# Patient Record
Sex: FEMALE | Race: WHITE | Employment: UNEMPLOYED | URBAN - METROPOLITAN AREA
[De-identification: names, ages, dates, MRNs, and addresses within clinical notes are randomized per-mention and may not be internally consistent; named-entity substitution may affect disease eponyms.]

---

## 2017-04-03 ENCOUNTER — OFFICE VISIT (OUTPATIENT)
Dept: SURGERY | Age: 37
End: 2017-04-03

## 2017-04-03 VITALS
HEIGHT: 63 IN | DIASTOLIC BLOOD PRESSURE: 85 MMHG | WEIGHT: 170 LBS | BODY MASS INDEX: 30.12 KG/M2 | SYSTOLIC BLOOD PRESSURE: 134 MMHG | HEART RATE: 85 BPM

## 2017-04-03 DIAGNOSIS — B35.1 ONYCHOMYCOSIS: Primary | ICD-10-CM

## 2017-04-03 LAB
ANION GAP SERPL CALCULATED.3IONS-SCNC: 13 MEQ/L (ref 7–13)
BUN BLDV-MCNC: 8 MG/DL (ref 6–20)
CALCIUM SERPL-MCNC: 9 MG/DL (ref 8.6–10.2)
CHLORIDE BLD-SCNC: 102 MEQ/L (ref 98–107)
CO2: 21 MEQ/L (ref 22–29)
CREAT SERPL-MCNC: 0.48 MG/DL (ref 0.5–0.9)
GFR AFRICAN AMERICAN: >60
GFR NON-AFRICAN AMERICAN: >60
GLUCOSE BLD-MCNC: 221 MG/DL (ref 74–109)
GLUCOSE BLD-MCNC: 300 MG/DL
HBA1C MFR BLD: 7.1 %
POTASSIUM SERPL-SCNC: 4.1 MEQ/L (ref 3.5–5.1)
SODIUM BLD-SCNC: 136 MEQ/L (ref 132–144)

## 2017-04-03 PROCEDURE — 82962 GLUCOSE BLOOD TEST: CPT | Performed by: INTERNAL MEDICINE

## 2017-04-03 PROCEDURE — 83036 HEMOGLOBIN GLYCOSYLATED A1C: CPT | Performed by: INTERNAL MEDICINE

## 2017-04-03 PROCEDURE — 99213 OFFICE O/P EST LOW 20 MIN: CPT | Performed by: INTERNAL MEDICINE

## 2017-04-03 ASSESSMENT — ENCOUNTER SYMPTOMS: VISUAL CHANGE: 0

## 2017-08-22 ENCOUNTER — OFFICE VISIT (OUTPATIENT)
Dept: SURGERY | Age: 37
End: 2017-08-22

## 2017-08-22 VITALS
SYSTOLIC BLOOD PRESSURE: 126 MMHG | WEIGHT: 158 LBS | DIASTOLIC BLOOD PRESSURE: 88 MMHG | BODY MASS INDEX: 28 KG/M2 | HEART RATE: 92 BPM | HEIGHT: 63 IN

## 2017-08-22 LAB
ANION GAP SERPL CALCULATED.3IONS-SCNC: 15 MEQ/L (ref 7–13)
BUN BLDV-MCNC: 6 MG/DL (ref 6–20)
CALCIUM SERPL-MCNC: 9.2 MG/DL (ref 8.6–10.2)
CHLORIDE BLD-SCNC: 99 MEQ/L (ref 98–107)
CO2: 24 MEQ/L (ref 22–29)
CREAT SERPL-MCNC: 0.4 MG/DL (ref 0.5–0.9)
CREATININE URINE: 82.5 MG/DL
GFR AFRICAN AMERICAN: >60
GFR NON-AFRICAN AMERICAN: >60
GLUCOSE BLD-MCNC: 134 MG/DL (ref 74–109)
GLUCOSE BLD-MCNC: 146 MG/DL
HBA1C MFR BLD: 6.6 %
MICROALBUMIN UR-MCNC: <1.2 MG/DL
MICROALBUMIN/CREAT UR-RTO: NORMAL MG/G (ref 0–30)
POTASSIUM SERPL-SCNC: 3.9 MEQ/L (ref 3.5–5.1)
SODIUM BLD-SCNC: 138 MEQ/L (ref 132–144)

## 2017-08-22 PROCEDURE — 83036 HEMOGLOBIN GLYCOSYLATED A1C: CPT | Performed by: INTERNAL MEDICINE

## 2017-08-22 PROCEDURE — 82962 GLUCOSE BLOOD TEST: CPT | Performed by: INTERNAL MEDICINE

## 2017-08-22 PROCEDURE — 99213 OFFICE O/P EST LOW 20 MIN: CPT | Performed by: INTERNAL MEDICINE

## 2017-09-05 ENCOUNTER — HOSPITAL ENCOUNTER (EMERGENCY)
Age: 37
Discharge: HOME | End: 2017-09-06
Payer: COMMERCIAL

## 2017-09-05 DIAGNOSIS — E86.0 DEHYDRATION: ICD-10-CM

## 2017-09-05 DIAGNOSIS — R00.0 TACHYCARDIA: Primary | ICD-10-CM

## 2017-09-05 LAB
ALBUMIN SERPL-MCNC: 3.4 G/DL (ref 3.9–4.9)
ALP BLD-CCNC: 42 U/L (ref 40–130)
ALT SERPL-CCNC: 9 U/L (ref 0–33)
AMPHETAMINE SCREEN, URINE: ABNORMAL
ANION GAP SERPL CALCULATED.3IONS-SCNC: 16 MEQ/L (ref 7–13)
AST SERPL-CCNC: 10 U/L (ref 0–35)
BARBITURATE SCREEN URINE: ABNORMAL
BASOPHILS ABSOLUTE: 0 K/UL (ref 0–0.2)
BASOPHILS RELATIVE PERCENT: 0.3 %
BENZODIAZEPINE SCREEN, URINE: ABNORMAL
BILIRUB SERPL-MCNC: 0.1 MG/DL (ref 0–1.2)
BILIRUBIN URINE: NEGATIVE
BLOOD, URINE: NEGATIVE
BUN BLDV-MCNC: 8 MG/DL (ref 6–20)
CALCIUM SERPL-MCNC: 8.9 MG/DL (ref 8.6–10.2)
CANNABINOID SCREEN URINE: POSITIVE
CHLORIDE BLD-SCNC: 102 MEQ/L (ref 98–107)
CLARITY: CLEAR
CO2: 19 MEQ/L (ref 22–29)
COCAINE METABOLITE SCREEN URINE: ABNORMAL
COLOR: YELLOW
CREAT SERPL-MCNC: 0.44 MG/DL (ref 0.5–0.9)
EOSINOPHILS ABSOLUTE: 0.2 K/UL (ref 0–0.7)
EOSINOPHILS RELATIVE PERCENT: 1.7 %
ETHANOL PERCENT: NORMAL G/DL
ETHANOL: <10 MG/DL (ref 0–0.08)
GFR AFRICAN AMERICAN: >60
GFR NON-AFRICAN AMERICAN: >60
GLOBULIN: 2.4 G/DL (ref 2.3–3.5)
GLUCOSE BLD-MCNC: 219 MG/DL (ref 74–109)
GLUCOSE URINE: >=1000 MG/DL
HCT VFR BLD CALC: 37.1 % (ref 37–47)
HEMOGLOBIN: 13 G/DL (ref 12–16)
KETONES, URINE: NEGATIVE MG/DL
LACTIC ACID: 2.3 MMOL/L (ref 0.5–2.2)
LEUKOCYTE ESTERASE, URINE: NEGATIVE
LYMPHOCYTES ABSOLUTE: 2.1 K/UL (ref 1–4.8)
LYMPHOCYTES RELATIVE PERCENT: 21.4 %
Lab: ABNORMAL
MAGNESIUM: 1.8 MG/DL (ref 1.7–2.3)
MCH RBC QN AUTO: 31.5 PG (ref 27–31.3)
MCHC RBC AUTO-ENTMCNC: 34.9 % (ref 33–37)
MCV RBC AUTO: 90.1 FL (ref 82–100)
MONOCYTES ABSOLUTE: 0.6 K/UL (ref 0.2–0.8)
MONOCYTES RELATIVE PERCENT: 6.1 %
NEUTROPHILS ABSOLUTE: 7 K/UL (ref 1.4–6.5)
NEUTROPHILS RELATIVE PERCENT: 70.5 %
NITRITE, URINE: NEGATIVE
OPIATE SCREEN URINE: ABNORMAL
PDW BLD-RTO: 12.2 % (ref 11.5–14.5)
PH UA: 6 (ref 5–9)
PHENCYCLIDINE SCREEN URINE: ABNORMAL
PLATELET # BLD: 261 K/UL (ref 130–400)
POTASSIUM SERPL-SCNC: 3.9 MEQ/L (ref 3.5–5.1)
PROTEIN UA: NEGATIVE MG/DL
RBC # BLD: 4.12 M/UL (ref 4.2–5.4)
SODIUM BLD-SCNC: 137 MEQ/L (ref 132–144)
SPECIFIC GRAVITY UA: 1.02 (ref 1–1.03)
TOTAL CK: 59 U/L (ref 0–170)
TOTAL PROTEIN: 5.8 G/DL (ref 6.4–8.1)
TRICYCLIC, URINE: ABNORMAL
TROPONIN: <0.01 NG/ML (ref 0–0.01)
URINE REFLEX TO CULTURE: ABNORMAL
UROBILINOGEN, URINE: 0.2 E.U./DL
WBC # BLD: 9.9 K/UL (ref 4.8–10.8)

## 2017-09-05 PROCEDURE — 2580000003 HC RX 258: Performed by: PHYSICIAN ASSISTANT

## 2017-09-05 PROCEDURE — 87040 BLOOD CULTURE FOR BACTERIA: CPT

## 2017-09-05 PROCEDURE — 83735 ASSAY OF MAGNESIUM: CPT

## 2017-09-05 PROCEDURE — 83605 ASSAY OF LACTIC ACID: CPT

## 2017-09-05 PROCEDURE — 84484 ASSAY OF TROPONIN QUANT: CPT

## 2017-09-05 PROCEDURE — 36415 COLL VENOUS BLD VENIPUNCTURE: CPT

## 2017-09-05 PROCEDURE — 93005 ELECTROCARDIOGRAM TRACING: CPT

## 2017-09-05 PROCEDURE — 81003 URINALYSIS AUTO W/O SCOPE: CPT

## 2017-09-05 PROCEDURE — 96361 HYDRATE IV INFUSION ADD-ON: CPT

## 2017-09-05 PROCEDURE — 82550 ASSAY OF CK (CPK): CPT

## 2017-09-05 PROCEDURE — 80307 DRUG TEST PRSMV CHEM ANLYZR: CPT

## 2017-09-05 PROCEDURE — 80053 COMPREHEN METABOLIC PANEL: CPT

## 2017-09-05 PROCEDURE — G0480 DRUG TEST DEF 1-7 CLASSES: HCPCS

## 2017-09-05 PROCEDURE — 99284 EMERGENCY DEPT VISIT MOD MDM: CPT

## 2017-09-05 PROCEDURE — 85025 COMPLETE CBC W/AUTO DIFF WBC: CPT

## 2017-09-05 PROCEDURE — 96360 HYDRATION IV INFUSION INIT: CPT

## 2017-09-05 RX ORDER — 0.9 % SODIUM CHLORIDE 0.9 %
1000 INTRAVENOUS SOLUTION INTRAVENOUS ONCE
Status: COMPLETED | OUTPATIENT
Start: 2017-09-05 | End: 2017-09-06

## 2017-09-05 RX ADMIN — SODIUM CHLORIDE 1000 ML: 9 INJECTION, SOLUTION INTRAVENOUS at 23:04

## 2017-09-05 ASSESSMENT — ENCOUNTER SYMPTOMS
EYE PAIN: 0
COUGH: 0
CHOKING: 0
SHORTNESS OF BREATH: 0
ABDOMINAL PAIN: 0
ALLERGIC/IMMUNOLOGIC NEGATIVE: 1
COLOR CHANGE: 0
APNEA: 0
TROUBLE SWALLOWING: 0

## 2017-09-06 VITALS
HEIGHT: 63 IN | OXYGEN SATURATION: 99 % | BODY MASS INDEX: 29.23 KG/M2 | DIASTOLIC BLOOD PRESSURE: 85 MMHG | RESPIRATION RATE: 16 BRPM | TEMPERATURE: 98.5 F | SYSTOLIC BLOOD PRESSURE: 131 MMHG | HEART RATE: 79 BPM | WEIGHT: 165 LBS

## 2017-09-06 LAB
EKG ATRIAL RATE: 80 BPM
EKG P AXIS: 50 DEGREES
EKG P-R INTERVAL: 140 MS
EKG Q-T INTERVAL: 354 MS
EKG QRS DURATION: 92 MS
EKG QTC CALCULATION (BAZETT): 408 MS
EKG R AXIS: 74 DEGREES
EKG T AXIS: 44 DEGREES
EKG VENTRICULAR RATE: 80 BPM

## 2017-09-06 PROCEDURE — 2580000003 HC RX 258: Performed by: PHYSICIAN ASSISTANT

## 2017-09-06 RX ORDER — 0.9 % SODIUM CHLORIDE 0.9 %
1000 INTRAVENOUS SOLUTION INTRAVENOUS ONCE
Status: COMPLETED | OUTPATIENT
Start: 2017-09-06 | End: 2017-09-06

## 2017-09-06 RX ADMIN — SODIUM CHLORIDE 1000 ML: 9 INJECTION, SOLUTION INTRAVENOUS at 00:38

## 2017-09-11 LAB
BLOOD CULTURE, ROUTINE: NORMAL
CULTURE, BLOOD 2: NORMAL

## 2017-09-22 ENCOUNTER — APPOINTMENT (OUTPATIENT)
Dept: GENERAL RADIOLOGY | Age: 37
End: 2017-09-22
Payer: COMMERCIAL

## 2017-09-22 ENCOUNTER — HOSPITAL ENCOUNTER (EMERGENCY)
Age: 37
Discharge: HOME | End: 2017-09-22
Attending: EMERGENCY MEDICINE
Payer: COMMERCIAL

## 2017-09-22 VITALS
RESPIRATION RATE: 23 BRPM | HEART RATE: 100 BPM | OXYGEN SATURATION: 98 % | HEIGHT: 63 IN | DIASTOLIC BLOOD PRESSURE: 79 MMHG | WEIGHT: 167 LBS | TEMPERATURE: 98.8 F | BODY MASS INDEX: 29.59 KG/M2 | SYSTOLIC BLOOD PRESSURE: 135 MMHG

## 2017-09-22 DIAGNOSIS — I47.1 PAROXYSMAL SUPRAVENTRICULAR TACHYCARDIA (HCC): Primary | ICD-10-CM

## 2017-09-22 LAB
ALBUMIN SERPL-MCNC: 3.8 G/DL (ref 3.9–4.9)
ALP BLD-CCNC: 41 U/L (ref 40–130)
ALT SERPL-CCNC: 11 U/L (ref 0–33)
ANION GAP SERPL CALCULATED.3IONS-SCNC: 16 MEQ/L (ref 7–13)
AST SERPL-CCNC: 12 U/L (ref 0–35)
BASOPHILS ABSOLUTE: 0 K/UL (ref 0–0.2)
BASOPHILS RELATIVE PERCENT: 0.2 %
BILIRUB SERPL-MCNC: 0.3 MG/DL (ref 0–1.2)
BUN BLDV-MCNC: 8 MG/DL (ref 6–20)
CALCIUM SERPL-MCNC: 9.6 MG/DL (ref 8.6–10.2)
CHLORIDE BLD-SCNC: 99 MEQ/L (ref 98–107)
CO2: 19 MEQ/L (ref 22–29)
CREAT SERPL-MCNC: 0.45 MG/DL (ref 0.5–0.9)
EOSINOPHILS ABSOLUTE: 0.3 K/UL (ref 0–0.7)
EOSINOPHILS RELATIVE PERCENT: 2 %
GFR AFRICAN AMERICAN: >60
GFR NON-AFRICAN AMERICAN: >60
GLOBULIN: 2.7 G/DL (ref 2.3–3.5)
GLUCOSE BLD-MCNC: 159 MG/DL (ref 74–109)
GLUCOSE BLD-MCNC: 172 MG/DL (ref 60–115)
HCT VFR BLD CALC: 41.3 % (ref 37–47)
HEMOGLOBIN: 13.9 G/DL (ref 12–16)
LYMPHOCYTES ABSOLUTE: 1.8 K/UL (ref 1–4.8)
LYMPHOCYTES RELATIVE PERCENT: 14.6 %
MAGNESIUM: 2 MG/DL (ref 1.7–2.3)
MCH RBC QN AUTO: 30.8 PG (ref 27–31.3)
MCHC RBC AUTO-ENTMCNC: 33.6 % (ref 33–37)
MCV RBC AUTO: 91.5 FL (ref 82–100)
MONOCYTES ABSOLUTE: 0.8 K/UL (ref 0.2–0.8)
MONOCYTES RELATIVE PERCENT: 6 %
NEUTROPHILS ABSOLUTE: 9.8 K/UL (ref 1.4–6.5)
NEUTROPHILS RELATIVE PERCENT: 77.2 %
PDW BLD-RTO: 12.8 % (ref 11.5–14.5)
PERFORMED ON: ABNORMAL
PLATELET # BLD: 261 K/UL (ref 130–400)
POTASSIUM SERPL-SCNC: 3.9 MEQ/L (ref 3.5–5.1)
RBC # BLD: 4.51 M/UL (ref 4.2–5.4)
SODIUM BLD-SCNC: 134 MEQ/L (ref 132–144)
TOTAL PROTEIN: 6.5 G/DL (ref 6.4–8.1)
TROPONIN: <0.01 NG/ML (ref 0–0.01)
WBC # BLD: 12.7 K/UL (ref 4.8–10.8)

## 2017-09-22 PROCEDURE — 84484 ASSAY OF TROPONIN QUANT: CPT

## 2017-09-22 PROCEDURE — 85025 COMPLETE CBC W/AUTO DIFF WBC: CPT

## 2017-09-22 PROCEDURE — 93005 ELECTROCARDIOGRAM TRACING: CPT

## 2017-09-22 PROCEDURE — 73140 X-RAY EXAM OF FINGER(S): CPT

## 2017-09-22 PROCEDURE — 80053 COMPREHEN METABOLIC PANEL: CPT

## 2017-09-22 PROCEDURE — 99285 EMERGENCY DEPT VISIT HI MDM: CPT

## 2017-09-22 PROCEDURE — 36415 COLL VENOUS BLD VENIPUNCTURE: CPT

## 2017-09-22 PROCEDURE — 83735 ASSAY OF MAGNESIUM: CPT

## 2017-09-22 RX ORDER — ACETAMINOPHEN 500 MG
500 TABLET ORAL EVERY 6 HOURS PRN
Qty: 120 TABLET | Refills: 3 | Status: SHIPPED | OUTPATIENT
Start: 2017-09-22 | End: 2017-10-14 | Stop reason: ALTCHOICE

## 2017-09-22 RX ORDER — 0.9 % SODIUM CHLORIDE 0.9 %
1000 INTRAVENOUS SOLUTION INTRAVENOUS ONCE
Status: DISCONTINUED | OUTPATIENT
Start: 2017-09-22 | End: 2017-09-22 | Stop reason: HOSPADM

## 2017-09-22 ASSESSMENT — ENCOUNTER SYMPTOMS
DIARRHEA: 0
SORE THROAT: 0
COUGH: 0
VOMITING: 0
SHORTNESS OF BREATH: 0
ABDOMINAL PAIN: 0
BACK PAIN: 0
NAUSEA: 0

## 2017-09-26 LAB
EKG ATRIAL RATE: 103 BPM
EKG P AXIS: 32 DEGREES
EKG P-R INTERVAL: 132 MS
EKG Q-T INTERVAL: 330 MS
EKG QRS DURATION: 82 MS
EKG QTC CALCULATION (BAZETT): 432 MS
EKG R AXIS: 58 DEGREES
EKG T AXIS: 36 DEGREES
EKG VENTRICULAR RATE: 103 BPM

## 2017-10-04 ENCOUNTER — OFFICE VISIT (OUTPATIENT)
Dept: SURGERY | Age: 37
End: 2017-10-04

## 2017-10-04 VITALS
BODY MASS INDEX: 30.83 KG/M2 | SYSTOLIC BLOOD PRESSURE: 118 MMHG | WEIGHT: 174 LBS | DIASTOLIC BLOOD PRESSURE: 78 MMHG | HEIGHT: 63 IN | HEART RATE: 80 BPM

## 2017-10-04 LAB — GLUCOSE BLD-MCNC: 98 MG/DL

## 2017-10-04 PROCEDURE — 82962 GLUCOSE BLOOD TEST: CPT | Performed by: INTERNAL MEDICINE

## 2017-10-04 PROCEDURE — 99213 OFFICE O/P EST LOW 20 MIN: CPT | Performed by: INTERNAL MEDICINE

## 2017-10-04 NOTE — MR AVS SNAPSHOT
118/78 (Site: Left Arm, Position: Sitting, Cuff Size: Medium Adult) 80 5' 3\" (1.6 m) 174 lb (78.9 kg) 05/31/2017 30.82 kg/m2    Smoking Status                   Former Smoker           Additional Information about your Body Mass Index (BMI)           Your BMI as listed above is considered obese (30 or more). BMI is an estimate of body fat, calculated from your height and weight. The higher your BMI, the greater your risk of heart disease, high blood pressure, type 2 diabetes, stroke, gallstones, arthritis, sleep apnea, and certain cancers. BMI is not perfect. It may overestimate body fat in athletes and people who are more muscular. Even a small weight loss (between 5 and 10 percent of your current weight) by decreasing your calorie intake and becoming more physically active will help lower your risk of developing or worsening diseases associated with obesity. Learn more at: [employer]             Today's Medication Changes          These changes are accurate as of: 10/4/17  1:55 PM.  If you have any questions, ask your nurse or doctor. CHANGE how you take these medications           insulin lispro 100 UNIT/ML pen   Commonly known as:  HUMALOG KWIKPEN   Instructions:  10-14  units at each meals   Quantity:  5 Pen   Refills:  3   What changed:  additional instructions   Changed by: David Marcum MD       insulin  UNIT/ML injection pen   Commonly known as:  HUMULIN N KWIKPEN   Instructions:  60 units am and 50 units at dinner   Quantity:  30 Pen   Refills:  3   What changed:  additional instructions   Changed by: David Marcum MD            Where to Get Your Medications      Information about where to get these medications is not yet available     !  Ask your nurse or doctor about these medications     insulin lispro 100 UNIT/ML pen    insulin  UNIT/ML injection pen               Your Current Medications Are medical emergencies, dial 911. For questions regarding your [employer] account call [phone]. If you have a clinical question, please call your doctor's office.

## 2017-10-04 NOTE — PROGRESS NOTES
Pt is pregnant, Disp: 200 strip, Rfl: 3    metFORMIN (GLUCOPHAGE) 500 MG tablet, Take 1 tablet by mouth 2 times daily (with meals), Disp: 60 tablet, Rfl: 3    Insulin Pen Needle (BD PEN NEEDLE EMRE U/F) 32G X 4 MM MISC, Use 3 times a day, Disp: 200 each, Rfl: 03    albuterol sulfate  (90 BASE) MCG/ACT inhaler, Use 2 puffs 4 times daily for 7 days then as needed for wheezing. Dispense with Spacer and instruct in use. At patient's preference may use 60 dose MDI. May Sub Pro-Air or Proventil as needed per insurance., Disp: 1 Inhaler, Rfl: 0    Alcohol Swabs PADS, Use qid, Disp: 100 each, Rfl: 06    FREESTYLE LANCETS MISC, 1 each by Does not apply route daily, Disp: 200 each, Rfl: 3      Review of Systems   Cardiovascular: Positive for palpitations. Seeing cardiologist foe tachycardia    Neurological: Positive for dizziness. Psychiatric/Behavioral: Positive for confusion. Vitals:    10/04/17 1331   BP: 118/78   Site: Left Arm   Position: Sitting   Cuff Size: Medium Adult   Pulse: 80   Weight: 174 lb (78.9 kg)   Height: 5' 3\" (1.6 m)       Objective:   Physical Exam   Constitutional: She is oriented to person, place, and time. She appears well-developed and well-nourished. HENT:   Head: Normocephalic and atraumatic. Cardiovascular: Normal rate. Abdominal:   Obese    Musculoskeletal: Normal range of motion. Neurological: She is alert and oriented to person, place, and time. Skin: Skin is warm and dry. Psychiatric: Her mood appears anxious. Assessment:      1.  Uncontrolled diabetes mellitus type 2 without complications, unspecified long term insulin use status (HCC)  POCT Glucose    HM DIABETES FOOT EXAM    HM DIABETES EYE EXAM    Lipid Panel           Plan:        Orders Placed This Encounter   Procedures    Lipid Panel     Standing Status:   Future     Standing Expiration Date:   10/4/2018     Order Specific Question:   Is Patient Fasting?/# of Hours     Answer:   Yes, 12 Hours  POCT Glucose     DIABETES FOOT EXAM     DIABETES EYE EXAM     Orders Placed This Encounter   Medications    insulin NPH (HUMULIN N KWIKPEN) 100 UNIT/ML injection pen     Si units am and 50 units at dinner     Dispense:  30 Pen     Refill:  3    insulin lispro (HUMALOG KWIKPEN) 100 UNIT/ML pen     Sig: 10-14  units at each meals     Dispense:  5 Pen     Refill:  3       continue metformin 500 mg bid

## 2017-10-11 ENCOUNTER — OFFICE VISIT (OUTPATIENT)
Dept: OBGYN | Age: 37
End: 2017-10-11

## 2017-10-11 VITALS
HEIGHT: 63 IN | SYSTOLIC BLOOD PRESSURE: 134 MMHG | DIASTOLIC BLOOD PRESSURE: 82 MMHG | HEART RATE: 100 BPM | WEIGHT: 175 LBS | BODY MASS INDEX: 31.01 KG/M2

## 2017-10-11 DIAGNOSIS — N91.2 AMENORRHEA: Primary | ICD-10-CM

## 2017-10-11 DIAGNOSIS — Z3A.19 19 WEEKS GESTATION OF PREGNANCY: ICD-10-CM

## 2017-10-11 DIAGNOSIS — O09.892 HX OF PRETERM DELIVERY, CURRENTLY PREGNANT, SECOND TRIMESTER: ICD-10-CM

## 2017-10-11 DIAGNOSIS — O09.92 HIGH-RISK PREGNANCY IN SECOND TRIMESTER: ICD-10-CM

## 2017-10-11 DIAGNOSIS — Z34.82 ENCOUNTER FOR SUPERVISION OF OTHER NORMAL PREGNANCY, SECOND TRIMESTER: ICD-10-CM

## 2017-10-11 DIAGNOSIS — O09.32 LATE PRENATAL CARE AFFECTING PREGNANCY IN SECOND TRIMESTER: ICD-10-CM

## 2017-10-11 DIAGNOSIS — O09.292 HX OF PRE-ECLAMPSIA IN PRIOR PREGNANCY, CURRENTLY PREGNANT, SECOND TRIMESTER: ICD-10-CM

## 2017-10-11 DIAGNOSIS — O34.219 HX SUCCESSFUL VBAC (VAGINAL BIRTH AFTER CESAREAN), CURRENTLY PREGNANT: ICD-10-CM

## 2017-10-11 DIAGNOSIS — Z98.891 HX OF CESAREAN SECTION: ICD-10-CM

## 2017-10-11 DIAGNOSIS — N91.2 AMENORRHEA: ICD-10-CM

## 2017-10-11 DIAGNOSIS — O24.319 MODIFIED WHITE CLASS B PREGESTATIONAL DIABETES MELLITUS: ICD-10-CM

## 2017-10-11 DIAGNOSIS — Z86.79 HISTORY OF PSVT (PAROXYSMAL SUPRAVENTRICULAR TACHYCARDIA): ICD-10-CM

## 2017-10-11 DIAGNOSIS — E66.9 OBESITY (BMI 30-39.9): ICD-10-CM

## 2017-10-11 DIAGNOSIS — O09.292 HISTORY OF FETAL ABNORMALITY IN PREVIOUS PREGNANCY, CURRENTLY PREGNANT, SECOND TRIMESTER: ICD-10-CM

## 2017-10-11 PROBLEM — O09.299 HISTORY OF FETAL ABNORMALITY IN PREVIOUS PREGNANCY, CURRENTLY PREGNANT: Status: ACTIVE | Noted: 2017-10-11

## 2017-10-11 PROBLEM — O09.299 HX OF PRE-ECLAMPSIA IN PRIOR PREGNANCY, CURRENTLY PREGNANT: Status: ACTIVE | Noted: 2017-10-11

## 2017-10-11 PROBLEM — O09.899 HX OF PRETERM DELIVERY, CURRENTLY PREGNANT: Status: ACTIVE | Noted: 2017-10-11

## 2017-10-11 LAB
ABO/RH: NORMAL
ANTIBODY SCREEN: NORMAL
BASOPHILS ABSOLUTE: 0 K/UL (ref 0–0.2)
BASOPHILS RELATIVE PERCENT: 0.1 %
CONTROL: YES
EOSINOPHILS ABSOLUTE: 0.2 K/UL (ref 0–0.7)
EOSINOPHILS RELATIVE PERCENT: 2.6 %
GONADOTROPIN, CHORIONIC (HCG) QUANT: NORMAL MIU/ML
HCT VFR BLD CALC: 38.2 % (ref 37–47)
HEMOGLOBIN: 13.2 G/DL (ref 12–16)
HEPATITIS B SURFACE ANTIGEN INTERPRETATION: NORMAL
LYMPHOCYTES ABSOLUTE: 1.6 K/UL (ref 1–4.8)
LYMPHOCYTES RELATIVE PERCENT: 18.7 %
MCH RBC QN AUTO: 30.8 PG (ref 27–31.3)
MCHC RBC AUTO-ENTMCNC: 34.6 % (ref 33–37)
MCV RBC AUTO: 89.1 FL (ref 82–100)
MONOCYTES ABSOLUTE: 0.6 K/UL (ref 0.2–0.8)
MONOCYTES RELATIVE PERCENT: 6.9 %
NEUTROPHILS ABSOLUTE: 6 K/UL (ref 1.4–6.5)
NEUTROPHILS RELATIVE PERCENT: 71.7 %
PDW BLD-RTO: 13.5 % (ref 11.5–14.5)
PLATELET # BLD: 236 K/UL (ref 130–400)
PREGNANCY TEST URINE, POC: NORMAL
RBC # BLD: 4.28 M/UL (ref 4.2–5.4)
RUBELLA ANTIBODY IGG: 24.3 IU/ML
WBC # BLD: 8.3 K/UL (ref 4.8–10.8)

## 2017-10-11 PROCEDURE — 99214 OFFICE O/P EST MOD 30 MIN: CPT | Performed by: OBSTETRICS & GYNECOLOGY

## 2017-10-11 PROCEDURE — 81025 URINE PREGNANCY TEST: CPT | Performed by: OBSTETRICS & GYNECOLOGY

## 2017-10-11 RX ORDER — ASPIRIN 325 MG
150 TABLET ORAL DAILY
Qty: 30 TABLET | Refills: 3 | Status: ON HOLD | OUTPATIENT
Start: 2017-10-11 | End: 2018-02-08 | Stop reason: HOSPADM

## 2017-10-11 NOTE — PROGRESS NOTES
normoactive  External Genitalia: Normal anatomy, no lesions or masses  Pubic Hair Distribution: Normal pattern and distribution  Pelvic Floor: Normal pelvic support, no significant cystocele or rectocele  Perineum: No fissures, lesions or leukoplakia  Urethra: Normal  Vagina: Moist, pink, supple, no lesions, no abnormal discharge  Cervix: Firm, nontender, no lesions  Uterus: firm, mobile, nontender, no masses or irregularities  Adnexa: Normal; No masses or tenderness bilaterally      Assessment:     1. Amenorrhea    2. Encounter for supervision of other normal pregnancy, second trimester    3. 19 weeks gestation of pregnancy    4. High-risk pregnancy in second trimester    5. 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 History of PSVT (paroxysmal supraventricular tachycardia)    7. Hx of pre-eclampsia in prior pregnancy, currently pregnant, second trimester    8. Hx of  section    9. Hx successful  (vaginal birth after ), currently pregnant    10. Hx of  delivery, currently pregnant, second trimester    6. History of fetal abnormality in previous pregnancy, currently pregnant, second trimester    15. Late prenatal care affecting pregnancy in second trimester    15. Obesity (BMI 30-39. 9)        Plan:     1) MFM referral in process for genetic counseling, fetal anatomy scan, history of gestational diabetes  delivery fetal congenital anomalies  2) started on aspirin 150 mg for prevention of preeclampsia  3) history of paroxysmal supraventricular tachycardia according to patient's history, follows by Dr. Mayank Knight. Currently on no medications. 4)history of P PROM due to polyhydramnios, could be candidate for 17 hydroxyprogesterone intramuscular weekly injections to be assessed by maternal-fetal medicine  5) class B gestational diabetes, following with Dr. Israel Newman endocrinology  6) patient for repeat  section #3      Orders Placed This Encounter   Procedures    C. SHEBA HORIZON     Standing Status:   Future     Standing Expiration Date:   10/11/2018    Prenatal Testing for Fetal Aneuploidy     PANORAMA TEST     Standing Status:   Future     Standing Expiration Date:   10/11/2018    CBC Auto Differential     Standing Status:   Future     Standing Expiration Date:   10/11/2018    Glucose tolerance, 1 hour     Standing Status:   Future     Standing Expiration Date:   10/11/2018    POCT urine pregnancy    Type and screen     Standing Status:   Future     Standing Expiration Date:   10/11/2018        Orders Placed This Encounter   Medications    aspirin (JOSEE ASPIRIN) 325 MG tablet     Sig: Take 0.5 tablets by mouth daily     Dispense:  30 tablet     Refill:  3     Plan:   MD Blanca Zavala  10/11/2017  Patient's last menstrual period was 2017. Brief Counseling DM in pregnancy :   Risks  ? The risk of adverse pregnancy outcome is increased in women with diabetes, but appears to be similar for women with type 1 and type 2 diabetes. Women with type 1 diabetes are more likely to have microvascular disease-related complications than women with type 2 diabetes, and they are at higher risk of developing severe hypo- and hyperglycemia. ?Adverse pregnancy outcomes include miscarriage, congenital anomaly, macrosomia, preeclampsia,  birth,  delivery, and  mortality. Short- and long-term morbidity in offspring is also a concern. Maternal medical risks include progression of retinopathy and nephropathy, diabetic ketoacidosis, and complications related to gastroparesis. ? Hyperglycemia in the periconception period and during the course of pregnancy is the single most important determinant of excess risk of adverse fetal outcome in women with pregestational diabetes. Achieving preconception glycemic control as close to normal as possible while avoiding hypoglycemia is of critical importance.        Risks of obesity in pregnancy  Compared with pregnant women with BMI <25 kg/m2, pregnancies among obese women are at increased risk of several adverse outcomes, including increased rates of early pregnancy loss, congenital anomalies, stillbirth, pregnancy-associated hypertension,  and post-term birth, gestational diabetes mellitus (GDM), multifetal gestation, and birth of a large for gestational age infant. Macrosomia may result in shoulder dystocia and its sequelae (brachial plexus injury) or  delivery.  delivery in obese women is associated with increased rates of wound infection and thromboembolism. Obese pregnant women are also at increased risk for maternal disorders, such as sleep-related breathing disorders, carpal tunnel syndrome, postpartum depression, and venous thromboembolism (VTE). Recommendations for total and rate of weight gain during pregnancy by prepregnancy BMI    Total weight gain Rates of weight gain*  second and third trimester   Prepregnancy BMI Range in kg Range in lb Mean (range) in kg/week Mean (range) in lb/week   Underweight (<18.5 kg/m2) 12.5 to 18 28 to 40 0.51 (0.44 to 0.58) 1 (1 to 1.3)   Normal weight (18.5 to 24.9 kg/m2) 11.5 to 16 25 to 35 0.42 (0.35 to 0.50) 1 (0.8 to 1)   Overweight (25.0 to 29.9 kg/m2) 7 to 11.5 15 to 25 0.28 (0.23 to 0.33) 0.6 (0.5 to 0.7)   Obese (?30.0 kg/m2) 5 to 9 11 to 20 0.22 (0.17 to 0.27) 0.5 (0.4 to 0.6)         INITIAL OBSTETRICAL VISIT EVALUATION:  The patient was seen full history and physical was completed/reviewed. Cytology was collected for patients over 24years of age. Cultures were collected. The patient was counseled on office policies and she was counseled on termination of pregnancy in the state of [place]. The patient was counseled on Toxoplasmosis, HIV, Tobacco Abuse, Group Beta Strep Infections, Cystic Fibrosis,  Labor precautions and Sickle Cell disease. The patient was counseled on the risks of tobacco abuse.  Both maternal and fetal. She was instructed to stop smoking if currently using tobacco. Morbidity, mortality, and cessation programs were reviewed. The risks include but are not limited to increased risks of  labor,  delivery, premature rupture of membranes, intrauterine growth restriction, intrauterine fetal demise and abruptio placenta. Secondary smoke risks were also reviewed. Increases in cancer, respiratory problems, and sudden infant death syndrome were reviewed as well. The patient was informed of a 2-4% risk of congenital anomalies in the general population. She was also informed that karyotyping is the only way to evaluate the fetus for genetic problems and genetic lethal anomalies. Chorionic villous sampling, amniocentesis and Maternal Genetic Blood Sampling-(NIPT Testing) were also discussed with morbidity rates in detail. She requested any of the options. Route of delivery and counseling on vaginal, operative vaginal, and  sections were completed with the risks of each to both the patient as well as her baby. The possibility of a blood transfusion was discussed as well. The patient was not opposed to receiving a transfusion if needed. Nuchal translucency and MSAFP single marker testing was reviewed in detail with attention to timing of testing and their windows. For patients beyond the gestational age for Nuchal translucency evaluation Quad testing was recommended. Timing for the Quad test was reviewed. Benefits of the above testing was reviewed. A second trimester amniocentesis was also made available to the patient. Risks, Benefits and non-invasive alternative testing was reviewed.      The literature regarding a questionable link to pitocin augmentation and induction of labor, the assistance of labor contractions and the initiation of contractions to help delivery, have been reviewed with the patient regarding the increased potential of having a  with Attention Deficit Hyperactivity Disorder and or Autism. These two disorders and the ramifications of their impact on a child and the family caring for that child has been reviewed with the patient in detail. She was given the risks, benefits and alternatives of the use of this medication. She has agreed to its use in the delivery of her unborn child if needed at the time of delivery, Yes. The patient was questioned in detail regarding any genetic misnomer history, chromosomal abnormalities, or learning disabilities in  herself, the father of the baby or their families. SHE DENIED ANY HISTORY AS STATED ABOVE: Yes    Upon completion of the visit all questions were answered and the patients follow-up and testing schedule were reviewed. Prenatal vitamins were given. Patient was seen with total face to face time of 45 minutes. More than 50% of this visit was counseling and education regarding The primary encounter diagnosis was Amenorrhea. 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 9) were also pertinent to this visit. and Amenorrhea (LMP approximately 17)   as well as  counseling on preventative health maintenance follow-up. Initial labs drawn. Prenatal vitamins. Problem list reviewed and updated.   Counseled about QUAD/ NIPT  Role of ultrasound in pregnancy discussed  Follow-up in 2 weeks  Finish labs and return in 2 weeks    Ino Sun M.D., ARIELLE G

## 2017-10-12 DIAGNOSIS — N91.2 AMENORRHEA: ICD-10-CM

## 2017-10-12 LAB
BILIRUBIN URINE: NEGATIVE
BLOOD, URINE: NEGATIVE
CLARITY: ABNORMAL
COLOR: YELLOW
GLUCOSE URINE: >=1000 MG/DL
HEMOGLOBIN A-1 QUANTITATION: 95.6 % (ref 95–97.9)
HEMOGLOBIN A2 QUANTITATION: 3.9 % (ref 2–3.5)
HEMOGLOBIN C QUANTITATION: 0 % (ref 0–0)
HEMOGLOBIN E QUANTITATION: 0 % (ref 0–0)
HEMOGLOBIN ELECTROPHORESIS: ABNORMAL
HEMOGLOBIN EVALUATION: ABNORMAL
HEMOGLOBIN F QUANTITATION: 0.5 % (ref 0–2.1)
HEMOGLOBIN OTHER: 0 % (ref 0–0)
HEMOGLOBIN S QUANTITATION: 0 % (ref 0–0)
KETONES, URINE: NEGATIVE MG/DL
LEUKOCYTE ESTERASE, URINE: NEGATIVE
NITRITE, URINE: NEGATIVE
PH UA: 6 (ref 5–9)
PROTEIN UA: NEGATIVE MG/DL
RPR: NORMAL
SICKLE CELL: ABNORMAL
SPECIFIC GRAVITY UA: 1.01 (ref 1–1.03)
UROBILINOGEN, URINE: 0.2 E.U./DL

## 2017-10-13 LAB
HIV-1 AND HIV-2 ANTIBODIES: NEGATIVE
VZV IGG SER QL IA: 495 IV

## 2017-10-14 ENCOUNTER — HOSPITAL ENCOUNTER (EMERGENCY)
Age: 37
Discharge: HOME | End: 2017-10-14
Attending: EMERGENCY MEDICINE
Payer: COMMERCIAL

## 2017-10-14 VITALS
RESPIRATION RATE: 14 BRPM | DIASTOLIC BLOOD PRESSURE: 83 MMHG | HEART RATE: 87 BPM | HEIGHT: 63 IN | BODY MASS INDEX: 31.01 KG/M2 | OXYGEN SATURATION: 100 % | TEMPERATURE: 98 F | WEIGHT: 175 LBS | SYSTOLIC BLOOD PRESSURE: 139 MMHG

## 2017-10-14 DIAGNOSIS — M62.838 TRAPEZIUS MUSCLE SPASM: Primary | ICD-10-CM

## 2017-10-14 LAB
AMPHETAMINE SCREEN, URINE: NEGATIVE NG/ML
BARBITURATE SCREEN URINE: NEGATIVE NG/ML
BENZODIAZEPINE SCREEN, URINE: NEGATIVE NG/ML
CANNABINOID SCREEN URINE: NEGATIVE NG/ML
COCAINE METABOLITE SCREEN URINE: NEGATIVE NG/ML
CREATININE URINE: 34.1 MG/DL (ref 20–400)
Lab: NORMAL
MDMA URINE: NEGATIVE NG/ML
METHADONE SCREEN, URINE: NEGATIVE NG/ML
OPIATE SCREEN URINE: NEGATIVE NG/ML
ORGANISM: ABNORMAL
OXYCODONE SCREEN URINE: NEGATIVE NG/ML
PHENCYCLIDINE SCREEN URINE: NEGATIVE NG/ML
PROPOXYPHENE SCREEN: NEGATIVE NG/ML
URINE CULTURE, ROUTINE: ABNORMAL
URINE CULTURE, ROUTINE: ABNORMAL

## 2017-10-14 PROCEDURE — 99282 EMERGENCY DEPT VISIT SF MDM: CPT

## 2017-10-14 RX ORDER — ACETAMINOPHEN 500 MG
500 TABLET ORAL EVERY 6 HOURS PRN
Qty: 60 TABLET | Refills: 0 | Status: SHIPPED | OUTPATIENT
Start: 2017-10-14 | End: 2017-12-01

## 2017-10-14 RX ORDER — CYCLOBENZAPRINE HCL 10 MG
10 TABLET ORAL 2 TIMES DAILY PRN
Qty: 10 TABLET | Refills: 0 | Status: SHIPPED | OUTPATIENT
Start: 2017-10-14 | End: 2017-10-19

## 2017-10-14 ASSESSMENT — ENCOUNTER SYMPTOMS
VOMITING: 0
SORE THROAT: 0
DIARRHEA: 0
ABDOMINAL PAIN: 0
NAUSEA: 0
COUGH: 0
SHORTNESS OF BREATH: 0
BACK PAIN: 0

## 2017-10-14 ASSESSMENT — PAIN DESCRIPTION - PAIN TYPE: TYPE: ACUTE PAIN

## 2017-10-14 ASSESSMENT — PAIN DESCRIPTION - FREQUENCY: FREQUENCY: CONTINUOUS

## 2017-10-14 ASSESSMENT — PAIN SCALES - GENERAL: PAINLEVEL_OUTOF10: 8

## 2017-10-14 ASSESSMENT — PAIN DESCRIPTION - LOCATION: LOCATION: NECK

## 2017-10-16 ENCOUNTER — HOSPITAL ENCOUNTER (OUTPATIENT)
Dept: ULTRASOUND IMAGING | Age: 37
Discharge: HOME | End: 2017-10-16
Payer: COMMERCIAL

## 2017-10-16 DIAGNOSIS — Z34.82 ENCOUNTER FOR SUPERVISION OF OTHER NORMAL PREGNANCY, SECOND TRIMESTER: ICD-10-CM

## 2017-10-16 DIAGNOSIS — N91.2 AMENORRHEA: ICD-10-CM

## 2017-10-16 LAB — PAP SMEAR: NORMAL

## 2017-10-16 PROCEDURE — 76805 OB US >/= 14 WKS SNGL FETUS: CPT

## 2017-10-18 LAB
EER NON INVASIVE PRENATAL ANEUPLOIDY: NORMAL
FETAL FRACTION: 12.8
FETAL GENDER: NORMAL
GESTATIONAL AGE (DAYS): 0
GESTATIONAL AGE(WEEKS): 19
Lab: NORMAL
MATERNAL WEIGHT: 175
MONOSOMY X: NORMAL
REPORT FETUS GENDER: YES
TRIPLOIDY (VANISHING TWIN): NORMAL
TRISOMY 13 RISK: NORMAL
TRISOMY 18 RISK ASSESSMENT: NORMAL
TRISOMY 21 RISK: NORMAL

## 2017-10-27 RX ORDER — NITROFURANTOIN 25; 75 MG/1; MG/1
100 CAPSULE ORAL 2 TIMES DAILY
Qty: 20 CAPSULE | Refills: 0 | Status: SHIPPED | OUTPATIENT
Start: 2017-10-27 | End: 2017-11-06

## 2017-10-30 ENCOUNTER — INITIAL PRENATAL (OUTPATIENT)
Dept: OBGYN | Age: 37
End: 2017-10-30

## 2017-10-30 VITALS — SYSTOLIC BLOOD PRESSURE: 120 MMHG | DIASTOLIC BLOOD PRESSURE: 62 MMHG | HEART RATE: 100 BPM

## 2017-10-30 DIAGNOSIS — O09.292 HX OF PRE-ECLAMPSIA IN PRIOR PREGNANCY, CURRENTLY PREGNANT, SECOND TRIMESTER: ICD-10-CM

## 2017-10-30 DIAGNOSIS — O09.92 HIGH-RISK PREGNANCY IN SECOND TRIMESTER: ICD-10-CM

## 2017-10-30 DIAGNOSIS — Z86.79 HISTORY OF PSVT (PAROXYSMAL SUPRAVENTRICULAR TACHYCARDIA): ICD-10-CM

## 2017-10-30 DIAGNOSIS — Z34.82 ENCOUNTER FOR SUPERVISION OF OTHER NORMAL PREGNANCY IN SECOND TRIMESTER: Primary | ICD-10-CM

## 2017-10-30 DIAGNOSIS — O09.892 HX OF PRETERM DELIVERY, CURRENTLY PREGNANT, SECOND TRIMESTER: ICD-10-CM

## 2017-10-30 DIAGNOSIS — Z3A.21 21 WEEKS GESTATION OF PREGNANCY: ICD-10-CM

## 2017-10-30 DIAGNOSIS — O24.319 MODIFIED WHITE CLASS B PREGESTATIONAL DIABETES MELLITUS: ICD-10-CM

## 2017-10-30 DIAGNOSIS — O09.292 HISTORY OF FETAL ABNORMALITY IN PREVIOUS PREGNANCY, CURRENTLY PREGNANT, SECOND TRIMESTER: ICD-10-CM

## 2017-10-30 DIAGNOSIS — Z98.891 HX OF CESAREAN SECTION: ICD-10-CM

## 2017-10-30 DIAGNOSIS — O34.219 HX SUCCESSFUL VBAC (VAGINAL BIRTH AFTER CESAREAN), CURRENTLY PREGNANT: ICD-10-CM

## 2017-10-30 PROCEDURE — 99213 OFFICE O/P EST LOW 20 MIN: CPT | Performed by: OBSTETRICS & GYNECOLOGY

## 2017-10-30 NOTE — PROGRESS NOTES
Initial OB visit      Joie Goodell is a 40y.o. year old female [id number] @ 21.5 weeks by L 2017, MACHELLE 3/7/2018 . POB: SAB  X 2            FTLTCS @ 38 wks , female , 9lbs , induction due to Pre-eclampsia, gestational diabetes , arrest of descent .               @ 33 weeks , PPROM with labor due to congenital anomalies, polyhydramnios, GDMA2. Was followed at [facility] with MFM ,              SAB x 2. FTLTCS X 2 @ 36 wks , male , Alessandro Mohamud . After which patient was diagnosed with diabetes in postpartum and is currently on insulin.  . NB diagnosed with cardiomegaly due to diabetes. PGYN: History of genital herpes ? Last outbreak age 13 yo . PMH: IDDM - hUMULIN N 50 in AM and 50 in PM ,             Hx SVTs  ? Follows with DR. Ina Mueller - last seen \" few weeks ago\" - next appointment 10/18    PSH: C/S X 2     MEDS: [facility]. Drug Allergies: neg     SOCHX: quit smoking recently . FH: Mother or Father , DM Yes , HTN Yes, Other No    /62   Pulse 100   LMP 2017   Past Medical History:   Diagnosis Date    Hypercholesterolemia     Irregular heart beat     runs of v tach in the past    Obesity     Pregnancy induced hypertension     Type II or unspecified type diabetes mellitus without mention of complication, not stated as uncontrolled      Past Surgical History:   Procedure Laterality Date     SECTION           Review of Systems  Constitutional: negative  Genitourinary:see above  Integument/breast: negative  Behavioral/Psych: negative  Endocrine: negative    All other systems reviewed and are negative. Physical Exam:  /62   Pulse 100   LMP 2017   Physical Exam   Constitutional: She is well-developed, well-nourished, and in no distress. Cardiovascular: Normal rate and regular rhythm. Pulmonary/Chest: Effort normal and breath sounds normal.   Abdominal: Soft.  Bowel sounds are normal.   Gravid uterus :     HOF: Women with type 1 diabetes are more likely to have microvascular disease-related complications than women with type 2 diabetes, and they are at higher risk of developing severe hypo- and hyperglycemia. ?Adverse pregnancy outcomes include miscarriage, congenital anomaly, macrosomia, preeclampsia,  birth,  delivery, and  mortality. Short- and long-term morbidity in offspring is also a concern. Maternal medical risks include progression of retinopathy and nephropathy, diabetic ketoacidosis, and complications related to gastroparesis. ? Hyperglycemia in the periconception period and during the course of pregnancy is the single most important determinant of excess risk of adverse fetal outcome in women with pregestational diabetes. Achieving preconception glycemic control as close to normal as possible while avoiding hypoglycemia is of critical importance. Risks of obesity in pregnancy  Compared with pregnant women with BMI <25 kg/m2, pregnancies among obese women are at increased risk of several adverse outcomes, including increased rates of early pregnancy loss, congenital anomalies, stillbirth, pregnancy-associated hypertension,  and post-term birth, gestational diabetes mellitus (GDM), multifetal gestation, and birth of a large for gestational age infant. Macrosomia may result in shoulder dystocia and its sequelae (brachial plexus injury) or  delivery.  delivery in obese women is associated with increased rates of wound infection and thromboembolism. Obese pregnant women are also at increased risk for maternal disorders, such as sleep-related breathing disorders, carpal tunnel syndrome, postpartum depression, and venous thromboembolism (VTE).      Recommendations for total and rate of weight gain during pregnancy by prepregnancy BMI    Total weight gain Rates of weight gain*  second and third trimester   Prepregnancy BMI Range in kg Range in lb Mean (range) in kg/week Mean (range) in lb/week   Underweight (<18.5 kg/m2) 12.5 to 18 28 to 40 0.51 (0.44 to 0.58) 1 (1 to 1.3)   Normal weight (18.5 to 24.9 kg/m2) 11.5 to 16 25 to 35 0.42 (0.35 to 0.50) 1 (0.8 to 1)   Overweight (25.0 to 29.9 kg/m2) 7 to 11.5 15 to 25 0.28 (0.23 to 0.33) 0.6 (0.5 to 0.7)   Obese (?30.0 kg/m2) 5 to 9 11 to 20 0.22 (0.17 to 0.27) 0.5 (0.4 to 0.6)      return in 3 weeks    Genna Harper M.D., F.A.C.O. G

## 2017-11-02 ENCOUNTER — HOSPITAL ENCOUNTER (EMERGENCY)
Age: 37
Discharge: HOME | End: 2017-11-02
Payer: COMMERCIAL

## 2017-11-02 ENCOUNTER — TELEPHONE (OUTPATIENT)
Dept: OBGYN | Age: 37
End: 2017-11-02

## 2017-11-02 VITALS
BODY MASS INDEX: 32.43 KG/M2 | HEIGHT: 63 IN | OXYGEN SATURATION: 98 % | DIASTOLIC BLOOD PRESSURE: 77 MMHG | RESPIRATION RATE: 18 BRPM | WEIGHT: 183 LBS | HEART RATE: 94 BPM | SYSTOLIC BLOOD PRESSURE: 147 MMHG | TEMPERATURE: 98.7 F

## 2017-11-02 DIAGNOSIS — B02.9 HERPES ZOSTER WITHOUT COMPLICATION: Primary | ICD-10-CM

## 2017-11-02 PROCEDURE — 99282 EMERGENCY DEPT VISIT SF MDM: CPT

## 2017-11-02 RX ORDER — VALACYCLOVIR HYDROCHLORIDE 1 G/1
1000 TABLET, FILM COATED ORAL 3 TIMES DAILY
Qty: 21 TABLET | Refills: 0 | Status: SHIPPED | OUTPATIENT
Start: 2017-11-02 | End: 2017-11-09

## 2017-11-02 ASSESSMENT — PAIN DESCRIPTION - LOCATION: LOCATION: NECK

## 2017-11-02 ASSESSMENT — ENCOUNTER SYMPTOMS
COUGH: 0
SORE THROAT: 0
PHOTOPHOBIA: 0
BACK PAIN: 0
TROUBLE SWALLOWING: 0
ABDOMINAL PAIN: 0
SHORTNESS OF BREATH: 0
VOMITING: 0

## 2017-11-02 ASSESSMENT — PAIN DESCRIPTION - FREQUENCY: FREQUENCY: CONTINUOUS

## 2017-11-02 ASSESSMENT — PAIN DESCRIPTION - ORIENTATION: ORIENTATION: LEFT

## 2017-11-02 ASSESSMENT — PAIN DESCRIPTION - DESCRIPTORS: DESCRIPTORS: DISCOMFORT

## 2017-11-02 ASSESSMENT — PAIN SCALES - GENERAL: PAINLEVEL_OUTOF10: 5

## 2017-11-02 NOTE — TELEPHONE ENCOUNTER
Patient wanted to let the doctor know about her recent BP readings. 147/77 and 141/84. She wants to know if these are okay?

## 2017-11-02 NOTE — ED PROVIDER NOTES
[address] [facility] ED  eMERGENCY dEPARTMENT eNCOUnter      Pt Name: Timo Garcia  MRN: [id number]  [place] 1980  Date of evaluation: 2017  Provider: Yunior Singer PA-C      HISTORY OF PRESENT ILLNESS    HPI  Timo Garcia is a 40 y.o. female, who presents for evaluation of A blistered, red, painful rash to the left side of the neck times one day. Patient is approximately 23 weeks pregnant. She states that she gets frequent herpes outbreaks in this area for which she takes Valtrex, but she is out of her prescription was unsure if it was safe in pregnancy. She denies cough, congestion, shortness of breath. REVIEW OF SYSTEMS       Review of Systems   Constitutional: Negative for chills and fever. HENT: Negative for ear pain, sore throat and trouble swallowing. Eyes: Negative for photophobia and visual disturbance. Respiratory: Negative for cough and shortness of breath. Cardiovascular: Negative for chest pain, palpitations and leg swelling. Gastrointestinal: Negative for abdominal pain and vomiting. Genitourinary: Negative for difficulty urinating, dysuria, flank pain and hematuria. Musculoskeletal: Negative for back pain. Skin: Positive for rash. Neurological: Negative for syncope, speech difficulty, numbness and headaches. Psychiatric/Behavioral: Negative for agitation, confusion and hallucinations.          PAST MEDICAL HISTORY     Past Medical History:   Diagnosis Date    Hypercholesterolemia     Irregular heart beat     runs of v tach in the past    Obesity     Pregnancy induced hypertension     Type II or unspecified type diabetes mellitus without mention of complication, not stated as uncontrolled          SURGICAL HISTORY       Past Surgical History:   Procedure Laterality Date     SECTION           CURRENT MEDICATIONS       Previous Medications    ACETAMINOPHEN (APAP EXTRA STRENGTH) 500 MG TABLET    Take 1 tablet by mouth every 6 hours as Constitutional: She is oriented to person, place, and time. No distress. HENT:   Head: Normocephalic and atraumatic. Eyes: EOM are normal. Pupils are equal, round, and reactive to light. Neck: Neck supple. Cardiovascular: Normal rate and regular rhythm. Pulmonary/Chest: Breath sounds normal. No respiratory distress. Abdominal: Soft. Bowel sounds are normal. There is no tenderness. Musculoskeletal: Normal range of motion. She exhibits no edema. Neurological: She is alert and oriented to person, place, and time. Skin: Skin is warm and dry. Rash noted. Rash is vesicular (White vesicles on erythematous base to the left side of the neck approximately 1 cm x 2 cm). Nursing note and vitals reviewed. Procedures      MDM:   Vitals:    Vitals:    11/02/17 1249   BP: (!) 147/77   Pulse: 94   Resp: 18   Temp: 98.7 °F (37.1 °C)   SpO2: 98%   Weight: 183 lb (83 kg)   Height: 5' 3\" (1.6 m)       I discussed the patient this is likely herpes outbreak, as she has experienced in the past.  Valtrex is category B in pregnancy, and she is prescribed this. I asked that she follow up with her ObGyn managing pregnancy for further evaluation management. She is agreeable to plan. Standard anticipatory guidance given to patient upon discharge. I have given them a specific time frame in which to follow-up and who to follow-up with. I have also advised them that they should return to the emergency department if they get worse, or not getting better or develop any new or concerning symptoms. Patient demonstrates understanding. FINAL IMPRESSION      1.  Herpes zoster without complication          DISPOSITION/PLAN   DISPOSITION Decision to Discharge    PATIENT REFERRED TO:  your PCP    In 2 days        DISCHARGE MEDICATIONS:  New Prescriptions    VALACYCLOVIR (VALTREX) 1 G TABLET    Take 1 tablet by mouth 3 times daily for 7 days            Hayde Shahid PA-C (electronically signed)  Emergency

## 2017-11-03 ENCOUNTER — OFFICE VISIT (OUTPATIENT)
Dept: SURGERY | Age: 37
End: 2017-11-03

## 2017-11-03 VITALS
BODY MASS INDEX: 32.6 KG/M2 | WEIGHT: 184 LBS | DIASTOLIC BLOOD PRESSURE: 64 MMHG | HEART RATE: 88 BPM | HEIGHT: 63 IN | SYSTOLIC BLOOD PRESSURE: 120 MMHG

## 2017-11-03 LAB — GLUCOSE BLD-MCNC: 153 MG/DL

## 2017-11-03 PROCEDURE — G8427 DOCREV CUR MEDS BY ELIG CLIN: HCPCS | Performed by: INTERNAL MEDICINE

## 2017-11-03 PROCEDURE — 3044F HG A1C LEVEL LT 7.0%: CPT | Performed by: INTERNAL MEDICINE

## 2017-11-03 PROCEDURE — G8417 CALC BMI ABV UP PARAM F/U: HCPCS | Performed by: INTERNAL MEDICINE

## 2017-11-03 PROCEDURE — G8484 FLU IMMUNIZE NO ADMIN: HCPCS | Performed by: INTERNAL MEDICINE

## 2017-11-03 PROCEDURE — 82962 GLUCOSE BLOOD TEST: CPT | Performed by: INTERNAL MEDICINE

## 2017-11-03 PROCEDURE — 99213 OFFICE O/P EST LOW 20 MIN: CPT | Performed by: INTERNAL MEDICINE

## 2017-11-03 PROCEDURE — 1036F TOBACCO NON-USER: CPT | Performed by: INTERNAL MEDICINE

## 2017-11-03 NOTE — PROGRESS NOTES
Subjective:      Patient ID: Sherly Wilson is a 40 y.o. female. Diabetes   She presents for her follow-up diabetic visit. She has type 2 diabetes mellitus. Her disease course has been stable. Hypoglycemia symptoms include confusion, dizziness and sweats. Symptoms are stable. Risk factors for coronary artery disease include sedentary lifestyle and diabetes mellitus. Current diabetic treatment includes oral agent (monotherapy). She is compliant with treatment all of the time. She is currently taking insulin pre-breakfast, pre-lunch, pre-dinner and at bedtime. Insulin injections are given by patient. Her weight is fluctuating minimally. She is following a generally healthy diet. Meal planning includes avoidance of concentrated sweets. She participates in exercise intermittently. Frequency home blood tests: 2-3 /day. Blood glucose monitoring compliance is inadequate. Her home blood glucose trend is fluctuating minimally. Her breakfast blood glucose range is generally  mg/dl. Her highest blood glucose is 180-200 mg/dl. Her overall blood glucose range is 130-140 mg/dl. (  Pt 23 weeks pregnant) She sees a podiatrist (roberto).         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 Seeing cardiologist for tachycardia    Neurological: Positive for dizziness. Psychiatric/Behavioral: Positive for confusion. Vitals:    17 1322   BP: 120/64   Site: Right Arm   Position: Sitting   Cuff Size: Medium Adult   Pulse: 88   Weight: 184 lb (83.5 kg)   Height: 5' 3\" (1.6 m)       Objective:   Physical Exam   Constitutional: She is oriented to person, place, and time. She appears well-developed and well-nourished. HENT:   Head: Normocephalic and atraumatic. Cardiovascular: Normal rate. Abdominal:   Obese    Musculoskeletal: Normal range of motion. Neurological: She is alert and oriented to person, place, and time. Skin: Skin is warm and dry. Psychiatric: Her mood appears anxious. Assessment:      1.  Uncontrolled diabetes mellitus type 2 without complications, unspecified long term insulin use status (HCC)  POCT Glucose           Plan:        continue humulin n and metformin at current dose   Orders Placed This Encounter   Medications    insulin lispro (HUMALOG KWIKPEN) 100 UNIT/ML pen     Si-16  units at each meals     Dispense:  5 Pen     Refill:  3

## 2017-11-20 ENCOUNTER — ROUTINE PRENATAL (OUTPATIENT)
Dept: OBGYN | Age: 37
End: 2017-11-20

## 2017-11-20 VITALS
HEART RATE: 104 BPM | BODY MASS INDEX: 33.48 KG/M2 | WEIGHT: 189 LBS | DIASTOLIC BLOOD PRESSURE: 68 MMHG | SYSTOLIC BLOOD PRESSURE: 108 MMHG

## 2017-11-20 DIAGNOSIS — O09.292 HISTORY OF FETAL ABNORMALITY IN PREVIOUS PREGNANCY, CURRENTLY PREGNANT, SECOND TRIMESTER: ICD-10-CM

## 2017-11-20 DIAGNOSIS — O09.892 HX OF PRETERM DELIVERY, CURRENTLY PREGNANT, SECOND TRIMESTER: ICD-10-CM

## 2017-11-20 DIAGNOSIS — Z98.891 HX OF CESAREAN SECTION: ICD-10-CM

## 2017-11-20 DIAGNOSIS — O24.319 MODIFIED WHITE CLASS B PREGESTATIONAL DIABETES MELLITUS: ICD-10-CM

## 2017-11-20 DIAGNOSIS — Z86.79 HISTORY OF PSVT (PAROXYSMAL SUPRAVENTRICULAR TACHYCARDIA): ICD-10-CM

## 2017-11-20 DIAGNOSIS — O09.292 HX OF PRE-ECLAMPSIA IN PRIOR PREGNANCY, CURRENTLY PREGNANT, SECOND TRIMESTER: ICD-10-CM

## 2017-11-20 DIAGNOSIS — Z3A.24 24 WEEKS GESTATION OF PREGNANCY: ICD-10-CM

## 2017-11-20 DIAGNOSIS — O34.219 HX SUCCESSFUL VBAC (VAGINAL BIRTH AFTER CESAREAN), CURRENTLY PREGNANT: ICD-10-CM

## 2017-11-20 DIAGNOSIS — L98.9 SKIN LESION: ICD-10-CM

## 2017-11-20 DIAGNOSIS — O09.92 HIGH-RISK PREGNANCY IN SECOND TRIMESTER: ICD-10-CM

## 2017-11-20 DIAGNOSIS — Z34.82 ENCOUNTER FOR SUPERVISION OF OTHER NORMAL PREGNANCY IN SECOND TRIMESTER: Primary | ICD-10-CM

## 2017-11-20 PROBLEM — Z34.90 SUPERVISION OF NORMAL PREGNANCY: Status: ACTIVE | Noted: 2017-11-20

## 2017-11-20 LAB
BILIRUBIN, POC: ABNORMAL
BLOOD URINE, POC: ABNORMAL
CLARITY, POC: CLEAR
COLOR, POC: YELLOW
GLUCOSE URINE, POC: ABNORMAL
KETONES, POC: ABNORMAL
LEUKOCYTE EST, POC: ABNORMAL
NITRITE, POC: ABNORMAL
PH, POC: 6
PROTEIN, POC: ABNORMAL
SPECIFIC GRAVITY, POC: 1.02
UROBILINOGEN, POC: ABNORMAL

## 2017-11-20 PROCEDURE — 99213 OFFICE O/P EST LOW 20 MIN: CPT | Performed by: OBSTETRICS & GYNECOLOGY

## 2017-11-20 RX ORDER — VALACYCLOVIR HYDROCHLORIDE 500 MG/1
500 TABLET, FILM COATED ORAL 2 TIMES DAILY
COMMUNITY
End: 2017-12-01

## 2017-11-20 NOTE — PROGRESS NOTES
Subsequent OB visit      Luis Alberto Castellon is a 40y.o. year old female [id number] @ 24.5 weeks by L 2017, MACHELLE 3/7/2018 . POB: SAB  X 2            FTLTCS @ 38 wks , female , 9lbs , induction due to Pre-eclampsia, gestational diabetes , arrest of descent .               @ 33 weeks , PPROM with labor due to congenital anomalies, polyhydramnios, GDMA2. Was followed at [facility] with MFM ,              SAB x 2. FTLTCS X 2 @ 36 wks , male , Rosina Martin . After which patient was diagnosed with diabetes in postpartum and is currently on insulin.  . NB diagnosed with cardiomegaly due to diabetes. PGYN: History of genital herpes ? Last outbreak age 15 yo . PMH: IDDM - hUMULIN N 50 in AM and 50 in PM ,             Hx SVTs  ? Follows with DR. Kendra Ge - last seen \" few weeks ago\" - next appointment 10/18    PSH: C/S X 2     MEDS: [facility]. Drug Allergies: neg     SOCHX: quit smoking recently . FH: Mother or Father , DM Yes , HTN Yes, Other No    /68   Pulse 104   Wt 189 lb (85.7 kg)   LMP 2017   BMI 33.48 kg/m²   Past Medical History:   Diagnosis Date    Hypercholesterolemia     Irregular heart beat     runs of v tach in the past    Obesity     Pregnancy induced hypertension     Type II or unspecified type diabetes mellitus without mention of complication, not stated as uncontrolled      Past Surgical History:   Procedure Laterality Date     SECTION           Review of Systems  Constitutional: negative  Genitourinary:see above  Integument/breast: negative  Behavioral/Psych: negative  Endocrine: negative    All other systems reviewed and are negative. Physical Exam:  /68   Pulse 104   Wt 189 lb (85.7 kg)   LMP 2017   BMI 33.48 kg/m²   Physical Exam   Constitutional: She is well-developed, well-nourished, and in no distress. Cardiovascular: Normal rate and regular rhythm.     Pulmonary/Chest: Effort normal and breath sounds normal.   Abdominal: Soft. Bowel sounds are normal.   Gravid uterus :     HOF: 24 CM . FHT : 156 bpm     Assessment:     Complains of left skin lesion on neck, claims its Herpes? Using valacyclovir , with no resolution. Class B DM - controlled , follows with MFM and endocrinology . 1. Encounter for supervision of other normal pregnancy in second trimester    2. 24 weeks gestation of pregnancy    3. High-risk pregnancy in second trimester    4. 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 History of PSVT (paroxysmal supraventricular tachycardia)    6. Hx of pre-eclampsia in prior pregnancy, currently pregnant, second trimester    7. Hx of  section    8. Hx successful  (vaginal birth after ), currently pregnant    9. Hx of  delivery, currently pregnant, second trimester    10. History of fetal abnormality in previous pregnancy, currently pregnant, second trimester    6. Skin lesion            Orders Placed This Encounter   Procedures    CBC With Auto Differential     Standing Status:   Future     Standing Expiration Date:   2018    Ambulatory referral to Dermatology     Referral Priority:   Routine     Referral Type:   Consult for Advice and Opinion     Referral Reason:   Specialty Services Required     Referred to Provider:   Elmer Baker MD     Requested Specialty:   Family Medicine     Number of Visits Requested:   1    POCT Urinalysis No Micro (Auto)        No orders of the defined types were placed in this encounter. Plan:   Following with [facility]     1) s/p MFM referral in process for genetic counseling, fetal anatomy scan, history of gestational diabetes  delivery fetal congenital anomalies  2) started on aspirin 150 mg for prevention of preeclampsia  3) history of paroxysmal supraventricular tachycardia according to patient's history, follows by Dr. Sophia Schaeffer. Currently on no medications.    4) history of PPROM due to polyhydramnios, no a large for gestational age infant. Macrosomia may result in shoulder dystocia and its sequelae (brachial plexus injury) or  delivery.  delivery in obese women is associated with increased rates of wound infection and thromboembolism. Obese pregnant women are also at increased risk for maternal disorders, such as sleep-related breathing disorders, carpal tunnel syndrome, postpartum depression, and venous thromboembolism (VTE). Recommendations for total and rate of weight gain during pregnancy by prepregnancy BMI    Total weight gain Rates of weight gain*  second and third trimester   Prepregnancy BMI Range in kg Range in lb Mean (range) in kg/week Mean (range) in lb/week   Underweight (<18.5 kg/m2) 12.5 to 18 28 to 40 0.51 (0.44 to 0.58) 1 (1 to 1.3)   Normal weight (18.5 to 24.9 kg/m2) 11.5 to 16 25 to 35 0.42 (0.35 to 0.50) 1 (0.8 to 1)   Overweight (25.0 to 29.9 kg/m2) 7 to 11.5 15 to 25 0.28 (0.23 to 0.33) 0.6 (0.5 to 0.7)   Obese (?30.0 kg/m2) 5 to 9 11 to 20 0.22 (0.17 to 0.27) 0.5 (0.4 to 0.6)      return in 3 weeks    Betzaida Lozoya M.D., F.A.C.O. G

## 2017-11-24 LAB
Lab: NORMAL
REPORT: NORMAL
THIS TEST SENT TO: NORMAL

## 2017-12-01 ENCOUNTER — OFFICE VISIT (OUTPATIENT)
Dept: FAMILY MEDICINE CLINIC | Age: 37
End: 2017-12-01

## 2017-12-01 VITALS
SYSTOLIC BLOOD PRESSURE: 106 MMHG | WEIGHT: 191 LBS | OXYGEN SATURATION: 98 % | HEART RATE: 105 BPM | HEIGHT: 63 IN | DIASTOLIC BLOOD PRESSURE: 68 MMHG | BODY MASS INDEX: 33.84 KG/M2

## 2017-12-01 DIAGNOSIS — B00.9 HERPES SIMPLEX INFECTION OF SKIN: ICD-10-CM

## 2017-12-01 PROCEDURE — G8484 FLU IMMUNIZE NO ADMIN: HCPCS | Performed by: FAMILY MEDICINE

## 2017-12-01 PROCEDURE — 99202 OFFICE O/P NEW SF 15 MIN: CPT | Performed by: FAMILY MEDICINE

## 2017-12-01 PROCEDURE — G8427 DOCREV CUR MEDS BY ELIG CLIN: HCPCS | Performed by: FAMILY MEDICINE

## 2017-12-01 PROCEDURE — G8417 CALC BMI ABV UP PARAM F/U: HCPCS | Performed by: FAMILY MEDICINE

## 2017-12-01 PROCEDURE — 1036F TOBACCO NON-USER: CPT | Performed by: FAMILY MEDICINE

## 2017-12-01 RX ORDER — ACYCLOVIR 50 MG/G
OINTMENT TOPICAL
Qty: 30 G | Refills: 1 | Status: SHIPPED | OUTPATIENT
Start: 2017-12-01 | End: 2017-12-07 | Stop reason: SDUPTHER

## 2017-12-01 RX ORDER — INSULIN HUMAN 100 [IU]/ML
INJECTION, SUSPENSION SUBCUTANEOUS
Qty: 30 ML | Refills: 3 | Status: SHIPPED | OUTPATIENT
Start: 2017-12-01 | End: 2017-12-08 | Stop reason: SDUPTHER

## 2017-12-01 RX ORDER — VALACYCLOVIR HYDROCHLORIDE 500 MG/1
500 TABLET, FILM COATED ORAL DAILY
Qty: 30 TABLET | Refills: 2 | Status: SHIPPED | OUTPATIENT
Start: 2017-12-01 | End: 2018-01-16 | Stop reason: SDUPTHER

## 2017-12-01 ASSESSMENT — ENCOUNTER SYMPTOMS
SHORTNESS OF BREATH: 0
ABDOMINAL PAIN: 0
COUGH: 0

## 2017-12-06 RX ORDER — BLOOD PRESSURE TEST KIT
KIT MISCELLANEOUS
Qty: 150 EACH | Refills: 6 | Status: SHIPPED | OUTPATIENT
Start: 2017-12-06 | End: 2019-10-23 | Stop reason: SDUPTHER

## 2017-12-07 ENCOUNTER — TELEPHONE (OUTPATIENT)
Dept: FAMILY MEDICINE CLINIC | Age: 37
End: 2017-12-07

## 2017-12-07 DIAGNOSIS — B00.9 HERPES SIMPLEX INFECTION OF SKIN: ICD-10-CM

## 2017-12-07 RX ORDER — ACYCLOVIR 50 MG/G
OINTMENT TOPICAL
Qty: 15 G | Refills: 1 | Status: SHIPPED | OUTPATIENT
Start: 2017-12-07 | End: 2017-12-14

## 2017-12-08 ENCOUNTER — OFFICE VISIT (OUTPATIENT)
Dept: SURGERY | Age: 37
End: 2017-12-08

## 2017-12-08 VITALS
DIASTOLIC BLOOD PRESSURE: 76 MMHG | HEART RATE: 88 BPM | BODY MASS INDEX: 34.02 KG/M2 | WEIGHT: 192 LBS | HEIGHT: 63 IN | SYSTOLIC BLOOD PRESSURE: 112 MMHG

## 2017-12-08 LAB
GLUCOSE BLD-MCNC: 205 MG/DL
HBA1C MFR BLD: 6.5 %

## 2017-12-08 PROCEDURE — 1036F TOBACCO NON-USER: CPT | Performed by: INTERNAL MEDICINE

## 2017-12-08 PROCEDURE — 3044F HG A1C LEVEL LT 7.0%: CPT | Performed by: INTERNAL MEDICINE

## 2017-12-08 PROCEDURE — 82962 GLUCOSE BLOOD TEST: CPT | Performed by: INTERNAL MEDICINE

## 2017-12-08 PROCEDURE — G8484 FLU IMMUNIZE NO ADMIN: HCPCS | Performed by: INTERNAL MEDICINE

## 2017-12-08 PROCEDURE — G8417 CALC BMI ABV UP PARAM F/U: HCPCS | Performed by: INTERNAL MEDICINE

## 2017-12-08 PROCEDURE — 83036 HEMOGLOBIN GLYCOSYLATED A1C: CPT | Performed by: INTERNAL MEDICINE

## 2017-12-08 PROCEDURE — G8428 CUR MEDS NOT DOCUMENT: HCPCS | Performed by: INTERNAL MEDICINE

## 2017-12-08 PROCEDURE — 99213 OFFICE O/P EST LOW 20 MIN: CPT | Performed by: INTERNAL MEDICINE

## 2017-12-12 ENCOUNTER — ROUTINE PRENATAL (OUTPATIENT)
Dept: OBGYN | Age: 37
End: 2017-12-12

## 2017-12-12 VITALS
SYSTOLIC BLOOD PRESSURE: 98 MMHG | BODY MASS INDEX: 34.54 KG/M2 | WEIGHT: 195 LBS | HEART RATE: 100 BPM | DIASTOLIC BLOOD PRESSURE: 64 MMHG

## 2017-12-12 DIAGNOSIS — Z34.82 ENCOUNTER FOR SUPERVISION OF OTHER NORMAL PREGNANCY IN SECOND TRIMESTER: Primary | ICD-10-CM

## 2017-12-12 PROCEDURE — 99213 OFFICE O/P EST LOW 20 MIN: CPT | Performed by: OBSTETRICS & GYNECOLOGY

## 2017-12-12 NOTE — PROGRESS NOTES
Subsequent OB visit      Yareli Kaye is a 40y.o. year old female [id number] @ 27.6 weeks by L 2017, MACHELLE 3/7/2018 . POB: SAB  X 2            FTLTCS @ 38 wks , female , 9lbs , induction due to Pre-eclampsia, gestational diabetes , arrest of descent .               @ 33 weeks , PPROM with labor due to congenital anomalies, polyhydramnios, GDMA2. Was followed at [facility] with MFM ,             SAB x 2. FTLTCS X 2 @ 36 wks , male , Gerber Alba . After which patient was diagnosed with diabetes in postpartum and is currently on insulin.  . NB diagnosed with cardiomegaly due to diabetes. PGYN: History of genital herpes ? Last outbreak age 15 yo . PMH: IDDM - hUMULIN N 65 in AM and 65 in PM , Lispro 14-18 follows each meal .  with Dg [facility]. Hx SVTs  ? Follows with DR. Jaylan Bravo - last seen \" few weeks ago\" - next appointment 10/18    PSH: C/S X 2     MEDS: [facility]. Drug Allergies: neg     SOCHX: quit smoking recently . FH: Mother or Father , DM Yes , HTN Yes, Other No    BP 98/64   Pulse 100   Wt 195 lb (88.5 kg)   LMP 2017   BMI 34.54 kg/m²   Past Medical History:   Diagnosis Date    Herpes simplex infection of skin     Hypercholesterolemia     Irregular heart beat     runs of v tach in the past    Obesity     Pregnancy induced hypertension     Type II or unspecified type diabetes mellitus without mention of complication, not stated as uncontrolled      Past Surgical History:   Procedure Laterality Date     SECTION           Review of Systems  Constitutional: negative  Genitourinary:see above  Integument/breast: negative  Behavioral/Psych: negative  Endocrine: negative    All other systems reviewed and are negative. Physical Exam:  BP 98/64   Pulse 100   Wt 195 lb (88.5 kg)   LMP 2017   BMI 34.54 kg/m²   Physical Exam   Constitutional: She is well-developed, well-nourished, and in no distress.    Cardiovascular: they are at higher risk of developing severe hypo- and hyperglycemia. ?Adverse pregnancy outcomes include miscarriage, congenital anomaly, macrosomia, preeclampsia,  birth,  delivery, and  mortality. Short- and long-term morbidity in offspring is also a concern. Maternal medical risks include progression of retinopathy and nephropathy, diabetic ketoacidosis, and complications related to gastroparesis. ? Hyperglycemia in the periconception period and during the course of pregnancy is the single most important determinant of excess risk of adverse fetal outcome in women with pregestational diabetes. Achieving preconception glycemic control as close to normal as possible while avoiding hypoglycemia is of critical importance. Risks of obesity in pregnancy  Compared with pregnant women with BMI <25 kg/m2, pregnancies among obese women are at increased risk of several adverse outcomes, including increased rates of early pregnancy loss, congenital anomalies, stillbirth, pregnancy-associated hypertension,  and post-term birth, gestational diabetes mellitus (GDM), multifetal gestation, and birth of a large for gestational age infant. Macrosomia may result in shoulder dystocia and its sequelae (brachial plexus injury) or  delivery.  delivery in obese women is associated with increased rates of wound infection and thromboembolism. Obese pregnant women are also at increased risk for maternal disorders, such as sleep-related breathing disorders, carpal tunnel syndrome, postpartum depression, and venous thromboembolism (VTE).      Recommendations for total and rate of weight gain during pregnancy by prepregnancy BMI    Total weight gain Rates of weight gain*  second and third trimester   Prepregnancy BMI Range in kg Range in lb Mean (range) in kg/week Mean (range) in lb/week   Underweight (<18.5 kg/m2) 12.5 to 18 28 to 40 0.51 (0.44 to 0.58) 1 (1 to 1.3)   Normal weight (18.5 to 24.9 kg/m2) 11.5 to 16 25 to 35 0.42 (0.35 to 0.50) 1 (0.8 to 1)   Overweight (25.0 to 29.9 kg/m2) 7 to 11.5 15 to 25 0.28 (0.23 to 0.33) 0.6 (0.5 to 0.7)   Obese (?30.0 kg/m2) 5 to 9 11 to 20 0.22 (0.17 to 0.27) 0.5 (0.4 to 0.6)      return in 3 weeks    Ino Sun M.D., ARIELLE G

## 2017-12-13 NOTE — PROGRESS NOTES
Subjective:      Patient ID: Jo Ann Hagan is a 40 y.o. female. Diabetes   She presents for her follow-up diabetic visit. She has type 2 diabetes mellitus. Her disease course has been worsening. Hypoglycemia symptoms include confusion, dizziness and sweats. Risk factors for coronary artery disease include sedentary lifestyle and diabetes mellitus. Current diabetic treatment includes oral agent (monotherapy). She is compliant with treatment all of the time. She is currently taking insulin pre-breakfast, pre-lunch, pre-dinner and at bedtime. Insulin injections are given by patient. Her weight is fluctuating minimally. She is following a generally healthy diet. Meal planning includes avoidance of concentrated sweets. She participates in exercise intermittently. Frequency home blood tests: 2-3 /day. Blood glucose monitoring compliance is inadequate. Her home blood glucose trend is fluctuating minimally. Her breakfast blood glucose range is generally 110-130 mg/dl. Her highest blood glucose is 180-200 mg/dl. Her overall blood glucose range is 130-140 mg/dl. (Lab Results       Component                Value               Date                       LABA1C                   6.5                 2017            Higher fasting and post prandial glucose  Pt 27 weeks pregnant) She sees a podiatrist (roberto).         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 9)    Hx of  delivery, currently pregnant, second trimester    History of fetal abnormality in previous pregnancy, currently pregnant, second trimester    Hx of pre-eclampsia in prior pregnancy, currently pregnant, second trimester    Supervision of normal pregnancy    24 weeks gestation of pregnancy    Herpes simplex infection of skin         Allergies   Allergen Reactions    Tape [Adhesive Tape]        Current Outpatient Prescriptions:     insulin NPH (HUMULIN N KWIKPEN) 100 UNIT/ML injection pen, 65 units twice a day, Disp: 20 pen, Rfl: 3    insulin lispro (HUMALOG KWIKPEN) 100 UNIT/ML pen, 14-18  units at each meals, Disp: 5 pen, Rfl: 3    acyclovir (ZOVIRAX) 5 % ointment, Apply topically 5 times daily to affected area for 7 days, Disp: 15 g, Rfl: 1    Insulin Pen Needle (BD PEN NEEDLE EMRE U/F) 32G X 4 MM MISC, Use 3 times a day, Disp: 200 each, Rfl: 03    Alcohol Swabs PADS, Use qid, Disp: 150 each, Rfl: 06    valACYclovir (VALTREX) 500 MG tablet, Take 1 tablet by mouth daily, Disp: 30 tablet, Rfl: 2    Heating Pads (HEATING PAD DRY HEAT) PADS, 1 packet by Does not apply route 3 times daily, Disp: 120 each, Rfl: 0    aspirin (JOSEE ASPIRIN) 325 MG tablet, Take 0.5 tablets by mouth daily, Disp: 30 tablet, Rfl: 3    insulin NPH (HUMULIN N KWIKPEN) 100 UNIT/ML injection pen, 60 units am and 50 units at dinner, Disp: 30 Pen, Rfl: 3    glucose blood VI test strips (FREESTYLE LITE) strip, Use 6 times a day  Pt is pregnant, Disp: 200 strip, Rfl: 3    metFORMIN (GLUCOPHAGE) 500 MG tablet, Take 1 tablet by mouth 2 times daily (with meals), Disp: 60 tablet, Rfl: 3    FREESTYLE LANCETS MISC, 1 each by Does not apply route daily, Disp: 200 each, Rfl: 3      Review of Systems   Neurological: Positive

## 2017-12-27 ENCOUNTER — HOSPITAL ENCOUNTER (OUTPATIENT)
Age: 37
Discharge: HOME | End: 2017-12-27
Attending: OBSTETRICS & GYNECOLOGY | Admitting: OBSTETRICS & GYNECOLOGY
Payer: COMMERCIAL

## 2017-12-27 VITALS
SYSTOLIC BLOOD PRESSURE: 123 MMHG | RESPIRATION RATE: 18 BRPM | DIASTOLIC BLOOD PRESSURE: 71 MMHG | HEART RATE: 102 BPM | TEMPERATURE: 98.3 F

## 2017-12-27 LAB
ALBUMIN SERPL-MCNC: 3.1 G/DL (ref 3.9–4.9)
ALP BLD-CCNC: 48 U/L (ref 40–130)
ALT SERPL-CCNC: 11 U/L (ref 0–33)
AMPHETAMINE SCREEN, URINE: NORMAL
AMYLASE: 43 U/L (ref 28–100)
ANION GAP SERPL CALCULATED.3IONS-SCNC: 13 MEQ/L (ref 7–13)
AST SERPL-CCNC: 9 U/L (ref 0–35)
BARBITURATE SCREEN URINE: NORMAL
BASOPHILS ABSOLUTE: 0 K/UL (ref 0–0.2)
BASOPHILS RELATIVE PERCENT: 0.2 %
BENZODIAZEPINE SCREEN, URINE: NORMAL
BILIRUB SERPL-MCNC: 0.2 MG/DL (ref 0–1.2)
BILIRUBIN URINE: NEGATIVE
BLOOD, URINE: NEGATIVE
BUN BLDV-MCNC: 7 MG/DL (ref 6–20)
CALCIUM SERPL-MCNC: 8.4 MG/DL (ref 8.6–10.2)
CANNABINOID SCREEN URINE: NORMAL
CHLORIDE BLD-SCNC: 103 MEQ/L (ref 98–107)
CLARITY: CLEAR
CO2: 18 MEQ/L (ref 22–29)
COCAINE METABOLITE SCREEN URINE: NORMAL
COLOR: YELLOW
CREAT SERPL-MCNC: 0.31 MG/DL (ref 0.5–0.9)
EOSINOPHILS ABSOLUTE: 0.3 K/UL (ref 0–0.7)
EOSINOPHILS RELATIVE PERCENT: 2.8 %
FETAL FIBRONECTIN: NEGATIVE
GFR AFRICAN AMERICAN: >60
GFR NON-AFRICAN AMERICAN: >60
GLOBULIN: 2.3 G/DL (ref 2.3–3.5)
GLUCOSE BLD-MCNC: 202 MG/DL (ref 74–109)
GLUCOSE URINE: >=1000 MG/DL
HCT VFR BLD CALC: 37.6 % (ref 37–47)
HEMOGLOBIN: 13.3 G/DL (ref 12–16)
KETONES, URINE: NEGATIVE MG/DL
LEUKOCYTE ESTERASE, URINE: NEGATIVE
LIPASE: 38 U/L (ref 13–60)
LYMPHOCYTES ABSOLUTE: 1.7 K/UL (ref 1–4.8)
LYMPHOCYTES RELATIVE PERCENT: 17.1 %
Lab: NORMAL
MCH RBC QN AUTO: 32 PG (ref 27–31.3)
MCHC RBC AUTO-ENTMCNC: 35.4 % (ref 33–37)
MCV RBC AUTO: 90.2 FL (ref 82–100)
MONOCYTES ABSOLUTE: 0.7 K/UL (ref 0.2–0.8)
MONOCYTES RELATIVE PERCENT: 7.2 %
NEUTROPHILS ABSOLUTE: 7.2 K/UL (ref 1.4–6.5)
NEUTROPHILS RELATIVE PERCENT: 72.7 %
NITRITE, URINE: NEGATIVE
OPIATE SCREEN URINE: NORMAL
PDW BLD-RTO: 14.1 % (ref 11.5–14.5)
PH UA: 6 (ref 5–9)
PHENCYCLIDINE SCREEN URINE: NORMAL
PLATELET # BLD: 215 K/UL (ref 130–400)
POTASSIUM SERPL-SCNC: 3.9 MEQ/L (ref 3.5–5.1)
PROTEIN UA: NEGATIVE MG/DL
RBC # BLD: 4.17 M/UL (ref 4.2–5.4)
SODIUM BLD-SCNC: 134 MEQ/L (ref 132–144)
SPECIFIC GRAVITY UA: 1.04 (ref 1–1.03)
TOTAL PROTEIN: 5.4 G/DL (ref 6.4–8.1)
URIC ACID, SERUM: 1.6 MG/DL (ref 2.4–5.7)
UROBILINOGEN, URINE: 0.2 E.U./DL
WBC # BLD: 9.9 K/UL (ref 4.8–10.8)

## 2017-12-27 PROCEDURE — 99283 EMERGENCY DEPT VISIT LOW MDM: CPT

## 2017-12-27 PROCEDURE — 99283 EMERGENCY DEPT VISIT LOW MDM: CPT | Performed by: OBSTETRICS & GYNECOLOGY

## 2017-12-27 PROCEDURE — 81003 URINALYSIS AUTO W/O SCOPE: CPT

## 2017-12-27 PROCEDURE — 82150 ASSAY OF AMYLASE: CPT

## 2017-12-27 PROCEDURE — 83690 ASSAY OF LIPASE: CPT

## 2017-12-27 PROCEDURE — 85025 COMPLETE CBC W/AUTO DIFF WBC: CPT

## 2017-12-27 PROCEDURE — 36415 COLL VENOUS BLD VENIPUNCTURE: CPT

## 2017-12-27 PROCEDURE — 82731 ASSAY OF FETAL FIBRONECTIN: CPT

## 2017-12-27 PROCEDURE — 6370000000 HC RX 637 (ALT 250 FOR IP): Performed by: OBSTETRICS & GYNECOLOGY

## 2017-12-27 PROCEDURE — 80053 COMPREHEN METABOLIC PANEL: CPT

## 2017-12-27 PROCEDURE — 84550 ASSAY OF BLOOD/URIC ACID: CPT

## 2017-12-27 PROCEDURE — 80307 DRUG TEST PRSMV CHEM ANLYZR: CPT

## 2017-12-27 RX ORDER — ACETAMINOPHEN 325 MG/1
650 TABLET ORAL EVERY 4 HOURS PRN
Status: DISCONTINUED | OUTPATIENT
Start: 2017-12-27 | End: 2017-12-27 | Stop reason: HOSPADM

## 2017-12-27 RX ADMIN — ACETAMINOPHEN 650 MG: 325 TABLET ORAL at 16:07

## 2017-12-27 ASSESSMENT — PAIN SCALES - GENERAL: PAINLEVEL_OUTOF10: 3

## 2017-12-27 NOTE — ED PROVIDER NOTES
Department of Obstetrics and Gynecology  Labor and Delivery   Triage Note      Pt Name: Mahesh See  MRN: [id number] [place] #: [de-identified]  YOB: 1980  Procedure Performed By: Tigist Rangel MD        SUBJECTIVE:  39 y/o [id number] female whose MACHELLE was 3/7/18; now 30 wks gest and presents with lower abdominal pain and HA; type 2 DM ( insulin dependent ) patient with very complicated ob hx/o 2 prior c-sections, PTD at 35 wks with infant with tracheal esophageal fistula and prior PIH; seeing Dr. Abhay Ndiaye for [facility] and also being seen by MFM from [facility] and Dr. Winsome Laws for DM mgmt; last ob sono was fetal echo at [facility] ~ 2 wks ago; no recent sex. ROS-no dysuria, vag bleeding or ROM. OBJECTIVE  She is at 30 weeks gestation   Vitals:  /84   Pulse 110   Temp 98.3 °F (36.8 °C) (Oral)   Resp 18   LMP 2017     CONSTITUTIONAL:  awake, alert, cooperative, no apparent distress, and appears stated age  ABDOMEN:  Uterus gravid to 8 months and supple. GENITAL/URINARY:  No labial lesions. Speculum: normal d/c of pregnancy ( FFN-done ). Cervix:             Dilation:  Closed         Effacement:  Long         Station:  -3 cm         Consistency:  medium         Position:  mid    Fetal Position:  Unknown.     Membranes:  Intact    NST is reactive    Fetal heart rate:         Baseline Heart Rate:  140        Accelerations:  present       Decelerations:  none       Variability:  moderate    Contraction frequency: 0 minutes        ASSESSMENT & PLAN:    IUP at 30 wks gest, ; type-2 DM ( insulin dep ); complicated ob hx ( see HPI ); will check cbc, ua, chem panel; will offer clear liquids and tylenol; FFN is neg and NST is reactive with no sign of PT ctx's; ua is neg except lg glucose and serum chem shows random BS of 200 mg%; spoke with Dr. Abhay Ndiaye re: apparent poor control of DM and he would like pt to get into his office this Friday; her most recnt Hgb A1c was on 17 and this was 6.5; advise increase rest fluids and tylenol; continue close monitor of BS's at home.     Tigist Rangel MD

## 2018-01-02 ENCOUNTER — HOSPITAL ENCOUNTER (OUTPATIENT)
Age: 38
Discharge: HOME | End: 2018-01-02
Attending: OBSTETRICS & GYNECOLOGY | Admitting: OBSTETRICS & GYNECOLOGY
Payer: COMMERCIAL

## 2018-01-02 VITALS
SYSTOLIC BLOOD PRESSURE: 122 MMHG | TEMPERATURE: 97.7 F | RESPIRATION RATE: 18 BRPM | DIASTOLIC BLOOD PRESSURE: 82 MMHG | HEART RATE: 112 BPM

## 2018-01-02 LAB
AMPHETAMINE SCREEN, URINE: NORMAL
BARBITURATE SCREEN URINE: NORMAL
BENZODIAZEPINE SCREEN, URINE: NORMAL
BILIRUBIN URINE: NEGATIVE
BLOOD, URINE: NEGATIVE
CANNABINOID SCREEN URINE: NORMAL
CLARITY: CLEAR
COCAINE METABOLITE SCREEN URINE: NORMAL
COLOR: YELLOW
GLUCOSE BLD-MCNC: 196 MG/DL (ref 60–115)
GLUCOSE URINE: >=1000 MG/DL
KETONES, URINE: 40 MG/DL
LEUKOCYTE ESTERASE, URINE: NEGATIVE
Lab: NORMAL
NITRITE, URINE: NEGATIVE
OPIATE SCREEN URINE: NORMAL
PERFORMED ON: ABNORMAL
PH UA: 6 (ref 5–9)
PHENCYCLIDINE SCREEN URINE: NORMAL
PROTEIN UA: NEGATIVE MG/DL
SPECIFIC GRAVITY UA: 1.04 (ref 1–1.03)
UROBILINOGEN, URINE: 0.2 E.U./DL

## 2018-01-02 PROCEDURE — 80307 DRUG TEST PRSMV CHEM ANLYZR: CPT

## 2018-01-02 PROCEDURE — 59025 FETAL NON-STRESS TEST: CPT

## 2018-01-02 PROCEDURE — 81003 URINALYSIS AUTO W/O SCOPE: CPT

## 2018-01-02 PROCEDURE — 6370000000 HC RX 637 (ALT 250 FOR IP): Performed by: OBSTETRICS & GYNECOLOGY

## 2018-01-02 PROCEDURE — 59025 FETAL NON-STRESS TEST: CPT | Performed by: OBSTETRICS & GYNECOLOGY

## 2018-01-02 RX ORDER — D-METHORPHAN/PE/ACETAMINOPHEN 10-5-325MG
2 CAPSULE ORAL DAILY
COMMUNITY
End: 2021-05-20

## 2018-01-02 RX ORDER — ACETAMINOPHEN 325 MG/1
650 TABLET ORAL EVERY 4 HOURS PRN
Status: DISCONTINUED | OUTPATIENT
Start: 2018-01-02 | End: 2018-01-02 | Stop reason: HOSPADM

## 2018-01-02 RX ADMIN — ACETAMINOPHEN 650 MG: 325 TABLET, FILM COATED ORAL at 17:20

## 2018-01-02 ASSESSMENT — PAIN SCALES - GENERAL: PAINLEVEL_OUTOF10: 6

## 2018-01-02 NOTE — FLOWSHEET NOTE
Pt was seen by Joe LOYAW regarding pt concerns about getting supplies and formula for her child and concerns about insurance lapse and need for son's supplies   LSW gave pt her number to contact if she had further concerns

## 2018-01-02 NOTE — FLOWSHEET NOTE
Dr Patsy Rodríguez notified of current bp reading and that pt has had her tylenol  MD will review poc after speaking with Dr Zina Cosme about pt's blood sugar

## 2018-01-08 ENCOUNTER — ROUTINE PRENATAL (OUTPATIENT)
Dept: OBGYN | Age: 38
End: 2018-01-08

## 2018-01-08 VITALS
WEIGHT: 197 LBS | DIASTOLIC BLOOD PRESSURE: 60 MMHG | SYSTOLIC BLOOD PRESSURE: 102 MMHG | HEART RATE: 108 BPM | BODY MASS INDEX: 34.9 KG/M2

## 2018-01-08 DIAGNOSIS — Z98.891 HX OF CESAREAN SECTION: ICD-10-CM

## 2018-01-08 DIAGNOSIS — O09.892 HX OF PRETERM DELIVERY, CURRENTLY PREGNANT, SECOND TRIMESTER: ICD-10-CM

## 2018-01-08 DIAGNOSIS — Z34.82 ENCOUNTER FOR SUPERVISION OF OTHER NORMAL PREGNANCY IN SECOND TRIMESTER: ICD-10-CM

## 2018-01-08 DIAGNOSIS — O24.319 MODIFIED WHITE CLASS B PREGESTATIONAL DIABETES MELLITUS: ICD-10-CM

## 2018-01-08 DIAGNOSIS — Z34.83 ENCOUNTER FOR SUPERVISION OF OTHER NORMAL PREGNANCY IN THIRD TRIMESTER: Primary | ICD-10-CM

## 2018-01-08 DIAGNOSIS — O09.292 HX OF PRE-ECLAMPSIA IN PRIOR PREGNANCY, CURRENTLY PREGNANT, SECOND TRIMESTER: ICD-10-CM

## 2018-01-08 DIAGNOSIS — O09.292 HISTORY OF FETAL ABNORMALITY IN PREVIOUS PREGNANCY, CURRENTLY PREGNANT, SECOND TRIMESTER: ICD-10-CM

## 2018-01-08 DIAGNOSIS — Z3A.31 31 WEEKS GESTATION OF PREGNANCY: ICD-10-CM

## 2018-01-08 DIAGNOSIS — Z86.79 HISTORY OF PSVT (PAROXYSMAL SUPRAVENTRICULAR TACHYCARDIA): ICD-10-CM

## 2018-01-08 DIAGNOSIS — O34.219 HX SUCCESSFUL VBAC (VAGINAL BIRTH AFTER CESAREAN), CURRENTLY PREGNANT: ICD-10-CM

## 2018-01-08 DIAGNOSIS — Z23 NEED FOR PROPHYLACTIC VACCINATION AND INOCULATION AGAINST INFLUENZA: ICD-10-CM

## 2018-01-08 DIAGNOSIS — O09.92 HIGH-RISK PREGNANCY IN SECOND TRIMESTER: ICD-10-CM

## 2018-01-08 DIAGNOSIS — Z23 NEED FOR TDAP VACCINATION: ICD-10-CM

## 2018-01-08 PROCEDURE — 0502F SUBSEQUENT PRENATAL CARE: CPT | Performed by: OBSTETRICS & GYNECOLOGY

## 2018-01-08 PROCEDURE — 3046F HEMOGLOBIN A1C LEVEL >9.0%: CPT | Performed by: OBSTETRICS & GYNECOLOGY

## 2018-01-08 PROCEDURE — 90472 IMMUNIZATION ADMIN EACH ADD: CPT | Performed by: OBSTETRICS & GYNECOLOGY

## 2018-01-08 PROCEDURE — G8417 CALC BMI ABV UP PARAM F/U: HCPCS | Performed by: OBSTETRICS & GYNECOLOGY

## 2018-01-08 PROCEDURE — G8427 DOCREV CUR MEDS BY ELIG CLIN: HCPCS | Performed by: OBSTETRICS & GYNECOLOGY

## 2018-01-08 PROCEDURE — 90715 TDAP VACCINE 7 YRS/> IM: CPT | Performed by: OBSTETRICS & GYNECOLOGY

## 2018-01-08 PROCEDURE — 90471 IMMUNIZATION ADMIN: CPT | Performed by: OBSTETRICS & GYNECOLOGY

## 2018-01-08 PROCEDURE — 1036F TOBACCO NON-USER: CPT | Performed by: OBSTETRICS & GYNECOLOGY

## 2018-01-08 PROCEDURE — 90686 IIV4 VACC NO PRSV 0.5 ML IM: CPT | Performed by: OBSTETRICS & GYNECOLOGY

## 2018-01-08 PROCEDURE — G8484 FLU IMMUNIZE NO ADMIN: HCPCS | Performed by: OBSTETRICS & GYNECOLOGY

## 2018-01-08 NOTE — PROGRESS NOTES
stillbirth, pregnancy-associated hypertension,  and post-term birth, gestational diabetes mellitus (GDM), multifetal gestation, and birth of a large for gestational age infant. Macrosomia may result in shoulder dystocia and its sequelae (brachial plexus injury) or  delivery.  delivery in obese women is associated with increased rates of wound infection and thromboembolism. Obese pregnant women are also at increased risk for maternal disorders, such as sleep-related breathing disorders, carpal tunnel syndrome, postpartum depression, and venous thromboembolism (VTE). Recommendations for total and rate of weight gain during pregnancy by prepregnancy BMI    Total weight gain Rates of weight gain*  second and third trimester   Prepregnancy BMI Range in kg Range in lb Mean (range) in kg/week Mean (range) in lb/week   Underweight (<18.5 kg/m2) 12.5 to 18 28 to 40 0.51 (0.44 to 0.58) 1 (1 to 1.3)   Normal weight (18.5 to 24.9 kg/m2) 11.5 to 16 25 to 35 0.42 (0.35 to 0.50) 1 (0.8 to 1)   Overweight (25.0 to 29.9 kg/m2) 7 to 11.5 15 to 25 0.28 (0.23 to 0.33) 0.6 (0.5 to 0.7)   Obese (?30.0 kg/m2) 5 to 9 11 to 20 0.22 (0.17 to 0.27) 0.5 (0.4 to 0.6)      Testing Schedule     1) The patient will begin twice weekly fetal non stress testing at 32 weeks as well as weekly biophysical profiles. Fetal kick counts every 8 hours will begin at 28 weeks. 2) Growth ultrasounds will begin at 24 weeks     Indication : high risk history . Return in 2 weeks  TDAP - given   Flu- given     Litzy Schuler M.D., F.A.C.O. G

## 2018-01-10 ENCOUNTER — NURSE ONLY (OUTPATIENT)
Dept: OBGYN | Age: 38
End: 2018-01-10

## 2018-01-10 DIAGNOSIS — O09.92 HIGH-RISK PREGNANCY IN SECOND TRIMESTER: Primary | ICD-10-CM

## 2018-01-10 DIAGNOSIS — E66.9 OBESITY (BMI 30-39.9): ICD-10-CM

## 2018-01-10 DIAGNOSIS — O99.019 ANEMIA DURING PREGNANCY: ICD-10-CM

## 2018-01-10 DIAGNOSIS — O24.319 MODIFIED WHITE CLASS B PREGESTATIONAL DIABETES MELLITUS: ICD-10-CM

## 2018-01-10 PROCEDURE — 99999 PR OFFICE/OUTPT VISIT,PROCEDURE ONLY: CPT | Performed by: OBSTETRICS & GYNECOLOGY

## 2018-01-10 PROCEDURE — 59025 FETAL NON-STRESS TEST: CPT | Performed by: OBSTETRICS & GYNECOLOGY

## 2018-01-10 NOTE — PROGRESS NOTES
Nonstress Test        SUBJECTIVE:  The patient is a 40 y.o. female 32w0d. 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

## 2018-01-11 ENCOUNTER — OFFICE VISIT (OUTPATIENT)
Dept: SURGERY | Age: 38
End: 2018-01-11

## 2018-01-11 VITALS
HEIGHT: 63 IN | SYSTOLIC BLOOD PRESSURE: 118 MMHG | BODY MASS INDEX: 34.91 KG/M2 | DIASTOLIC BLOOD PRESSURE: 82 MMHG | HEART RATE: 112 BPM | WEIGHT: 197 LBS

## 2018-01-11 LAB — GLUCOSE BLD-MCNC: 237 MG/DL

## 2018-01-11 PROCEDURE — 3046F HEMOGLOBIN A1C LEVEL >9.0%: CPT | Performed by: INTERNAL MEDICINE

## 2018-01-11 PROCEDURE — G8484 FLU IMMUNIZE NO ADMIN: HCPCS | Performed by: INTERNAL MEDICINE

## 2018-01-11 PROCEDURE — 99213 OFFICE O/P EST LOW 20 MIN: CPT | Performed by: INTERNAL MEDICINE

## 2018-01-11 PROCEDURE — G8427 DOCREV CUR MEDS BY ELIG CLIN: HCPCS | Performed by: INTERNAL MEDICINE

## 2018-01-11 PROCEDURE — 1036F TOBACCO NON-USER: CPT | Performed by: INTERNAL MEDICINE

## 2018-01-11 PROCEDURE — G8417 CALC BMI ABV UP PARAM F/U: HCPCS | Performed by: INTERNAL MEDICINE

## 2018-01-11 PROCEDURE — 82962 GLUCOSE BLOOD TEST: CPT | Performed by: INTERNAL MEDICINE

## 2018-01-11 NOTE — PROGRESS NOTES
Subjective:      Patient ID: Travis Blair is a 40 y.o. female. Diabetes   She presents for her follow-up diabetic visit. She has type 2 diabetes mellitus. Her disease course has been worsening. Hypoglycemia symptoms include confusion, dizziness and sweats. Risk factors for coronary artery disease include sedentary lifestyle and diabetes mellitus. Current diabetic treatment includes oral agent (monotherapy). She is compliant with treatment all of the time. She is currently taking insulin pre-breakfast, pre-lunch, pre-dinner and at bedtime. Insulin injections are given by patient. Her weight is fluctuating minimally. She is following a generally healthy diet. She participates in exercise intermittently. Frequency home blood tests: 2-3 /day. Blood glucose monitoring compliance is inadequate. Her home blood glucose trend is fluctuating minimally. Her breakfast blood glucose range is generally 130-140 mg/dl. Her highest blood glucose is >200 mg/dl. Her overall blood glucose range is >200 mg/dl. (Pt admitted to ob recently       High stress   Pt 32 weeks pregnant) Eye exam is current (). 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 9)    Hx of  delivery, currently pregnant, second trimester    History of fetal abnormality in previous pregnancy, currently pregnant, second trimester    Hx of pre-eclampsia in prior pregnancy, currently pregnant, second trimester    Supervision of normal pregnancy    24 weeks gestation of pregnancy    Herpes simplex infection of skin    Lower abdominal pain    Type 2 diabetes mellitus affecting pregnancy in third trimester, antepartum    30 weeks gestation of pregnancy    Insulin dependent diabetes mellitus ([facility] [address]    31 weeks gestation of pregnancy         Allergies   Allergen Reactions    Tape [Adhesive Tape]        Current Outpatient Prescriptions:     insulin NPH (HUMULIN N KWIKPEN) 100 UNIT/ML injection pen, 75 units twice a day, Disp: 30 pen, Rfl: 3    insulin lispro (HUMALOG KWIKPEN) 100 UNIT/ML pen, 20-24   units at each meals, Disp: 15 pen, Rfl: 3    Pediatric Multiple Vit-C-FA (FLINSTONES GUMMIES OMEGA-3 DHA) CHEW, Take 2 capsules by mouth daily, Disp: , Rfl:     Insulin Pen Needle (BD PEN NEEDLE EMRE U/F) 32G X 4 MM MISC, Use 3 times a day, Disp: 200 each, Rfl: 03    Alcohol Swabs PADS, Use qid, Disp: 150 each, Rfl: 06    valACYclovir (VALTREX) 500 MG tablet, Take 1 tablet by mouth daily, Disp: 30 tablet, Rfl: 2    Heating Pads (HEATING PAD DRY HEAT) PADS, 1 packet by Does not apply route 3 times daily, Disp: 120 each, Rfl: 0    aspirin (JOSEE ASPIRIN) 325 MG tablet, Take 0.5 tablets by mouth daily, Disp: 30 tablet, Rfl: 3    glucose blood VI test strips (FREESTYLE LITE) strip, Use 6 times a day  Pt is pregnant, Disp: 200 strip, Rfl: 3    FREESTYLE LANCETS MISC, 1 each by Does not apply route daily, Disp: 200 each, Rfl: 3      Review of Systems   Neurological: Positive for dizziness. Psychiatric/Behavioral: Positive for confusion.      Vitals:    01/11/18 1321   BP: 118/82   Site: Left Arm   Position: Sitting   Cuff Size: Medium Adult   Pulse: 112   Weight: 197 lb (89.4 kg)   Height: 5' 3\" (1.6 m)       Objective:   Physical Exam   Constitutional: She is oriented to person, place, and time. She appears well-developed and well-nourished. HENT:   Head: Normocephalic and atraumatic. Cardiovascular: Normal rate. Abdominal:   Obese    Musculoskeletal: Normal range of motion. Neurological: She is alert and oriented to person, place, and time. Skin: Skin is warm and dry. Psychiatric: Her mood appears anxious. Lab Results   Component Value Date     2017    K 3.9 2017     2017    CO2 18 (L) 2017    BUN 7 2017    CREATININE 0.31 (L) 2017    GLUCOSE 237 2018    CALCIUM 8.4 (L) 2017    PROT 5.4 (L) 2017    LABALBU 3.1 (L) 2017    BILITOT 0.2 2017    ALKPHOS 48 2017    AST 9 2017    ALT 11 2017    LABGLOM >60.0 2017    GFRAA >60.0 2017    GLOB 2.3 2017         Assessment:      1. Uncontrolled diabetes mellitus type 2 without complications, unspecified long term insulin use status (HCC)  POCT Glucose   2.  Uncontrolled type 2 diabetes mellitus with complication, with long-term current use of insulin (HCC)  insulin NPH (HUMULIN N KWIKPEN) 100 UNIT/ML injection pen    insulin lispro (HUMALOG KWIKPEN) 100 UNIT/ML pen           Plan:        Increase insulin dose   Orders Placed This Encounter   Medications    insulin NPH (HUMULIN N KWIKPEN) 100 UNIT/ML injection pen     Si units twice a day     Dispense:  30 pen     Refill:  3    insulin lispro (HUMALOG KWIKPEN) 100 UNIT/ML pen     Si-24   units at each meals     Dispense:  15 pen     Refill:  3

## 2018-01-13 ENCOUNTER — HOSPITAL ENCOUNTER (EMERGENCY)
Age: 38
Discharge: HOME | End: 2018-01-13
Attending: EMERGENCY MEDICINE
Payer: COMMERCIAL

## 2018-01-13 VITALS
OXYGEN SATURATION: 99 % | WEIGHT: 197 LBS | RESPIRATION RATE: 16 BRPM | BODY MASS INDEX: 34.91 KG/M2 | DIASTOLIC BLOOD PRESSURE: 82 MMHG | HEIGHT: 63 IN | TEMPERATURE: 97.6 F | SYSTOLIC BLOOD PRESSURE: 121 MMHG | HEART RATE: 110 BPM

## 2018-01-13 DIAGNOSIS — L02.91 ABSCESS: Primary | ICD-10-CM

## 2018-01-13 DIAGNOSIS — T16.1XXA FOREIGN BODY OF RIGHT EAR, INITIAL ENCOUNTER: ICD-10-CM

## 2018-01-13 DIAGNOSIS — T16.2XXA FOREIGN BODY OF LEFT EAR, INITIAL ENCOUNTER: ICD-10-CM

## 2018-01-13 PROCEDURE — 99282 EMERGENCY DEPT VISIT SF MDM: CPT

## 2018-01-13 ASSESSMENT — ENCOUNTER SYMPTOMS
SHORTNESS OF BREATH: 0
FACIAL SWELLING: 0
RHINORRHEA: 0
ABDOMINAL DISTENTION: 0
ABDOMINAL PAIN: 0
VOMITING: 0
COLOR CHANGE: 0
WHEEZING: 0
EYE DISCHARGE: 0
PHOTOPHOBIA: 0

## 2018-01-13 ASSESSMENT — PAIN DESCRIPTION - FREQUENCY: FREQUENCY: CONTINUOUS

## 2018-01-13 ASSESSMENT — PAIN DESCRIPTION - PAIN TYPE: TYPE: ACUTE PAIN

## 2018-01-13 ASSESSMENT — PAIN SCALES - GENERAL: PAINLEVEL_OUTOF10: 8

## 2018-01-13 ASSESSMENT — PAIN DESCRIPTION - DESCRIPTORS: DESCRIPTORS: CONSTANT

## 2018-01-13 ASSESSMENT — PAIN DESCRIPTION - ONSET: ONSET: ON-GOING

## 2018-01-13 ASSESSMENT — PAIN DESCRIPTION - LOCATION: LOCATION: BUTTOCKS

## 2018-01-13 NOTE — ED PROVIDER NOTES
[facility] ED  eMERGENCY dEPARTMENT eNCOUnter      Pt Name: Travis Blair  MRN: [id number]  [place] 1980  Date of evaluation: 2018  Provider: Tori Monge, [address] [place] [address]       Chief Complaint   Patient presents with    Abscess     between rectum and vagina for a couple days. pt is 32 weeks pregant. HISTORY OF PRESENT ILLNESS   (Location/Symptom, Timing/Onset, Context/Setting, Quality, Duration, Modifying Factors, Severity)  Note limiting factors. Travis Blair is a 40 y.o. female who presents to the emergency department With a chief complaint of an abscess. Patient states that since arriving to the emergency department she has noticed some drainage on her panties, it is been hurting her for a couple of days. Patient has a secondary concern that she needs to have her earrings removed prior to  and is having a hard time getting them out. HPI    Nursing Notes were reviewed. REVIEW OF SYSTEMS    (2-9 systems for level 4, 10 or more for level 5)     Review of Systems   Constitutional: Negative for activity change and appetite change. HENT: Positive for ear pain. Negative for congestion, facial swelling and rhinorrhea. Eyes: Negative for photophobia and discharge. Respiratory: Negative for shortness of breath and wheezing. Cardiovascular: Negative for chest pain. Gastrointestinal: Negative for abdominal distention, abdominal pain and vomiting. Endocrine: Negative for polydipsia and polyphagia. Genitourinary: Negative for difficulty urinating, frequency, vaginal bleeding and vaginal discharge. Musculoskeletal: Negative for gait problem. Skin: Positive for wound. Negative for color change. Allergic/Immunologic: Negative for immunocompromised state. Neurological: Negative for dizziness, weakness and light-headedness. Hematological: Negative for adenopathy. Psychiatric/Behavioral: Negative for behavioral problems.        Except children: N/A    Years of education: N/A     Social History Main Topics    Smoking status: Former Smoker     Quit date: 8/15/2017    Smokeless tobacco: Never Used    Alcohol use No    Drug use: No    Sexual activity: Yes     Partners: Male     Other Topics Concern    None     Social History Narrative    None       SCREENINGS    Adonis Coma Scale  Eye Opening: Spontaneous  Best Verbal Response: Oriented  Best Motor Response: Obeys commands  Adonis Coma Scale Score: 15        PHYSICAL EXAM    (up to 7 for level 4, 8 or more for level 5)     ED Triage Vitals [01/13/18 1354]   BP Temp Temp Source Pulse Resp SpO2 Height Weight   121/82 97.6 °F (36.4 °C) Oral 110 16 99 % 5' 3\" (1.6 m) 197 lb (89.4 kg)       Physical Exam   Constitutional: She is oriented to person, place, and time. She appears well-developed and well-nourished. HENT:   Head: Normocephalic and atraumatic. Eyes: Conjunctivae and EOM are normal. Pupils are equal, round, and reactive to light. Patient does have small studs in her ears, the backs of which have corroded onto the stems making them difficult for removal.   Neck: Normal range of motion. Neck supple. Cardiovascular: Normal rate. Pulmonary/Chest: Effort normal.   Abdominal: Soft. Bowel sounds are normal.   Musculoskeletal: Normal range of motion. Neurological: She is alert and oriented to person, place, and time. She has normal reflexes. Skin: Skin is warm and dry. Patient has an abscess noted at the internal aspect of her left buttock approximately 2 inches away from her rectum at 9:00. This is indurated and mildly erythematous and tender to the touch. It is draining a copious amount of purulent discharge spontaneously   Psychiatric: She has a normal mood and affect.        DIAGNOSTIC RESULTS     EKG: All EKG's are interpreted by the Emergency Department Physician who either signs or Co-signs this chart in the absence of a cardiologist.        RADIOLOGY:   Non-plain film images such as CT, Ultrasound and MRI are read by the radiologist. Plain radiographic images are visualized and preliminarily interpreted by the emergency physician with the below findings:        Interpretation per the Radiologist below, if available at the time of this note:    No orders to display         ED BEDSIDE ULTRASOUND:   Performed by ED Physician - none    LABS:  Labs Reviewed - No data to display    All other labs were within normal range or not returned as of this dictation. EMERGENCY DEPARTMENT COURSE and DIFFERENTIAL DIAGNOSIS/MDM:   Vitals:    Vitals:    01/13/18 1354   BP: 121/82   Pulse: 110   Resp: 16   Temp: 97.6 °F (36.4 °C)   TempSrc: Oral   SpO2: 99%   Weight: 197 lb (89.4 kg)   Height: 5' 3\" (1.6 m)       We discussed conservative measures at this point to manage her abscess as she is not wanting any pain medication or antibiotics during pregnancy unless absolutely necessary. She is encouraged to do sitz baths and/or hot compresses and use Tylenol as needed for pain control. She has her earrings removed without event and is discharged home to follow-up with her obstetrician as needed. MDM    CRITICAL CARE TIME   Total Critical Care time was 0 minutes, excluding separately reportable procedures. There was a high probability of clinically significant/life threatening deterioration in the patient's condition which required my urgent intervention. CONSULTS:  None    PROCEDURES:  Unless otherwise noted below, none     Foreign Body  Date/Time: 1/13/2018 2:54 PM  Performed by: Giles Cranker by: Michelle Alicea     Consent:     Consent obtained:  Verbal    Consent given by:  Patient    Risks discussed:  Bleeding, pain and poor cosmetic result    Alternatives discussed:  No treatment  Location:     Location:  Ear    Ear location:  R ear (both)    Depth:   Intradermal    Tendon involvement:  None  Pre-procedure details:     Imaging:  None    Neurovascular status: intact Anesthesia (see MAR for exact dosages): Anesthesia method:  None  Procedure type:     Procedure complexity:  Simple  Procedure details:     Localization method:  Visualized    Dissection of underlying tissues: no      Bloodless field: yes      Removal mechanism:  Hemostat    Foreign bodies recovered:  2    Intact foreign body removal: yes    Post-procedure details:     Neurovascular status: intact      Confirmation:  No additional foreign bodies on visualization    Skin closure:  None    Dressing:  Open (no dressing)    Patient tolerance of procedure: Tolerated well, no immediate complications        FINAL IMPRESSION      1. Abscess    2. Foreign body of right ear, initial encounter    3.  Foreign body of left ear, initial encounter          DISPOSITION/PLAN   DISPOSITION Decision To Discharge 01/13/2018 02:47:27 PM      PATIENT REFERRED TO:  Mehul Fink             DISCHARGE MEDICATIONS:  New Prescriptions    No medications on file          (Please note that portions of this note were completed with a voice recognition program.  Efforts were made to edit the dictations but occasionally words are mis-transcribed.)    Vega Soriano DO (electronically signed)  Attending Emergency Physician          Vega Soriano DO  01/13/18 2625

## 2018-01-14 ENCOUNTER — HOSPITAL ENCOUNTER (OUTPATIENT)
Age: 38
Discharge: HOME | End: 2018-01-14
Attending: OBSTETRICS & GYNECOLOGY | Admitting: OBSTETRICS & GYNECOLOGY
Payer: COMMERCIAL

## 2018-01-14 VITALS
WEIGHT: 197 LBS | RESPIRATION RATE: 18 BRPM | SYSTOLIC BLOOD PRESSURE: 105 MMHG | DIASTOLIC BLOOD PRESSURE: 65 MMHG | HEIGHT: 63 IN | HEART RATE: 110 BPM | BODY MASS INDEX: 34.91 KG/M2 | TEMPERATURE: 97.9 F

## 2018-01-14 PROBLEM — O09.92 HIGH-RISK PREGNANCY IN SECOND TRIMESTER: Status: RESOLVED | Noted: 2017-10-11 | Resolved: 2018-01-14

## 2018-01-14 PROBLEM — O09.892 HX OF PRETERM DELIVERY, CURRENTLY PREGNANT, SECOND TRIMESTER: Status: RESOLVED | Noted: 2017-10-30 | Resolved: 2018-01-14

## 2018-01-14 PROBLEM — Z34.82 ENCOUNTER FOR SUPERVISION OF OTHER NORMAL PREGNANCY, SECOND TRIMESTER: Status: RESOLVED | Noted: 2017-10-11 | Resolved: 2018-01-14

## 2018-01-14 PROBLEM — Z34.90 SUPERVISION OF NORMAL PREGNANCY: Status: RESOLVED | Noted: 2017-11-20 | Resolved: 2018-01-14

## 2018-01-14 PROBLEM — Z3A.31 31 WEEKS GESTATION OF PREGNANCY: Status: RESOLVED | Noted: 2018-01-08 | Resolved: 2018-01-14

## 2018-01-14 PROBLEM — O09.292 HISTORY OF FETAL ABNORMALITY IN PREVIOUS PREGNANCY, CURRENTLY PREGNANT, SECOND TRIMESTER: Status: RESOLVED | Noted: 2017-10-30 | Resolved: 2018-01-14

## 2018-01-14 PROBLEM — N91.2 AMENORRHEA: Status: RESOLVED | Noted: 2017-10-11 | Resolved: 2018-01-14

## 2018-01-14 PROBLEM — O09.292 HX OF PRE-ECLAMPSIA IN PRIOR PREGNANCY, CURRENTLY PREGNANT, SECOND TRIMESTER: Status: RESOLVED | Noted: 2017-10-30 | Resolved: 2018-01-14

## 2018-01-14 PROBLEM — Z3A.24 24 WEEKS GESTATION OF PREGNANCY: Status: RESOLVED | Noted: 2017-11-20 | Resolved: 2018-01-14

## 2018-01-14 PROBLEM — Z3A.19 19 WEEKS GESTATION OF PREGNANCY: Status: RESOLVED | Noted: 2017-10-11 | Resolved: 2018-01-14

## 2018-01-14 LAB
BILIRUBIN URINE: NEGATIVE
BLOOD, URINE: NEGATIVE
CLARITY: CLEAR
COLOR: YELLOW
GLUCOSE BLD-MCNC: 189 MG/DL (ref 60–115)
GLUCOSE URINE: >=1000 MG/DL
KETONES, URINE: NEGATIVE MG/DL
LEUKOCYTE ESTERASE, URINE: NEGATIVE
NITRITE, URINE: NEGATIVE
PERFORMED ON: ABNORMAL
PH UA: 6 (ref 5–9)
PROTEIN UA: NEGATIVE MG/DL
SPECIFIC GRAVITY UA: 1.02 (ref 1–1.03)
UROBILINOGEN, URINE: 0.2 E.U./DL

## 2018-01-14 PROCEDURE — 59025 FETAL NON-STRESS TEST: CPT

## 2018-01-14 PROCEDURE — 99284 EMERGENCY DEPT VISIT MOD MDM: CPT

## 2018-01-14 PROCEDURE — 81003 URINALYSIS AUTO W/O SCOPE: CPT

## 2018-01-14 PROCEDURE — 6370000000 HC RX 637 (ALT 250 FOR IP): Performed by: OBSTETRICS & GYNECOLOGY

## 2018-01-14 PROCEDURE — 96374 THER/PROPH/DIAG INJ IV PUSH: CPT

## 2018-01-14 PROCEDURE — 2580000003 HC RX 258: Performed by: OBSTETRICS & GYNECOLOGY

## 2018-01-14 PROCEDURE — 6360000002 HC RX W HCPCS: Performed by: OBSTETRICS & GYNECOLOGY

## 2018-01-14 RX ORDER — DOCUSATE SODIUM 100 MG/1
100 CAPSULE, LIQUID FILLED ORAL DAILY
Status: DISCONTINUED | OUTPATIENT
Start: 2018-01-14 | End: 2018-01-14 | Stop reason: HOSPADM

## 2018-01-14 RX ORDER — SODIUM CHLORIDE 9 MG/ML
INJECTION, SOLUTION INTRAVENOUS CONTINUOUS
Status: DISCONTINUED | OUTPATIENT
Start: 2018-01-14 | End: 2018-01-14 | Stop reason: HOSPADM

## 2018-01-14 RX ORDER — ACETAMINOPHEN 325 MG/1
650 TABLET ORAL EVERY 4 HOURS PRN
Status: DISCONTINUED | OUTPATIENT
Start: 2018-01-14 | End: 2018-01-14 | Stop reason: HOSPADM

## 2018-01-14 RX ORDER — SODIUM CHLORIDE 0.9 % (FLUSH) 0.9 %
10 SYRINGE (ML) INJECTION EVERY 12 HOURS SCHEDULED
Status: DISCONTINUED | OUTPATIENT
Start: 2018-01-14 | End: 2018-01-14 | Stop reason: HOSPADM

## 2018-01-14 RX ORDER — FAMOTIDINE 20 MG/1
20 TABLET, FILM COATED ORAL DAILY
Qty: 30 TABLET | Refills: 3 | Status: SHIPPED | OUTPATIENT
Start: 2018-01-14 | End: 2020-12-10

## 2018-01-14 RX ORDER — SODIUM CHLORIDE 0.9 % (FLUSH) 0.9 %
10 SYRINGE (ML) INJECTION PRN
Status: DISCONTINUED | OUTPATIENT
Start: 2018-01-14 | End: 2018-01-14 | Stop reason: HOSPADM

## 2018-01-14 RX ORDER — CEPHALEXIN 500 MG/1
500 CAPSULE ORAL 2 TIMES DAILY
Qty: 20 CAPSULE | Refills: 0 | Status: ON HOLD | OUTPATIENT
Start: 2018-01-14 | End: 2018-02-08 | Stop reason: HOSPADM

## 2018-01-14 RX ORDER — ONDANSETRON 2 MG/ML
4 INJECTION INTRAMUSCULAR; INTRAVENOUS EVERY 6 HOURS PRN
Status: DISCONTINUED | OUTPATIENT
Start: 2018-01-14 | End: 2018-01-14 | Stop reason: HOSPADM

## 2018-01-14 RX ORDER — PSEUDOEPHEDRINE HCL 30 MG
100 TABLET ORAL 2 TIMES DAILY
Qty: 60 CAPSULE | Refills: 3 | Status: SHIPPED | OUTPATIENT
Start: 2018-01-14 | End: 2020-12-10

## 2018-01-14 RX ADMIN — WATER 1 G: 1 INJECTION INTRAMUSCULAR; INTRAVENOUS; SUBCUTANEOUS at 19:25

## 2018-01-14 RX ADMIN — DOCUSATE SODIUM 100 MG: 100 CAPSULE, LIQUID FILLED ORAL at 18:18

## 2018-01-14 RX ADMIN — SODIUM CHLORIDE: 9 INJECTION, SOLUTION INTRAVENOUS at 18:13

## 2018-01-14 NOTE — FLOWSHEET NOTE
Dr. Graham Labor in to see patient . Inspected \"boil\". Plan will talk to Dr. Liss Jauregui. And give antibiatics.

## 2018-01-15 NOTE — PROGRESS NOTES
Discharge instructions given, pt states understanding, states she sees dr Chiquis Blevins on Friday. Pt given prescriptions for keflex, colace and pepcid.

## 2018-01-16 ENCOUNTER — OFFICE VISIT (OUTPATIENT)
Dept: FAMILY MEDICINE CLINIC | Age: 38
End: 2018-01-16

## 2018-01-16 VITALS
HEART RATE: 82 BPM | BODY MASS INDEX: 34.91 KG/M2 | SYSTOLIC BLOOD PRESSURE: 102 MMHG | OXYGEN SATURATION: 97 % | HEIGHT: 63 IN | DIASTOLIC BLOOD PRESSURE: 62 MMHG | WEIGHT: 197 LBS

## 2018-01-16 DIAGNOSIS — B00.9 HERPES SIMPLEX INFECTION OF SKIN: Primary | ICD-10-CM

## 2018-01-16 PROCEDURE — G8427 DOCREV CUR MEDS BY ELIG CLIN: HCPCS | Performed by: FAMILY MEDICINE

## 2018-01-16 PROCEDURE — 99214 OFFICE O/P EST MOD 30 MIN: CPT | Performed by: FAMILY MEDICINE

## 2018-01-16 PROCEDURE — G8484 FLU IMMUNIZE NO ADMIN: HCPCS | Performed by: FAMILY MEDICINE

## 2018-01-16 PROCEDURE — 1036F TOBACCO NON-USER: CPT | Performed by: FAMILY MEDICINE

## 2018-01-16 PROCEDURE — G8417 CALC BMI ABV UP PARAM F/U: HCPCS | Performed by: FAMILY MEDICINE

## 2018-01-16 RX ORDER — VALACYCLOVIR HYDROCHLORIDE 500 MG/1
500 TABLET, FILM COATED ORAL DAILY
Qty: 30 TABLET | Refills: 11 | Status: ON HOLD | OUTPATIENT
Start: 2018-01-16 | End: 2018-02-08 | Stop reason: HOSPADM

## 2018-01-16 ASSESSMENT — ENCOUNTER SYMPTOMS
ABDOMINAL PAIN: 0
COUGH: 0
SHORTNESS OF BREATH: 0

## 2018-01-16 NOTE — PROGRESS NOTES
 Number of children: N/A    Years of education: N/A     Occupational History    Not on file.      Social History Main Topics    Smoking status: Former Smoker     Quit date: 8/15/2017    Smokeless tobacco: Never Used    Alcohol use No      Comment: socially before pregnancy    Drug use: No    Sexual activity: Not Currently     Partners: Male     Other Topics Concern    Not on file     Social History Narrative    No narrative on file     Family History   Problem Relation Age of Onset    Arthritis Mother     Diabetes Mother     Arthritis Father     Early Death Father     Heart Attack Father     Heart Disease Father     High Blood Pressure Father     High Cholesterol Father     Stroke Father     Diabetes Paternal Grandfather     Birth Defects Son      Allergies   Allergen Reactions    Tape Joseph Milo Tape] Rash     Current Outpatient Prescriptions   Medication Sig Dispense Refill    valACYclovir (VALTREX) 500 MG tablet Take 1 tablet by mouth daily 30 tablet 11    cephALEXin (KEFLEX) 500 MG capsule Take 1 capsule by mouth 2 times daily 20 capsule 0    docusate sodium (COLACE, DULCOLAX) 100 MG CAPS Take 100 mg by mouth 2 times daily 60 capsule 3    famotidine (PEPCID) 20 MG tablet Take 1 tablet by mouth daily 30 tablet 3    insulin NPH (HUMULIN N KWIKPEN) 100 UNIT/ML injection pen 75 units twice a day 30 pen 3    insulin lispro (HUMALOG KWIKPEN) 100 UNIT/ML pen 20-24   units at each meals 15 pen 3    Pediatric Multiple Vit-C-FA (FLINSTONES GUMMIES OMEGA-3 DHA) CHEW Take 2 capsules by mouth daily      Insulin Pen Needle (BD PEN NEEDLE EMRE U/F) 32G X 4 MM MISC Use 3 times a day 200 each 03    Alcohol Swabs PADS Use qid 150 each 06    Heating Pads (HEATING PAD DRY HEAT) PADS 1 packet by Does not apply route 3 times daily 120 each 0    aspirin (JOSEE ASPIRIN) 325 MG tablet Take 0.5 tablets by mouth daily 30 tablet 3    glucose blood VI test strips (FREESTYLE LITE) strip Use 6 times a day

## 2018-01-24 ENCOUNTER — ROUTINE PRENATAL (OUTPATIENT)
Dept: OBGYN CLINIC | Age: 38
End: 2018-01-24
Payer: COMMERCIAL

## 2018-01-24 VITALS
DIASTOLIC BLOOD PRESSURE: 68 MMHG | BODY MASS INDEX: 34.9 KG/M2 | SYSTOLIC BLOOD PRESSURE: 98 MMHG | WEIGHT: 197 LBS | HEART RATE: 84 BPM

## 2018-01-24 DIAGNOSIS — Z34.83 ENCOUNTER FOR SUPERVISION OF OTHER NORMAL PREGNANCY IN THIRD TRIMESTER: Primary | ICD-10-CM

## 2018-01-24 DIAGNOSIS — O09.92 HIGH-RISK PREGNANCY IN SECOND TRIMESTER: ICD-10-CM

## 2018-01-24 DIAGNOSIS — O34.219 HX SUCCESSFUL VBAC (VAGINAL BIRTH AFTER CESAREAN), CURRENTLY PREGNANT: ICD-10-CM

## 2018-01-24 DIAGNOSIS — O09.292 HX OF PRE-ECLAMPSIA IN PRIOR PREGNANCY, CURRENTLY PREGNANT, SECOND TRIMESTER: ICD-10-CM

## 2018-01-24 DIAGNOSIS — Z3A.34 34 WEEKS GESTATION OF PREGNANCY: ICD-10-CM

## 2018-01-24 DIAGNOSIS — Z98.891 HX OF CESAREAN SECTION: ICD-10-CM

## 2018-01-24 DIAGNOSIS — Z91.199 NONCOMPLIANCE: ICD-10-CM

## 2018-01-24 DIAGNOSIS — O24.319 MODIFIED WHITE CLASS B PREGESTATIONAL DIABETES MELLITUS: ICD-10-CM

## 2018-01-24 DIAGNOSIS — Z86.79 HISTORY OF PSVT (PAROXYSMAL SUPRAVENTRICULAR TACHYCARDIA): ICD-10-CM

## 2018-01-24 PROBLEM — Z34.90 SUPERVISION OF NORMAL PREGNANCY: Status: ACTIVE | Noted: 2018-01-24

## 2018-01-24 PROCEDURE — 99213 OFFICE O/P EST LOW 20 MIN: CPT | Performed by: OBSTETRICS & GYNECOLOGY

## 2018-01-24 NOTE — PROGRESS NOTES
Subsequent OB visit      Anthony Bradley is a 40y.o. year old female [id number] @ 34 weeks by L 2017, MACHELLE 3/7/2018 . POB: SAB  X 2            FTLTCS @ 38 wks , female , 9lbs , induction due to Pre-eclampsia, gestational diabetes , arrest of descent .               @ 33 weeks , PPROM with labor due to congenital anomalies, polyhydramnios, GDMA2. Was followed at [facility] with MFM ,             SAB x 2. FTLTCS X 2 @ 36 wks , male , Josefa Germain . After which patient was diagnosed with diabetes in postpartum and is currently on insulin. 2016 . NB diagnosed with cardiomegaly due to diabetes. PGYN: History of genital herpes ? Last outbreak age 15 yo . PMH: IDDM - hUMULIN N 65 in AM and 65 in PM , Lispro 14-18 follows each meal .  with Dinora Mina. Hx SVTs  ? Follows with DR. Ted Agarwal - last seen \" few weeks ago\" - next appointment 10/18    PSH: C/S X 2     MEDS: [facility]. Drug Allergies: neg     SOCHX: quit smoking recently . FH: Mother or Father , DM Yes , HTN Yes, Other No    BP 98/68   Pulse 84   Wt 197 lb (89.4 kg)   LMP 2017   BMI 34.90 kg/m²   Past Medical History:   Diagnosis Date    Anemia during pregnancy 2018    Herpes simplex infection of skin     on neck    Hypercholesterolemia     Irregular heart beat     runs of v tach in the past    Obesity     Pregnancy induced hypertension     Type II or unspecified type diabetes mellitus without mention of complication, not stated as uncontrolled     type 2     Past Surgical History:   Procedure Laterality Date    ABDOMEN SURGERY       x 2     SECTION           Review of Systems  Constitutional: negative  Genitourinary:see above  Integument/breast: negative  Behavioral/Psych: negative  Endocrine: negative    All other systems reviewed and are negative.        Physical Exam:  BP 98/68   Pulse 84   Wt 197 lb (89.4 kg)   LMP 2017   BMI 34.90 kg/m²   Physical Exam   Constitutional: She is well-developed, well-nourished, and in no distress. Cardiovascular: Normal rate and regular rhythm. Pulmonary/Chest: Effort normal and breath sounds normal.   Abdominal: Soft. Bowel sounds are normal.   Gravid uterus :     HOF: 34 cm . FHT : 156 bpm     Assessment:     Complains of left skin lesion on neck, claims its Herpes? Using valacyclovir , with no resolution. Class B DM - controlled , follows with MFM and endocrinology see updated doses above. Fetal Echo - following with [facility], will follow in 4 weeks. MFM - on 18   82 humulin N AM and PM .   Humulin R cannot remember ? Being managed by MF now for uncontrolled BS . NST received and BPP at [facility] 2018   HBA1C pending with [facility] . 1. Encounter for supervision of other normal pregnancy in third trimester    2. Noncompliance    3. High-risk pregnancy in second trimester    4. 34 weeks gestation of pregnancy    5. History of PSVT (paroxysmal supraventricular tachycardia)    6. Hx of pre-eclampsia in prior pregnancy, currently pregnant, second trimester    7. Hx of  section    8. Hx successful  (vaginal birth after ), currently pregnant    9. 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 Plan:   Following with [facility]     1) s/p MFM referral in process for genetic counseling, fetal anatomy scan, history of gestational diabetes  delivery fetal congenital anomalies  2) started on aspirin 150 mg for prevention of preeclampsia  3) history of paroxysmal supraventricular tachycardia according to patient's history, follows by Dr. Olga Marquez. Currently on no medications.    4) history of PPROM due to polyhydramnios, no need for 17 hydroxyprogesterone intramuscular weekly injections  by maternal-fetal medicine, due to known inducing cause of PPROM, ie the polyhydramnios. 5) class B gestational diabetes, following with Dr. Zach Richmond endocrinology  6) patient for repeat  section #3  7) advised to deliver at tertiary facility due to possible follow up and studies needed to assess  due to previous history of  diagnosis in her previous NB with cardiomegaly due to diabetes. Brief Counseling DM in pregnancy :   Risks  ? The risk of adverse pregnancy outcome is increased in women with diabetes, but appears to be similar for women with type 1 and type 2 diabetes. Women with type 1 diabetes are more likely to have microvascular disease-related complications than women with type 2 diabetes, and they are at higher risk of developing severe hypo- and hyperglycemia. ?Adverse pregnancy outcomes include miscarriage, congenital anomaly, macrosomia, preeclampsia,  birth,  delivery, and  mortality. Short- and long-term morbidity in offspring is also a concern. Maternal medical risks include progression of retinopathy and nephropathy, diabetic ketoacidosis, and complications related to gastroparesis. ? Hyperglycemia in the periconception period and during the course of pregnancy is the single most important determinant of excess risk of adverse fetal outcome in women with pregestational diabetes. Achieving preconception glycemic control as close to normal as possible while avoiding hypoglycemia is of critical importance. Risks of obesity in pregnancy  Compared with pregnant women with BMI <25 kg/m2, pregnancies among obese women are at increased risk of several adverse outcomes, including increased rates of early pregnancy loss, congenital anomalies, stillbirth, pregnancy-associated hypertension,  and post-term birth, gestational diabetes mellitus (GDM), multifetal gestation, and birth of a large for gestational age infant.  Macrosomia may result in shoulder dystocia and its sequelae (brachial plexus injury) or  delivery.  delivery in obese women is associated with increased rates of wound infection and thromboembolism. Obese pregnant women are also at increased risk for maternal disorders, such as sleep-related breathing disorders, carpal tunnel syndrome, postpartum depression, and venous thromboembolism (VTE). Recommendations for total and rate of weight gain during pregnancy by prepregnancy BMI    Total weight gain Rates of weight gain*  second and third trimester   Prepregnancy BMI Range in kg Range in lb Mean (range) in kg/week Mean (range) in lb/week   Underweight (<18.5 kg/m2) 12.5 to 18 28 to 40 0.51 (0.44 to 0.58) 1 (1 to 1.3)   Normal weight (18.5 to 24.9 kg/m2) 11.5 to 16 25 to 35 0.42 (0.35 to 0.50) 1 (0.8 to 1)   Overweight (25.0 to 29.9 kg/m2) 7 to 11.5 15 to 25 0.28 (0.23 to 0.33) 0.6 (0.5 to 0.7)   Obese (?30.0 kg/m2) 5 to 9 11 to 20 0.22 (0.17 to 0.27) 0.5 (0.4 to 0.6)      Testing Schedule     1) The patient will begin twice weekly fetal non stress testing at 32 weeks as well as weekly biophysical profiles. Fetal kick counts every 8 hours will begin at 28 weeks. 2) Growth ultrasounds will begin at 24 weeks     Indication : high risk history . Class B DM uncontrolled. Return in 1 weeks. Instructed to follow with MFM only since she will be going there twice weekly. TDAP - given   Flu- given     Alessandra Bridges M.D., F.A.C.O. G

## 2018-02-02 PROBLEM — O99.019 ANEMIA DURING PREGNANCY: Status: ACTIVE | Noted: 2018-02-02

## 2018-02-02 PROBLEM — O09.92 HIGH-RISK PREGNANCY IN SECOND TRIMESTER: Status: ACTIVE | Noted: 2018-02-02

## 2018-02-08 ENCOUNTER — APPOINTMENT (OUTPATIENT)
Dept: ULTRASOUND IMAGING | Age: 38
End: 2018-02-08
Payer: COMMERCIAL

## 2018-02-08 ENCOUNTER — HOSPITAL ENCOUNTER (OUTPATIENT)
Age: 38
Discharge: HOME | End: 2018-02-08
Attending: OBSTETRICS & GYNECOLOGY | Admitting: OBSTETRICS & GYNECOLOGY
Payer: COMMERCIAL

## 2018-02-08 VITALS
OXYGEN SATURATION: 98 % | BODY MASS INDEX: 35.26 KG/M2 | SYSTOLIC BLOOD PRESSURE: 120 MMHG | HEIGHT: 63 IN | DIASTOLIC BLOOD PRESSURE: 74 MMHG | WEIGHT: 199 LBS | TEMPERATURE: 98.2 F | HEART RATE: 107 BPM | RESPIRATION RATE: 18 BRPM

## 2018-02-08 PROBLEM — Z98.891 PREVIOUS CESAREAN SECTION: Status: ACTIVE | Noted: 2017-10-11

## 2018-02-08 LAB
ALBUMIN SERPL-MCNC: 3.2 G/DL (ref 3.9–4.9)
ALP BLD-CCNC: 83 U/L (ref 40–130)
ALT SERPL-CCNC: 10 U/L (ref 0–33)
AMPHETAMINE SCREEN, URINE: NORMAL
AMYLASE: 58 U/L (ref 28–100)
ANION GAP SERPL CALCULATED.3IONS-SCNC: 14 MEQ/L (ref 7–13)
AST SERPL-CCNC: 11 U/L (ref 0–35)
BACTERIA: ABNORMAL /HPF
BARBITURATE SCREEN URINE: NORMAL
BASOPHILS ABSOLUTE: 0 K/UL (ref 0–0.2)
BASOPHILS RELATIVE PERCENT: 0.2 %
BENZODIAZEPINE SCREEN, URINE: NORMAL
BILIRUB SERPL-MCNC: 0.3 MG/DL (ref 0–1.2)
BILIRUBIN URINE: NEGATIVE
BLOOD, URINE: NEGATIVE
BUN BLDV-MCNC: 6 MG/DL (ref 6–20)
CALCIUM SERPL-MCNC: 8.8 MG/DL (ref 8.6–10.2)
CANNABINOID SCREEN URINE: NORMAL
CHLORIDE BLD-SCNC: 104 MEQ/L (ref 98–107)
CLARITY: ABNORMAL
CO2: 20 MEQ/L (ref 22–29)
COCAINE METABOLITE SCREEN URINE: NORMAL
COLOR: YELLOW
CREAT SERPL-MCNC: 0.4 MG/DL (ref 0.5–0.9)
EOSINOPHILS ABSOLUTE: 0.2 K/UL (ref 0–0.7)
EOSINOPHILS RELATIVE PERCENT: 1.5 %
EPITHELIAL CELLS, UA: ABNORMAL /HPF
GFR AFRICAN AMERICAN: >60
GFR NON-AFRICAN AMERICAN: >60
GLOBULIN: 2.5 G/DL (ref 2.3–3.5)
GLUCOSE BLD-MCNC: 106 MG/DL (ref 74–109)
GLUCOSE URINE: NEGATIVE MG/DL
HCT VFR BLD CALC: 42.4 % (ref 37–47)
HEMOGLOBIN: 14.4 G/DL (ref 12–16)
KETONES, URINE: NEGATIVE MG/DL
LEUKOCYTE ESTERASE, URINE: ABNORMAL
LIPASE: 39 U/L (ref 13–60)
LYMPHOCYTES ABSOLUTE: 2.2 K/UL (ref 1–4.8)
LYMPHOCYTES RELATIVE PERCENT: 17.9 %
Lab: NORMAL
MCH RBC QN AUTO: 30.4 PG (ref 27–31.3)
MCHC RBC AUTO-ENTMCNC: 33.9 % (ref 33–37)
MCV RBC AUTO: 89.8 FL (ref 82–100)
MONOCYTES ABSOLUTE: 1 K/UL (ref 0.2–0.8)
MONOCYTES RELATIVE PERCENT: 7.6 %
NEUTROPHILS ABSOLUTE: 9.1 K/UL (ref 1.4–6.5)
NEUTROPHILS RELATIVE PERCENT: 72.8 %
NITRITE, URINE: NEGATIVE
OPIATE SCREEN URINE: NORMAL
PDW BLD-RTO: 14.1 % (ref 11.5–14.5)
PH UA: 6 (ref 5–9)
PHENCYCLIDINE SCREEN URINE: NORMAL
PLATELET # BLD: 271 K/UL (ref 130–400)
POTASSIUM SERPL-SCNC: 3.9 MEQ/L (ref 3.5–5.1)
PROTEIN UA: NEGATIVE MG/DL
RBC # BLD: 4.73 M/UL (ref 4.2–5.4)
RBC UA: ABNORMAL /HPF (ref 0–2)
SODIUM BLD-SCNC: 138 MEQ/L (ref 132–144)
SPECIFIC GRAVITY UA: 1.01 (ref 1–1.03)
TOTAL PROTEIN: 5.7 G/DL (ref 6.4–8.1)
UROBILINOGEN, URINE: 0.2 E.U./DL
WBC # BLD: 12.6 K/UL (ref 4.8–10.8)
WBC UA: ABNORMAL /HPF (ref 0–5)

## 2018-02-08 PROCEDURE — 80053 COMPREHEN METABOLIC PANEL: CPT

## 2018-02-08 PROCEDURE — 87086 URINE CULTURE/COLONY COUNT: CPT

## 2018-02-08 PROCEDURE — 99283 EMERGENCY DEPT VISIT LOW MDM: CPT

## 2018-02-08 PROCEDURE — 81001 URINALYSIS AUTO W/SCOPE: CPT

## 2018-02-08 PROCEDURE — 36415 COLL VENOUS BLD VENIPUNCTURE: CPT

## 2018-02-08 PROCEDURE — 99282 EMERGENCY DEPT VISIT SF MDM: CPT | Performed by: OBSTETRICS & GYNECOLOGY

## 2018-02-08 PROCEDURE — 76819 FETAL BIOPHYS PROFIL W/O NST: CPT

## 2018-02-08 PROCEDURE — 59025 FETAL NON-STRESS TEST: CPT

## 2018-02-08 PROCEDURE — 85025 COMPLETE CBC W/AUTO DIFF WBC: CPT

## 2018-02-08 PROCEDURE — 83690 ASSAY OF LIPASE: CPT

## 2018-02-08 PROCEDURE — 82150 ASSAY OF AMYLASE: CPT

## 2018-02-08 PROCEDURE — 80307 DRUG TEST PRSMV CHEM ANLYZR: CPT

## 2018-02-08 RX ORDER — POLYETHYLENE GLYCOL 3350 17 G/17G
17 POWDER, FOR SOLUTION ORAL DAILY
COMMUNITY
End: 2020-12-10

## 2018-02-08 RX ORDER — NITROFURANTOIN 25; 75 MG/1; MG/1
100 CAPSULE ORAL 2 TIMES DAILY
Qty: 6 CAPSULE | Refills: 0 | Status: SHIPPED | OUTPATIENT
Start: 2018-02-08 | End: 2018-02-11

## 2018-02-08 NOTE — ED PROVIDER NOTES
OB/ER Note:  SUBJECTIVE:  39 y/o [id number] female whose MACHELLE was 3/7/18; now 36 wks 1 day gest and presents with c/o constipation; high risk IDDM patient whose care was transferred to [facility] by Dr. Richardson Kim; she is s/p  x's 2 and is scheduled for repeat  on 2/15/18 with Dr. Cipriano Rangel at [facility]; she has been seeing Dr. Izzy Gruber ( MFM at [facility] for late [facility] after transfer of care from Dr. Richardson Kim ); she is on miralax as well as colace; describes passing only small fecal balls this AM, but is having flatus; does not give hx/o constipaation when not pregnant. OBJECTIVE:  /74   Pulse 107   Temp 98.2 °F (36.8 °C) (Oral)   Resp 18   Ht 5' 3\" (1.6 m)   Wt 199 lb (90.3 kg)   LMP 2017   SpO2 98%   BMI 35.25 kg/m²   NST-reactive. Uterus gravid to 38 wks gest ( supple )  CX-clsed/ 80%/ 0 station/ vtx ( os far posterior ); RV-no feces in rectal ampulla. ASSESSMENT:  IUP at 36+ wks gest, ; IDDM; prior  x's 2; c/o constipation; PNC elsewhere ( [facility] ); stable. PLAN:  Will check cbc, ua and comp panel; will check BPP; labs are ok and BS is 106; ua shows mod leuks and bacteria, but no nitrites ( also 10-20 Epi ); BPP is 8/8 and CIERA-11 cm; pt was able to tolerate a sandwich wrap and liquids; spoke with Dr. James Nunez ( MFM at [facility] ); will rx with macrobid, 100 mg po bid x's 3 days pending uc result and have pt f/u -M next Tuesday; if symptoms with constipation persist or worsen, advise pt to present to [facility] for further evaluation; continue miralax-colacace and increase fluids; discus dietary issues as well.   Marielena Diane MD

## 2018-02-08 NOTE — FLOWSHEET NOTE
Labs drawn and sent EFM applied pt continues to C/O only being able to pass \"3 small poop balls\". Admits to cramping and\" not sure why. \"

## 2018-02-10 LAB — URINE CULTURE, ROUTINE: NORMAL

## 2018-03-02 ENCOUNTER — TELEPHONE (OUTPATIENT)
Dept: OBGYN CLINIC | Age: 38
End: 2018-03-02

## 2018-03-02 RX ORDER — AMOXICILLIN AND CLAVULANATE POTASSIUM 875; 125 MG/1; MG/1
1 TABLET, FILM COATED ORAL 2 TIMES DAILY
Qty: 20 TABLET | Refills: 0 | Status: SHIPPED | OUTPATIENT
Start: 2018-03-02 | End: 2018-03-12

## 2018-04-18 ENCOUNTER — OFFICE VISIT (OUTPATIENT)
Dept: OBGYN CLINIC | Age: 38
End: 2018-04-18
Payer: COMMERCIAL

## 2018-04-18 VITALS
HEIGHT: 63 IN | BODY MASS INDEX: 31.71 KG/M2 | WEIGHT: 179 LBS | HEART RATE: 100 BPM | DIASTOLIC BLOOD PRESSURE: 64 MMHG | SYSTOLIC BLOOD PRESSURE: 110 MMHG

## 2018-04-18 DIAGNOSIS — R21 RASH: Primary | ICD-10-CM

## 2018-04-18 PROCEDURE — G8417 CALC BMI ABV UP PARAM F/U: HCPCS | Performed by: OBSTETRICS & GYNECOLOGY

## 2018-04-18 PROCEDURE — 99213 OFFICE O/P EST LOW 20 MIN: CPT | Performed by: OBSTETRICS & GYNECOLOGY

## 2018-04-18 PROCEDURE — 1036F TOBACCO NON-USER: CPT | Performed by: OBSTETRICS & GYNECOLOGY

## 2018-04-18 PROCEDURE — G8427 DOCREV CUR MEDS BY ELIG CLIN: HCPCS | Performed by: OBSTETRICS & GYNECOLOGY

## 2018-04-20 PROBLEM — R21 RASH: Status: ACTIVE | Noted: 2018-04-20

## 2018-05-02 ENCOUNTER — TELEPHONE (OUTPATIENT)
Dept: OBGYN CLINIC | Age: 38
End: 2018-05-02

## 2018-05-04 RX ORDER — CLOTRIMAZOLE AND BETAMETHASONE DIPROPIONATE 10; .64 MG/G; MG/G
CREAM TOPICAL
Qty: 1 TUBE | Refills: 1 | Status: SHIPPED | OUTPATIENT
Start: 2018-05-04

## 2018-10-16 ENCOUNTER — TELEPHONE (OUTPATIENT)
Dept: FAMILY MEDICINE CLINIC | Age: 38
End: 2018-10-16

## 2018-11-07 ENCOUNTER — OFFICE VISIT (OUTPATIENT)
Dept: FAMILY MEDICINE CLINIC | Age: 38
End: 2018-11-07
Payer: COMMERCIAL

## 2018-11-07 VITALS
OXYGEN SATURATION: 98 % | SYSTOLIC BLOOD PRESSURE: 112 MMHG | TEMPERATURE: 97.7 F | BODY MASS INDEX: 31.79 KG/M2 | HEIGHT: 63 IN | HEART RATE: 57 BPM | DIASTOLIC BLOOD PRESSURE: 82 MMHG | WEIGHT: 179.4 LBS

## 2018-11-07 DIAGNOSIS — L08.9 SUPERFICIAL INJURY OF LEFT MIDDLE FINGER WITH INFECTION: Primary | ICD-10-CM

## 2018-11-07 DIAGNOSIS — S60.943A SUPERFICIAL INJURY OF LEFT MIDDLE FINGER WITH INFECTION: Primary | ICD-10-CM

## 2018-11-07 PROCEDURE — G8427 DOCREV CUR MEDS BY ELIG CLIN: HCPCS | Performed by: NURSE PRACTITIONER

## 2018-11-07 PROCEDURE — G8417 CALC BMI ABV UP PARAM F/U: HCPCS | Performed by: NURSE PRACTITIONER

## 2018-11-07 PROCEDURE — 1036F TOBACCO NON-USER: CPT | Performed by: NURSE PRACTITIONER

## 2018-11-07 PROCEDURE — 99213 OFFICE O/P EST LOW 20 MIN: CPT | Performed by: NURSE PRACTITIONER

## 2018-11-07 PROCEDURE — G8484 FLU IMMUNIZE NO ADMIN: HCPCS | Performed by: NURSE PRACTITIONER

## 2018-11-07 RX ORDER — CEPHALEXIN 500 MG/1
500 CAPSULE ORAL 2 TIMES DAILY
Qty: 14 CAPSULE | Refills: 0 | Status: SHIPPED | OUTPATIENT
Start: 2018-11-07 | End: 2018-11-14

## 2018-11-07 ASSESSMENT — ENCOUNTER SYMPTOMS
CHEST TIGHTNESS: 0
SHORTNESS OF BREATH: 0
COLOR CHANGE: 0
BACK PAIN: 0

## 2018-11-07 ASSESSMENT — PATIENT HEALTH QUESTIONNAIRE - PHQ9
SUM OF ALL RESPONSES TO PHQ QUESTIONS 1-9: 0
2. FEELING DOWN, DEPRESSED OR HOPELESS: 0
SUM OF ALL RESPONSES TO PHQ9 QUESTIONS 1 & 2: 0
SUM OF ALL RESPONSES TO PHQ QUESTIONS 1-9: 0
1. LITTLE INTEREST OR PLEASURE IN DOING THINGS: 0

## 2018-11-07 NOTE — PROGRESS NOTES
distal pulses. Pulmonary/Chest: Effort normal and breath sounds normal. No respiratory distress. Neurological: She is alert and oriented to person, place, and time. She displays normal reflexes. She exhibits normal muscle tone. Coordination normal.   Skin: Skin is warm and dry. Abrasion noted. No ecchymosis and no rash noted. She is not diaphoretic. No erythema. Dorsal surface of left third finger is erythematous and warm, with peeling skin and several small cracks. Cuticle area is mildly edematous. No drainage noted. Psychiatric: She has a normal mood and affect. Her behavior is normal.   Nursing note and vitals reviewed. POC Testing Today: No results found for this visit on 11/07/18. Assessment & Plan    Diagnosis Orders   1. Superficial injury of left middle finger with infection  cephALEXin (KEFLEX) 500 MG capsule    Crisaborole 2 % OINT       No orders of the defined types were placed in this encounter. Juan Carlos Newberry denies any specific injury to her finger; states that it has been worsening due to frequent hand washing/sanitizing. States she just recently stopped pumping her breast milk, which required pump parts to be washed in hot water several times per day. Also reports having a \"special needs\" son, so she is diligent in hand hygiene to try to prevent him from getting sick. States her blood glucose levels have been higher than usual for the past few weeks, even when she has only had water for the day prior to checking. Sample of Eucrisa provided. Apply to peeling skin of finger and cover with a bandage. Wear kitchen gloves while washing dishes, which Juan Carlos Newberry states she is already doing. States she is also using an alcohol-free hand  now, as well as Gold Squires hand lotion. Antibiotic Instructions: Complete the full course of antibiotics as ordered. Take each dose with a small snack or meal to lessen potential GI upset.   To prevent antibiotic resistance, please take medication as ordered and for the full duration even if you start to feel better. Consider intake of yogurt or probiotic during antibiotic use and for a few days after to help reduce the risk of developing a secondary infection. Separate the yogurt and antibiotic by at least 1 hour. Avoid alcohol while taking antibiotics. Return if symptoms worsen or fail to improve, for follow-up with PCP. Side effects and adverse effects of any medication prescribed today, as well as treatment plan/rationale,follow-up care, and result expectations have been discussed with the patient. Expresses understanding and desires to proceed with treatment plan. Discussed signs and symptoms which require immediate follow-up in ED/call to 911. Understanding verbalized. I have reviewed and updated the electronic medical record.     TRISHA Tinajero NP

## 2019-09-03 ENCOUNTER — OFFICE VISIT (OUTPATIENT)
Dept: FAMILY MEDICINE CLINIC | Age: 39
End: 2019-09-03
Payer: COMMERCIAL

## 2019-09-03 VITALS
SYSTOLIC BLOOD PRESSURE: 136 MMHG | HEART RATE: 106 BPM | OXYGEN SATURATION: 99 % | TEMPERATURE: 98.6 F | RESPIRATION RATE: 16 BRPM | DIASTOLIC BLOOD PRESSURE: 88 MMHG | BODY MASS INDEX: 29.77 KG/M2 | WEIGHT: 168 LBS | HEIGHT: 63 IN

## 2019-09-03 DIAGNOSIS — B00.9 HERPES SIMPLEX INFECTION OF SKIN: Primary | ICD-10-CM

## 2019-09-03 PROCEDURE — 1036F TOBACCO NON-USER: CPT | Performed by: NURSE PRACTITIONER

## 2019-09-03 PROCEDURE — G8417 CALC BMI ABV UP PARAM F/U: HCPCS | Performed by: NURSE PRACTITIONER

## 2019-09-03 PROCEDURE — 99213 OFFICE O/P EST LOW 20 MIN: CPT | Performed by: NURSE PRACTITIONER

## 2019-09-03 PROCEDURE — G8427 DOCREV CUR MEDS BY ELIG CLIN: HCPCS | Performed by: NURSE PRACTITIONER

## 2019-09-03 RX ORDER — ACYCLOVIR 50 MG/G
OINTMENT TOPICAL
Qty: 15 G | Refills: 0 | Status: SHIPPED | OUTPATIENT
Start: 2019-09-03

## 2019-09-03 RX ORDER — ACYCLOVIR 50 MG/G
OINTMENT TOPICAL
COMMUNITY
End: 2019-09-03 | Stop reason: SDUPTHER

## 2019-09-03 RX ORDER — VALACYCLOVIR HYDROCHLORIDE 1 G/1
1000 TABLET, FILM COATED ORAL 2 TIMES DAILY
Qty: 20 TABLET | Refills: 0 | Status: SHIPPED | OUTPATIENT
Start: 2019-09-03 | End: 2019-09-13

## 2019-09-03 ASSESSMENT — ENCOUNTER SYMPTOMS
EYE PAIN: 0
EYE REDNESS: 0
SHORTNESS OF BREATH: 0
SORE THROAT: 0
COUGH: 0
WHEEZING: 0
VOMITING: 0

## 2019-09-03 ASSESSMENT — PATIENT HEALTH QUESTIONNAIRE - PHQ9
2. FEELING DOWN, DEPRESSED OR HOPELESS: 0
SUM OF ALL RESPONSES TO PHQ QUESTIONS 1-9: 0
SUM OF ALL RESPONSES TO PHQ9 QUESTIONS 1 & 2: 0
SUM OF ALL RESPONSES TO PHQ QUESTIONS 1-9: 0
1. LITTLE INTEREST OR PLEASURE IN DOING THINGS: 0

## 2019-09-03 NOTE — PATIENT INSTRUCTIONS
· Return or see PCP if symptoms do not improve in 3-5 days as expected or worsen in any way  · Take medication with food  · Keep blisters covered as they may form    Patient Education   Cold Sores: Care Instructions  Your Care Instructions  Cold sores are clusters of small blisters on the lip and skin around or inside the mouth. Often the first sign of a cold sore is a spot that tingles, burns, or itches. A blister usually forms within 24 hours. The skin around the blisters can be red and inflamed. The blisters can break open, weep a clear fluid, and then scab over after a few days. Cold sores most often heal in 7 to 10 days without a scar. They are sometimes called fever blisters. Cold sores are caused by a virus called the herpes simplex virus. This virus also causes some cases of genital herpes. The virus can spread from a sore in the genital area to the lips. Or it can spread from a cold sore on the lips to the genital area. Cold sores most often go away on their own. But if they are severe, embarrass you, or cause pain, your doctor may prescribe antiviral medicine to relieve pain and help prevent outbreaks. Follow-up care is a key part of your treatment and safety. Be sure to make and go to all appointments, and call your doctor if you are having problems. It's also a good idea to know your test results and keep a list of the medicines you take. How can you care for yourself at home? · Wash your hands often. And try not to touch your cold sores. This will help to avoid spreading the virus to your eyes or genital area, or to other people. This is more likely to happen if this is your first cold sore outbreak. · Place ice or a cool, wet towel on the sores 3 times a day. Do this for 20 minutes each time. It may help to reduce redness and swelling. · If you are just getting a cold sore, try over-the-counter docosanol (Abreva) cream to reduce symptoms.   · If your doctor prescribed antiviral medicine to \"Cold Sores: Care Instructions. \"     If you do not have an account, please click on the \"Sign Up Now\" link. Current as of: September 11, 2018  Content Version: 12.1  © 3989-5734 [employer]. Care instructions adapted under license by [facility], [place] ([facility]). If you have questions about a medical condition or this instruction, always ask your healthcare professional. Coralee Rawlins any warranty or liability for your use of this information.

## 2019-09-16 ASSESSMENT — ENCOUNTER SYMPTOMS
DIARRHEA: 0
NAUSEA: 0
BLOOD IN STOOL: 0
TROUBLE SWALLOWING: 0
ABDOMINAL PAIN: 0

## 2019-10-10 ENCOUNTER — TELEPHONE (OUTPATIENT)
Dept: INTERNAL MEDICINE CLINIC | Age: 39
End: 2019-10-10

## 2019-10-23 ENCOUNTER — OFFICE VISIT (OUTPATIENT)
Dept: ENDOCRINOLOGY | Age: 39
End: 2019-10-23
Payer: COMMERCIAL

## 2019-10-23 VITALS
HEART RATE: 84 BPM | SYSTOLIC BLOOD PRESSURE: 129 MMHG | DIASTOLIC BLOOD PRESSURE: 86 MMHG | HEIGHT: 63 IN | OXYGEN SATURATION: 98 % | RESPIRATION RATE: 16 BRPM | BODY MASS INDEX: 29.16 KG/M2 | WEIGHT: 164.6 LBS

## 2019-10-23 LAB
ANION GAP SERPL CALCULATED.3IONS-SCNC: 15 MEQ/L (ref 9–15)
BUN BLDV-MCNC: 9 MG/DL (ref 6–20)
CALCIUM SERPL-MCNC: 8.7 MG/DL (ref 8.5–9.9)
CHLORIDE BLD-SCNC: 98 MEQ/L (ref 95–107)
CHP ED QC CHECK: NORMAL
CO2: 22 MEQ/L (ref 20–31)
CREAT SERPL-MCNC: 0.53 MG/DL (ref 0.5–0.9)
CREATININE URINE: 46.3 MG/DL
GFR AFRICAN AMERICAN: >60
GFR NON-AFRICAN AMERICAN: >60
GLUCOSE BLD-MCNC: 300 MG/DL
GLUCOSE BLD-MCNC: 333 MG/DL (ref 70–99)
HBA1C MFR BLD: 9.3 %
HBA1C MFR BLD: 9.3 % (ref 4.8–5.9)
MICROALBUMIN UR-MCNC: <1.2 MG/DL
MICROALBUMIN/CREAT UR-RTO: NORMAL MG/G (ref 0–30)
POTASSIUM SERPL-SCNC: 4.3 MEQ/L (ref 3.4–4.9)
SODIUM BLD-SCNC: 135 MEQ/L (ref 135–144)

## 2019-10-23 PROCEDURE — G8417 CALC BMI ABV UP PARAM F/U: HCPCS | Performed by: INTERNAL MEDICINE

## 2019-10-23 PROCEDURE — 99213 OFFICE O/P EST LOW 20 MIN: CPT | Performed by: INTERNAL MEDICINE

## 2019-10-23 PROCEDURE — 2022F DILAT RTA XM EVC RTNOPTHY: CPT | Performed by: INTERNAL MEDICINE

## 2019-10-23 PROCEDURE — G8427 DOCREV CUR MEDS BY ELIG CLIN: HCPCS | Performed by: INTERNAL MEDICINE

## 2019-10-23 PROCEDURE — 82962 GLUCOSE BLOOD TEST: CPT | Performed by: INTERNAL MEDICINE

## 2019-10-23 PROCEDURE — 3046F HEMOGLOBIN A1C LEVEL >9.0%: CPT | Performed by: INTERNAL MEDICINE

## 2019-10-23 PROCEDURE — 83036 HEMOGLOBIN GLYCOSYLATED A1C: CPT | Performed by: INTERNAL MEDICINE

## 2019-10-23 PROCEDURE — G8484 FLU IMMUNIZE NO ADMIN: HCPCS | Performed by: INTERNAL MEDICINE

## 2019-10-23 PROCEDURE — 1036F TOBACCO NON-USER: CPT | Performed by: INTERNAL MEDICINE

## 2019-10-23 RX ORDER — LANCETS 28 GAUGE
1 EACH MISCELLANEOUS DAILY
Qty: 200 EACH | Refills: 3 | Status: SHIPPED | OUTPATIENT
Start: 2019-10-23 | End: 2022-06-08

## 2019-10-23 RX ORDER — BLOOD PRESSURE TEST KIT
KIT MISCELLANEOUS
Qty: 150 EACH | Refills: 6 | Status: SHIPPED | OUTPATIENT
Start: 2019-10-23 | End: 2020-12-11 | Stop reason: SDUPTHER

## 2019-11-20 ENCOUNTER — OFFICE VISIT (OUTPATIENT)
Dept: ENDOCRINOLOGY | Age: 39
End: 2019-11-20
Payer: COMMERCIAL

## 2019-11-20 VITALS
WEIGHT: 165 LBS | HEIGHT: 63 IN | BODY MASS INDEX: 29.23 KG/M2 | SYSTOLIC BLOOD PRESSURE: 141 MMHG | DIASTOLIC BLOOD PRESSURE: 91 MMHG | HEART RATE: 81 BPM

## 2019-11-20 PROCEDURE — 99213 OFFICE O/P EST LOW 20 MIN: CPT | Performed by: INTERNAL MEDICINE

## 2019-11-20 PROCEDURE — 2022F DILAT RTA XM EVC RTNOPTHY: CPT | Performed by: INTERNAL MEDICINE

## 2019-11-20 PROCEDURE — G8484 FLU IMMUNIZE NO ADMIN: HCPCS | Performed by: INTERNAL MEDICINE

## 2019-11-20 PROCEDURE — 95250 CONT GLUC MNTR PHYS/QHP EQP: CPT | Performed by: INTERNAL MEDICINE

## 2019-11-20 PROCEDURE — G8417 CALC BMI ABV UP PARAM F/U: HCPCS | Performed by: INTERNAL MEDICINE

## 2019-11-20 PROCEDURE — 1036F TOBACCO NON-USER: CPT | Performed by: INTERNAL MEDICINE

## 2019-11-20 PROCEDURE — 3046F HEMOGLOBIN A1C LEVEL >9.0%: CPT | Performed by: INTERNAL MEDICINE

## 2019-11-20 PROCEDURE — G8428 CUR MEDS NOT DOCUMENT: HCPCS | Performed by: INTERNAL MEDICINE

## 2019-11-20 RX ORDER — INSULIN LISPRO 100 [IU]/ML
INJECTION, SOLUTION INTRAVENOUS; SUBCUTANEOUS
Qty: 15 PEN | Refills: 3 | Status: SHIPPED | OUTPATIENT
Start: 2019-11-20 | End: 2020-03-20 | Stop reason: SDUPTHER

## 2019-12-04 ENCOUNTER — NURSE ONLY (OUTPATIENT)
Dept: ENDOCRINOLOGY | Age: 39
End: 2019-12-04
Payer: COMMERCIAL

## 2019-12-04 PROCEDURE — 95251 CONT GLUC MNTR ANALYSIS I&R: CPT | Performed by: INTERNAL MEDICINE

## 2019-12-26 ENCOUNTER — OFFICE VISIT (OUTPATIENT)
Dept: OBGYN CLINIC | Age: 39
End: 2019-12-26
Payer: COMMERCIAL

## 2019-12-26 DIAGNOSIS — N63.20 BREAST MASS, LEFT: Primary | ICD-10-CM

## 2019-12-26 PROCEDURE — G8484 FLU IMMUNIZE NO ADMIN: HCPCS | Performed by: OBSTETRICS & GYNECOLOGY

## 2019-12-26 PROCEDURE — G8427 DOCREV CUR MEDS BY ELIG CLIN: HCPCS | Performed by: OBSTETRICS & GYNECOLOGY

## 2019-12-26 PROCEDURE — 1036F TOBACCO NON-USER: CPT | Performed by: OBSTETRICS & GYNECOLOGY

## 2019-12-26 PROCEDURE — G8417 CALC BMI ABV UP PARAM F/U: HCPCS | Performed by: OBSTETRICS & GYNECOLOGY

## 2019-12-26 PROCEDURE — 99213 OFFICE O/P EST LOW 20 MIN: CPT | Performed by: OBSTETRICS & GYNECOLOGY

## 2020-01-02 ENCOUNTER — HOSPITAL ENCOUNTER (OUTPATIENT)
Dept: WOMENS IMAGING | Age: 40
Discharge: HOME | End: 2020-01-04
Payer: COMMERCIAL

## 2020-01-02 ENCOUNTER — HOSPITAL ENCOUNTER (OUTPATIENT)
Dept: ULTRASOUND IMAGING | Age: 40
Discharge: HOME | End: 2020-01-04
Payer: COMMERCIAL

## 2020-01-02 PROCEDURE — G0279 TOMOSYNTHESIS, MAMMO: HCPCS

## 2020-01-02 PROCEDURE — 76642 ULTRASOUND BREAST LIMITED: CPT

## 2020-02-27 RX ORDER — INSULIN GLARGINE 100 [IU]/ML
INJECTION, SOLUTION SUBCUTANEOUS
Qty: 9 ML | Refills: 3 | Status: SHIPPED | OUTPATIENT
Start: 2020-02-27 | End: 2020-03-20 | Stop reason: SDUPTHER

## 2020-03-20 RX ORDER — INSULIN LISPRO 100 [IU]/ML
INJECTION, SOLUTION INTRAVENOUS; SUBCUTANEOUS
Qty: 15 PEN | Refills: 3 | Status: SHIPPED | OUTPATIENT
Start: 2020-03-20 | End: 2020-12-10

## 2020-03-20 RX ORDER — INSULIN GLARGINE 100 [IU]/ML
INJECTION, SOLUTION SUBCUTANEOUS
Qty: 9 ML | Refills: 3 | Status: SHIPPED | OUTPATIENT
Start: 2020-03-20 | End: 2020-12-10

## 2020-05-07 ENCOUNTER — TELEPHONE (OUTPATIENT)
Dept: ENDOCRINOLOGY | Age: 40
End: 2020-05-07

## 2020-05-08 RX ORDER — INSULIN GLARGINE 100 [IU]/ML
INJECTION, SOLUTION SUBCUTANEOUS
Qty: 5 PEN | Refills: 3 | Status: SHIPPED | OUTPATIENT
Start: 2020-05-08 | End: 2020-12-11 | Stop reason: SDUPTHER

## 2020-08-28 ENCOUNTER — HOSPITAL ENCOUNTER (EMERGENCY)
Age: 40
Discharge: HOME | End: 2020-08-28
Payer: COMMERCIAL

## 2020-08-28 VITALS
RESPIRATION RATE: 16 BRPM | DIASTOLIC BLOOD PRESSURE: 90 MMHG | SYSTOLIC BLOOD PRESSURE: 138 MMHG | TEMPERATURE: 98.7 F | OXYGEN SATURATION: 98 % | HEART RATE: 95 BPM | WEIGHT: 167 LBS | BODY MASS INDEX: 29.59 KG/M2 | HEIGHT: 63 IN

## 2020-08-28 PROCEDURE — 6370000000 HC RX 637 (ALT 250 FOR IP): Performed by: PHYSICIAN ASSISTANT

## 2020-08-28 PROCEDURE — 99282 EMERGENCY DEPT VISIT SF MDM: CPT

## 2020-08-28 RX ORDER — CLOTRIMAZOLE AND BETAMETHASONE DIPROPIONATE 10; .64 MG/G; MG/G
CREAM TOPICAL
Qty: 1 TUBE | Refills: 0 | Status: SHIPPED | OUTPATIENT
Start: 2020-08-28 | End: 2020-12-10

## 2020-08-28 RX ORDER — PREDNISONE 20 MG/1
60 TABLET ORAL ONCE
Status: COMPLETED | OUTPATIENT
Start: 2020-08-28 | End: 2020-08-28

## 2020-08-28 RX ORDER — PREDNISONE 20 MG/1
20 TABLET ORAL DAILY
Qty: 5 TABLET | Refills: 0 | Status: SHIPPED | OUTPATIENT
Start: 2020-08-28 | End: 2020-09-02

## 2020-08-28 RX ADMIN — PREDNISONE 60 MG: 20 TABLET ORAL at 15:44

## 2020-08-28 ASSESSMENT — ENCOUNTER SYMPTOMS
BACK PAIN: 0
DIARRHEA: 0
EYE REDNESS: 0
ABDOMINAL PAIN: 0
COUGH: 0
CHEST TIGHTNESS: 0
SINUS PAIN: 0
SHORTNESS OF BREATH: 0
EYE DISCHARGE: 0
RHINORRHEA: 0
NAUSEA: 0
VOMITING: 0
COLOR CHANGE: 0

## 2020-08-28 ASSESSMENT — PAIN DESCRIPTION - ORIENTATION: ORIENTATION: RIGHT;LEFT

## 2020-08-28 ASSESSMENT — PAIN DESCRIPTION - LOCATION: LOCATION: HAND

## 2020-08-28 ASSESSMENT — PAIN DESCRIPTION - DESCRIPTORS: DESCRIPTORS: ACHING

## 2020-08-28 NOTE — ED PROVIDER NOTES
[address] [facility] ED  EMERGENCY DEPARTMENT ENCOUNTER      Pt Name: Bradford Hsieh  MRN: [id number]  [place] 1980  Date of evaluation: 8/28/2020  Provider: Layla Bauman PA-C    CHIEF COMPLAINT       Chief Complaint   Patient presents with    Rash     rash to bilateral hands         HISTORY OF PRESENT ILLNESS   (Location/Symptom, Timing/Onset, Context/Setting, Quality, Duration, Modifying Factors, Severity)  Note limiting factors. Bradford Hsieh is a 45 y.o. female who presents to the emergency department erythematous, painful, scaly, bilateral hand rash that is painful and pruritic. Patient states this started about March when she started wearing gloves every time she left the house or touching her children. Patient states she also washes her hands multiple times a day. Patient states she has been trying to treat this \"rash\" with triple antibiotic ointment but is only gotten worse. Patient states she has tried different soaps without cessation of symptoms. Patient states whenever she tries to apply moisturizing cream is staying so she does not. Patient states that she is multiple small \"cuts\" that she has covered by bandages that happen whenever she bends her fingers. Patient denies fevers. HPI    Nursing Notes were reviewed. REVIEW OF SYSTEMS    (2-9 systems for level 4, 10 or more for level 5)     Review of Systems   Constitutional: Negative for activity change, chills, fatigue and fever. HENT: Negative for congestion, hearing loss, rhinorrhea, sinus pain, sneezing and tinnitus. Eyes: Negative for discharge, redness and visual disturbance. Respiratory: Negative for cough, chest tightness and shortness of breath. Cardiovascular: Negative for chest pain and leg swelling. Gastrointestinal: Negative for abdominal pain, diarrhea, nausea and vomiting. Endocrine: Negative for heat intolerance, polydipsia and polyuria.    Genitourinary: Negative for dysuria, flank pain, hematuria and urgency. Musculoskeletal: Negative for back pain, joint swelling and myalgias. Skin: Positive for rash. Negative for color change and wound. Allergic/Immunologic: Negative for immunocompromised state. Neurological: Negative for tremors, seizures, syncope, light-headedness and headaches. Hematological: Does not bruise/bleed easily. Psychiatric/Behavioral: Negative for agitation and behavioral problems. The patient is not nervous/anxious. Except as noted above the remainder of the review of systems was reviewed and negative. PAST MEDICAL HISTORY     Past Medical History:   Diagnosis Date    Anemia during pregnancy 2018    Herpes simplex infection of skin     on neck    Hypercholesterolemia     Irregular heart beat     runs of v tach in the past    Obesity     Pregnancy induced hypertension     Type II or unspecified type diabetes mellitus without mention of complication, not stated as uncontrolled     type 2         SURGICAL HISTORY       Past Surgical History:   Procedure Laterality Date    ABDOMEN SURGERY       x 2     SECTION           CURRENT MEDICATIONS       Discharge Medication List as of 2020  3:56 PM      CONTINUE these medications which have NOT CHANGED    Details   !! LANTUS SOLOSTAR 100 UNIT/ML injection pen Inject 45 units nightly, Disp-5 pen,R-3,DAWNormal      !! insulin glargine (BASAGLAR KWIKPEN) 100 UNIT/ML injection pen inject 45 units subcutaneously nightly, Disp-9 mL,R-3Normal      insulin lispro, 1 Unit Dial, (HUMALOG KWIKPEN) 100 UNIT/ML SOPN 10 units at each meals, Disp-15 pen,R-3Normal      !!  Insulin Pen Needle (NOVOFINE) 32G X 6 MM MISC Disp-300 each, R-3, NormalQid      FREESTYLE LANCETS MISC DAILY Starting Wed 10/23/2019, Disp-200 each, R-3, Normal      blood glucose test strips (FREESTYLE LITE) strip Disp-200 strip, R-3, NormalUse 6 times a day  Pt is pregnant      Alcohol Swabs PADS Disp-150 each, R-06, NormalUse qid      metFORMIN (GLUCOPHAGE) 1000 MG tablet Take 1 tablet by mouth 2 times daily (with meals), Disp-60 tablet, R-3Normal      !! Insulin Pen Needle (NOVOFINE) 32G X 6 MM MISC Disp-100 each, R-5, NormalOnce a day      acyclovir (ZOVIRAX) 5 % ointment Apply topically 5 times daily Apply topically 5 times daily to affected area for 7 days, Topical, 5 TIMES DAILY Starting Tue 9/3/2019, Disp-15 g, R-0, Normal      Crisaborole 2 % OINT Apply thin layer topically daily. , Disp-1 Tube, R-0Sample      !! clotrimazole-betamethasone (LOTRISONE) 1-0.05 % cream Apply topically 2 times daily. , Disp-1 Tube, R-1, Normal      miconazole (MICONAZOLE ANTIFUNGAL) 2 % cream Apply topically 2 times daily. , Disp-1 Tube, R-1, Normal      polyethylene glycol (GLYCOLAX) powder Take 17 g by mouth dailyHistorical Med      docusate sodium (COLACE, DULCOLAX) 100 MG CAPS Take 100 mg by mouth 2 times daily, Disp-60 capsule, R-3Print      famotidine (PEPCID) 20 MG tablet Take 1 tablet by mouth daily, Disp-30 tablet, R-3Print      Pediatric Multiple Vit-C-FA (FLINSTONES GUMMIES OMEGA-3 DHA) CHEW Take 2 capsules by mouth dailyHistorical Med      !! Insulin Pen Needle (BD PEN NEEDLE EMRE U/F) 32G X 4 MM MISC Disp-200 each, R-03, NormalUse 3 times a day       Heating Pads (HEATING PAD DRY HEAT) PADS 3 TIMES DAILY Starting Sat 10/14/2017, Disp-120 each, R-0, Print       !! - Potential duplicate medications found. Please discuss with provider.           ALLERGIES     Tape Renzo Dry tape]    FAMILY HISTORY       Family History   Problem Relation Age of Onset    Arthritis Mother     Diabetes Mother     Arthritis Father     Early Death Father     Heart Attack Father     Heart Disease Father     High Blood Pressure Father     High Cholesterol Father     Stroke Father     Diabetes Paternal Grandfather     Birth Defects Son           SOCIAL HISTORY       Social History     Socioeconomic History    Marital status: Single     Spouse name: oropharyngeal exudate or posterior oropharyngeal erythema. Eyes:      Conjunctiva/sclera: Conjunctivae normal.      Pupils: Pupils are equal, round, and reactive to light. Neck:      Musculoskeletal: Normal range of motion. No neck rigidity. Cardiovascular:      Rate and Rhythm: Normal rate and regular rhythm. Pulses: Normal pulses. Heart sounds: Normal heart sounds. No murmur. No friction rub. Pulmonary:      Effort: Pulmonary effort is normal. No respiratory distress. Breath sounds: Normal breath sounds. No stridor. Abdominal:      General: Abdomen is flat. Bowel sounds are normal.      Palpations: Abdomen is soft. Genitourinary:     Vagina: No vaginal discharge. Musculoskeletal: Normal range of motion. General: No swelling or tenderness. Hands:       Comments: Scaly, erythematous, nonraised rash bilateral dorsums of hands that extend to the first interphalangeal joint. Also on the palmar aspect of bilateral hands. Multiple small linear abrasions noted to bilateral hands. Dorsum of each hand there is a circular lesion with a red border and skin colored center that patient states has grown in size. Neurological:      General: No focal deficit present. Mental Status: She is alert.    Psychiatric:         Mood and Affect: Mood normal.         Behavior: Behavior normal.         DIAGNOSTIC RESULTS     EKG: All EKG's are interpreted by the Emergency Department Physician who either signs or Co-signs this chart in the absence of a cardiologist.        RADIOLOGY:   Non-plain film images such as CT, Ultrasound and MRI are read by the radiologist. Plain radiographic images are visualized and preliminarily interpreted by the emergency physician with the below findings:        Interpretation per the Radiologist below, if available at the time of this note:    No orders to display         ED BEDSIDE ULTRASOUND:   Performed by ED Physician - none    LABS:  Labs Reviewed - No data to display    All other labs were within normal range or not returned as of this dictation. EMERGENCY DEPARTMENT COURSE and DIFFERENTIAL DIAGNOSIS/MDM:   Vitals:    Vitals:    08/28/20 1520   BP: (!) 138/90   Pulse: 95   Resp: 16   Temp: 98.7 °F (37.1 °C)   TempSrc: Oral   SpO2: 98%   Weight: 167 lb (75.8 kg)   Height: 5' 3\" (1.6 m)       37-year of age female with bilateral hand rash. Patient will be given high-dose steroids here. Patient advised to apply cream 5 times a day or at least every single time after she washes her hands. Patient also advised to stop wearing gloves as often. Patient remained hemodynamically stable throughout the entire ED course. Patient be discharged home with a prescription for steroids and and a Zoll cream.  Patient also advised to follow-up closely with  Dermatology. Patient understand this plan is agreeable as well. MDM  Number of Diagnoses or Management Options  Dermatitis:   Ringworm:             CONSULTS:  None    PROCEDURES:  Unless otherwise noted below, none     Procedures        FINAL IMPRESSION      1. Ringworm    2. Dermatitis          DISPOSITION/PLAN   DISPOSITION Decision To Discharge 08/28/2020 03:46:08 PM      PATIENT REFERRED TO:  [address] [place] [address]  [phone]  Call in 1 day        DISCHARGE MEDICATIONS:  Discharge Medication List as of 8/28/2020  3:56 PM      START taking these medications    Details   !! clotrimazole-betamethasone (LOTRISONE) 1-0.05 % cream Apply topically 2 times daily. , Disp-1 Tube,R-0, Print      predniSONE (DELTASONE) 20 MG tablet Take 1 tablet by mouth daily for 5 days, Disp-5 tablet,R-0Print       !! - Potential duplicate medications found. Please discuss with provider. Controlled Substances Monitoring:     No flowsheet data found.     (Please note that portions of this note were completed with a voice recognition program.  Efforts were made to edit the dictations but occasionally words are mis-transcribed.)    Layla Bauman PA-C (electronically signed)             Layla Bauman PA-C  08/28/20 0211

## 2020-08-28 NOTE — ED TRIAGE NOTES
Pt presents to the ER with complaints of rash to bilateral hands. Red patches seen through patients gloves that she is wearing continuously due to [address] Patient is switching gloves multiple times a day. Patient states that she washes her hands multiple times a day due to being worried about virus. Uses hand  also. Patient has been changing soaps to see if her hands will get better. Patient states that she prefers to keep gloves on because she has moisture in her hands with them on.

## 2020-11-04 ENCOUNTER — OFFICE VISIT (OUTPATIENT)
Dept: FAMILY MEDICINE CLINIC | Age: 40
End: 2020-11-04
Payer: COMMERCIAL

## 2020-11-04 VITALS
DIASTOLIC BLOOD PRESSURE: 74 MMHG | HEIGHT: 63 IN | BODY MASS INDEX: 30.12 KG/M2 | SYSTOLIC BLOOD PRESSURE: 128 MMHG | HEART RATE: 81 BPM | TEMPERATURE: 98.5 F | WEIGHT: 170 LBS | OXYGEN SATURATION: 98 %

## 2020-11-04 PROCEDURE — G8427 DOCREV CUR MEDS BY ELIG CLIN: HCPCS | Performed by: NURSE PRACTITIONER

## 2020-11-04 PROCEDURE — 1036F TOBACCO NON-USER: CPT | Performed by: NURSE PRACTITIONER

## 2020-11-04 PROCEDURE — 99213 OFFICE O/P EST LOW 20 MIN: CPT | Performed by: NURSE PRACTITIONER

## 2020-11-04 PROCEDURE — G8417 CALC BMI ABV UP PARAM F/U: HCPCS | Performed by: NURSE PRACTITIONER

## 2020-11-04 PROCEDURE — G8484 FLU IMMUNIZE NO ADMIN: HCPCS | Performed by: NURSE PRACTITIONER

## 2020-11-04 RX ORDER — CLOBETASOL PROPIONATE 0.5 MG/G
OINTMENT TOPICAL
Qty: 60 G | Refills: 1 | Status: SHIPPED | OUTPATIENT
Start: 2020-11-04

## 2020-11-04 RX ORDER — PREDNISONE 20 MG/1
TABLET ORAL
Qty: 14 TABLET | Refills: 0 | Status: SHIPPED | OUTPATIENT
Start: 2020-11-04 | End: 2020-12-10

## 2020-11-04 SDOH — ECONOMIC STABILITY: TRANSPORTATION INSECURITY
IN THE PAST 12 MONTHS, HAS LACK OF TRANSPORTATION KEPT YOU FROM MEETINGS, WORK, OR FROM GETTING THINGS NEEDED FOR DAILY LIVING?: NO

## 2020-11-04 SDOH — ECONOMIC STABILITY: TRANSPORTATION INSECURITY
IN THE PAST 12 MONTHS, HAS THE LACK OF TRANSPORTATION KEPT YOU FROM MEDICAL APPOINTMENTS OR FROM GETTING MEDICATIONS?: NO

## 2020-11-04 SDOH — ECONOMIC STABILITY: FOOD INSECURITY: WITHIN THE PAST 12 MONTHS, THE FOOD YOU BOUGHT JUST DIDN'T LAST AND YOU DIDN'T HAVE MONEY TO GET MORE.: NEVER TRUE

## 2020-11-04 SDOH — ECONOMIC STABILITY: FOOD INSECURITY: WITHIN THE PAST 12 MONTHS, YOU WORRIED THAT YOUR FOOD WOULD RUN OUT BEFORE YOU GOT MONEY TO BUY MORE.: NEVER TRUE

## 2020-11-04 ASSESSMENT — ENCOUNTER SYMPTOMS
WHEEZING: 0
SHORTNESS OF BREATH: 0
VOMITING: 0
COUGH: 0
NAUSEA: 0
RHINORRHEA: 0
DIARRHEA: 0
SORE THROAT: 0

## 2020-11-04 ASSESSMENT — PATIENT HEALTH QUESTIONNAIRE - PHQ9
SUM OF ALL RESPONSES TO PHQ QUESTIONS 1-9: 0
SUM OF ALL RESPONSES TO PHQ QUESTIONS 1-9: 0
2. FEELING DOWN, DEPRESSED OR HOPELESS: 0
SUM OF ALL RESPONSES TO PHQ QUESTIONS 1-9: 0
SUM OF ALL RESPONSES TO PHQ9 QUESTIONS 1 & 2: 0
1. LITTLE INTEREST OR PLEASURE IN DOING THINGS: 0

## 2020-11-04 NOTE — PROGRESS NOTES
Subjective:      Patient ID: Shannon Snell is a 45 y.o. female who presents today for:  Chief Complaint   Patient presents with    Hand Pain     Patient here with hand pain, peeling, cracking peeling, had started back in  March , has tried differant lotions and soaps not dont seem to be getting better        HPI        Pt here with complaints of dry hands, cracks in them, she cannot open her hands up all the way because they are so dry.   Buying all different lotions  Different hand washes  Cannot put  on her hands because it burns   She has been excessively washing her hands because she is paranoid over the pandemic   Hands have looked this bad  For 5 days  She went to the ER before and it hasn't gotten better  This is the worst its ever been and the worst pain  Cannot straighten her hands   Skins cracked  This has been a problem though for a long time   They dont really itch, they hurt   The lesions on the top itch a little  She wears gloves all the time out in public because she is afraid to touch things      Past Medical History:   Diagnosis Date    Anemia during pregnancy 2018    Herpes simplex infection of skin     on neck    Hypercholesterolemia     Irregular heart beat     runs of v tach in the past    Obesity     Pregnancy induced hypertension     Type II or unspecified type diabetes mellitus without mention of complication, not stated as uncontrolled     type 2     Past Surgical History:   Procedure Laterality Date    ABDOMEN SURGERY       x 2     SECTION       Social History     Socioeconomic History    Marital status: Single     Spouse name: Not on file    Number of children: Not on file    Years of education: Not on file    Highest education level: Not on file   Occupational History    Not on file   Social Needs    Financial resource strain: Not on file    Food insecurity     Worry: Never true     Inability: Never true   [employer] needs Medical: No     Non-medical: No   Tobacco Use    Smoking status: Former Smoker     Last attempt to quit: 8/15/2017     Years since quitting: 3.2    Smokeless tobacco: Never Used   Substance and Sexual Activity    Alcohol use: No     Comment: socially before pregnancy    Drug use: No    Sexual activity: Not Currently     Partners: Male   Lifestyle    Physical activity     Days per week: Not on file     Minutes per session: Not on file    Stress: Not on file   Relationships    Social connections     Talks on phone: Not on file     Gets together: Not on file     Attends Restorationist service: Not on file     Active member of club or organization: Not on file     Attends meetings of clubs or organizations: Not on file     Relationship status: Not on file    Intimate partner violence     Fear of current or ex partner: Not on file     Emotionally abused: Not on file     Physically abused: Not on file     Forced sexual activity: Not on file   Other Topics Concern    Not on file   Social History Narrative    Not on file     Family History   Problem Relation Age of Onset    Arthritis Mother     Diabetes Mother     Arthritis Father     Early Death Father     Heart Attack Father     Heart Disease Father     High Blood Pressure Father     High Cholesterol Father     Stroke Father     Diabetes Paternal Grandfather     Birth Defects Son      Allergies   Allergen Reactions    Tape [employer] Tape] Rash     Current Outpatient Medications   Medication Sig Dispense Refill    predniSONE (DELTASONE) 20 MG tablet Take 2 tabs daily for 4 days, Take 1 tab daily for 4 days then 1/2 tab daily for 4 days 14 tablet 0    clobetasol (TEMOVATE) 0.05 % ointment Apply topically 2 times daily. 60 g 1    clotrimazole-betamethasone (LOTRISONE) 1-0.05 % cream Apply topically 2 times daily.  1 Tube 0    LANTUS SOLOSTAR 100 UNIT/ML injection pen Inject 45 units nightly 5 pen 3    insulin glargine (BASAGLAR KWIKPEN) 100 UNIT/ML injection pen inject 45 units subcutaneously nightly 9 mL 3    insulin lispro, 1 Unit Dial, (HUMALOG KWIKPEN) 100 UNIT/ML SOPN 10 units at each meals 15 pen 3    Insulin Pen Needle (NOVOFINE) 32G X 6 MM MISC Qid 300 each 3    FREESTYLE LANCETS MISC 1 each by Does not apply route daily 200 each 3    blood glucose test strips (FREESTYLE LITE) strip Use 6 times a day   Pt is pregnant 200 strip 3    Alcohol Swabs PADS Use qid 150 each 06    metFORMIN (GLUCOPHAGE) 1000 MG tablet Take 1 tablet by mouth 2 times daily (with meals) 60 tablet 3    Insulin Pen Needle (NOVOFINE) 32G X 6 MM MISC Once a day 100 each 5    acyclovir (ZOVIRAX) 5 % ointment Apply topically 5 times daily Apply topically 5 times daily to affected area for 7 days 15 g 0    Crisaborole 2 % OINT Apply thin layer topically daily. 1 Tube 0    clotrimazole-betamethasone (LOTRISONE) 1-0.05 % cream Apply topically 2 times daily. 1 Tube 1    miconazole (MICONAZOLE ANTIFUNGAL) 2 % cream Apply topically 2 times daily. 1 Tube 1    polyethylene glycol (GLYCOLAX) powder Take 17 g by mouth daily      docusate sodium (COLACE, DULCOLAX) 100 MG CAPS Take 100 mg by mouth 2 times daily 60 capsule 3    famotidine (PEPCID) 20 MG tablet Take 1 tablet by mouth daily 30 tablet 3    Pediatric Multiple Vit-C-FA (FLINSTONES GUMMIES OMEGA-3 DHA) CHEW Take 2 capsules by mouth daily      Insulin Pen Needle (BD PEN NEEDLE EMRE U/F) 32G X 4 MM MISC Use 3 times a day 200 each 03    Heating Pads (HEATING PAD DRY HEAT) PADS 1 packet by Does not apply route 3 times daily 120 each 0     No current facility-administered medications for this visit. Review of Systems   Constitutional: Negative for chills, fatigue and fever. HENT: Negative for congestion, ear pain, rhinorrhea and sore throat. Respiratory: Negative for cough, shortness of breath and wheezing. Gastrointestinal: Negative for diarrhea, nausea and vomiting.    Genitourinary: Negative for dysuria, frequency and urgency. Musculoskeletal: Negative for arthralgias, gait problem and myalgias. Skin: Positive for rash. Psychiatric/Behavioral: Negative for agitation, confusion and hallucinations. Objective:   /74 (Site: Left Upper Arm, Position: Sitting, Cuff Size: Large Adult)   Pulse 81   Temp 98.5 °F (36.9 °C) (Temporal)   Ht 5' 3\" (1.6 m)   Wt 170 lb (77.1 kg)   SpO2 98%   BMI 30.11 kg/m²     Physical Exam  Vitals signs reviewed. Constitutional:       Appearance: Normal appearance. HENT:      Head: Normocephalic and atraumatic. Nose: Nose normal.      Mouth/Throat:      Lips: Pink. Eyes:      General: Lids are normal.      Conjunctiva/sclera: Conjunctivae normal.   Neck:      Musculoskeletal: Normal range of motion. Cardiovascular:      Rate and Rhythm: Normal rate. Pulmonary:      Effort: Pulmonary effort is normal.   Skin:     General: Skin is warm and dry. Findings: Erythema and rash present. Rash is scaling. Comments: [place] hands all fingers and half way up the palms is dry scaly skin. There is some scattered erythema. There are some patches on the top of the hand too   Neurological:      General: No focal deficit present. Mental Status: She is alert and oriented to person, place, and time. Psychiatric:         Mood and Affect: Mood normal.         Behavior: Behavior normal. Behavior is cooperative. Thought Content: Thought content is paranoid. Assessment:       Diagnosis Orders   1. Atopic dermatitis, unspecified type  predniSONE (DELTASONE) 20 MG tablet    clobetasol (TEMOVATE) 0.05 % ointment     No results found for this visit on 11/04/20. Plan: This is likely from over washing the hands  instructed her on creams/oisturizers to use    [facility] was seen today for hand pain. Diagnoses and all orders for this visit:    Atopic dermatitis, unspecified type  -     predniSONE (DELTASONE) 20 MG tablet;  Take 2 tabs daily for 4 days, Take 1 tab daily for 4 days then 1/2 tab daily for 4 days  -     clobetasol (TEMOVATE) 0.05 % ointment; Apply topically 2 times daily. No orders of the defined types were placed in this encounter. Orders Placed This Encounter   Medications    predniSONE (DELTASONE) 20 MG tablet     Sig: Take 2 tabs daily for 4 days, Take 1 tab daily for 4 days then 1/2 tab daily for 4 days     Dispense:  14 tablet     Refill:  0    clobetasol (TEMOVATE) 0.05 % ointment     Sig: Apply topically 2 times daily. Dispense:  60 g     Refill:  1     There are no discontinued medications. Return for Follow up with PCP. Reviewed with the patient/family: current clinical status & medications. Side effects of the medication prescribed today, as well as treatment plan/rationale and result expectations have been discussed with the patient/family who expresses understanding. Patient will be discharged home in stable condition. Follow up with PCP to evaluate treatment results or return if symptoms worsen or fail to improve. Discussed signs and symptoms which require immediate follow-up in ED/call to 911. Understanding verbalized. I have reviewed the patient's medical history in detail and updated the computerized patient record.     Anthony Galvan, TRISHA - CNP

## 2020-11-04 NOTE — PATIENT INSTRUCTIONS
Cerave, ucerine, or aveno ointment or creams for eczmea      Patient Education        Atopic Dermatitis: Care Instructions  Your Care Instructions  Atopic dermatitis (also called eczema) is a skin problem that causes intense itching and a red, raised rash. In severe cases, the rash develops clear fluid-filled blisters. The rash is not contagious. People with this condition seem to have very sensitive immune systems that are likely to react to things that cause allergies. The immune system is the body's way of fighting infection. There is no cure for atopic dermatitis, but you may be able to control it with care at home. Follow-up care is a key part of your treatment and safety. Be sure to make and go to all appointments, and call your doctor if you are having problems. It's also a good idea to know your test results and keep a list of the medicines you take. How can you care for yourself at home? · Use moisturizer at least twice a day. · If your doctor prescribes a cream, use it as directed. If your doctor prescribes other medicine, take it exactly as directed. · Wash the affected area with water only. Soap can make dryness and itching worse. Pat dry. · Apply a moisturizer after bathing. Use a cream such as Lubriderm, Moisturel, or Cetaphil that does not irritate the skin or cause a rash. Apply the cream while your skin is still damp after lightly drying with a towel. · Use cold, wet cloths to reduce itching. · Keep cool, and stay out of the sun. · If itching affects your normal activities, an over-the-counter antihistamine, such as diphenhydramine (Benadryl) or loratadine (Claritin) may help. Read and follow all instructions on the label. When should you call for help?    Call your doctor now or seek immediate medical care if:    · Your rash gets worse and you have a fever.     · You have new blisters or bruises, or the rash spreads and looks like a sunburn.     · You have signs of infection, such as:  ? Increased pain, swelling, warmth, or redness. ? Red streaks leading from the rash. ? Pus draining from the rash. ? A fever.     · You have crusting or oozing sores.     · You have joint aches or body aches along with your rash. Watch closely for changes in your health, and be sure to contact your doctor if:    · Your rash does not clear up after 2 to 3 weeks of home treatment.     · Itching interferes with your sleep or daily activities. Where can you learn more? Go to [email or web address] [email or web address] My [email or web address]. org and sign in to your [employer] account. Enter V375 in the Yeahka box to learn more about \"Atopic Dermatitis: Care Instructions. \"     If you do not have an account, please click on the \"Sign Up Now\" link. Current as of: July 2, 2020               Content Version: 12.6  © 8463-6233 [employer]. Care instructions adapted under license by [facility] ([facility]). If you have questions about a medical condition or this instruction, always ask your healthcare professional. [address] any warranty or liability for your use of this information.

## 2020-12-10 ENCOUNTER — OFFICE VISIT (OUTPATIENT)
Dept: FAMILY MEDICINE CLINIC | Age: 40
End: 2020-12-10
Payer: COMMERCIAL

## 2020-12-10 VITALS
HEIGHT: 63 IN | SYSTOLIC BLOOD PRESSURE: 124 MMHG | BODY MASS INDEX: 30.16 KG/M2 | DIASTOLIC BLOOD PRESSURE: 72 MMHG | HEART RATE: 97 BPM | OXYGEN SATURATION: 99 % | WEIGHT: 170.2 LBS | TEMPERATURE: 98.3 F

## 2020-12-10 PROCEDURE — 4004F PT TOBACCO SCREEN RCVD TLK: CPT | Performed by: NURSE PRACTITIONER

## 2020-12-10 PROCEDURE — 96372 THER/PROPH/DIAG INJ SC/IM: CPT | Performed by: NURSE PRACTITIONER

## 2020-12-10 PROCEDURE — G8417 CALC BMI ABV UP PARAM F/U: HCPCS | Performed by: NURSE PRACTITIONER

## 2020-12-10 PROCEDURE — G8427 DOCREV CUR MEDS BY ELIG CLIN: HCPCS | Performed by: NURSE PRACTITIONER

## 2020-12-10 PROCEDURE — 99213 OFFICE O/P EST LOW 20 MIN: CPT | Performed by: NURSE PRACTITIONER

## 2020-12-10 PROCEDURE — G8484 FLU IMMUNIZE NO ADMIN: HCPCS | Performed by: NURSE PRACTITIONER

## 2020-12-10 RX ORDER — TRIAMCINOLONE ACETONIDE 40 MG/ML
80 INJECTION, SUSPENSION INTRA-ARTICULAR; INTRAMUSCULAR ONCE
Status: COMPLETED | OUTPATIENT
Start: 2020-12-10 | End: 2020-12-10

## 2020-12-10 RX ORDER — LORATADINE 10 MG/1
10 TABLET ORAL DAILY
Qty: 30 TABLET | Refills: 1 | Status: SHIPPED | OUTPATIENT
Start: 2020-12-10 | End: 2021-01-09

## 2020-12-10 RX ORDER — PREDNISONE 10 MG/1
TABLET ORAL
Qty: 63 TABLET | Refills: 0 | Status: SHIPPED | OUTPATIENT
Start: 2020-12-10 | End: 2021-02-23 | Stop reason: ALTCHOICE

## 2020-12-10 RX ADMIN — TRIAMCINOLONE ACETONIDE 80 MG: 40 INJECTION, SUSPENSION INTRA-ARTICULAR; INTRAMUSCULAR at 16:07

## 2020-12-10 NOTE — PROGRESS NOTES
Subjective:      Patient ID: Ellis Chambers is a 45 y.o. female who presents today for:  Chief Complaint   Patient presents with    Skin Problem     Has a rash over several parts of her body. Has been increasing over the past week. Rash does itch. Has used OTC eczema cream on the areas with no relief.         HPI       Pt was here -at that time only had eczema on her hands  every time she stops taking the steroid cream her hands get worse  Son was in the hospital   Now the rash started spreading more  She cant even sleep  Thought it was bedbugs maybe  The rash is everywhere now  Her son sleeps in the same bed as her and he doesn't have anything   Its very itchy  It started about a week after she was seen the last time   Her hands did seem to get better after she had her last visit  Last time was here it was only on her hands- Shes had this on her hands since the pandemic started and she kept excessively washing her hands   Pt is very anxious and stresses a lot- she worries about everything and 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  She did buy the cerave cream and feels that helps a little        Past Medical History:   Diagnosis Date    Anemia during pregnancy 2018    Herpes simplex infection of skin     on neck    Hypercholesterolemia     Irregular heart beat     runs of v tach in the past    Obesity     Pregnancy induced hypertension     Type II or unspecified type diabetes mellitus without mention of complication, not stated as uncontrolled     type 2     Past Surgical History:   Procedure Laterality Date    ABDOMEN SURGERY       x 2     SECTION       Social History     Socioeconomic History    Marital status: Single     Spouse name: Not on file    Number of children: Not on file    Years of education: Not on file    Highest education level: Not on file   Occupational History    Not on file   Social Needs    Financial resource strain: Not on file    Food insecurity     Worry: Never true     Inability: Never true    Transportation needs     Medical: No     Non-medical: No   Tobacco Use    Smoking status: Current Every Day Smoker     Packs/day: 0.25     Years: 23.00     Pack years: 5.75     Last attempt to quit: 8/15/2017     Years since quitting: 3.3    Smokeless tobacco: Never Used    Tobacco comment: started age 13   Substance and Sexual Activity    Alcohol use: No     Comment: socially before pregnancy    Drug use: No    Sexual activity: Not Currently     Partners: Male   Lifestyle    Physical activity     Days per week: Not on file     Minutes per session: Not on file    Stress: Not on file   Relationships    Social connections     Talks on phone: Not on file     Gets together: Not on file     Attends Adventism service: Not on file     Active member of club or organization: Not on file     Attends meetings of clubs or organizations: Not on file     Relationship status: Not on file    Intimate partner violence     Fear of current or ex partner: Not on file     Emotionally abused: Not on file     Physically abused: Not on file     Forced sexual activity: Not on file   Other Topics Concern    Not on file   Social History Narrative    Not on file     Family History   Problem Relation Age of Onset    Arthritis Mother     Diabetes Mother     Arthritis Father     Early Death Father     Heart Attack Father     Heart Disease Father     High Blood Pressure Father     High Cholesterol Father     Stroke Father     Diabetes Paternal Grandfather     Birth Defects Son      Allergies   Allergen Reactions    Tape Suzzane Iliff Tape] Rash     Current Outpatient Medications   Medication Sig Dispense Refill    predniSONE (DELTASONE) 10 MG tablet Take 6 tabs daily for 3 days, 5 tabs daily for 3 days, 4 tabs daily for for 3 days, take 3 tabs daily for 3 days, take 2 tabs daily for 3 days, then 1 tab daily for 3 days 63 tablet 0    loratadine (CLARITIN) 10 MG tablet Take 1 tablet by mouth daily 30 tablet 1    Crisaborole 2 % OINT Apply sparingly to the area x BID 2 Tube 0    LANTUS SOLOSTAR 100 UNIT/ML injection pen Inject 45 units nightly 5 pen 3    FREESTYLE LANCETS MISC 1 each by Does not apply route daily 200 each 3    blood glucose test strips (FREESTYLE LITE) strip Use 6 times a day   Pt is pregnant 200 strip 3    Alcohol Swabs PADS Use qid 150 each 06    Insulin Pen Needle (NOVOFINE) 32G X 6 MM MISC Once a day 100 each 5    acyclovir (ZOVIRAX) 5 % ointment Apply topically 5 times daily Apply topically 5 times daily to affected area for 7 days 15 g 0    Pediatric Multiple Vit-C-FA (FLINSTONES GUMMIES OMEGA-3 DHA) CHEW Take 2 capsules by mouth daily      Insulin Pen Needle (BD PEN NEEDLE EMRE U/F) 32G X 4 MM MISC Use 3 times a day 200 each 03    clobetasol (TEMOVATE) 0.05 % ointment Apply topically 2 times daily. 60 g 1    clotrimazole-betamethasone (LOTRISONE) 1-0.05 % cream Apply topically 2 times daily. 1 Tube 1    miconazole (MICONAZOLE ANTIFUNGAL) 2 % cream Apply topically 2 times daily. 1 Tube 1     No current facility-administered medications for this visit. Review of Systems    Objective:   /72 (Site: Right Upper Arm, Position: Sitting, Cuff Size: Medium Adult)   Pulse 97   Temp 98.3 °F (36.8 °C) (Oral)   Ht 5' 3\" (1.6 m)   Wt 170 lb 3.2 oz (77.2 kg)   SpO2 99%   BMI 30.15 kg/m²     Physical Exam  Vitals signs reviewed. Constitutional:       Appearance: Normal appearance. HENT:      Head: Normocephalic and atraumatic. Nose: Nose normal.      Mouth/Throat:      Lips: Pink. Eyes:      General: Lids are normal.      Conjunctiva/sclera: Conjunctivae normal.   Neck:      Musculoskeletal: Normal range of motion. Cardiovascular:      Rate and Rhythm: Normal rate. Pulmonary:      Effort: Pulmonary effort is normal.   Skin:     General: Skin is warm and dry. Findings: Erythema and rash present. Rash is macular and papular. Rash is not crusting, pustular or scaling. Comments: Like before her lakia hands, all fingers are red and dry and cracked with rash all over. She can not even straighten her hands. This is a different rash now on the rest of her body that itches. These are macular/papular circular areas that are pinpoint in size to < .5 cm. Right shin appears the worst which she states this area doesn't really itch. Neurological:      General: No focal deficit present. Mental Status: She is alert and oriented to person, place, and time. Psychiatric:         Mood and Affect: Mood is anxious. Behavior: Behavior is hyperactive. Behavior is cooperative. Assessment:       Diagnosis Orders   1. Dermatitis  Xi - Nina Gordon DO, Dermatology, [place]    triamcinolone acetonide VIA [facility]) injection 80 mg    predniSONE (DELTASONE) 10 MG tablet    loratadine (CLARITIN) 10 MG tablet    Crisaborole 2 % OINT   2. Skin rash       No results found for this visit on 12/10/20. Plan:   Send her to dermatology  She also needs to establish care with a PCP  Gave her samples of eucrisa  For her hands and a copay card  Likely these rashes are unrelated   Maurizio Smith was seen today for skin problem. Diagnoses and all orders for this visit:    Dermatitis  -     [address] [facility]  -     triamcinolone acetonide [facility]IA [facility]) injection 80 mg  -     predniSONE (DELTASONE) 10 MG tablet; Take 6 tabs daily for 3 days, 5 tabs daily for 3 days, 4 tabs daily for for 3 days, take 3 tabs daily for 3 days, take 2 tabs daily for 3 days, then 1 tab daily for 3 days  -     loratadine (CLARITIN) 10 MG tablet; Take 1 tablet by mouth daily  -     Crisaborole 2 % OINT;  Apply sparingly to the area x BID    Skin rash      Orders Placed This Encounter   Procedures   [address] [place] [address] Flip Kent DO, Dermatology, [facility]     Referral Priority:   Routine Referral Type:   Eval and Treat     Referral Reason:   Specialty Services Required     Referred to Provider:   Chris Mendez DO     Number of Visits Requested:   1     Orders Placed This Encounter   Medications    triamcinolone acetonide (KENALOG-40) injection 80 mg    predniSONE (DELTASONE) 10 MG tablet     Sig: Take 6 tabs daily for 3 days, 5 tabs daily for 3 days, 4 tabs daily for for 3 days, take 3 tabs daily for 3 days, take 2 tabs daily for 3 days, then 1 tab daily for 3 days     Dispense:  63 tablet     Refill:  0    loratadine (CLARITIN) 10 MG tablet     Sig: Take 1 tablet by mouth daily     Dispense:  30 tablet     Refill:  1    Crisaborole 2 % OINT     Sig: Apply sparingly to the area x BID     Dispense:  2 Tube     Refill:  0     Medications Discontinued During This Encounter   Medication Reason    clotrimazole-betamethasone (LOTRISONE) 1-0.05 % cream LIST CLEANUP    docusate sodium (COLACE, DULCOLAX) 100 MG CAPS LIST CLEANUP    famotidine (PEPCID) 20 MG tablet LIST CLEANUP    Heating Pads (HEATING PAD DRY HEAT) PADS LIST CLEANUP    Insulin Pen Needle (NOVOFINE) 32G X 6 MM MISC LIST CLEANUP    insulin lispro, 1 Unit Dial, (HUMALOG KWIKPEN) 100 UNIT/ML SOPN LIST CLEANUP    insulin glargine (BASAGLAR KWIKPEN) 100 UNIT/ML injection pen LIST CLEANUP    metFORMIN (GLUCOPHAGE) 1000 MG tablet LIST CLEANUP    polyethylene glycol (GLYCOLAX) powder LIST CLEANUP    predniSONE (DELTASONE) 20 MG tablet LIST CLEANUP    Crisaborole 2 % OINT LIST CLEANUP     Return for follow up with Dermatology, Follow up with PCP. Reviewed with the patient/family: current clinical status & medications. Side effects of the medication prescribed today, as well as treatment plan/rationale and result expectations have been discussed with the patient/family who expresses understanding. Patient will be discharged home in stable condition.     Follow up with PCP to evaluate treatment results or return if symptoms worsen or fail to improve. Discussed signs and symptoms which require immediate follow-up in ED/call to 911. Understanding verbalized. I have reviewed the patient's medical history in detail and updated the computerized patient record.     Rupa Navarrete, APRN - CNP

## 2020-12-10 NOTE — PATIENT INSTRUCTIONS
Patient Education        Dermatitis: Care Instructions  Your Care Instructions  Dermatitis is the general name used for any rash or inflammation of the skin. Different kinds of dermatitis cause different kinds of rashes. Common causes of a rash include new medicines, plants (such as poison oak or poison ivy), heat, and stress. Certain illnesses can also cause a rash. An allergic reaction to something that touches your skin, such as latex, nickel, or poison ivy, is called contact dermatitis. Contact dermatitis may also be caused by something that irritates the skin, such as bleach, a chemical, or soap. These types of rashes cannot be spread from person to person. How long your rash will last depends on what caused it. Rashes may last a few days or months. Follow-up care is a key part of your treatment and safety. Be sure to make and go to all appointments, and call your doctor if you are having problems. It's also a good idea to know your test results and keep a list of the medicines you take. How can you care for yourself at home? · Do not scratch the rash. Cut your nails short, and file them smooth. Or wear gloves if this helps keep you from scratching. · Wash the area with water only. Pat dry. · Put cold, wet cloths on the rash to reduce itching. · Keep cool, and stay out of the sun. · Leave the rash open to the air as much as possible. · If the rash itches, use hydrocortisone cream. Follow the directions on the label. Calamine lotion may help for plant rashes. · Take an over-the-counter antihistamine, such as diphenhydramine (Benadryl) or loratadine (Claritin), to help calm the itching. Read and follow all instructions on the label. · If your doctor prescribed a cream, use it as directed. If your doctor prescribed medicine, take it exactly as directed. When should you call for help?    Call your doctor now or seek immediate medical care if:    · You have symptoms of infection, such as:  ? Increased pain, swelling, warmth, or redness. ? Red streaks leading from the area. ? Pus draining from the area. ? A fever.     · You have joint pain along with the rash. Watch closely for changes in your health, and be sure to contact your doctor if:    · Your rash is changing or getting worse.     · You are not getting better as expected. Where can you learn more? Go to [email or web address] org and sign in to your [employer] account. Enter [phone] in the [facility] box to learn more about \"Dermatitis: Care Instructions. \"     If you do not have an account, please click on the \"Sign Up Now\" link. Current as of: July 2, 2020               Content Version: 12.6  © 5386-2262 [employer]. Care instructions adapted under license by [facility] [place] ([facility]). If you have questions about a medical condition or this instruction, always ask your healthcare professional. [place] [address] any warranty or liability for your use of this information.

## 2020-12-11 ENCOUNTER — VIRTUAL VISIT (OUTPATIENT)
Dept: ENDOCRINOLOGY | Age: 40
End: 2020-12-11
Payer: COMMERCIAL

## 2020-12-11 PROCEDURE — 99442 PR PHYS/QHP TELEPHONE EVALUATION 11-20 MIN: CPT | Performed by: INTERNAL MEDICINE

## 2020-12-11 RX ORDER — BLOOD PRESSURE TEST KIT
KIT MISCELLANEOUS
Qty: 150 EACH | Refills: 6 | Status: SHIPPED | OUTPATIENT
Start: 2020-12-11

## 2020-12-11 RX ORDER — INSULIN GLARGINE 100 [IU]/ML
INJECTION, SOLUTION SUBCUTANEOUS
Qty: 9 ML | OUTPATIENT
Start: 2020-12-11

## 2020-12-11 RX ORDER — SYRINGE-NEEDLE,INSULIN,0.5 ML 30 GX5/16"
SYRINGE, EMPTY DISPOSABLE MISCELLANEOUS
Qty: 300 EACH | OUTPATIENT
Start: 2020-12-11

## 2020-12-11 RX ORDER — INSULIN GLARGINE 100 [IU]/ML
INJECTION, SOLUTION SUBCUTANEOUS
Qty: 5 PEN | Refills: 3 | Status: SHIPPED | OUTPATIENT
Start: 2020-12-11 | End: 2021-04-16 | Stop reason: SDUPTHER

## 2020-12-11 RX ORDER — INSULIN LISPRO 100 [IU]/ML
INJECTION, SOLUTION INTRAVENOUS; SUBCUTANEOUS
Qty: 15 PEN | Refills: 3 | Status: SHIPPED | OUTPATIENT
Start: 2020-12-11 | End: 2021-09-14 | Stop reason: SDUPTHER

## 2020-12-11 NOTE — PROGRESS NOTES
clotrimazole-betamethasone (LOTRISONE) 1-0.05 % cream Apply topically 2 times daily. James Erazo MD   miconazole (MICONAZOLE ANTIFUNGAL) 2 % cream Apply topically 2 times daily. James Erazo MD   Pediatric Multiple Vit-C-FA (FLINSTONES GUMMIES OMEGA-3 DHA) CHEW Take 2 capsules by mouth daily  Historical Provider, MD   Insulin Pen Needle (BD PEN NEEDLE EMRE U/F) 32G X 4 MM MISC Use 3 times a day  Darcy Watson MD       Social History     Tobacco Use    Smoking status: Current Every Day Smoker     Packs/day: 0.25     Years: 23.00     Pack years: 5.75     Last attempt to quit: 8/15/2017     Years since quitting: 3.3    Smokeless tobacco: Never Used    Tobacco comment: started age 13   Substance Use Topics    Alcohol use: No     Comment: socially before pregnancy    Drug use: No            PHYSICAL EXAMINATION:  [ INSTRUCTIONS:  \"[x]\" Indicates a positive item  \"[]\" Indicates a negative item  -- DELETE ALL ITEMS NOT EXAMINED]  [] Alert  [] Oriented to person/place/time    [] No apparent distress  [] Toxic appearing    [] Face flushed appearing [] Sclera clear  [] Lips are cyanotic      [] Breathing appears normal  [] Appears tachypneic      [] Rash on visible skin    [] Cranial Nerves II-XII grossly intact    [] Motor grossly intact in visible upper extremities    [] Motor grossly intact in visible lower extremities    [] Normal Mood  [] Anxious appearing    [] Depressed appearing  [] Confused appearing      [] Poor short term memory  [] Poor long term memory    [] OTHER:      Due to this being a TeleHealth encounter, evaluation of the following organ systems is limited: Vitals/Constitutional/EENT/Resp/CV/GI//MS/Neuro/Skin/Heme-Lymph-Imm. ASSESSMENT/PLAN:     Diagnosis Orders   1.  Type 2 diabetes mellitus with other specified complication, with long-term current use of insulin (HCC)  Basic Metabolic Panel    Hemoglobin A1C    Microalbumin / Creatinine Urine Ratio 2. Uncontrolled type 2 diabetes mellitus with complication, with long-term current use of insulin ([facility] [address]       Orders Placed This Encounter   Procedures    Basic Metabolic Panel     Standing Status:   Future     Standing Expiration Date:   12/11/2021    Hemoglobin A1C     Standing Status:   Future     Standing Expiration Date:   12/11/2021    Microalbumin / Creatinine Urine Ratio     Standing Status:   Future     Standing Expiration Date:   12/11/2021     Orders Placed This Encounter   Medications    LANTUS SOLOSTAR 100 UNIT/ML injection pen     Sig: Inject 45 units nightly     Dispense:  5 pen     Refill:  3    Insulin Pen Needle (NOVOFINE) 32G X 6 MM MISC     Sig: qid     Dispense:  300 each     Refill:  5    insulin lispro, 1 Unit Dial, (HUMALOG KWIKPEN) 100 UNIT/ML SOPN     Sig: 10 units at each meals     Dispense:  15 pen     Refill:  3    Alcohol Swabs PADS     Sig: Use qid     Dispense:  150 each     Refill:  06     Continue current dose of Lantus Humalog patient will have labs done follow-up in 3 to 4 weeks time also to see dermatologist or primary care    An  electronic signature was used to authenticate this note. --Harvinder Barraza MD on 12/11/2020 at 4:30 PM        Pursuant to the emergency declaration under the [facility], [facility] waiver authority and the [employer] Act, this Virtual  Visit was conducted, with patient's consent, to reduce the patient's risk of exposure to COVID-19 and provide continuity of care for an established patient. Services were provided through a video synchronous discussion virtually to substitute for in-person clinic visit.

## 2020-12-14 ENCOUNTER — OFFICE VISIT (OUTPATIENT)
Dept: FAMILY MEDICINE CLINIC | Age: 40
End: 2020-12-14
Payer: COMMERCIAL

## 2020-12-14 VITALS
OXYGEN SATURATION: 99 % | HEIGHT: 63 IN | BODY MASS INDEX: 29.77 KG/M2 | WEIGHT: 168 LBS | DIASTOLIC BLOOD PRESSURE: 82 MMHG | HEART RATE: 93 BPM | SYSTOLIC BLOOD PRESSURE: 148 MMHG

## 2020-12-14 PROCEDURE — 4004F PT TOBACCO SCREEN RCVD TLK: CPT | Performed by: STUDENT IN AN ORGANIZED HEALTH CARE EDUCATION/TRAINING PROGRAM

## 2020-12-14 PROCEDURE — 99213 OFFICE O/P EST LOW 20 MIN: CPT | Performed by: STUDENT IN AN ORGANIZED HEALTH CARE EDUCATION/TRAINING PROGRAM

## 2020-12-14 PROCEDURE — G8484 FLU IMMUNIZE NO ADMIN: HCPCS | Performed by: STUDENT IN AN ORGANIZED HEALTH CARE EDUCATION/TRAINING PROGRAM

## 2020-12-14 PROCEDURE — G8417 CALC BMI ABV UP PARAM F/U: HCPCS | Performed by: STUDENT IN AN ORGANIZED HEALTH CARE EDUCATION/TRAINING PROGRAM

## 2020-12-14 PROCEDURE — G8427 DOCREV CUR MEDS BY ELIG CLIN: HCPCS | Performed by: STUDENT IN AN ORGANIZED HEALTH CARE EDUCATION/TRAINING PROGRAM

## 2020-12-14 ASSESSMENT — ENCOUNTER SYMPTOMS
SORE THROAT: 0
ABDOMINAL PAIN: 0
COUGH: 0
SHORTNESS OF BREATH: 0
VOMITING: 0
SINUS PRESSURE: 0

## 2020-12-14 NOTE — PROGRESS NOTES
2020    Moni Lozoya (:  1980) is a 45 y.o. female, here for evaluation of the following medical concerns:  Chief Complaint   Patient presents with    Skin Problem     Rash on legs, hands, and arms. Seen [facility] twice for this. HPI  Rash  Pt seen in the ER  due to rash on the hands  She was started on oral steroids and lotrisone  She was seen  in the urgent care to due worsening of symptoms  Pt has been excessively washing her hands due to paranoid over the pandemic  Pt does wear gloves in public due to fear of touching things with COVID  Pt started on oral prednisone and clobetasol ointment  ------------  Rash just started this year  She uses the clobetasol 7 days on and 7 days off  She is using an eczema cream on lesions with mild improvement   Pt now also has pruritic rash on the arms and legs  Pt uses dove body wash      Review of Systems   Constitutional: Negative for chills and fever. HENT: Negative for congestion, sinus pressure and sore throat. Respiratory: Negative for cough and shortness of breath. Cardiovascular: Negative for chest pain and palpitations. Gastrointestinal: Negative for abdominal pain and vomiting. Musculoskeletal: Negative for arthralgias and myalgias. Skin: Positive for rash. Negative for wound. Neurological: Negative for speech difficulty and light-headedness. Psychiatric/Behavioral: Negative for suicidal ideas. The patient is not nervous/anxious. Prior to Visit Medications    Medication Sig Taking?  Authorizing Provider   LANTUS SOLOSTAR 100 UNIT/ML injection pen Inject 45 units nightly Yes Ganga Mccormick MD   Insulin Pen Needle (NOVOFINE) 32G X 6 MM MISC qid Yes Anabel Brown MD   insulin lispro, 1 Unit Dial, (HUMALOG KWIKPEN) 100 UNIT/ML SOPN 10 units at each meals Yes Anabel Brown MD   Alcohol Swabs PADS Use qid Yes Anabel Brown MD   predniSONE (DELTASONE) 10 MG tablet Take 6 tabs daily for 3 days, 5 tabs daily for 3 days, 4 tabs daily for for 3 days, take 3 tabs daily for 3 days, take 2 tabs daily for 3 days, then 1 tab daily for 3 days Yes TRISHA Meier CNP   loratadine (CLARITIN) 10 MG tablet Take 1 tablet by mouth daily Yes TRISHA Meier CNP   Crisaborole 2 % OINT Apply sparingly to the area x BID Yes TRISHA Meier CNP   clobetasol (TEMOVATE) 0.05 % ointment Apply topically 2 times daily. Yes TRISHA Reyna CNP   FREESTYLE LANCETS MISC 1 each by Does not apply route daily Yes Ayo Franco MD   blood glucose test strips (FREESTYLE LITE) strip Use 6 times a day   Pt is pregnant Yes Ayo Franco MD   acyclovir (ZOVIRAX) 5 % ointment Apply topically 5 times daily Apply topically 5 times daily to affected area for 7 days Yes TRISHA Prajapati CNP   clotrimazole-betamethasone (LOTRISONE) 1-0.05 % cream Apply topically 2 times daily. Yes Ham Clark MD   miconazole (MICONAZOLE ANTIFUNGAL) 2 % cream Apply topically 2 times daily.  Yes Ham Clark MD   Pediatric Multiple Vit-C-FA (FLINSTONES GUMMIES OMEGA-3 DHA) CHEW Take 2 capsules by mouth daily Yes Historical Provider, MD   Insulin Pen Needle (BD PEN NEEDLE EMRE U/F) 32G X 4 MM MISC Use 3 times a day Yes Ayo Franco MD        Allergies   Allergen Reactions    Tape Lindia Cluck Tape] Rash       Past Medical History:   Diagnosis Date    Anemia during pregnancy 2018    Herpes simplex infection of skin     on neck    Hypercholesterolemia     Irregular heart beat     runs of v tach in the past    Obesity     Pregnancy induced hypertension     Type II or unspecified type diabetes mellitus without mention of complication, not stated as uncontrolled     type 2       Past Surgical History:   Procedure Laterality Date    ABDOMEN SURGERY       x 2     SECTION         Social History     Socioeconomic History    Marital status: Single     Spouse name: Not on file    Number of children: Not on file    Years of education: Not on file    Highest education level: Not on file   Occupational History    Not on file   Social Needs    Financial resource strain: Not on file    Food insecurity     Worry: Never true     Inability: Never true    Transportation needs     Medical: No     Non-medical: No   Tobacco Use    Smoking status: Current Every Day Smoker     Packs/day: 0.25     Years: 23.00     Pack years: 5.75     Last attempt to quit: 8/15/2017     Years since quitting: 3.3    Smokeless tobacco: Never Used    Tobacco comment: started age 13   Substance and Sexual Activity    Alcohol use: No     Comment: socially before pregnancy    Drug use: No    Sexual activity: Not Currently     Partners: Male   Lifestyle    Physical activity     Days per week: Not on file     Minutes per session: Not on file    Stress: Not on file   Relationships    Social connections     Talks on phone: Not on file     Gets together: Not on file     Attends Caodaism service: Not on file     Active member of club or organization: Not on file     Attends meetings of clubs or organizations: Not on file     Relationship status: Not on file    Intimate partner violence     Fear of current or ex partner: Not on file     Emotionally abused: Not on file     Physically abused: Not on file     Forced sexual activity: Not on file   Other Topics Concern    Not on file   Social History Narrative    Not on file        Family History   Problem Relation Age of Onset    Arthritis Mother     Diabetes Mother     Arthritis Father     Early Death Father     Heart Attack Father     Heart Disease Father     High Blood Pressure Father     High Cholesterol Father     Stroke Father     Diabetes Paternal Grandfather     Birth Defects Son        Vitals:    12/14/20 1503 12/14/20 1509   BP: (!) 150/84 (!) 148/82   Site: Right Upper Arm Right Upper Arm   Position: Sitting Sitting   Cuff Size: Medium Adult Medium Adult   Pulse: 93    SpO2: 99%    Weight: 168 lb (76.2 kg)    Height: 5' 3\" (1.6 m)        Estimated body mass index is 29.76 kg/m² as calculated from the following:    Height as of this encounter: 5' 3\" (1.6 m). Weight as of this encounter: 168 lb (76.2 kg). No results for input(s): WBC, RBC, HGB, HCT, MCV, MCH, MCHC, RDW, PLT, MPV in the last 72 hours. No results for input(s): NA, K, CL, CO2, BUN, CREATININE, GLUCOSE, CALCIUM, PROT, LABALBU, BILITOT, ALKPHOS, AST, ALT in the last 72 hours. Lab Results   Component Value Date    LABA1C 9.3 10/23/2019       No results found. Physical Exam  Constitutional:       Appearance: Normal appearance. She is normal weight. HENT:      Head: Normocephalic and atraumatic. Eyes:      Extraocular Movements: Extraocular movements intact. Conjunctiva/sclera: Conjunctivae normal.   Cardiovascular:      Rate and Rhythm: Normal rate and regular rhythm. Pulses: Normal pulses. Heart sounds: Normal heart sounds. Pulmonary:      Effort: Pulmonary effort is normal.      Breath sounds: Normal breath sounds. No wheezing or rales. Skin:     General: Skin is warm. Capillary Refill: Capillary refill takes less than 2 seconds. Findings: Rash present. Comments: As seen in pictures   Neurological:      Mental Status: She is alert. Psychiatric:         Mood and Affect: Mood normal.         Thought Content: Thought content normal.         Judgment: Judgment normal.                         ASSESSMENT/PLAN:  1.  Eczema, unspecified type  - Appears to be widespread eczematous lesions  - Focus on skin hygiene with daily aquaphor  - Fragrance free soaps, detergents, etc  - Clobetasol twice weekly on hands      ---------------------------------------------------------------------  Side effects, adverse effects of the medication prescribed today, as well as treatment plan/ rationale and result expectations have been discussed with the patient who expresses understanding and desires to proceed. Close follow up to evaluate treatment results and for coordination of care. I have reviewed the patient's medical history in detail and updated the computerized patient record. As always, patient is advised that if symptoms worsen in any way they will proceed to the nearest emergency room. --------------------------------------------------------------------    Return in about 2 weeks (around 12/28/2020). An  electronic signature was used to authenticate this note.     --Baljinder Frederick DO on 12/14/2020 at 3:38 PM

## 2020-12-14 NOTE — PATIENT INSTRUCTIONS
People with this condition seem to have very sensitive immune systems that are likely to react to things that cause allergies. The immune system is the body's way of fighting infection. There is no cure for atopic dermatitis, but you may be able to control it with care at home. Follow-up care is a key part of your treatment and safety. Be sure to make and go to all appointments, and call your doctor if you are having problems. It's also a good idea to know your test results and keep a list of the medicines you take. How can you care for yourself at home? · Use moisturizer at least twice a day. · If your doctor prescribes a cream, use it as directed. If your doctor prescribes other medicine, take it exactly as directed. · Wash the affected area with water only. Soap can make dryness and itching worse. Pat dry. · Apply a moisturizer after bathing. Use a cream such as Lubriderm, Moisturel, or Cetaphil that does not irritate the skin or cause a rash. Apply the cream while your skin is still damp after lightly drying with a towel. · Use cold, wet cloths to reduce itching. · Keep cool, and stay out of the sun. · If itching affects your normal activities, an over-the-counter antihistamine, such as diphenhydramine (Benadryl) or loratadine (Claritin) may help. Read and follow all instructions on the label. When should you call for help? Call your doctor now or seek immediate medical care if:  · Your rash gets worse and you have a fever. · You have new blisters or bruises, or the rash spreads and looks like a sunburn. · You have signs of infection, such as:  ? Increased pain, swelling, warmth, or redness. ? Red streaks leading from the rash. ? Pus draining from the rash. ? A fever. · You have crusting or oozing sores. · You have joint aches or body aches along with your rash.   Watch closely for changes in your health, and be sure to contact your doctor if:  · Your rash does not clear up after 2 to 3

## 2020-12-27 NOTE — PROGRESS NOTES
2020    Cory Montague (:  1980) is a 45 y.o. female, here for evaluation of the following medical concerns:  Chief Complaint   Patient presents with    Establish Care    Skin Problem     States the rash is improving.  2 Week Follow-Up    Health Maintenance     Declined flu shot. HPI  Eczema  Rash overall improving  Pt using over the counter eczema cream which is very greasy  Rash everywhere has improved    Establishing care  Last PCP Dr. Cherrie Brown  Pt does follow with Dr. Julia Gonzalez for type 2 DM  Has labs ordered for A1c, microalbumin creatinine ration and BMP  Pt hasn't had a eye exam in two years due to [address] increased anxiety and depression over the last year  Pt does have anxiety due to [employer]  She also has a son that is special needs and takes care of him   She does not get very good sleep a night because she has to wake up to give her son medications     Declined influenza and pneumonia vaccine    Review of Systems   Constitutional: Negative for chills and fever. HENT: Negative for congestion, sinus pressure and sore throat. Respiratory: Negative for cough and shortness of breath. Cardiovascular: Negative for chest pain and palpitations. Gastrointestinal: Negative for abdominal pain and vomiting. Musculoskeletal: Negative for arthralgias and myalgias. Skin: Negative for rash and wound. Neurological: Negative for speech difficulty and light-headedness. Psychiatric/Behavioral: Positive for sleep disturbance. Negative for suicidal ideas. The patient is not nervous/anxious. Anxiety, depression       Prior to Visit Medications    Medication Sig Taking?  Authorizing Provider   LANTUS SOLOSTAR 100 UNIT/ML injection pen Inject 45 units nightly Yes Ganga Mccormick MD   Insulin Pen Needle (NOVOFINE) 32G X 6 MM MISC qid Yes Harvinder Barraza MD   insulin lispro, 1 Unit Dial, (HUMALOG KWIKPEN) 100 UNIT/ML SOPN 10 units at each meals Yes Harvinder Barraza MD Alcohol Swabs PADS Use qid Yes Ganga Mccormick MD   predniSONE (DELTASONE) 10 MG tablet Take 6 tabs daily for 3 days, 5 tabs daily for 3 days, 4 tabs daily for for 3 days, take 3 tabs daily for 3 days, take 2 tabs daily for 3 days, then 1 tab daily for 3 days Yes TRISHA Meier CNP   loratadine (CLARITIN) 10 MG tablet Take 1 tablet by mouth daily Yes TRISHA Meier CNP   Crisaborole 2 % OINT Apply sparingly to the area x BID Yes TRISHA Meier CNP   clobetasol (TEMOVATE) 0.05 % ointment Apply topically 2 times daily. Yes TRISHA Gutierrez CNP   FREESTYLE LANCETS MISC 1 each by Does not apply route daily Yes Israel Damon MD   blood glucose test strips (FREESTYLE LITE) strip Use 6 times a day   Pt is pregnant Yes Israel Damon MD   acyclovir (ZOVIRAX) 5 % ointment Apply topically 5 times daily Apply topically 5 times daily to affected area for 7 days Yes TRISHA Pemberton CNP   clotrimazole-betamethasone (LOTRISONE) 1-0.05 % cream Apply topically 2 times daily. Yes Cesar Sierra MD   miconazole (MICONAZOLE ANTIFUNGAL) 2 % cream Apply topically 2 times daily.  Yes Cesar Sierra MD   Pediatric Multiple Vit-C-FA (FLINSTONES GUMMIES OMEGA-3 DHA) CHEW Take 2 capsules by mouth daily Yes Historical Provider, MD   Insulin Pen Needle (BD PEN NEEDLE EMRE U/F) 32G X 4 MM MISC Use 3 times a day Yes Israel Damon MD        Allergies   Allergen Reactions    Tape Kurt Just Tape] Rash       Past Medical History:   Diagnosis Date    Anemia during pregnancy 2018    Herpes simplex infection of skin     on neck    Hypercholesterolemia     Irregular heart beat     runs of v tach in the past    Obesity     Pregnancy induced hypertension     Type II or unspecified type diabetes mellitus without mention of complication, not stated as uncontrolled     type 2       Past Surgical History:   Procedure Laterality Date    ABDOMEN SURGERY       x 2     SECTION         Social History     Socioeconomic History    Marital status: Single     Spouse name: Not on file    Number of children: Not on file    Years of education: Not on file    Highest education level: Not on file   Occupational History    Not on file   Social Needs    Financial resource strain: Not on file    Food insecurity     Worry: Never true     Inability: Never true    Transportation needs     Medical: No     Non-medical: No   Tobacco Use    Smoking status: Current Every Day Smoker     Packs/day: 0.25     Years: 23.00     Pack years: 5.75     Last attempt to quit: 8/15/2017     Years since quitting: 3.3    Smokeless tobacco: Never Used    Tobacco comment: started age 13   Substance and Sexual Activity    Alcohol use: No     Comment: socially before pregnancy    Drug use: No    Sexual activity: Not Currently     Partners: Male   Lifestyle    Physical activity     Days per week: Not on file     Minutes per session: Not on file    Stress: Not on file   Relationships    Social connections     Talks on phone: Not on file     Gets together: Not on file     Attends Cheondoism service: Not on file     Active member of club or organization: Not on file     Attends meetings of clubs or organizations: Not on file     Relationship status: Not on file    Intimate partner violence     Fear of current or ex partner: Not on file     Emotionally abused: Not on file     Physically abused: Not on file     Forced sexual activity: Not on file   Other Topics Concern    Not on file   Social History Narrative    Not on file        Family History   Problem Relation Age of Onset    Arthritis Mother     Diabetes Mother     Early Death Father     Heart Attack Father     Heart Disease Father     High Blood Pressure Father     High Cholesterol Father     Diabetes Father     Diabetes Paternal Grandfather     Heart Disease Paternal Grandfather     Birth Defects Son     Arthritis Sister     Cancer Neg Hx        Vitals:    12/28/20 1400   BP: 136/80   Pulse: 68   Temp: 97.2 °F (36.2 °C)   SpO2: 98%   Weight: 169 lb (76.7 kg)   Height: 5' 3\" (1.6 m)       Estimated body mass index is 29.94 kg/m² as calculated from the following:    Height as of this encounter: 5' 3\" (1.6 m). Weight as of this encounter: 169 lb (76.7 kg). No results for input(s): WBC, RBC, HGB, HCT, MCV, MCH, MCHC, RDW, PLT, MPV in the last 72 hours. No results for input(s): NA, K, CL, CO2, BUN, CREATININE, GLUCOSE, CALCIUM, PROT, LABALBU, BILITOT, ALKPHOS, AST, ALT in the last 72 hours. Lab Results   Component Value Date    LABA1C 9.3 10/23/2019       No results found. Physical Exam  Constitutional:       Appearance: Normal appearance. She is normal weight. HENT:      Head: Normocephalic and atraumatic. Eyes:      Extraocular Movements: Extraocular movements intact. Conjunctiva/sclera: Conjunctivae normal.   Skin:     General: Skin is warm. Findings: No rash. Neurological:      Mental Status: She is alert. Psychiatric:         Mood and Affect: Mood normal.         Thought Content: Thought content normal.         Judgment: Judgment normal.         ASSESSMENT/PLAN:  1. Eczema, unspecified type  - Rash is fading, continue daily skin hydration with vaseline    2. Anxiety  - Pt would like to trial counseling services at this time  - Ambulatory referral to Psychology    3. Uncontrolled type 2 diabetes mellitus with complication, without long-term current use of insulin ([facility]  - Follows with Dr. Evin Campo  - She does have A1c, BMP and microalbumin ordered  - Will add on lipid panel  - Lipid Panel; Future      ---------------------------------------------------------------------  Side effects, adverse effects of the medication prescribed today, as well as treatment plan/ rationale and result expectations have been discussed with the patient who expresses understanding and desires to proceed.      Close follow up to evaluate treatment results and for coordination of care. I have reviewed the patient's medical history in detail and updated the computerized patient record. As always, patient is advised that if symptoms worsen in any way they will proceed to the nearest emergency room. --------------------------------------------------------------------    Return in 4 weeks (on 1/25/2021) for mood. An  electronic signature was used to authenticate this note.     --Parul Szymanski, DO on 12/28/2020 at 2:29 PM

## 2020-12-28 ENCOUNTER — OFFICE VISIT (OUTPATIENT)
Dept: FAMILY MEDICINE CLINIC | Age: 40
End: 2020-12-28
Payer: COMMERCIAL

## 2020-12-28 VITALS
OXYGEN SATURATION: 98 % | WEIGHT: 169 LBS | SYSTOLIC BLOOD PRESSURE: 136 MMHG | BODY MASS INDEX: 29.95 KG/M2 | DIASTOLIC BLOOD PRESSURE: 80 MMHG | HEART RATE: 68 BPM | HEIGHT: 63 IN | TEMPERATURE: 97.2 F

## 2020-12-28 DIAGNOSIS — Z79.4 TYPE 2 DIABETES MELLITUS WITH OTHER SPECIFIED COMPLICATION, WITH LONG-TERM CURRENT USE OF INSULIN (HCC): ICD-10-CM

## 2020-12-28 DIAGNOSIS — E11.69 TYPE 2 DIABETES MELLITUS WITH OTHER SPECIFIED COMPLICATION, WITH LONG-TERM CURRENT USE OF INSULIN (HCC): ICD-10-CM

## 2020-12-28 LAB
ANION GAP SERPL CALCULATED.3IONS-SCNC: 9 MEQ/L (ref 9–15)
BUN BLDV-MCNC: 11 MG/DL (ref 6–20)
CALCIUM SERPL-MCNC: 8.6 MG/DL (ref 8.5–9.9)
CHLORIDE BLD-SCNC: 102 MEQ/L (ref 95–107)
CHOLESTEROL, TOTAL: 143 MG/DL (ref 0–199)
CO2: 25 MEQ/L (ref 20–31)
CREAT SERPL-MCNC: 0.53 MG/DL (ref 0.5–0.9)
CREATININE URINE: 231.8 MG/DL
GFR AFRICAN AMERICAN: >60
GFR NON-AFRICAN AMERICAN: >60
GLUCOSE BLD-MCNC: 230 MG/DL (ref 70–99)
HBA1C MFR BLD: 9.6 % (ref 4.8–5.9)
HDLC SERPL-MCNC: 37 MG/DL (ref 40–59)
LDL CHOLESTEROL CALCULATED: 83 MG/DL (ref 0–129)
MICROALBUMIN UR-MCNC: 2.3 MG/DL
MICROALBUMIN/CREAT UR-RTO: 9.9 MG/G (ref 0–30)
POTASSIUM SERPL-SCNC: 4 MEQ/L (ref 3.4–4.9)
SODIUM BLD-SCNC: 136 MEQ/L (ref 135–144)
TRIGL SERPL-MCNC: 117 MG/DL (ref 0–150)

## 2020-12-28 PROCEDURE — 99213 OFFICE O/P EST LOW 20 MIN: CPT | Performed by: STUDENT IN AN ORGANIZED HEALTH CARE EDUCATION/TRAINING PROGRAM

## 2020-12-28 PROCEDURE — G8417 CALC BMI ABV UP PARAM F/U: HCPCS | Performed by: STUDENT IN AN ORGANIZED HEALTH CARE EDUCATION/TRAINING PROGRAM

## 2020-12-28 PROCEDURE — 2022F DILAT RTA XM EVC RTNOPTHY: CPT | Performed by: STUDENT IN AN ORGANIZED HEALTH CARE EDUCATION/TRAINING PROGRAM

## 2020-12-28 PROCEDURE — 4004F PT TOBACCO SCREEN RCVD TLK: CPT | Performed by: STUDENT IN AN ORGANIZED HEALTH CARE EDUCATION/TRAINING PROGRAM

## 2020-12-28 PROCEDURE — G8427 DOCREV CUR MEDS BY ELIG CLIN: HCPCS | Performed by: STUDENT IN AN ORGANIZED HEALTH CARE EDUCATION/TRAINING PROGRAM

## 2020-12-28 PROCEDURE — G8484 FLU IMMUNIZE NO ADMIN: HCPCS | Performed by: STUDENT IN AN ORGANIZED HEALTH CARE EDUCATION/TRAINING PROGRAM

## 2020-12-28 PROCEDURE — 3046F HEMOGLOBIN A1C LEVEL >9.0%: CPT | Performed by: STUDENT IN AN ORGANIZED HEALTH CARE EDUCATION/TRAINING PROGRAM

## 2020-12-28 ASSESSMENT — ENCOUNTER SYMPTOMS
COUGH: 0
ABDOMINAL PAIN: 0
SINUS PRESSURE: 0
SHORTNESS OF BREATH: 0
SORE THROAT: 0
VOMITING: 0

## 2021-01-26 ENCOUNTER — VIRTUAL VISIT (OUTPATIENT)
Dept: BEHAVIORAL/MENTAL HEALTH CLINIC | Age: 41
End: 2021-01-26
Payer: COMMERCIAL

## 2021-01-26 DIAGNOSIS — F43.23 ADJUSTMENT DISORDER WITH MIXED ANXIETY AND DEPRESSED MOOD: Primary | ICD-10-CM

## 2021-01-26 PROCEDURE — 90832 PSYTX W PT 30 MINUTES: CPT | Performed by: PSYCHOLOGIST

## 2021-01-26 ASSESSMENT — PATIENT HEALTH QUESTIONNAIRE - PHQ9
9. THOUGHTS THAT YOU WOULD BE BETTER OFF DEAD, OR OF HURTING YOURSELF: 0
6. FEELING BAD ABOUT YOURSELF - OR THAT YOU ARE A FAILURE OR HAVE LET YOURSELF OR YOUR FAMILY DOWN: 0
1. LITTLE INTEREST OR PLEASURE IN DOING THINGS: 0
10. IF YOU CHECKED OFF ANY PROBLEMS, HOW DIFFICULT HAVE THESE PROBLEMS MADE IT FOR YOU TO DO YOUR WORK, TAKE CARE OF THINGS AT HOME, OR GET ALONG WITH OTHER PEOPLE: 1
SUM OF ALL RESPONSES TO PHQ QUESTIONS 1-9: 11
2. FEELING DOWN, DEPRESSED OR HOPELESS: 3

## 2021-01-26 NOTE — PROGRESS NOTES
Behavioral Health Consultation  Alma Snyder, [address]. Psychologist  1/26/21  2:42 PM EST      Time spent with Patient: 19 minutes due to late pt sign on  This is patient's first  [facility] [place] appointment. Reason for Consult:  depression  Referring Provider: Manav Simon DO  [address]    Pt provided informed consent for the behavioral health program. Discussed with patient model of service to include the limits of confidentiality (i.e. abuse reporting, suicide intervention, etc.) and short-term intervention focused approach. Pt indicated understanding. Feedback given to PCP. TELEHEALTH EVALUATION -- Audio and/or Visual (During FYJJS-63 public health emergency)    Due to COVID 19 outbreak, patient's office visit was converted to a virtual visit. Patient was contacted and agreed to proceed with a virtual visit via [employer]. Patient reports that they are located at home. Provider located at home office. The risks and benefits of converting to a virtual visit were discussed in light of the current infectious disease epidemic. Patient also understood that insurance coverage and co-pays are up to their individual insurance plans. Patient provides verbal consent for this visit to be billed to insurance. Pursuant to the emergency declaration under the [facility], 1135 waiver authority and the [employer] General Act, this Virtual  Visit was conducted, with patient's consent, to reduce the patient's risk of exposure to COVID-19 and provide continuity of care for an established patient. Services were provided through an audio and video synchronous discussion virtually to substitute for in-person clinic visit.       S:  Presenting Concerns:  Insulin Pen Needle (NOVOFINE) 32G X 6 MM MISC qid 300 each 5    insulin lispro, 1 Unit Dial, (HUMALOG KWIKPEN) 100 UNIT/ML SOPN 10 units at each meals 15 pen 3    Alcohol Swabs PADS Use qid 150 each 06    predniSONE (DELTASONE) 10 MG tablet Take 6 tabs daily for 3 days, 5 tabs daily for 3 days, 4 tabs daily for for 3 days, take 3 tabs daily for 3 days, take 2 tabs daily for 3 days, then 1 tab daily for 3 days 63 tablet 0    Crisaborole 2 % OINT Apply sparingly to the area x BID 2 Tube 0    clobetasol (TEMOVATE) 0.05 % ointment Apply topically 2 times daily. 60 g 1    FREESTYLE LANCETS MISC 1 each by Does not apply route daily 200 each 3    blood glucose test strips (FREESTYLE LITE) strip Use 6 times a day   Pt is pregnant 200 strip 3    acyclovir (ZOVIRAX) 5 % ointment Apply topically 5 times daily Apply topically 5 times daily to affected area for 7 days 15 g 0    clotrimazole-betamethasone (LOTRISONE) 1-0.05 % cream Apply topically 2 times daily. 1 Tube 1    miconazole (MICONAZOLE ANTIFUNGAL) 2 % cream Apply topically 2 times daily. 1 Tube 1    Pediatric Multiple Vit-C-FA (FLINSTONES GUMMIES OMEGA-3 DHA) CHEW Take 2 capsules by mouth daily      Insulin Pen Needle (BD PEN NEEDLE EMRE U/F) 32G X 4 MM MISC Use 3 times a day 200 each 03     No current facility-administered medications for this visit.         Social History:   Social History     Socioeconomic History    Marital status: Single     Spouse name: Not on file    Number of children: Not on file    Years of education: Not on file    Highest education level: Not on file   Occupational History    Not on file   Social Needs    Financial resource strain: Not on file    Food insecurity     Worry: Never true     Inability: Never true    Transportation needs     Medical: No     Non-medical: No   Tobacco Use    Smoking status: Current Every Day Smoker     Packs/day: 0.25     Years: 23.00     Pack years: 5.75     Last attempt to quit: 8/15/2017 Years since quitting: 3.4    Smokeless tobacco: Never Used    Tobacco comment: started age 13   Substance and Sexual Activity    Alcohol use: No     Comment: socially before pregnancy    Drug use: No    Sexual activity: Not Currently     Partners: Male   Lifestyle    Physical activity     Days per week: Not on file     Minutes per session: Not on file    Stress: Not on file   Relationships    Social connections     Talks on phone: Not on file     Gets together: Not on file     Attends Baptism service: Not on file     Active member of club or organization: Not on file     Attends meetings of clubs or organizations: Not on file     Relationship status: Not on file    Intimate partner violence     Fear of current or ex partner: Not on file     Emotionally abused: Not on file     Physically abused: Not on file     Forced sexual activity: Not on file   Other Topics Concern    Not on file   Social History Narrative    Not on file         Family History:   Family History   Problem Relation Age of Onset    Arthritis Mother     Diabetes Mother     Early Death Father     Heart Attack Father     Heart Disease Father     High Blood Pressure Father     High Cholesterol Father     Diabetes Father     Diabetes Paternal Grandfather     Heart Disease Paternal Grandfather     Birth Defects Son     Arthritis Sister     Cancer Neg Hx        TOBACCO:   reports that she has been smoking. She has a 5.75 pack-year smoking history. She has never used smokeless tobacco.  ETOH:   reports no history of alcohol use.        O:  MSE:    Appearance    alert, cooperative   Personal Hygiene : appropriately dressed, appropriately groomed and healthy looking  Appetite normal  Sleep disturbance Yes, including: frequent night time awakening, difficulty falling asleep and non-restful sleep  Fatigue Yes  Loss of pleasure No  Impulsive behavior No  Speech    spontaneous, normal rate and normal volume  Mood   anxious Please note this report has been partially produced using speech recognition software and may cause contain errors related to that system including grammar, punctuation and spelling as well as words and phrases that may seem inappropriate. If there are questions or concerns please feel free to contact me to clarify.

## 2021-02-01 NOTE — PROGRESS NOTES
2021    Kristyn Bush (:  1980) is a 45 y.o. female, here for evaluation of the following medical concerns:  Chief Complaint   Patient presents with    Rash     arms, breast, armpits, and neck. States its very itchy. Has been using eczema cream on it. HPI  Rash  She was seen  in the urgent care to due worsening of symptoms  Pt has been excessively washing her hands due to paranoid over the pandemic  Pt does wear gloves in public due to fear of touching things with COVID  Pt started on oral prednisone and clobetasol ointment  Rash had improved     Rash completely resolved for 4 weeks  Restarted a few weeks ago  Located on the arms, chest and neck  She is using eczema cream on her hands  She is not using anything on her arms  Very itchy  Cannot think of any triggers     Anxiety  Endorses increased anxiety and depression over the last year  Pt does have anxiety due to [place]  She also has a son that is special needs and takes care of him   She does not get very good sleep a night because she has to wake up to give her son medications   Referred to psychiatry at last apt  Had apt  with Dr. Emanuel Orosco      Review of Systems   Constitutional: Negative for chills and fever. HENT: Negative for congestion, sinus pressure and sore throat. Respiratory: Negative for cough and shortness of breath. Cardiovascular: Negative for chest pain and palpitations. Gastrointestinal: Negative for abdominal pain and vomiting. Musculoskeletal: Negative for arthralgias and myalgias. Skin: Positive for rash. Negative for wound. Neurological: Negative for speech difficulty and light-headedness. Anxiety   Psychiatric/Behavioral: Negative for suicidal ideas. The patient is not nervous/anxious. Prior to Visit Medications    Medication Sig Taking?  Authorizing Provider   loratadine (CLARITIN) 10 MG tablet Take 1 tablet by mouth daily Yes Ayanna Drake, DO   hydrocortisone 2.5 % cream Apply topically 2 times daily until rash clears Yes Skylar Lions, DO   Disposable Gloves (COTTON GLOVES MEDIUM) MISC Use nightly after applying ointment Yes Skylar Valenzuela, DO   LANTUS SOLOSTAR 100 UNIT/ML injection pen Inject 45 units nightly Yes Ganga Mccormick MD   Insulin Pen Needle (NOVOFINE) 32G X 6 MM MISC qid Yes Ayaz Jaimes MD   insulin lispro, 1 Unit Dial, (HUMALOG KWIKPEN) 100 UNIT/ML SOPN 10 units at each meals Yes Ayaz Jaimes MD   Alcohol Swabs PADS Use qid Yes Ganga Mccormick MD   predniSONE (DELTASONE) 10 MG tablet Take 6 tabs daily for 3 days, 5 tabs daily for 3 days, 4 tabs daily for for 3 days, take 3 tabs daily for 3 days, take 2 tabs daily for 3 days, then 1 tab daily for 3 days Yes TRISHA Meier CNP   Crisaborole 2 % OINT Apply sparingly to the area x BID Yes TRISHA Meier CNP   clobetasol (TEMOVATE) 0.05 % ointment Apply topically 2 times daily. Yes TRISHA Knutson CNP   FREESTYLE LANCETS MISC 1 each by Does not apply route daily Yes Ayaz Jaimes MD   blood glucose test strips (FREESTYLE LITE) strip Use 6 times a day   Pt is pregnant Yes Ayaz Jaimes MD   acyclovir (ZOVIRAX) 5 % ointment Apply topically 5 times daily Apply topically 5 times daily to affected area for 7 days Yes TRISHA Hair CNP   clotrimazole-betamethasone (LOTRISONE) 1-0.05 % cream Apply topically 2 times daily. Yes Ирина Viramontes MD   miconazole (MICONAZOLE ANTIFUNGAL) 2 % cream Apply topically 2 times daily.  Yes Ирина Viramontes MD   Pediatric Multiple Vit-C-FA (FLINSTONES GUMMIES OMEGA-3 DHA) CHEW Take 2 capsules by mouth daily Yes Historical Provider, MD   Insulin Pen Needle (BD PEN NEEDLE EMRE U/F) 32G X 4 MM MISC Use 3 times a day Yes Ayaz Jaimes MD        Allergies   Allergen Reactions    Tape Erendira Parker Tape] Rash       Past Medical History:   Diagnosis Date    Anemia during pregnancy 2/2/2018    Herpes simplex infection of skin     on neck    Hypercholesterolemia     Irregular heart beat     runs of v tach in the past    Obesity     Pregnancy induced hypertension     Type II or unspecified type diabetes mellitus without mention of complication, not stated as uncontrolled     type 2       Past Surgical History:   Procedure Laterality Date    ABDOMEN SURGERY       x 2     SECTION         Social History     Socioeconomic History    Marital status: Single     Spouse name: Not on file    Number of children: Not on file    Years of education: Not on file    Highest education level: Not on file   Occupational History    Not on file   Social Needs    Financial resource strain: Not on file    Food insecurity     Worry: Never true     Inability: Never true   [employer] needs     Medical: No     Non-medical: No   Tobacco Use    Smoking status: Current Every Day Smoker     Packs/day: 0.25     Years: 23.00     Pack years: 5.75     Last attempt to quit: 8/15/2017     Years since quitting: 3.4    Smokeless tobacco: Never Used    Tobacco comment: started age 13   Substance and Sexual Activity    Alcohol use: No     Comment: socially before pregnancy    Drug use: No    Sexual activity: Not Currently     Partners: Male   Lifestyle    Physical activity     Days per week: Not on file     Minutes per session: Not on file    Stress: Not on file   Relationships    Social connections     Talks on phone: Not on file     Gets together: Not on file     Attends Episcopal service: Not on file     Active member of club or organization: Not on file     Attends meetings of clubs or organizations: Not on file     Relationship status: Not on file    Intimate partner violence     Fear of current or ex partner: Not on file     Emotionally abused: Not on file     Physically abused: Not on file     Forced sexual activity: Not on file   Other Topics Concern    Not on file   Social History Narrative    Not on file        Family History   Problem Relation Age of Onset    Arthritis Mother     Diabetes Mother     Early Death Father     Heart Attack Father     Heart Disease Father     High Blood Pressure Father     High Cholesterol Father     Diabetes Father     Diabetes Paternal Grandfather     Heart Disease Paternal Grandfather     Birth Defects Son     Arthritis Sister     Cancer Neg Hx        Vitals:    02/02/21 1418   BP: 136/80   Pulse: 91   Temp: 98.2 °F (36.8 °C)   SpO2: 99%   Weight: 177 lb (80.3 kg)   Height: 5' 3\" (1.6 m)       Estimated body mass index is 31.35 kg/m² as calculated from the following:    Height as of this encounter: 5' 3\" (1.6 m). Weight as of this encounter: 177 lb (80.3 kg). No results for input(s): WBC, RBC, HGB, HCT, MCV, MCH, MCHC, RDW, PLT, MPV in the last 72 hours. No results for input(s): NA, K, CL, CO2, BUN, CREATININE, GLUCOSE, CALCIUM, PROT, LABALBU, BILITOT, ALKPHOS, AST, ALT in the last 72 hours. Lab Results   Component Value Date    LABA1C 9.6 12/28/2020       No results found. Physical Exam  Constitutional:       Appearance: Normal appearance. She is normal weight. HENT:      Head: Normocephalic and atraumatic. Eyes:      Extraocular Movements: Extraocular movements intact. Conjunctiva/sclera: Conjunctivae normal.   Skin:     General: Skin is warm. Findings: No rash. Comments: Eczematous patches on the forearms, axilla and right neck   Neurological:      Mental Status: She is alert. Psychiatric:         Mood and Affect: Mood normal.         Thought Content: Thought content normal.         Judgment: Judgment normal.                           ASSESSMENT/PLAN:  1. Dyshidrotic eczema  - Hands improved when she wasn't washing them as frequently   - Will try to get cloth gloves to wear at nighttime to apply after eczema ointment    - Disposable Gloves (COTTON GLOVES MEDIUM) MISC; Use nightly after applying ointment  Dispense: 2 each; Refill: 1    2.  Dermatitis  - Discussed fragrance free products  - Will start topical hydrocortisone on rash along with oral antihistamine  - Recheck in one week  - Pt to call sooner if symptoms worsen  - If rash resolved and pt flares up again, consider patch testing     - loratadine (CLARITIN) 10 MG tablet; Take 1 tablet by mouth daily  Dispense: 30 tablet; Refill: 0  - hydrocortisone 2.5 % cream; Apply topically 2 times daily until rash clears  Dispense: 28 g; Refill: 1      ---------------------------------------------------------------------  Side effects, adverse effects of the medication prescribed today, as well as treatment plan/ rationale and result expectations have been discussed with the patient who expresses understanding and desires to proceed. Close follow up to evaluate treatment results and for coordination of care. I have reviewed the patient's medical history in detail and updated the computerized patient record. As always, patient is advised that if symptoms worsen in any way they will proceed to the nearest emergency room. --------------------------------------------------------------------    Return in about 1 week (around 2/9/2021) for Skin recheck Dr. Chip Méndez. An  electronic signature was used to authenticate this note.     --Bc Martinez DO on 2/2/2021 at 2:43 PM

## 2021-02-02 ENCOUNTER — OFFICE VISIT (OUTPATIENT)
Dept: FAMILY MEDICINE CLINIC | Age: 41
End: 2021-02-02
Payer: COMMERCIAL

## 2021-02-02 VITALS
WEIGHT: 177 LBS | HEART RATE: 91 BPM | BODY MASS INDEX: 31.36 KG/M2 | TEMPERATURE: 98.2 F | HEIGHT: 63 IN | OXYGEN SATURATION: 99 % | SYSTOLIC BLOOD PRESSURE: 136 MMHG | DIASTOLIC BLOOD PRESSURE: 80 MMHG

## 2021-02-02 DIAGNOSIS — L30.1 DYSHIDROTIC ECZEMA: Primary | ICD-10-CM

## 2021-02-02 DIAGNOSIS — L30.9 DERMATITIS: ICD-10-CM

## 2021-02-02 PROCEDURE — 4004F PT TOBACCO SCREEN RCVD TLK: CPT | Performed by: STUDENT IN AN ORGANIZED HEALTH CARE EDUCATION/TRAINING PROGRAM

## 2021-02-02 PROCEDURE — G8484 FLU IMMUNIZE NO ADMIN: HCPCS | Performed by: STUDENT IN AN ORGANIZED HEALTH CARE EDUCATION/TRAINING PROGRAM

## 2021-02-02 PROCEDURE — 99213 OFFICE O/P EST LOW 20 MIN: CPT | Performed by: STUDENT IN AN ORGANIZED HEALTH CARE EDUCATION/TRAINING PROGRAM

## 2021-02-02 PROCEDURE — G8417 CALC BMI ABV UP PARAM F/U: HCPCS | Performed by: STUDENT IN AN ORGANIZED HEALTH CARE EDUCATION/TRAINING PROGRAM

## 2021-02-02 PROCEDURE — G8427 DOCREV CUR MEDS BY ELIG CLIN: HCPCS | Performed by: STUDENT IN AN ORGANIZED HEALTH CARE EDUCATION/TRAINING PROGRAM

## 2021-02-02 RX ORDER — LORATADINE 10 MG/1
10 TABLET ORAL DAILY
Qty: 30 TABLET | Refills: 0 | Status: SHIPPED | OUTPATIENT
Start: 2021-02-02 | End: 2021-03-04

## 2021-02-02 ASSESSMENT — ENCOUNTER SYMPTOMS
COUGH: 0
VOMITING: 0
SINUS PRESSURE: 0
SHORTNESS OF BREATH: 0
SORE THROAT: 0
ABDOMINAL PAIN: 0

## 2021-02-02 NOTE — PATIENT INSTRUCTIONS
air as much as possible. · If the rash itches, use hydrocortisone cream. Follow the directions on the label. Calamine lotion may help for plant rashes. · Take an over-the-counter antihistamine, such as diphenhydramine (Benadryl) or loratadine (Claritin), to help calm the itching. Read and follow all instructions on the label. · If your doctor prescribed a cream, use it as directed. If your doctor prescribed medicine, take it exactly as directed. When should you call for help? Call your doctor now or seek immediate medical care if:  · You have symptoms of infection, such as:  ? Increased pain, swelling, warmth, or redness. ? Red streaks leading from the area. ? Pus draining from the area. ? A fever. · You have joint pain along with the rash. Watch closely for changes in your health, and be sure to contact your doctor if:  · Your rash is changing or getting worse. · You are not getting better as expected. Where can you learn more? Go to [email or web address] and sign in to your WeTag account. Enter [phone] in the [facility] box to learn more about \"Dermatitis: Care Instructions. \"     If you do not have an account, please click on the \"Sign Up Now\" link. Current as of: October 31, 2019               Content Version: 12.5  © 8459-4580 Healthwise, Incorporated. Care instructions adapted under license by [facility] ([facility]). If you have questions about a medical condition or this instruction, always ask your healthcare professional. [place] [address] any warranty or liability for your use of this information.

## 2021-02-08 NOTE — PROGRESS NOTES
2021    Vel Lamb (:  1980) is a 45 y.o. female, here for evaluation of the following medical concerns:  Chief Complaint   Patient presents with    Skin Problem     1 week skin check. Not improving. Rash on neck, hands, arms, and arm pits. Legs are better. HPI  Rash  She was seen  in the urgent care to due worsening of symptoms  Pt has been excessively washing her hands due to paranoid over the pandemic  Pt does wear gloves in public due to fear of touching things with COVID  Pt started on oral prednisone and clobetasol ointment  Rash had improved      Rash completely resolved for 4 weeks  Restarted a few weeks ago  Located on the arms, chest and neck  She is using eczema cream on her hands  She is not using anything on her arms  Very itchy  Cannot think of any triggers     Started topical hydrocortisone and oral antihistamines at last apt  Using hydrocortisone twice daily and taking Claritin daily  Still itchy  She does not think the rash has spread    Anxiety  Endorses increased anxiety and depression over the last year  Pt does have anxiety due to [place]  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 Fide Mortensen    Review of Systems   Constitutional: Negative for chills and fever. HENT: Negative for congestion, sinus pressure and sore throat. Respiratory: Negative for cough and shortness of breath. Cardiovascular: Negative for chest pain and palpitations. Gastrointestinal: Negative for abdominal pain and vomiting. Musculoskeletal: Negative for arthralgias and myalgias. Skin: Positive for rash. Negative for wound. Neurological: Negative for speech difficulty and light-headedness. Anxiety   Psychiatric/Behavioral: Negative for suicidal ideas. The patient is not nervous/anxious. Prior to Visit Medications    Medication Sig Taking? Authorizing Provider   triamcinolone (KENALOG) 0.1 % ointment Apply 2 times daily to affected areas Yes Kathaleen Nageotte, DO   loratadine (CLARITIN) 10 MG tablet Take 1 tablet by mouth daily Yes Kathaleen Nageotte, DO   Disposable Gloves (COTTON GLOVES MEDIUM) MISC Use nightly after applying ointment Yes Kathaleen Nageotte, DO   LANTUS SOLOSTAR 100 UNIT/ML injection pen Inject 45 units nightly Yes Ganga Mccormick MD   Insulin Pen Needle (NOVOFINE) 32G X 6 MM MISC qid Yes Iva Andersen MD   insulin lispro, 1 Unit Dial, (HUMALOG KWIKPEN) 100 UNIT/ML SOPN 10 units at each meals Yes Iva Andersen MD   Alcohol Swabs PADS Use qid Yes Ganga Mccormick MD   predniSONE (DELTASONE) 10 MG tablet Take 6 tabs daily for 3 days, 5 tabs daily for 3 days, 4 tabs daily for for 3 days, take 3 tabs daily for 3 days, take 2 tabs daily for 3 days, then 1 tab daily for 3 days Yes TRISHA Meier CNP   Crisaborole 2 % OINT Apply sparingly to the area x BID Yes TRISHA Meier CNP   clobetasol (TEMOVATE) 0.05 % ointment Apply topically 2 times daily. Yes Leti Fontenot Aleksander, APRN - CNP   FREESTYLE LANCETS MISC 1 each by Does not apply route daily Yes Iva Andersen MD   blood glucose test strips (FREESTYLE LITE) strip Use 6 times a day   Pt is pregnant Yes Iva Andersen MD   acyclovir (ZOVIRAX) 5 % ointment Apply topically 5 times daily Apply topically 5 times daily to affected area for 7 days Yes TRISHA Chan CNP   clotrimazole-betamethasone (LOTRISONE) 1-0.05 % cream Apply topically 2 times daily. Yes Mili Renner MD   miconazole (MICONAZOLE ANTIFUNGAL) 2 % cream Apply topically 2 times daily.  Yes Miil Renner MD   Pediatric Multiple Vit-C-FA (FLINSTONES GUMMIES OMEGA-3 DHA) CHEW Take 2 capsules by mouth daily Yes Historical Provider, MD   Insulin Pen Needle (BD PEN NEEDLE EMRE U/F) 32G X 4 MM MISC Use 3 times a day Yes Iva Andersen MD        Allergies   Allergen Reactions    Tape Tamia Anis Tape] Rash       Past Medical History:   Diagnosis Date    Anemia during pregnancy 2018    Herpes simplex infection of skin     on neck    Hypercholesterolemia     Irregular heart beat     runs of v tach in the past    Obesity     Pregnancy induced hypertension     Type II or unspecified type diabetes mellitus without mention of complication, not stated as uncontrolled     type 2       Past Surgical History:   Procedure Laterality Date    ABDOMEN SURGERY       x 2     SECTION         Social History     Socioeconomic History    Marital status: Single     Spouse name: Not on file    Number of children: Not on file    Years of education: Not on file    Highest education level: Not on file   Occupational History    Not on file   Social Needs    Financial resource strain: Not on file    Food insecurity     Worry: Never true     Inability: Never true   [employer] needs     Medical: No     Non-medical: No   Tobacco Use    Smoking status: Current Every Day Smoker     Packs/day: 0.25     Years: 23.00     Pack years: 5.75     Last attempt to quit: 8/15/2017     Years since quitting: 3.4    Smokeless tobacco: Never Used    Tobacco comment: started age 13   Substance and Sexual Activity    Alcohol use: No     Comment: socially before pregnancy    Drug use: No    Sexual activity: Not Currently     Partners: Male   Lifestyle    Physical activity     Days per week: Not on file     Minutes per session: Not on file    Stress: Not on file   Relationships    Social connections     Talks on phone: Not on file     Gets together: Not on file     Attends Mandaeism service: Not on file     Active member of club or organization: Not on file     Attends meetings of clubs or organizations: Not on file     Relationship status: Not on file    Intimate partner violence     Fear of current or ex partner: Not on file     Emotionally abused: Not on file Physically abused: Not on file     Forced sexual activity: Not on file   Other Topics Concern    Not on file   Social History Narrative    Not on file        Family History   Problem Relation Age of Onset    Arthritis Mother     Diabetes Mother     Early Death Father     Heart Attack Father     Heart Disease Father     High Blood Pressure Father     High Cholesterol Father     Diabetes Father     Diabetes Paternal Grandfather     Heart Disease Paternal Grandfather     Birth Defects Son     Arthritis Sister     Cancer Neg Hx        Vitals:    02/09/21 1404   BP: 136/80   Temp: 98 °F (36.7 °C)   Weight: 177 lb (80.3 kg)   Height: 5' 3\" (1.6 m)       Estimated body mass index is 31.35 kg/m² as calculated from the following:    Height as of this encounter: 5' 3\" (1.6 m). Weight as of this encounter: 177 lb (80.3 kg). No results for input(s): WBC, RBC, HGB, HCT, MCV, MCH, MCHC, RDW, PLT, MPV in the last 72 hours. No results for input(s): NA, K, CL, CO2, BUN, CREATININE, GLUCOSE, CALCIUM, PROT, LABALBU, BILITOT, ALKPHOS, AST, ALT in the last 72 hours. Lab Results   Component Value Date    LABA1C 9.6 12/28/2020       No results found. Physical Exam  Constitutional:       Appearance: Normal appearance. She is normal weight. HENT:      Head: Normocephalic and atraumatic. Eyes:      Extraocular Movements: Extraocular movements intact. Conjunctiva/sclera: Conjunctivae normal.   Skin:     General: Skin is warm. Findings: No rash. Comments: Eczematous patches on the forearms, axilla and right neck   Neurological:      Mental Status: She is alert. Psychiatric:         Mood and Affect: Mood normal.         Thought Content: Thought content normal.         Judgment: Judgment normal.       ASSESSMENT/PLAN:  1. Dermatitis  - Will increase strength of steroid and recheck rash  - If no improvement can trial PO steroids     - triamcinolone (KENALOG) 0.1 % ointment;  Apply 2 times daily to affected areas  Dispense: 80 g; Refill: 0    2. Dyshidrotic eczema        ---------------------------------------------------------------------  Side effects, adverse effects of the medication prescribed today, as well as treatment plan/ rationale and result expectations have been discussed with the patient who expresses understanding and desires to proceed. Close follow up to evaluate treatment results and for coordination of care. I have reviewed the patient's medical history in detail and updated the computerized patient record. As always, patient is advised that if symptoms worsen in any way they will proceed to the nearest emergency room. --------------------------------------------------------------------    Return in about 2 weeks (around 2/23/2021) for Skin recheck Dr. Vinny Villareal. An  electronic signature was used to authenticate this note.     --Renato Vega,  on 2/9/2021 at 2:23 PM

## 2021-02-09 ENCOUNTER — OFFICE VISIT (OUTPATIENT)
Dept: FAMILY MEDICINE CLINIC | Age: 41
End: 2021-02-09
Payer: COMMERCIAL

## 2021-02-09 VITALS
HEIGHT: 63 IN | DIASTOLIC BLOOD PRESSURE: 80 MMHG | BODY MASS INDEX: 31.36 KG/M2 | SYSTOLIC BLOOD PRESSURE: 136 MMHG | WEIGHT: 177 LBS | TEMPERATURE: 98 F

## 2021-02-09 DIAGNOSIS — L30.9 DERMATITIS: Primary | ICD-10-CM

## 2021-02-09 DIAGNOSIS — L30.1 DYSHIDROTIC ECZEMA: ICD-10-CM

## 2021-02-09 PROCEDURE — 4004F PT TOBACCO SCREEN RCVD TLK: CPT | Performed by: STUDENT IN AN ORGANIZED HEALTH CARE EDUCATION/TRAINING PROGRAM

## 2021-02-09 PROCEDURE — G8484 FLU IMMUNIZE NO ADMIN: HCPCS | Performed by: STUDENT IN AN ORGANIZED HEALTH CARE EDUCATION/TRAINING PROGRAM

## 2021-02-09 PROCEDURE — 99213 OFFICE O/P EST LOW 20 MIN: CPT | Performed by: STUDENT IN AN ORGANIZED HEALTH CARE EDUCATION/TRAINING PROGRAM

## 2021-02-09 PROCEDURE — G8427 DOCREV CUR MEDS BY ELIG CLIN: HCPCS | Performed by: STUDENT IN AN ORGANIZED HEALTH CARE EDUCATION/TRAINING PROGRAM

## 2021-02-09 PROCEDURE — G8417 CALC BMI ABV UP PARAM F/U: HCPCS | Performed by: STUDENT IN AN ORGANIZED HEALTH CARE EDUCATION/TRAINING PROGRAM

## 2021-02-09 RX ORDER — PREDNISONE 10 MG/1
TABLET ORAL
Qty: 34 TABLET | Refills: 0 | Status: CANCELLED | OUTPATIENT
Start: 2021-02-09

## 2021-02-09 ASSESSMENT — ENCOUNTER SYMPTOMS
COUGH: 0
SHORTNESS OF BREATH: 0
SINUS PRESSURE: 0
ABDOMINAL PAIN: 0
VOMITING: 0
SORE THROAT: 0

## 2021-02-09 NOTE — PATIENT INSTRUCTIONS
Eczema  Skincare products should be fragrance free (not scented), dye free and hypoallergenic  My favorite brands for eczema prone skin:  o  Cleanser: Dove sensitive skin bar soap, Aveeno baby, Vanicream gentle baby wash  o  Emollients: Aquaphor, Vaseline, CeraVe moisturizing cream, La Roche lipikar balm AP +    Bath/Shower information:   Soak  -  Daily bath or shower   -  Only lukewarm water  -  Dilute bleach bath helps with inflammation and bacteria  -  Minimize use of gentle cleansers to only buttock and armpits  -  Keep to less than 20 minutes  -  No scrubbing or use of loofah Smear  -  Immediately after bath, pat skin dry, apply a heavy-duty moisturizer all over  -  Thicker creams and ointments are better than lotions  -  Look for active ingredients like ceramides and lipids to help repair the skin barrier Treat  -  After moisturizer, apply any medicated ointments or creams applied to rash  -  Consider wet wraps for severe flare ups  -  Keep bedroom cool and comfortable at nighttime  -  Stick to cotton and loose-fitting PJs     -  Dilute bleach bath  **Use pure bleach only (sodium hypochlorite)  o  1/4 to 1/2 cup of bleach in a whole tub of warm water   o  Soak for up to 10-15 minutes     -  Wet wraps  o  Bathe and apply topical medications and emollient (Vaseline)  o  Soak snug cotton pajamas in warm water and wring out well  o  Dress in the damp pajamas  o  Cover with a warm blanket/towel or a second pair of dry pajamas if needed for comfort  o  Can be worn overnight, but stay on for 1 hour at least  o  Reapply emollient (Vaseline) affer removing wraps)        ----------------------------------------------------------------------------------       Atopic Dermatitis: Care Instructions  Your Care Instructions  Atopic dermatitis (also called eczema) is a skin problem that causes intense itching and a red, raised rash. In severe cases, the rash develops clear fluid-filled blisters. The rash is not contagious. People with this condition seem to have very sensitive immune systems that are likely to react to things that cause allergies. The immune system is the body's way of fighting infection. There is no cure for atopic dermatitis, but you may be able to control it with care at home. Follow-up care is a key part of your treatment and safety. Be sure to make and go to all appointments, and call your doctor if you are having problems. It's also a good idea to know your test results and keep a list of the medicines you take. How can you care for yourself at home? · Use moisturizer at least twice a day. · If your doctor prescribes a cream, use it as directed. If your doctor prescribes other medicine, take it exactly as directed. · Wash the affected area with water only. Soap can make dryness and itching worse. Pat dry. · Apply a moisturizer after bathing. Use a cream such as Lubriderm, Moisturel, or Cetaphil that does not irritate the skin or cause a rash. Apply the cream while your skin is still damp after lightly drying with a towel. · Use cold, wet cloths to reduce itching. · Keep cool, and stay out of the sun. · If itching affects your normal activities, an over-the-counter antihistamine, such as diphenhydramine (Benadryl) or loratadine (Claritin) may help. Read and follow all instructions on the label. When should you call for help? Call your doctor now or seek immediate medical care if:  · Your rash gets worse and you have a fever. · You have new blisters or bruises, or the rash spreads and looks like a sunburn. · You have signs of infection, such as:  ? Increased pain, swelling, warmth, or redness. ? Red streaks leading from the rash. ? Pus draining from the rash. ? A fever. · You have crusting or oozing sores. · You have joint aches or body aches along with your rash.   Watch closely for changes in your health, and be sure to contact your doctor if:  · Your rash does not clear up after 2 to 3 weeks of home treatment. · Itching interferes with your sleep or daily activities. Where can you learn more? Go to [email or web address] and sign in to your weendy account. Enter M081 in the [facility] box to learn more about \"Atopic Dermatitis: Care Instructions. \"     If you do not have an account, please click on the \"Sign Up Now\" link. Current as of: October 31, 2019               Content Version: 12.5  © 4657-6035 Healthwise, Incorporated. Care instructions adapted under license by [facility] ([facility]). If you have questions about a medical condition or this instruction, always ask your healthcare professional. [address] any warranty or liability for your use of this information.

## 2021-02-17 ENCOUNTER — TELEPHONE (OUTPATIENT)
Dept: FAMILY MEDICINE CLINIC | Age: 41
End: 2021-02-17

## 2021-02-17 NOTE — TELEPHONE ENCOUNTER
Pt given urinal per request.  
 Pt on the A1C outreach appt  Scheduled 2/23 added to office note Pt resting on stretcher. Attempted to reposition in bed and pt declined stating he was comfortable as is. Educated pt to notify staff if he would like assistance repositioning in bed. Report given to Nade[facility]  
 79 y/o M w/ pHx spinal stenosis pf fall.  Pt found by staff on ground, per pt felt very weak and fell.  Denies LOC or head trauma.  Denies CP, cough, SOB, AP, n/v.  Notes increased urinary frequency, dysuria, hesitancy.

## 2021-02-23 ENCOUNTER — OFFICE VISIT (OUTPATIENT)
Dept: FAMILY MEDICINE CLINIC | Age: 41
End: 2021-02-23
Payer: COMMERCIAL

## 2021-02-23 VITALS
DIASTOLIC BLOOD PRESSURE: 82 MMHG | TEMPERATURE: 98 F | HEIGHT: 63 IN | OXYGEN SATURATION: 98 % | WEIGHT: 177 LBS | HEART RATE: 94 BPM | SYSTOLIC BLOOD PRESSURE: 138 MMHG | BODY MASS INDEX: 31.36 KG/M2

## 2021-02-23 DIAGNOSIS — L30.1 DYSHIDROTIC ECZEMA: ICD-10-CM

## 2021-02-23 DIAGNOSIS — R05.3 CHRONIC COUGH: ICD-10-CM

## 2021-02-23 DIAGNOSIS — L02.412 ABSCESS OF LEFT AXILLA: ICD-10-CM

## 2021-02-23 DIAGNOSIS — L30.9 DERMATITIS: Primary | ICD-10-CM

## 2021-02-23 PROCEDURE — 4004F PT TOBACCO SCREEN RCVD TLK: CPT | Performed by: STUDENT IN AN ORGANIZED HEALTH CARE EDUCATION/TRAINING PROGRAM

## 2021-02-23 PROCEDURE — 99214 OFFICE O/P EST MOD 30 MIN: CPT | Performed by: STUDENT IN AN ORGANIZED HEALTH CARE EDUCATION/TRAINING PROGRAM

## 2021-02-23 PROCEDURE — G8427 DOCREV CUR MEDS BY ELIG CLIN: HCPCS | Performed by: STUDENT IN AN ORGANIZED HEALTH CARE EDUCATION/TRAINING PROGRAM

## 2021-02-23 PROCEDURE — G8417 CALC BMI ABV UP PARAM F/U: HCPCS | Performed by: STUDENT IN AN ORGANIZED HEALTH CARE EDUCATION/TRAINING PROGRAM

## 2021-02-23 PROCEDURE — G8484 FLU IMMUNIZE NO ADMIN: HCPCS | Performed by: STUDENT IN AN ORGANIZED HEALTH CARE EDUCATION/TRAINING PROGRAM

## 2021-02-23 ASSESSMENT — ENCOUNTER SYMPTOMS
SORE THROAT: 0
SINUS PRESSURE: 0
ABDOMINAL PAIN: 0
SHORTNESS OF BREATH: 0
COUGH: 0
VOMITING: 0

## 2021-02-23 NOTE — PROGRESS NOTES
2021    Jon Morrison (:  1980) is a 45 y.o. female, here for evaluation of the following medical concerns:  Chief Complaint   Patient presents with    Skin Problem     2 week skin check. states hands are still bad. Arms improving. States she now has a lesion on the left arm pit.  Other     States she feels like her lungs are rattling for about a year. HPI  Rash  Rash completely resolved for 4 weeks  Restarted a few weeks ago  Located on the arms, chest and neck  She is using 'eczema cream' on her hands  She is not using anything on her arms  Very itchy  Cannot think of any triggers      Started topical triamcinolone at last apt  Rash on arms has improved with topical steroids - using triamcinolone every other day    Dyshidrotic eczema  Using clobetasol prn  Rash does improve after a few days of using clobetasol    Left armpit lesion  Noticed the lesion about one week ago  No drainage  Painful   Has not tried anything to treat it    Chronic cough  Notices rattling noises in the lungs  Occurring for the last year  Smokes 8 cigarettes a day  Denied wheezing    Review of Systems   Constitutional: Negative for chills and fever. HENT: Negative for congestion, sinus pressure and sore throat. Respiratory: Negative for cough and shortness of breath. Cardiovascular: Negative for chest pain and palpitations. Gastrointestinal: Negative for abdominal pain and vomiting. Musculoskeletal: Negative for arthralgias and myalgias. Skin: Positive for rash. Negative for wound. Lump left axilla   Neurological: Negative for speech difficulty and light-headedness. Anxiety   Psychiatric/Behavioral: Negative for suicidal ideas. The patient is not nervous/anxious. Prior to Visit Medications    Medication Sig Taking?  Authorizing Provider   triamcinolone (KENALOG) 0.1 % ointment Apply 2 times daily to affected areas Yes [facility], DO   loratadine (CLARITIN) 10 MG tablet Take 1 tablet by mouth daily Yes Abbey Medley, DO   Disposable Gloves (COTTON GLOVES MEDIUM) MISC Use nightly after applying ointment Yes Abbey Medley, DO   LANTUS SOLOSTAR 100 UNIT/ML injection pen Inject 45 units nightly Yes Ganga Mccormick MD   Insulin Pen Needle (NOVOFINE) 32G X 6 MM MISC qid Yes Yue Ellis MD   insulin lispro, 1 Unit Dial, (HUMALOG KWIKPEN) 100 UNIT/ML SOPN 10 units at each meals Yes Yue Ellis MD   Alcohol Swabs PADS Use qid Yes Yue Ellis MD   Crisaborole 2 % OINT Apply sparingly to the area x BID Yes TRISHA Meier CNP   clobetasol (TEMOVATE) 0.05 % ointment Apply topically 2 times daily. Yes TRISHA Wheatley CNP   FREESTYLE LANCETS MISC 1 each by Does not apply route daily Yes Yue Ellis MD   blood glucose test strips (FREESTYLE LITE) strip Use 6 times a day   Pt is pregnant Yes Yue Ellis MD   acyclovir (ZOVIRAX) 5 % ointment Apply topically 5 times daily Apply topically 5 times daily to affected area for 7 days Yes TRISHA Waite CNP   clotrimazole-betamethasone (LOTRISONE) 1-0.05 % cream Apply topically 2 times daily. Yes Susan Holter, MD   miconazole (MICONAZOLE ANTIFUNGAL) 2 % cream Apply topically 2 times daily.  Yes Susan Holter, MD   Pediatric Multiple Vit-C-FA (FLINSTONES GUMMIES OMEGA-3 DHA) CHEW Take 2 capsules by mouth daily Yes Historical MD Margarita   Insulin Pen Needle (BD PEN NEEDLE EMRE U/F) 32G X 4 MM MISC Use 3 times a day Yes Yue Ellis MD        Allergies   Allergen Reactions    Tape Mateusz Limber Tape] Rash       Past Medical History:   Diagnosis Date    Anemia during pregnancy 2/2/2018    Herpes simplex infection of skin     on neck    Hypercholesterolemia     Irregular heart beat     runs of v tach in the past    Obesity     Pregnancy induced hypertension     Type II or unspecified type diabetes mellitus without mention of complication, not stated as uncontrolled     type 2       Past Surgical History:   Procedure Laterality Date    ABDOMEN SURGERY       x 2     SECTION         Social History     Socioeconomic History    Marital status: Single     Spouse name: Not on file    Number of children: Not on file    Years of education: Not on file    Highest education level: Not on file   Occupational History    Not on file   Social Needs    Financial resource strain: Not on file    Food insecurity     Worry: Never true     Inability: Never true    Transportation needs     Medical: No     Non-medical: No   Tobacco Use    Smoking status: Current Every Day Smoker     Packs/day: 0.25     Years: 23.00     Pack years: 5.75     Last attempt to quit: 8/15/2017     Years since quitting: 3.5    Smokeless tobacco: Never Used    Tobacco comment: started age 13   Substance and Sexual Activity    Alcohol use: No     Comment: socially before pregnancy    Drug use: No    Sexual activity: Not Currently     Partners: Male   Lifestyle    Physical activity     Days per week: Not on file     Minutes per session: Not on file    Stress: Not on file   Relationships    Social connections     Talks on phone: Not on file     Gets together: Not on file     Attends Rastafarian service: Not on file     Active member of club or organization: Not on file     Attends meetings of clubs or organizations: Not on file     Relationship status: Not on file    Intimate partner violence     Fear of current or ex partner: Not on file     Emotionally abused: Not on file     Physically abused: Not on file     Forced sexual activity: Not on file   Other Topics Concern    Not on file   Social History Narrative    Not on file        Family History   Problem Relation Age of Onset    Arthritis Mother     Diabetes Mother     Early Death Father     Heart Attack Father     Heart Disease Father     High Blood Pressure Father     High Cholesterol Father     Diabetes Father     Diabetes Paternal Grandfather     Heart Disease Paternal Grandfather     Birth Defects Son     Arthritis Sister     Cancer Neg Hx        Vitals:    02/23/21 1402   BP: 138/82   Pulse: 94   Temp: 98 °F (36.7 °C)   SpO2: 98%   Weight: 177 lb (80.3 kg)   Height: 5' 3\" (1.6 m)       Estimated body mass index is 31.35 kg/m² as calculated from the following:    Height as of this encounter: 5' 3\" (1.6 m). Weight as of this encounter: 177 lb (80.3 kg). No results for input(s): WBC, RBC, HGB, HCT, MCV, MCH, MCHC, RDW, PLT, MPV in the last 72 hours. No results for input(s): NA, K, CL, CO2, BUN, CREATININE, GLUCOSE, CALCIUM, PROT, LABALBU, BILITOT, ALKPHOS, AST, ALT in the last 72 hours. Lab Results   Component Value Date    LABA1C 9.6 12/28/2020       No results found. Physical Exam  Constitutional:       Appearance: Normal appearance. She is normal weight. HENT:      Head: Normocephalic and atraumatic. Eyes:      Extraocular Movements: Extraocular movements intact. Conjunctiva/sclera: Conjunctivae normal.   Cardiovascular:      Rate and Rhythm: Normal rate and regular rhythm. Pulses: Normal pulses. Heart sounds: Normal heart sounds. Pulmonary:      Effort: Pulmonary effort is normal. No respiratory distress. Breath sounds: Normal breath sounds. No wheezing, rhonchi or rales. Skin:     General: Skin is warm. Findings: No rash. Comments: Eczematous patches on the hands bilaterally; rash on arms and chest has improved; erythematous tender nodule in the left axilla, no drainage with lateral pressure   Neurological:      Mental Status: She is alert. Psychiatric:         Mood and Affect: Mood normal.         Thought Content: Thought content normal.         Judgment: Judgment normal.             ASSESSMENT/PLAN:  1. Dermatitis  - Continue triamcinolone as needed  - Rash has improved with addition of topical steroids    2.  Dyshidrotic eczema  - Continue clobetasol  - Pt will try to get cotton gloves to wear at nighttime

## 2021-04-16 ENCOUNTER — TELEPHONE (OUTPATIENT)
Dept: ENDOCRINOLOGY | Age: 41
End: 2021-04-16

## 2021-04-16 RX ORDER — INSULIN GLARGINE 100 [IU]/ML
INJECTION, SOLUTION SUBCUTANEOUS
Qty: 5 PEN | Refills: 3 | Status: SHIPPED | OUTPATIENT
Start: 2021-04-16 | End: 2021-09-02 | Stop reason: SDUPTHER

## 2021-04-16 NOTE — TELEPHONE ENCOUNTER
Cholesterol levels were normal A1c is up to 9.6 also need to have a follow-up with A1c repeat in next few weeks thank you

## 2021-05-19 NOTE — PROGRESS NOTES
2021    Eric Gonsalez (:  1980) is a 45 y.o. female, here for evaluation of the following medical concerns:  Chief Complaint   Patient presents with    Rash     Hands and legs.  Lesion(s)     lesion on back, painful, and draining. HPI  Lesion on back  Started 2-3 days ago  Has had boils in the past  Lesion has been draining off and on  Endorses some nausea and headache   Denied fevers or chills    Review of Systems   Constitutional: Negative for chills and fever. HENT: Negative for congestion, sinus pressure and sore throat. Respiratory: Negative for cough and shortness of breath. Cardiovascular: Negative for chest pain and palpitations. Gastrointestinal: Negative for abdominal pain and vomiting. Musculoskeletal: Negative for arthralgias and myalgias. Skin: Negative for rash and wound. Skin lesion back   Neurological: Negative for speech difficulty and light-headedness. Psychiatric/Behavioral: Negative for suicidal ideas. The patient is not nervous/anxious. Prior to Visit Medications    Medication Sig Taking? Authorizing Provider   doxycycline hyclate (VIBRA-TABS) 100 MG tablet Take 1 tablet by mouth 2 times daily for 10 days Yes Janine Bush DO   cetirizine (ZYRTEC) 10 MG tablet Take 1 tablet by mouth daily Yes Janine Bush DO   LANTUS SOLOSTAR 100 UNIT/ML injection pen Inject 45 units nightly Yes Ganga Mccormick MD   triamcinolone (KENALOG) 0.1 % ointment Apply 2 times daily to affected areas Yes Janine Bush DO   Disposable Gloves (COTTON GLOVES MEDIUM) MISC Use nightly after applying ointment Yes Janine Bush DO   Insulin Pen Needle (NOVOFINE) 32G X 6 MM MISC qid Yes Sosa Watson MD   insulin lispro, 1 Unit Dial, (HUMALOG KWIKPEN) 100 UNIT/ML SOPN 10 units at each meals Yes Sosa Watson MD   Alcohol Swabs PADS Use qid Yes Sosa Watson MD   Crisaborole 2 % OINT Apply sparingly to the area x BID Yes Leti Dave, APRN - CNP   clobetasol (TEMOVATE) 0.05 % ointment Apply topically 2 times daily. Yes TRISHA Toro - CNP   FREESTYLE LANCETS MISC 1 each by Does not apply route daily Yes Rohan Carlson MD   blood glucose test strips (FREESTYLE LITE) strip Use 6 times a day   Pt is pregnant Yes Rohan Carlson MD   acyclovir (ZOVIRAX) 5 % ointment Apply topically 5 times daily Apply topically 5 times daily to affected area for 7 days Yes TRISHA Denton CNP   clotrimazole-betamethasone (LOTRISONE) 1-0.05 % cream Apply topically 2 times daily. Yes Rosalina Moreno MD   miconazole (MICONAZOLE ANTIFUNGAL) 2 % cream Apply topically 2 times daily.  Yes Rosalina Moreno MD   Insulin Pen Needle (BD PEN NEEDLE EMRE U/F) 32G X 4 MM MISC Use 3 times a day Yes Rohan Carlson MD        Allergies   Allergen Reactions    Tape [Adhesive Tape] Rash       Past Medical History:   Diagnosis Date    Anemia during pregnancy 2018    Herpes simplex infection of skin     on neck    Hypercholesterolemia     Irregular heart beat     runs of v tach in the past    Obesity     Pregnancy induced hypertension     Type II or unspecified type diabetes mellitus without mention of complication, not stated as uncontrolled     type 2       Past Surgical History:   Procedure Laterality Date    ABDOMEN SURGERY       x 2     SECTION         Social History     Socioeconomic History    Marital status: Single     Spouse name: Not on file    Number of children: Not on file    Years of education: Not on file    Highest education level: Not on file   Occupational History    Not on file   Tobacco Use    Smoking status: Current Every Day Smoker     Packs/day: 0.25     Years: 23.00     Pack years: 5.75     Last attempt to quit: 8/15/2017     Years since quitting: 3.7    Smokeless tobacco: Never Used    Tobacco comment: started age 13   Substance and Sexual Activity    Alcohol use: No     Comment: socially before pregnancy    Drug use: No    Sexual activity: Not Currently     Partners: Male   Other Topics Concern    Not on file   Social History Narrative    Not on file     Social Determinants of Health     Financial Resource Strain: Low Risk     Difficulty of Paying Living Expenses: Not hard at all   Food Insecurity: No Food Insecurity    Worried About Running Out of Food in the Last Year: Never true    [address] in the Last Year: Never true   Transportation Needs: No Transportation Needs    Lack of Transportation (Medical): No    Lack of Transportation (Non-Medical): No   Physical Activity:     Days of Exercise per Week:     Minutes of Exercise per Session:    Stress:     Feeling of Stress :    Social Connections:     Frequency of Communication with Friends and Family:     Frequency of Social Gatherings with Friends and Family:     Attends [employer] Services:     Active Member of Clubs or Organizations:     Attends Club or Organization Meetings:     Marital Status:    Intimate Partner Violence:     Fear of Current or Ex-Partner:     Emotionally Abused:     Physically Abused:     Sexually Abused:         Family History   Problem Relation Age of Onset    Arthritis Mother     Diabetes Mother     Early Death Father     Heart Attack Father     Heart Disease Father     High Blood Pressure Father     High Cholesterol Father     Diabetes Father     Diabetes Paternal Grandfather     Heart Disease Paternal Grandfather     Birth Defects Son     Arthritis Sister     Cancer Neg Hx        Vitals:    05/20/21 1500   BP: 116/78   Pulse: 103   Temp: 97.2 °F (36.2 °C)   SpO2: 99%   Weight: 172 lb (78 kg)   Height: 5' 3\" (1.6 m)       Estimated body mass index is 30.47 kg/m² as calculated from the following:    Height as of this encounter: 5' 3\" (1.6 m). Weight as of this encounter: 172 lb (78 kg). No results for input(s): WBC, RBC, HGB, HCT, MCV, MCH, MCHC, RDW, PLT, MPV in the last 72 hours.     No results for input(s): NA, nearest emergency room. --------------------------------------------------------------------    Return in about 4 days (around 5/24/2021) for 30 min procedure Dr. Rebecca Li I&DEYSI. An  electronic signature was used to authenticate this note.     --Cande Dumont DO on 5/20/2021 at 3:23 PM

## 2021-05-20 ENCOUNTER — OFFICE VISIT (OUTPATIENT)
Dept: FAMILY MEDICINE CLINIC | Age: 41
End: 2021-05-20
Payer: COMMERCIAL

## 2021-05-20 VITALS
HEIGHT: 63 IN | WEIGHT: 172 LBS | BODY MASS INDEX: 30.48 KG/M2 | TEMPERATURE: 97.2 F | SYSTOLIC BLOOD PRESSURE: 116 MMHG | DIASTOLIC BLOOD PRESSURE: 78 MMHG | OXYGEN SATURATION: 99 % | HEART RATE: 103 BPM

## 2021-05-20 DIAGNOSIS — L30.1 DYSHIDROTIC ECZEMA: ICD-10-CM

## 2021-05-20 DIAGNOSIS — L02.91 ABSCESS: Primary | ICD-10-CM

## 2021-05-20 DIAGNOSIS — L30.9 DERMATITIS: ICD-10-CM

## 2021-05-20 PROCEDURE — 4004F PT TOBACCO SCREEN RCVD TLK: CPT | Performed by: STUDENT IN AN ORGANIZED HEALTH CARE EDUCATION/TRAINING PROGRAM

## 2021-05-20 PROCEDURE — 99213 OFFICE O/P EST LOW 20 MIN: CPT | Performed by: STUDENT IN AN ORGANIZED HEALTH CARE EDUCATION/TRAINING PROGRAM

## 2021-05-20 PROCEDURE — G8427 DOCREV CUR MEDS BY ELIG CLIN: HCPCS | Performed by: STUDENT IN AN ORGANIZED HEALTH CARE EDUCATION/TRAINING PROGRAM

## 2021-05-20 PROCEDURE — G8417 CALC BMI ABV UP PARAM F/U: HCPCS | Performed by: STUDENT IN AN ORGANIZED HEALTH CARE EDUCATION/TRAINING PROGRAM

## 2021-05-20 RX ORDER — CETIRIZINE HYDROCHLORIDE 10 MG/1
10 TABLET ORAL DAILY
Qty: 30 TABLET | Refills: 0 | Status: SHIPPED | OUTPATIENT
Start: 2021-05-20 | End: 2021-06-19

## 2021-05-20 RX ORDER — DOXYCYCLINE HYCLATE 100 MG
100 TABLET ORAL 2 TIMES DAILY
Qty: 20 TABLET | Refills: 0 | Status: SHIPPED | OUTPATIENT
Start: 2021-05-20 | End: 2021-05-30

## 2021-05-20 ASSESSMENT — ENCOUNTER SYMPTOMS
ABDOMINAL PAIN: 0
SINUS PRESSURE: 0
SHORTNESS OF BREATH: 0
SORE THROAT: 0
VOMITING: 0
COUGH: 0

## 2021-05-20 ASSESSMENT — SOCIAL DETERMINANTS OF HEALTH (SDOH): HOW HARD IS IT FOR YOU TO PAY FOR THE VERY BASICS LIKE FOOD, HOUSING, MEDICAL CARE, AND HEATING?: NOT HARD AT ALL

## 2021-05-24 ENCOUNTER — OFFICE VISIT (OUTPATIENT)
Dept: FAMILY MEDICINE CLINIC | Age: 41
End: 2021-05-24
Payer: COMMERCIAL

## 2021-05-24 VITALS
HEIGHT: 63 IN | WEIGHT: 172 LBS | BODY MASS INDEX: 30.48 KG/M2 | OXYGEN SATURATION: 99 % | HEART RATE: 91 BPM | TEMPERATURE: 97.3 F

## 2021-05-24 DIAGNOSIS — L02.91 ABSCESS: Primary | ICD-10-CM

## 2021-05-24 PROCEDURE — G8427 DOCREV CUR MEDS BY ELIG CLIN: HCPCS | Performed by: FAMILY MEDICINE

## 2021-05-24 PROCEDURE — G8417 CALC BMI ABV UP PARAM F/U: HCPCS | Performed by: FAMILY MEDICINE

## 2021-05-24 PROCEDURE — 99213 OFFICE O/P EST LOW 20 MIN: CPT | Performed by: FAMILY MEDICINE

## 2021-05-24 PROCEDURE — 4004F PT TOBACCO SCREEN RCVD TLK: CPT | Performed by: FAMILY MEDICINE

## 2021-05-24 ASSESSMENT — ENCOUNTER SYMPTOMS: COLOR CHANGE: 1

## 2021-05-24 NOTE — PATIENT INSTRUCTIONS
Continue antibiotics. Continue to heat wound for 5 minutes at a time 2-3 times a day. Do not eat for longer will cause swelling.     Schedule procedure for next week

## 2021-06-03 ENCOUNTER — PROCEDURE VISIT (OUTPATIENT)
Dept: FAMILY MEDICINE CLINIC | Age: 41
End: 2021-06-03
Payer: COMMERCIAL

## 2021-06-03 VITALS — WEIGHT: 172 LBS | BODY MASS INDEX: 30.48 KG/M2 | HEIGHT: 63 IN | TEMPERATURE: 96.4 F

## 2021-06-03 DIAGNOSIS — R22.2 MASS OF SUBCUTANEOUS TISSUE OF BACK: Primary | ICD-10-CM

## 2021-06-03 PROCEDURE — G8417 CALC BMI ABV UP PARAM F/U: HCPCS | Performed by: FAMILY MEDICINE

## 2021-06-03 PROCEDURE — G8427 DOCREV CUR MEDS BY ELIG CLIN: HCPCS | Performed by: FAMILY MEDICINE

## 2021-06-03 PROCEDURE — 99214 OFFICE O/P EST MOD 30 MIN: CPT | Performed by: FAMILY MEDICINE

## 2021-06-03 PROCEDURE — 4004F PT TOBACCO SCREEN RCVD TLK: CPT | Performed by: FAMILY MEDICINE

## 2021-06-03 ASSESSMENT — ENCOUNTER SYMPTOMS: COLOR CHANGE: 1

## 2021-06-03 NOTE — PROGRESS NOTES
Prior to the start of the procedure known as Excision  an informed consent has been signed and scanned into patients EMR. After the patient is draped and prepped and before the start of the procedure  Dr. Margarita Del Toro and attending staff Neema Bravo both must perform a verbal confirmation using patients Name   and  The patient should participate in this. Dr. Margarita Del Toro will chace the surgical site if needed for documenting superior and inferior aspects. \"correct site. \" will be verified on container, and order. These above steps will be documented into the patients EMR chart.

## 2021-06-03 NOTE — PROGRESS NOTES
Diagnosis Orders   1. Mass of subcutaneous tissue of back  Laura Arias MD, General Surgery, [place]     Return for with specialist.  Patient Instructions   Referred to general surgery due to location and size      Subjective:      Patient ID: Joaquin Coffey is a 45 y.o. female who presents for:  Chief Complaint   Patient presents with    Procedure    Discuss Medications     Pt states that she stretched her meds outs and had only been taking 1 daily to make them last untill her appt. .        Lesion present still. Less red. Less tender. But seems larger. Patient is taking antibiotics. No fevers. Current Outpatient Medications on File Prior to Visit   Medication Sig Dispense Refill    cetirizine (ZYRTEC) 10 MG tablet Take 1 tablet by mouth daily 30 tablet 0    LANTUS SOLOSTAR 100 UNIT/ML injection pen Inject 45 units nightly 5 pen 3    triamcinolone (KENALOG) 0.1 % ointment Apply 2 times daily to affected areas 80 g 0    Disposable Gloves (COTTON GLOVES MEDIUM) MISC Use nightly after applying ointment 2 each 1    Insulin Pen Needle (NOVOFINE) 32G X 6 MM MISC qid 300 each 5    insulin lispro, 1 Unit Dial, (HUMALOG KWIKPEN) 100 UNIT/ML SOPN 10 units at each meals 15 pen 3    Alcohol Swabs PADS Use qid 150 each 06    Crisaborole 2 % OINT Apply sparingly to the area x BID 2 Tube 0    clobetasol (TEMOVATE) 0.05 % ointment Apply topically 2 times daily. 60 g 1    FREESTYLE LANCETS MISC 1 each by Does not apply route daily 200 each 3    blood glucose test strips (FREESTYLE LITE) strip Use 6 times a day   Pt is pregnant 200 strip 3    acyclovir (ZOVIRAX) 5 % ointment Apply topically 5 times daily Apply topically 5 times daily to affected area for 7 days 15 g 0    clotrimazole-betamethasone (LOTRISONE) 1-0.05 % cream Apply topically 2 times daily. 1 Tube 1    miconazole (MICONAZOLE ANTIFUNGAL) 2 % cream Apply topically 2 times daily.  1 Tube 1    Insulin Pen Needle (BD PEN NEEDLE EMRE U/F) 32G X 4 MM MISC Use 3 times a day 200 each 03     No current facility-administered medications on file prior to visit. Past Medical History:   Diagnosis Date    Anemia during pregnancy 2018    Herpes simplex infection of skin     on neck    Hypercholesterolemia     Irregular heart beat     runs of v tach in the past    Obesity     Pregnancy induced hypertension     Type II or unspecified type diabetes mellitus without mention of complication, not stated as uncontrolled     type 2     Past Surgical History:   Procedure Laterality Date    ABDOMEN SURGERY       x 2     SECTION       Social History     Socioeconomic History    Marital status: Single     Spouse name: Not on file    Number of children: Not on file    Years of education: Not on file    Highest education level: Not on file   Occupational History    Not on file   Tobacco Use    Smoking status: Current Every Day Smoker     Packs/day: 0.25     Years: 23.00     Pack years: 5.75     Last attempt to quit: 8/15/2017     Years since quitting: 3.8    Smokeless tobacco: Never Used    Tobacco comment: started age 13   Substance and Sexual Activity    Alcohol use: No     Comment: socially before pregnancy    Drug use: No    Sexual activity: Not Currently     Partners: Male   Other Topics Concern    Not on file   Social History Narrative    Not on file     Social Determinants of Health     Financial Resource Strain: Low Risk     Difficulty of Paying Living Expenses: Not hard at all   Food Insecurity: No Food Insecurity    Worried About Running Out of Food in the Last Year: Never true    Kathryn of Food in the Last Year: Never true   Transportation Needs: No Transportation Needs    Lack of Transportation (Medical): No    Lack of Transportation (Non-Medical):  No   Physical Activity:     Days of Exercise per Week:     Minutes of Exercise per Session:    Stress:     Feeling of Stress :    Social Connections:     Frequency of Communication with Friends and Family:     Frequency of Social Gatherings with Friends and Family:     Attends [employer] Services:     Active Member of Clubs or Organizations:     Attends Club or Organization Meetings:     Marital Status:    Intimate Partner Violence:     Fear of Current or Ex-Partner:     Emotionally Abused:     Physically Abused:     Sexually Abused:      Family History   Problem Relation Age of Onset    Arthritis Mother     Diabetes Mother     Early Death Father     Heart Attack Father     Heart Disease Father     High Blood Pressure Father     High Cholesterol Father     Diabetes Father     Diabetes Paternal Grandfather     Heart Disease Paternal Grandfather     Birth Defects Son     Arthritis Sister     Cancer Neg Hx      Allergies:  Tape [adhesive tape]    Review of Systems   Constitutional: Negative for chills and fever. Skin: Positive for color change. Negative for rash and wound. Allergic/Immunologic: Negative for environmental allergies, food allergies and immunocompromised state. Hematological: Negative for adenopathy. Does not bruise/bleed easily. Psychiatric/Behavioral: Negative for behavioral problems and sleep disturbance. Objective:   Temp 96.4 °F (35.8 °C)   Ht 5' 3\" (1.6 m)   Wt 172 lb (78 kg)   BMI 30.47 kg/m²     Physical Exam  Constitutional:       General: She is not in acute distress. Appearance: Normal appearance. She is well-developed. She is not toxic-appearing. HENT:      Head: Normocephalic and atraumatic. Right Ear: Hearing and tympanic membrane normal.      Left Ear: Hearing and tympanic membrane normal.      Nose: Nose normal. No nasal deformity. Eyes:      General: Lids are normal.         Right eye: No discharge. Left eye: No discharge. Conjunctiva/sclera: Conjunctivae normal.      Pupils: Pupils are equal, round, and reactive to light.    Neck:      Thyroid: No thyroid mass or thyromegaly. Vascular: No JVD. Trachea: Trachea and phonation normal.   Cardiovascular:      Rate and Rhythm: Normal rate and regular rhythm. Pulmonary:      Effort: No accessory muscle usage or respiratory distress. Musculoskeletal:      Cervical back: Full passive range of motion without pain. Comments: FROM all large muscle groups and joints as witnessed when walking to exam table, getting on, and getting off the exam table. Skin:     General: Skin is warm and dry. Findings: No rash. Comments: Erythematous macule 1 cm diameter noted in the lumbar back underlying this is a 2 x 2 centimeter firm mass   Neurological:      Mental Status: She is alert. Motor: No tremor or atrophy. Gait: Gait normal.   Psychiatric:         Speech: Speech normal.         Behavior: Behavior normal.         Thought Content: Thought content normal.         No results found for this visit on 06/03/21. No results found for this or any previous visit (from the past 2016 hour(s)). Assessment:       Diagnosis Orders   1. Mass of subcutaneous tissue of back  Noe Jerez MD, [facility]         Orders Placed This Encounter   Procedures   Noe Jerez MD, General SurgeryQian     Referral Priority:   Routine     Referral Type:   Eval and Treat     Referral Reason:   Specialty Services Required     Referred to Provider:   Nawaf Dai MD     Requested Specialty:   General Surgery     Number of Visits Requested:   1         Plan:   Return for with specialist.    Patient Instructions   Referred to general surgery due to location and size      This note was partially created with the assistance of dictation. This may lead to grammatical or spelling errors. Rakesh Duran Asp, M.D.

## 2021-06-07 ENCOUNTER — TELEPHONE (OUTPATIENT)
Dept: FAMILY MEDICINE CLINIC | Age: 41
End: 2021-06-07

## 2021-06-07 NOTE — TELEPHONE ENCOUNTER
Spoke to pt about scheduling appt. States that she is seeing specialist for Diabetes. Will call to eliane routine visit in the next mos or two.

## 2021-08-27 ENCOUNTER — APPOINTMENT (OUTPATIENT)
Dept: GENERAL RADIOLOGY | Age: 41
End: 2021-08-27
Payer: COMMERCIAL

## 2021-08-27 ENCOUNTER — HOSPITAL ENCOUNTER (EMERGENCY)
Age: 41
Discharge: HOME | End: 2021-08-27
Payer: COMMERCIAL

## 2021-08-27 VITALS
SYSTOLIC BLOOD PRESSURE: 135 MMHG | WEIGHT: 172 LBS | BODY MASS INDEX: 30.48 KG/M2 | HEIGHT: 63 IN | RESPIRATION RATE: 20 BRPM | DIASTOLIC BLOOD PRESSURE: 95 MMHG | OXYGEN SATURATION: 100 % | TEMPERATURE: 97.5 F | HEART RATE: 93 BPM

## 2021-08-27 DIAGNOSIS — L30.9 ECZEMA, UNSPECIFIED TYPE: ICD-10-CM

## 2021-08-27 DIAGNOSIS — L03.031 CELLULITIS OF SECOND TOE OF RIGHT FOOT: Primary | ICD-10-CM

## 2021-08-27 LAB
ALBUMIN SERPL-MCNC: 3.9 G/DL (ref 3.5–4.6)
ALP BLD-CCNC: 62 U/L (ref 40–130)
ALT SERPL-CCNC: 22 U/L (ref 0–33)
ANION GAP SERPL CALCULATED.3IONS-SCNC: 11 MEQ/L (ref 9–15)
AST SERPL-CCNC: 20 U/L (ref 0–35)
BASOPHILS ABSOLUTE: 0 K/UL (ref 0–0.2)
BASOPHILS RELATIVE PERCENT: 0.7 %
BILIRUB SERPL-MCNC: 0.4 MG/DL (ref 0.2–0.7)
BUN BLDV-MCNC: 4 MG/DL (ref 6–20)
C-REACTIVE PROTEIN: 17.3 MG/L (ref 0–5)
CALCIUM SERPL-MCNC: 8.7 MG/DL (ref 8.5–9.9)
CHLORIDE BLD-SCNC: 104 MEQ/L (ref 95–107)
CHP ED QC CHECK: NORMAL
CO2: 23 MEQ/L (ref 20–31)
CREAT SERPL-MCNC: 0.48 MG/DL (ref 0.5–0.9)
EOSINOPHILS ABSOLUTE: 0.3 K/UL (ref 0–0.7)
EOSINOPHILS RELATIVE PERCENT: 5.1 %
GFR AFRICAN AMERICAN: >60
GFR NON-AFRICAN AMERICAN: >60
GLOBULIN: 2.7 G/DL (ref 2.3–3.5)
GLUCOSE BLD-MCNC: 194 MG/DL
GLUCOSE BLD-MCNC: 194 MG/DL (ref 60–115)
GLUCOSE BLD-MCNC: 250 MG/DL (ref 70–99)
HCT VFR BLD CALC: 39 % (ref 37–47)
HEMOGLOBIN: 14.1 G/DL (ref 12–16)
LACTIC ACID: 1.8 MMOL/L (ref 0.5–2.2)
LYMPHOCYTES ABSOLUTE: 1.8 K/UL (ref 1–4.8)
LYMPHOCYTES RELATIVE PERCENT: 28.2 %
MCH RBC QN AUTO: 31.7 PG (ref 27–31.3)
MCHC RBC AUTO-ENTMCNC: 36.1 % (ref 33–37)
MCV RBC AUTO: 88 FL (ref 82–100)
MONOCYTES ABSOLUTE: 0.4 K/UL (ref 0.2–0.8)
MONOCYTES RELATIVE PERCENT: 5.6 %
NEUTROPHILS ABSOLUTE: 3.8 K/UL (ref 1.4–6.5)
NEUTROPHILS RELATIVE PERCENT: 60.4 %
PDW BLD-RTO: 13.2 % (ref 11.5–14.5)
PERFORMED ON: ABNORMAL
PLATELET # BLD: 341 K/UL (ref 130–400)
POTASSIUM SERPL-SCNC: 3.6 MEQ/L (ref 3.4–4.9)
PROCALCITONIN: 0.07 NG/ML (ref 0–0.15)
RBC # BLD: 4.43 M/UL (ref 4.2–5.4)
SEDIMENTATION RATE, ERYTHROCYTE: 21 MM (ref 0–20)
SODIUM BLD-SCNC: 138 MEQ/L (ref 135–144)
TOTAL PROTEIN: 6.6 G/DL (ref 6.3–8)
WBC # BLD: 6.3 K/UL (ref 4.8–10.8)

## 2021-08-27 PROCEDURE — 87040 BLOOD CULTURE FOR BACTERIA: CPT

## 2021-08-27 PROCEDURE — 83605 ASSAY OF LACTIC ACID: CPT

## 2021-08-27 PROCEDURE — 84145 PROCALCITONIN (PCT): CPT

## 2021-08-27 PROCEDURE — 85652 RBC SED RATE AUTOMATED: CPT

## 2021-08-27 PROCEDURE — 96365 THER/PROPH/DIAG IV INF INIT: CPT

## 2021-08-27 PROCEDURE — 85025 COMPLETE CBC W/AUTO DIFF WBC: CPT

## 2021-08-27 PROCEDURE — 36415 COLL VENOUS BLD VENIPUNCTURE: CPT

## 2021-08-27 PROCEDURE — 99283 EMERGENCY DEPT VISIT LOW MDM: CPT

## 2021-08-27 PROCEDURE — 6370000000 HC RX 637 (ALT 250 FOR IP): Performed by: PERSONAL EMERGENCY RESPONSE ATTENDANT

## 2021-08-27 PROCEDURE — 80053 COMPREHEN METABOLIC PANEL: CPT

## 2021-08-27 PROCEDURE — 2580000003 HC RX 258: Performed by: PERSONAL EMERGENCY RESPONSE ATTENDANT

## 2021-08-27 PROCEDURE — 86140 C-REACTIVE PROTEIN: CPT

## 2021-08-27 PROCEDURE — 73630 X-RAY EXAM OF FOOT: CPT

## 2021-08-27 PROCEDURE — 2500000003 HC RX 250 WO HCPCS: Performed by: PERSONAL EMERGENCY RESPONSE ATTENDANT

## 2021-08-27 RX ORDER — CLINDAMYCIN HYDROCHLORIDE 150 MG/1
300 CAPSULE ORAL 3 TIMES DAILY
Qty: 42 CAPSULE | Refills: 0 | Status: SHIPPED | OUTPATIENT
Start: 2021-08-27 | End: 2021-09-03

## 2021-08-27 RX ORDER — CLINDAMYCIN PHOSPHATE 600 MG/50ML
600 INJECTION INTRAVENOUS ONCE
Status: COMPLETED | OUTPATIENT
Start: 2021-08-27 | End: 2021-08-27

## 2021-08-27 RX ORDER — TACROLIMUS 0.3 MG/G
OINTMENT TOPICAL
Qty: 30 G | Refills: 0 | Status: SHIPPED | OUTPATIENT
Start: 2021-08-27

## 2021-08-27 RX ORDER — PREDNISONE 10 MG/1
40 TABLET ORAL DAILY
Qty: 28 TABLET | Refills: 0 | Status: SHIPPED | OUTPATIENT
Start: 2021-08-27 | End: 2021-09-03

## 2021-08-27 RX ORDER — GREEN TEA/HOODIA GORDONII 315-12.5MG
1 CAPSULE ORAL DAILY
Qty: 30 TABLET | Refills: 0 | Status: SHIPPED | OUTPATIENT
Start: 2021-08-27 | End: 2021-09-26

## 2021-08-27 RX ORDER — DIAPER,BRIEF,INFANT-TODD,DISP
EACH MISCELLANEOUS ONCE
Status: COMPLETED | OUTPATIENT
Start: 2021-08-27 | End: 2021-08-27

## 2021-08-27 RX ORDER — 0.9 % SODIUM CHLORIDE 0.9 %
1000 INTRAVENOUS SOLUTION INTRAVENOUS ONCE
Status: COMPLETED | OUTPATIENT
Start: 2021-08-27 | End: 2021-08-27

## 2021-08-27 RX ADMIN — SODIUM CHLORIDE 1000 ML: 9 INJECTION, SOLUTION INTRAVENOUS at 19:50

## 2021-08-27 RX ADMIN — CLINDAMYCIN PHOSPHATE 600 MG: 600 INJECTION, SOLUTION INTRAVENOUS at 19:54

## 2021-08-27 RX ADMIN — BACITRACIN ZINC: 500 OINTMENT TOPICAL at 20:58

## 2021-08-27 ASSESSMENT — ENCOUNTER SYMPTOMS
RHINORRHEA: 0
COLOR CHANGE: 1
ABDOMINAL PAIN: 0
DIARRHEA: 0
BLOOD IN STOOL: 0
COUGH: 0
SORE THROAT: 0
VOMITING: 0
SHORTNESS OF BREATH: 0
NAUSEA: 0

## 2021-08-27 NOTE — ED PROVIDER NOTES
[facility] ED  eMERGENCY dEPARTMENT eNCOUnter      Pt Name: Abiel Chen  MRN: [id number]  [place] 1980  Date of evaluation: 8/27/2021  Provider: Vivienne Vang, [address]    Abiel Chen is a 37 y.o. female with PMHx of eczema, HSV, hypercholesterolemia, obesity, DM 2 presents to the emergency department with right toe wound. Patient saw Mela Landau 8/25 with throat culture obtained and placed on Augmentin. She did initially have red toe swelling and wound to medial aspect of toe. Erythema overlying dorsal aspect of right foot has improved and no longer present, but she feels now erythema from second toe spreading to third toe and 2nd toe more red. She took her bandage off today as well and had a bad odor and she became concerned. Podiatrist did remove some skin while in the office. She denies fevers, chills, cough, chest pain, shortness of breath, nausea, vomiting, diarrhea. Due to neuropathy she has no sensation. HPI    Nursing Notes were reviewed. REVIEW OF SYSTEMS       Review of Systems   Constitutional: Negative for appetite change, chills and fever. HENT: Negative for congestion, rhinorrhea and sore throat. Respiratory: Negative for cough and shortness of breath. Cardiovascular: Negative for chest pain. Gastrointestinal: Negative for abdominal pain, blood in stool, diarrhea, nausea and vomiting. Genitourinary: Negative for difficulty urinating. Musculoskeletal: Negative for neck stiffness. Skin: Positive for color change and wound. Negative for pallor and rash. Neurological: Negative for dizziness, syncope, weakness, light-headedness, numbness and headaches. All other systems reviewed and are negative.             PAST MEDICAL HISTORY     Past Medical History:   Diagnosis Date    Anemia during pregnancy 2/2/2018    Eczema     Herpes simplex infection of skin     on neck    Hypercholesterolemia     Irregular heart beat     runs of v tach in the past    Obesity     Pregnancy induced hypertension     Type II or unspecified type diabetes mellitus without mention of complication, not stated as uncontrolled     type 2         SURGICAL HISTORY       Past Surgical History:   Procedure Laterality Date    ABDOMEN SURGERY       x 2     SECTION           CURRENT MEDICATIONS       Previous Medications    ACYCLOVIR (ZOVIRAX) 5 % OINTMENT    Apply topically 5 times daily Apply topically 5 times daily to affected area for 7 days    ALCOHOL SWABS PADS    Use qid    BLOOD GLUCOSE TEST STRIPS (FREESTYLE LITE) STRIP    Use 6 times a day   Pt is pregnant    CLOBETASOL (TEMOVATE) 0.05 % OINTMENT    Apply topically 2 times daily. CLOTRIMAZOLE-BETAMETHASONE (LOTRISONE) 1-0.05 % CREAM    Apply topically 2 times daily. CRISABOROLE 2 % OINT    Apply sparingly to the area x BID    DISPOSABLE GLOVES (COTTON GLOVES MEDIUM) MISC    Use nightly after applying ointment    FREESTYLE LANCETS MISC    1 each by Does not apply route daily    INSULIN LISPRO, 1 UNIT DIAL, (HUMALOG KWIKPEN) 100 UNIT/ML SOPN    10 units at each meals    INSULIN PEN NEEDLE (BD PEN NEEDLE EMRE U/F) 32G X 4 MM MISC    Use 3 times a day    INSULIN PEN NEEDLE (NOVOFINE) 32G X 6 MM MISC    qid    LANTUS SOLOSTAR 100 UNIT/ML INJECTION PEN    Inject 45 units nightly    MICONAZOLE (MICONAZOLE ANTIFUNGAL) 2 % CREAM    Apply topically 2 times daily.     TRIAMCINOLONE (KENALOG) 0.1 % OINTMENT    Apply 2 times daily to affected areas       ALLERGIES     Tape Jacob Kawasaki tape]    FAMILY HISTORY       Family History   Problem Relation Age of Onset    Arthritis Mother     Diabetes Mother     Early Death Father     Heart Attack Father     Heart Disease Father     High Blood Pressure Father     High Cholesterol Father     Diabetes Father     Diabetes Paternal Grandfather     Heart Disease Paternal Grandfather     Birth Defects Son     Arthritis Sister Appearance: She is well-developed. HENT:      Head: Normocephalic and atraumatic. Eyes:      Conjunctiva/sclera: Conjunctivae normal.      Pupils: Pupils are equal, round, and reactive to light. Neck:      Trachea: No tracheal deviation. Cardiovascular:      Heart sounds: Normal heart sounds. Pulmonary:      Effort: Pulmonary effort is normal. No respiratory distress. Breath sounds: Normal breath sounds. No stridor. Abdominal:      General: Bowel sounds are normal. There is no distension. Palpations: Abdomen is soft. There is no mass. Tenderness: There is no abdominal tenderness. There is no guarding or rebound. Musculoskeletal:         General: Normal range of motion. Cervical back: Normal range of motion and neck supple. Feet:       Comments: Patient does have erythema to second right toe and base of third toe. Wound noted to medial aspect of second toe without odor. Skin sloughing occurring. Erythema no longer present on dorsal aspect of foot and previous erythema line present. Skin:     General: Skin is warm and dry. Capillary Refill: Capillary refill takes less than 2 seconds. Findings: Erythema present. No rash. Neurological:      Mental Status: She is alert and oriented to person, place, and time. Deep Tendon Reflexes: Reflexes are normal and symmetric. Psychiatric:         Behavior: Behavior normal.         Thought Content:  Thought content normal.         Judgment: Judgment normal.         DIAGNOSTIC RESULTS     EKG:All EKG's are interpreted by the Emergency Department Physician who either signs or Co-signs this chart in the absence of a cardiologist.        RADIOLOGY:   Non-plain film images such as CT, Ultrasound and MRI are read by theradiologist. Plain radiographic images are visualized and preliminarily interpreted by the emergency physician with the below findings:    Interpretation per theRadiologist below, if available at the time of this note:    XR FOOT RIGHT (MIN 3 VIEWS)    (Results Pending)           LABS:  Labs Reviewed   COMPREHENSIVE METABOLIC PANEL - Abnormal; Notable for the following components:       Result Value    Glucose 250 (*)     BUN 4 (*)     CREATININE 0.48 (*)     All other components within normal limits   CBC WITH AUTO DIFFERENTIAL - Abnormal; Notable for the following components:    MCH 31.7 (*)     All other components within normal limits   C-REACTIVE PROTEIN - Abnormal; Notable for the following components:    CRP 17.3 (*)     All other components within normal limits   SEDIMENTATION RATE - Abnormal; Notable for the following components:    Sed Rate 21 (*)     All other components within normal limits   POCT GLUCOSE - Abnormal; Notable for the following components:    POC Glucose 194 (*)     All other components within normal limits   POCT GLUCOSE - Normal   CULTURE, BLOOD 2   CULTURE, BLOOD 1   PROCALCITONIN   LACTIC ACID, PLASMA       All other labs were within normal range or not returned as of this dictation. EMERGENCY DEPARTMENT COURSE and DIFFERENTIAL DIAGNOSIS/MDM:   Vitals:    Vitals:    08/27/21 1738 08/27/21 1948   BP: 124/79 (!) 135/95   Pulse: 116 93   Resp: 22 20   Temp: 97.5 °F (36.4 °C)    TempSrc: Temporal    SpO2: 98% 100%   Weight: 172 lb (78 kg)    Height: 5' 3\" (1.6 m)          MDM    Glucose 250, CRP 17.3, sed rate 21. I spoke to pharmacy on Abx coverage and clindamycin IV started. Patient does have moderate eczema to bilateral hands with gloves. She said she has tried hydrocortisone cream, eczema lotions at home. It seemed to start during Covid with frequent handwashing. Patient be placed on tacrolimus ointment. She was placed on clindamycin for MRSA coverage and probiotic. She was made aware to continue Augmentin. She was given prednisone if no relief occurs with ointment and was made aware to not start the prednisone until infection has resolved on toes.   Standard anticipatory guidance given to patient upon discharge. Have given them a specific time frame in which to follow-up and who to follow-up with. I have also advised them that they should return to the emergency department if they get worse, or not getting better or develop any new or concerning symptoms. Patient demonstrates understanding. CRITICAL CARE TIME   Total Critical Caretime was 0 minutes, excluding separately reportable procedures. There was a high probability of clinically significant/life threatening deterioration in the patient's condition which required my urgent intervention. Procedures    FINAL IMPRESSION      1. Cellulitis of second toe of right foot    2. Eczema, unspecified type          DISPOSITION/PLAN   DISPOSITION Decision To Discharge 08/27/2021 08:36:34 PM      PATIENT REFERRED TO:  Edith Corral DPM  [address] [place] [address]  [phone]    Call         DISCHARGE MEDICATIONS:  New Prescriptions    CLINDAMYCIN (CLEOCIN) 150 MG CAPSULE    Take 2 capsules by mouth 3 times daily for 7 days    PROBIOTIC ACIDOPHILUS (FLORANEX) TABS    Take 1 tablet by mouth daily    TACROLIMUS (PROTOPIC) 0.03 % OINTMENT    Apply topically 2 times daily. (Please notethat portions of this note were completed with a voice recognition program.  Efforts were made to edit the dictations but occasionally words are mis-transcribed. )    JO Plummer (electronically signed)  Emergency Physician Assistant         Apolinar Wattma  08/27/21 5842

## 2021-08-28 NOTE — ED NOTES
Pt understands discharge instructions.   Pt instructed to follow up with PCP   Prescriptions explained   Pt told to come back for new or worsening symptoms  No further questions         Mikaela Gutiérrez RN  08/27/21 0662

## 2021-09-02 DIAGNOSIS — Z79.4 TYPE 2 DIABETES MELLITUS WITH OTHER SPECIFIED COMPLICATION, WITH LONG-TERM CURRENT USE OF INSULIN (HCC): Primary | ICD-10-CM

## 2021-09-02 DIAGNOSIS — E11.69 TYPE 2 DIABETES MELLITUS WITH OTHER SPECIFIED COMPLICATION, WITH LONG-TERM CURRENT USE OF INSULIN (HCC): Primary | ICD-10-CM

## 2021-09-02 LAB
BLOOD CULTURE, ROUTINE: NORMAL
CULTURE, BLOOD 2: NORMAL

## 2021-09-02 RX ORDER — INSULIN GLARGINE 100 [IU]/ML
INJECTION, SOLUTION SUBCUTANEOUS
Qty: 5 PEN | Refills: 3 | Status: SHIPPED | OUTPATIENT
Start: 2021-09-02 | End: 2021-09-14 | Stop reason: SDUPTHER

## 2021-09-08 LAB
MISCELLANEOUS LAB TEST ORDER: NORMAL
WHOPPER PROMPT: NORMAL

## 2021-09-11 LAB
GRAM STAIN RESULT: ABNORMAL
ORGANISM: ABNORMAL
WOUND/ABSCESS: ABNORMAL

## 2021-09-13 ENCOUNTER — OFFICE VISIT (OUTPATIENT)
Dept: FAMILY MEDICINE CLINIC | Age: 41
End: 2021-09-13
Payer: COMMERCIAL

## 2021-09-13 ENCOUNTER — NURSE TRIAGE (OUTPATIENT)
Dept: OTHER | Facility: CLINIC | Age: 41
End: 2021-09-13

## 2021-09-13 VITALS
WEIGHT: 168 LBS | HEART RATE: 97 BPM | BODY MASS INDEX: 29.77 KG/M2 | SYSTOLIC BLOOD PRESSURE: 128 MMHG | DIASTOLIC BLOOD PRESSURE: 76 MMHG | TEMPERATURE: 96.7 F | OXYGEN SATURATION: 98 % | HEIGHT: 63 IN

## 2021-09-13 DIAGNOSIS — L30.9 ECZEMA, UNSPECIFIED TYPE: Primary | ICD-10-CM

## 2021-09-13 PROCEDURE — 4004F PT TOBACCO SCREEN RCVD TLK: CPT | Performed by: PHYSICIAN ASSISTANT

## 2021-09-13 PROCEDURE — G8417 CALC BMI ABV UP PARAM F/U: HCPCS | Performed by: PHYSICIAN ASSISTANT

## 2021-09-13 PROCEDURE — 3046F HEMOGLOBIN A1C LEVEL >9.0%: CPT | Performed by: PHYSICIAN ASSISTANT

## 2021-09-13 PROCEDURE — 99213 OFFICE O/P EST LOW 20 MIN: CPT | Performed by: PHYSICIAN ASSISTANT

## 2021-09-13 PROCEDURE — G8427 DOCREV CUR MEDS BY ELIG CLIN: HCPCS | Performed by: PHYSICIAN ASSISTANT

## 2021-09-13 PROCEDURE — 2022F DILAT RTA XM EVC RTNOPTHY: CPT | Performed by: PHYSICIAN ASSISTANT

## 2021-09-13 RX ORDER — HYDROXYZINE HYDROCHLORIDE 25 MG/1
25 TABLET, FILM COATED ORAL EVERY 8 HOURS PRN
Qty: 30 TABLET | Refills: 0 | Status: SHIPPED | OUTPATIENT
Start: 2021-09-13 | End: 2021-09-23

## 2021-09-13 RX ORDER — PREDNISONE 10 MG/1
10 TABLET ORAL DAILY
Qty: 10 TABLET | Refills: 0 | Status: SHIPPED | OUTPATIENT
Start: 2021-09-13 | End: 2021-09-17 | Stop reason: SDUPTHER

## 2021-09-13 ASSESSMENT — ENCOUNTER SYMPTOMS
SHORTNESS OF BREATH: 0
DIARRHEA: 0
TROUBLE SWALLOWING: 0
VOMITING: 0
SINUS PRESSURE: 0
ABDOMINAL PAIN: 0
CHEST TIGHTNESS: 0
BACK PAIN: 0
COUGH: 0

## 2021-09-13 ASSESSMENT — PATIENT HEALTH QUESTIONNAIRE - PHQ9
SUM OF ALL RESPONSES TO PHQ QUESTIONS 1-9: 0
2. FEELING DOWN, DEPRESSED OR HOPELESS: 0
SUM OF ALL RESPONSES TO PHQ QUESTIONS 1-9: 0
SUM OF ALL RESPONSES TO PHQ QUESTIONS 1-9: 0
1. LITTLE INTEREST OR PLEASURE IN DOING THINGS: 0
SUM OF ALL RESPONSES TO PHQ9 QUESTIONS 1 & 2: 0

## 2021-09-13 NOTE — PROGRESS NOTES
Subjective:      Patient ID: Sherrell Chan is a 37 y.o. female who presents today for:  Chief Complaint   Patient presents with    Rash     Patient is here c/o rash. Pt states she is developing a rash. Pt states rash is all over her body. Pt states rash has been ongoing since November. Pt describes rash as itchy and dry, rates pain in hands at 10. Pt states she has used OTC cream with minimal relief. HPI  37year old female who presents with a rash on her bilateral hand, arms, abdomin and legs for the past 10 months.  She is being seen by dermatology who is on maternity leave    Past Medical History:   Diagnosis Date    Anemia during pregnancy 2018    Eczema     Herpes simplex infection of skin     on neck    Hypercholesterolemia     Irregular heart beat     runs of v tach in the past    Obesity     Pregnancy induced hypertension     Type II or unspecified type diabetes mellitus without mention of complication, not stated as uncontrolled     type 2     Past Surgical History:   Procedure Laterality Date    ABDOMEN SURGERY       x 2     SECTION       Social History     Socioeconomic History    Marital status: Single     Spouse name: Not on file    Number of children: Not on file    Years of education: Not on file    Highest education level: Not on file   Occupational History    Not on file   Tobacco Use    Smoking status: Current Every Day Smoker     Packs/day: 0.25     Years: 23.00     Pack years: 5.75     Last attempt to quit: 8/15/2017     Years since quittin.0    Smokeless tobacco: Never Used    Tobacco comment: started age 13   Substance and Sexual Activity    Alcohol use: No     Comment: socially before pregnancy    Drug use: No    Sexual activity: Not Currently     Partners: Male   Other Topics Concern    Not on file   Social History Narrative    Not on file     Social Determinants of Health     Financial Resource Strain: Low Risk     Difficulty of Paying Living Expenses: Not hard at all   Food Insecurity: No Food Insecurity    Worried About Running Out of Food in the Last Year: Never true    Ran Out of Food in the Last Year: Never true   Transportation Needs: No Transportation Needs    Lack of Transportation (Medical): No    Lack of Transportation (Non-Medical): No   Physical Activity:     Days of Exercise per Week:     Minutes of Exercise per Session:    Stress:     Feeling of Stress :    Social Connections:     Frequency of Communication with Friends and Family:     Frequency of Social Gatherings with Friends and Family:     Attends Latter day Services:     Active Member of Clubs or Organizations:     Attends Club or Organization Meetings:     Marital Status:    Intimate Partner Violence:     Fear of Current or Ex-Partner:     Emotionally Abused:     Physically Abused:     Sexually Abused:      Family History   Problem Relation Age of Onset    Arthritis Mother     Diabetes Mother     Early Death Father     Heart Attack Father     Heart Disease Father     High Blood Pressure Father     High Cholesterol Father     Diabetes Father     Diabetes Paternal Grandfather     Heart Disease Paternal Grandfather     Birth Defects Son     Arthritis Sister     Cancer Neg Hx      Allergies   Allergen Reactions    Tape Kinza Sicilian Tape] Rash     Current Outpatient Medications   Medication Sig Dispense Refill    predniSONE (DELTASONE) 10 MG tablet Take 1 tablet by mouth daily for 10 days 10 tablet 0    hydrOXYzine (ATARAX) 25 MG tablet Take 1 tablet by mouth every 8 hours as needed for Itching 30 tablet 0    LANTUS SOLOSTAR 100 UNIT/ML injection pen Inject 45 units nightly 5 pen 3    Probiotic Acidophilus (FLORANEX) TABS Take 1 tablet by mouth daily 30 tablet 0    tacrolimus (PROTOPIC) 0.03 % ointment Apply topically 2 times daily.  30 g 0    triamcinolone (KENALOG) 0.1 % ointment Apply 2 times daily to affected areas 80 g 0    Disposable Gloves (COTTON GLOVES MEDIUM) MISC Use nightly after applying ointment 2 each 1    Insulin Pen Needle (NOVOFINE) 32G X 6 MM MISC qid 300 each 5    insulin lispro, 1 Unit Dial, (HUMALOG KWIKPEN) 100 UNIT/ML SOPN 10 units at each meals 15 pen 3    Alcohol Swabs PADS Use qid 150 each 06    Crisaborole 2 % OINT Apply sparingly to the area x BID 2 Tube 0    clobetasol (TEMOVATE) 0.05 % ointment Apply topically 2 times daily. 60 g 1    FREESTYLE LANCETS MISC 1 each by Does not apply route daily 200 each 3    blood glucose test strips (FREESTYLE LITE) strip Use 6 times a day   Pt is pregnant 200 strip 3    acyclovir (ZOVIRAX) 5 % ointment Apply topically 5 times daily Apply topically 5 times daily to affected area for 7 days 15 g 0    clotrimazole-betamethasone (LOTRISONE) 1-0.05 % cream Apply topically 2 times daily. 1 Tube 1    miconazole (MICONAZOLE ANTIFUNGAL) 2 % cream Apply topically 2 times daily. 1 Tube 1    Insulin Pen Needle (BD PEN NEEDLE EMRE U/F) 32G X 4 MM MISC Use 3 times a day 200 each 03     No current facility-administered medications for this visit. Review of Systems   Constitutional: Negative for activity change, appetite change, chills, fever and unexpected weight change. HENT: Negative for drooling, ear pain, nosebleeds, sinus pressure and trouble swallowing. Respiratory: Negative for cough, chest tightness and shortness of breath. Cardiovascular: Negative for chest pain and leg swelling. Gastrointestinal: Negative for abdominal pain, diarrhea and vomiting. Endocrine: Negative for polydipsia and polyphagia. Genitourinary: Negative for dysuria, flank pain and frequency. Musculoskeletal: Negative for back pain and myalgias. Skin: Positive for rash. Negative for pallor. Neurological: Negative for syncope, weakness and headaches. Hematological: Does not bruise/bleed easily.    Psychiatric/Behavioral: Negative for agitation, behavioral problems and confusion. All other systems reviewed and are negative. Objective:   /76 (Site: Left Upper Arm, Position: Sitting, Cuff Size: Small Adult)   Pulse 97   Temp 96.7 °F (35.9 °C)   Ht 5' 3\" (1.6 m)   Wt 168 lb (76.2 kg)   LMP 08/20/2021 (Approximate)   SpO2 98%   BMI 29.76 kg/m²     Physical Exam  Vitals and nursing note reviewed. Constitutional:       General: She is awake. She is not in acute distress. Appearance: Normal appearance. She is well-developed and normal weight. She is not ill-appearing, toxic-appearing or diaphoretic. Comments: No photophobia. No phonophobia. HENT:      Head: Normocephalic and atraumatic. No Toribio's sign. Right Ear: External ear normal.      Left Ear: External ear normal.      Nose: Nose normal. No congestion or rhinorrhea. Mouth/Throat:      Mouth: Mucous membranes are moist.      Pharynx: Oropharynx is clear. No oropharyngeal exudate or posterior oropharyngeal erythema. Eyes:      General: No scleral icterus. Right eye: No foreign body or discharge. Left eye: No discharge. Extraocular Movements: Extraocular movements intact. Conjunctiva/sclera: Conjunctivae normal.      Left eye: No exudate. Pupils: Pupils are equal, round, and reactive to light. Neck:      Vascular: No JVD. Trachea: No tracheal deviation. Comments: No meningismus. Cardiovascular:      Rate and Rhythm: Normal rate and regular rhythm. Heart sounds: Normal heart sounds. Heart sounds not distant. No murmur heard. No friction rub. No gallop. Pulmonary:      Effort: Pulmonary effort is normal. No respiratory distress. Breath sounds: Normal breath sounds. No stridor. No wheezing, rhonchi or rales. Chest:      Chest wall: No tenderness. Abdominal:      General: Abdomen is flat. Bowel sounds are normal. There is no distension. Palpations: Abdomen is soft. There is no mass. Tenderness:  There is no abdominal tenderness. There is no right CVA tenderness, left CVA tenderness, guarding or rebound. Hernia: No hernia is present. Musculoskeletal:         General: No swelling, tenderness, deformity or signs of injury. Normal range of motion. Cervical back: Normal range of motion and neck supple. No rigidity. Lymphadenopathy:      Head:      Right side of head: No submental adenopathy. Left side of head: No submental adenopathy. Skin:     General: Skin is warm and dry. Capillary Refill: Capillary refill takes less than 2 seconds. Coloration: Skin is not jaundiced or pale. Findings: Rash present. No bruising, erythema or lesion. Rash is macular. Neurological:      General: No focal deficit present. Mental Status: She is alert and oriented to person, place, and time. Mental status is at baseline. Cranial Nerves: No cranial nerve deficit. Sensory: No sensory deficit. Motor: No weakness. Coordination: Coordination normal.      Deep Tendon Reflexes: Reflexes are normal and symmetric. Psychiatric:         Mood and Affect: Mood normal.         Behavior: Behavior normal. Behavior is cooperative. Thought Content: Thought content normal.         Judgment: Judgment normal.         Assessment:       Diagnosis Orders   1. Eczema, unspecified type  predniSONE (DELTASONE) 10 MG tablet    hydrOXYzine (ATARAX) 25 MG tablet    Ambulatory referral to Dermatology   2. Uncontrolled type 2 diabetes mellitus with complication, without long-term current use of insulin ([facility]       No results found for this visit on 09/13/21. Plan:     Assessment & Plan   Carolina Thakkar was seen today for rash. Diagnoses and all orders for this visit:    Eczema, unspecified type  -     predniSONE (DELTASONE) 10 MG tablet; Take 1 tablet by mouth daily for 10 days  -     hydrOXYzine (ATARAX) 25 MG tablet;  Take 1 tablet by mouth every 8 hours as needed for Itching  -     Ambulatory referral to Dermatology    Uncontrolled type 2 diabetes mellitus with complication, without long-term current use of insulin ([facility]      Orders Placed This Encounter   Procedures    Ambulatory referral to Dermatology     Referral Priority:   Routine     Referral Type:   Eval and Treat     Referral Reason:   Specialty Services Required     Referred to Provider:   Colletta Murdoch, MD     Number of Visits Requested:   1     Orders Placed This Encounter   Medications    predniSONE (DELTASONE) 10 MG tablet     Sig: Take 1 tablet by mouth daily for 10 days     Dispense:  10 tablet     Refill:  0    hydrOXYzine (ATARAX) 25 MG tablet     Sig: Take 1 tablet by mouth every 8 hours as needed for Itching     Dispense:  30 tablet     Refill:  0     There are no discontinued medications. Return if symptoms worsen or fail to improve. Reviewed with the patient/family: current clinical status & medications. Side effects of the medication prescribed today, as well as treatment plan/rationale and result expectations have been discussed with the patient/family who expresses understanding. Patient will be discharged home in stable condition. Follow up with PCP to evaluate treatment results or return if symptoms worsen or fail to improve. Discussed signs and symptoms which require immediate follow-up in ED/call to 911. Understanding verbalized. I have reviewed the patient's medical history in detail and updated the computerized patient record.     JO De La Vega

## 2021-09-13 NOTE — TELEPHONE ENCOUNTER
Received call from [address] at [facility] with [employer] Complaint. Brief description of triage: Wide spread red rash after taking antibiotics. Triage indicates for patient to go to office now    Care advice provided, patient verbalizes understanding; denies any other questions or concerns; instructed to call back for any new or worsening symptoms. Attention Provider: Thank you for allowing me to participate in the care of your patient. The patient was connected to triage in response to information provided to the [facility]. Please do not respond through this encounter as the response is not directed to a shared pool. Reason for Disposition   Drug rash suspected and started taking new medicine within last 2 weeks (Exception: antihistamine, eye drops, ear drops, decongestant or other OTC cough/cold medicines)   Large or small blisters on skin (i.e., fluid filled bubbles or sacs)    Answer Assessment - Initial Assessment Questions  1. APPEARANCE of RASH: \"Describe the rash. \" (e.g., spots, blisters, raised areas, skin peeling, scaly)      Red spots that became a large red rash that is widespread covering 1/2 of her body    2. SIZE: \"How big are the spots? \" (e.g., tip of pen, eraser, coin; inches, centimeters)      1/2 of her body    3. LOCATION: \"Where is the rash located? \"      Face, neck , chest, stomach, both legs, and arms, shoulders, back, genitalia. 4. COLOR: \"What color is the rash? \" (Note: It is difficult to assess rash color in people with darker-colored skin. When this situation occurs, simply ask the caller to describe what they see.)      Red    5. ONSET: \"When did the rash begin? \"      2 weeks ago    6. FEVER: \"Do you have a fever? \" If so, ask: \"What is your temperature, how was it measured, and when did it start? \"      Denies    7. ITCHING: \"Does the rash itch? \" If so, ask: \"How bad is the itch? \" (Scale 1-10; or mild, moderate, severe)      Yes, 10 severe    8.  CAUSE: \"What do you think is causing the rash? \"      Was told it is eczema, but caller disagrees      9. MEDICATION FACTORS: \"Have you started any new medications within the last 2 weeks? \" (e.g., antibiotics)       Was taking amox. / Melodye Karime. -(Bactrum)    10. OTHER SYMPTOMS: \"Do you have any other symptoms? \" (e.g., dizziness, headache, sore throat, joint pain)        Denies    11. PREGNANCY: \"Is there any chance you are pregnant? \" \"When was your last menstrual period? \"        no    Protocols used: RASH OR REDNESS - WIDESPREAD-ADULT-OH, RASH - WIDESPREAD ON DRUGS-ADULT-OH

## 2021-09-13 NOTE — TELEPHONE ENCOUNTER
Spoke with pt, made aware pcp on maternity leave, and the 3 providers in office fully booked for today. Patient agreed to be seen at walk in clinic.    Made aware of hours and locations

## 2021-09-14 ENCOUNTER — VIRTUAL VISIT (OUTPATIENT)
Dept: ENDOCRINOLOGY | Age: 41
End: 2021-09-14
Payer: COMMERCIAL

## 2021-09-14 DIAGNOSIS — E11.69 TYPE 2 DIABETES MELLITUS WITH OTHER SPECIFIED COMPLICATION, WITH LONG-TERM CURRENT USE OF INSULIN (HCC): Primary | ICD-10-CM

## 2021-09-14 DIAGNOSIS — Z79.4 TYPE 2 DIABETES MELLITUS WITH OTHER SPECIFIED COMPLICATION, WITH LONG-TERM CURRENT USE OF INSULIN (HCC): Primary | ICD-10-CM

## 2021-09-14 PROCEDURE — 99442 PR PHYS/QHP TELEPHONE EVALUATION 11-20 MIN: CPT | Performed by: INTERNAL MEDICINE

## 2021-09-14 RX ORDER — INSULIN LISPRO 100 [IU]/ML
INJECTION, SOLUTION INTRAVENOUS; SUBCUTANEOUS
Qty: 15 PEN | Refills: 3 | Status: SHIPPED | OUTPATIENT
Start: 2021-09-14 | End: 2021-09-17 | Stop reason: SDUPTHER

## 2021-09-14 RX ORDER — INSULIN GLARGINE 100 [IU]/ML
INJECTION, SOLUTION SUBCUTANEOUS
Qty: 15 PEN | Refills: 3 | Status: SHIPPED | OUTPATIENT
Start: 2021-09-14 | End: 2021-09-17 | Stop reason: SDUPTHER

## 2021-09-14 NOTE — PROGRESS NOTES
2021    TELEHEALTH EVALUATION -- Audio/Visual (During IZQER-43 public health emergency)    Due to [place] 19 outbreak, patient's office visit was converted to a virtual visit. Patient was contacted and agreed to proceed with a virtual visit via Telephone Visit  The risks and benefits of converting to a virtual visit were discussed in light of the current infectious disease epidemic. Patient also understood that insurance coverage and co-pays are up to their individual insurance plans. HPI: Telephone visit patient was at home I was at my office  Has not been seen in over 6 months blood sugars are staying mostly in the 200+ range going to start steroid for some type of rash which could raise her sugar  Lasting globin A1c was 9.8 testing blood sugar mostly 2 times daily  Currently on Lantus 45 units at bedtime plus Humalog  10 units with each meals  Birdie Holcomb (:  1980) has requested an audio/video evaluation for the following concern(s):        [address]     Results for Florencia Peña (MRN [id number]) as of 2021 17:05   Ref.  Range 2021 14:55   Sodium Latest Ref Range: 135 - 144 mEq/L 137   Potassium Latest Ref Range: 3.4 - 4.9 mEq/L 3.8   Chloride Latest Ref Range: 95 - 107 mEq/L 100   CO2 Latest Ref Range: 20 - 31 mEq/L 23   BUN Latest Ref Range: 6 - 20 mg/dL 8   Creatinine Latest Ref Range: 0.50 - 0.90 mg/dL 0.46 (L)   Anion Gap Latest Ref Range: 9 - 15 mEq/L 14   GFR Non- Latest Ref Range: >60  >60.0   GFR African American Latest Ref Range: >60  >60.0   Glucose Latest Ref Range: 70 - 99 mg/dL 260 (H)   Calcium Latest Ref Range: 8.5 - 9.9 mg/dL 8.8   Hemoglobin A1C Latest Ref Range: 4.8 - 5.9 % 9.8 (H)     Patient Active Problem List   Diagnosis    Uncontrolled diabetes mellitus type 2 without complications    Hyperlipidemia    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 9)    Herpes simplex infection of skin    Lower abdominal pain    Type 2 diabetes mellitus affecting pregnancy in third trimester, antepartum    Uncontrolled type 2 diabetes mellitus with complication, without long-term current use of insulin (HCC)    Supervision of normal pregnancy    34 weeks gestation of pregnancy    Noncompliance    High-risk pregnancy in second trimester    Anemia during pregnancy    Constipation    Acute cystitis without hematuria    36 weeks gestation of pregnancy    Pregnancy with prenatal care elsewhere in third trimester    Rash    Eczema         Review of Systems   Skin: Positive for rash. All other systems reviewed and are negative. Prior to Visit Medications    Medication Sig Taking? Authorizing Provider   predniSONE (DELTASONE) 10 MG tablet Take 1 tablet by mouth daily for 10 days  JO Ramirez   hydrOXYzine (ATARAX) 25 MG tablet Take 1 tablet by mouth every 8 hours as needed for Itching  JO Galo   LANTUS SOLOSTAR 100 UNIT/ML injection pen Inject 45 units nightly  Ganga Mccormick MD   Probiotic Acidophilus (FLORANEX) TABS Take 1 tablet by mouth daily  JO Calderon   tacrolimus (PROTOPIC) 0.03 % ointment Apply topically 2 times daily.   JO Calderon   triamcinolone (KENALOG) 0.1 % ointment Apply 2 times daily to affected areas  Marli Dawson DO   Disposable Gloves (COTTON GLOVES MEDIUM) MISC Use nightly after applying ointment  Marli Dawson DO   Insulin Pen Needle (NOVOFINE) 32G X 6 MM MISC qid  Margarette Webster MD   insulin lispro, 1 Unit Dial, (HUMALOG KWIKPEN) 100 UNIT/ML SOPN 10 units at each meals  Liu Saleh MD   Alcohol Swabs PADS Use qid  Liu Saleh MD   Crisaborole 2 % OINT Apply sparingly to the area x BID  TRISHA Meier - CNP   clobetasol (TEMOVATE) 0.05 % ointment Apply topically 2 times daily. Florecita BrightTRISHA Sweeney CNP   FREESTYLE LANCETS MISC 1 each by Does not apply route daily  Liu Saleh MD   blood glucose test strips (FREESTYLE LITE) strip Use 6 times a day   Pt is pregnant  Liu Saleh MD   acyclovir (ZOVIRAX) 5 % ointment Apply topically 5 times daily Apply topically 5 times daily to affected area for 7 days  TRISHA Bullock CNP   clotrimazole-betamethasone (LOTRISONE) 1-0.05 % cream Apply topically 2 times daily. Rose Romo MD   miconazole (MICONAZOLE ANTIFUNGAL) 2 % cream Apply topically 2 times daily. Rose Romo MD   Insulin Pen Needle (BD PEN NEEDLE EMRE U/F) 32G X 4 MM MISC Use 3 times a day  Liu Saleh MD       Social History     Tobacco Use    Smoking status: Current Every Day Smoker     Packs/day: 0.25     Years: 23.00     Pack years: 5.75     Last attempt to quit: 8/15/2017     Years since quittin.0    Smokeless tobacco: Never Used    Tobacco comment: started age 13   Substance Use Topics    Alcohol use: No     Comment: socially before pregnancy    Drug use:  No            PHYSICAL EXAMINATION:  [ INSTRUCTIONS:  \"[x]\" Indicates a positive item  \"[]\" Indicates a negative item  -- DELETE ALL ITEMS NOT EXAMINED]  [] Alert  [] Oriented to person/place/time    [] No apparent distress  [] Toxic appearing    [] Face flushed appearing [] Sclera clear  [] Lips are cyanotic      [] Breathing appears normal  [] Appears tachypneic      [] Rash on visible skin    [] Cranial Nerves II-XII grossly intact    [] Motor grossly intact in visible upper extremities    [] Motor grossly intact in visible lower extremities    [] Normal Mood  [] Anxious appearing    [] Depressed appearing  [] Confused appearing      [] Poor short term memory  [] Poor long term memory    [] OTHER:      Due to this being a TeleHealth encounter, evaluation of the following organ systems is limited: Vitals/Constitutional/EENT/Resp/CV/GI//MS/Neuro/Skin/Heme-Lymph-Imm. ASSESSMENT/PLAN:     Diagnosis Orders   1. Type 2 diabetes mellitus with other specified complication, with long-term current use of insulin (HCC)       Increased dose of Lantus to 60 units at bedtime Humalog 16-20 with each meals adjust her insulin dose based on blood sugar based on  Discussed about insulin pump therapy long-term A1c goal of 6.5 or lower    Total time spent was 15 minutes    Orders Placed This Encounter   Medications    LANTUS SOLOSTAR 100 UNIT/ML injection pen     Sig: Inject 60 units nightly     Dispense:  15 pen     Refill:  3    insulin lispro, 1 Unit Dial, (HUMALOG KWIKPEN) 100 UNIT/ML SOPN     Si-20 units at each meals     Dispense:  15 pen     Refill:  3         An  electronic signature was used to authenticate this note. --Alla Yeh MD on 2021 at 5:05 PM        Pursuant to the emergency declaration under the [facility], [facility] waiver authority and the [employer] Act, this Virtual  Visit was conducted, with patient's consent, to reduce the patient's risk of exposure to COVID-19 and provide continuity of care for an established patient. Services were provided through a video synchronous discussion virtually to substitute for in-person clinic visit.

## 2021-09-15 ENCOUNTER — OFFICE VISIT (OUTPATIENT)
Dept: FAMILY MEDICINE CLINIC | Age: 41
End: 2021-09-15
Payer: COMMERCIAL

## 2021-09-15 VITALS — BODY MASS INDEX: 29.77 KG/M2 | WEIGHT: 168 LBS | HEIGHT: 63 IN | TEMPERATURE: 96.7 F

## 2021-09-15 DIAGNOSIS — L30.9 DERMATITIS: ICD-10-CM

## 2021-09-15 DIAGNOSIS — L30.9 ECZEMA, UNSPECIFIED TYPE: Primary | ICD-10-CM

## 2021-09-15 PROCEDURE — 4004F PT TOBACCO SCREEN RCVD TLK: CPT | Performed by: FAMILY MEDICINE

## 2021-09-15 PROCEDURE — 11105 PUNCH BX SKIN EA SEP/ADDL: CPT | Performed by: FAMILY MEDICINE

## 2021-09-15 PROCEDURE — G8427 DOCREV CUR MEDS BY ELIG CLIN: HCPCS | Performed by: FAMILY MEDICINE

## 2021-09-15 PROCEDURE — 99213 OFFICE O/P EST LOW 20 MIN: CPT | Performed by: FAMILY MEDICINE

## 2021-09-15 PROCEDURE — 11104 PUNCH BX SKIN SINGLE LESION: CPT | Performed by: FAMILY MEDICINE

## 2021-09-15 PROCEDURE — G8417 CALC BMI ABV UP PARAM F/U: HCPCS | Performed by: FAMILY MEDICINE

## 2021-09-15 RX ORDER — FLUCONAZOLE 100 MG/1
100 TABLET ORAL DAILY
Qty: 14 TABLET | Refills: 0 | Status: SHIPPED | OUTPATIENT
Start: 2021-09-15 | End: 2021-09-29

## 2021-09-15 NOTE — PATIENT INSTRUCTIONS
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 Suspect recent antibiotic and increasing blood sugars playing a part. Incision/Laceration repair    -Clean surgical area with antibacterial soap and water once daily. --You may be instructed to soak the wound with Hydrogen Peroxide to loosen scabbing around sutures, this is not to be done more often that every 3 days, should be for 30 seconds-1 min and then rinsed off with water.     -Keep surgical site moist with vaseline or antibiotic ointment (single, not tripleantibiotic or Neosporin) and apply a fresh bandage daily until a solid scab forms or if the wound is at risk for trauma or dirt.     -Follow up immediately if any growing redness (minimal redness or pale pink is normal along wound edges) surrounds the surgical site or if dripping drainage occurs at surgical site. Once a solid scab forms no more bandage needed. A wet scab can look yellow. This is not infection, just moisture.  -Once the lesion is healed be sure to apply sunscreen to the area to prevent burn of the newer and more delicate skin during the first 6 months of healing.    -If the scar begins to be raised you may massage the area firmly twice a day to help break down scar tissue and help the area become a flat scar. There is some evidence that Mederma applied to a scar daily for the first few months can help shrink and fade it more quickly then without intervention.

## 2021-09-15 NOTE — PROGRESS NOTES
Prior to the start of the procedure known as bx an informed consent has been signed and scanned into patients EMR. After the patient is draped and prepped and before the start of the procedure  Dr. Sophia Kahn and attending staff Celina Sandy both must perform a verbal confirmation using patients Name   and  The patient should participate in this. Dr. Sophia Kahn will chace the surgical site if needed for documenting superior and inferior aspects. \"correct site. \" will be verified on container, and order. These above steps will be documented into the patients EMR chart.

## 2021-09-15 NOTE — PROGRESS NOTES
Diagnosis Orders   1. Eczema, unspecified type     2. Dermatitis  Culture, Surgical    MI PUNCH BIOPSY SKIN SINGLE LESION    Specimen to Pathology Outpatient    MI PUNCH BIOPSY SKIN EA SEP/ADDITIONAL LESION    Specimen to Pathology Outpatient    MI PUNCH BIOPSY SKIN EA SEP/ADDITIONAL LESION    Specimen to Pathology Outpatient    Culture, Surgical    fluconazole (DIFLUCAN) 100 MG tablet       Return in about 2 weeks (around 9/29/2021) for for review of outcome of today's recommendation. Patient states her hands are not responding to the triamcinolone. She was recently put on prednisone that does not seem to be helping either. Her blood sugars are getting higher. She has recently been put on antibiotics for a foot infection she noticed that to make the skin worse as well. She did get white cloth gloves and she wears them out. 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 The rash on her body itches significantly. Current Outpatient Medications on File Prior to Visit   Medication Sig Dispense Refill    LANTUS SOLOSTAR 100 UNIT/ML injection pen Inject 60 units nightly 15 pen 3    insulin lispro, 1 Unit Dial, (HUMALOG KWIKPEN) 100 UNIT/ML SOPN 16-20 units at each meals 15 pen 3    predniSONE (DELTASONE) 10 MG tablet Take 1 tablet by mouth daily for 10 days 10 tablet 0    hydrOXYzine (ATARAX) 25 MG tablet Take 1 tablet by mouth every 8 hours as needed for Itching 30 tablet 0    Probiotic Acidophilus (FLORANEX) TABS Take 1 tablet by mouth daily 30 tablet 0    tacrolimus (PROTOPIC) 0.03 % ointment Apply topically 2 times daily.  30 g 0    triamcinolone (KENALOG) 0.1 % ointment Apply 2 times daily to affected areas 80 g 0    Disposable Gloves (COTTON GLOVES MEDIUM) MISC Use nightly after applying ointment 2 each 1    Insulin Pen Needle (NOVOFINE) 32G X 6 MM MISC qid 300 each 5    Alcohol Swabs PADS Use qid 150 each 06    Crisaborole 2 % OINT Apply sparingly to the area x BID 2 Tube 0    clobetasol (TEMOVATE) 0.05 % ointment Apply topically 2 times daily. 60 g 1    FREESTYLE LANCETS MISC 1 each by Does not apply route daily 200 each 3    blood glucose test strips (FREESTYLE LITE) strip Use 6 times a day   Pt is pregnant 200 strip 3    acyclovir (ZOVIRAX) 5 % ointment Apply topically 5 times daily Apply topically 5 times daily to affected area for 7 days 15 g 0    clotrimazole-betamethasone (LOTRISONE) 1-0.05 % cream Apply topically 2 times daily. 1 Tube 1    miconazole (MICONAZOLE ANTIFUNGAL) 2 % cream Apply topically 2 times daily. 1 Tube 1    Insulin Pen Needle (BD PEN NEEDLE EMRE U/F) 32G X 4 MM MISC Use 3 times a day 200 each 03     No current facility-administered medications on file prior to visit. Tape Daylene Righter tape]    Review of Systems   Constitutional: Negative for chills and fever. Skin: Positive for rash. Allergic/Immunologic: Negative for environmental allergies, food allergies and immunocompromised state. Hematological: Negative for adenopathy. Does not bruise/bleed easily. Psychiatric/Behavioral: Negative for behavioral problems and sleep disturbance. Temp 96.7 °F (35.9 °C)   Ht 5' 3\" (1.6 m)   Wt 168 lb (76.2 kg)   LMP 08/20/2021 (Approximate)   BMI 29.76 kg/m²   Physical Exam  Constitutional:       General: She is not in acute distress. Appearance: Normal appearance. She is well-developed. She is not toxic-appearing. HENT:      Head: Normocephalic and atraumatic. Right Ear: Hearing and tympanic membrane normal.      Left Ear: Hearing and tympanic membrane normal.      Nose: Nose normal. No nasal deformity. Eyes:      General: Lids are normal.         Right eye: No discharge. Left eye: No discharge. Conjunctiva/sclera: Conjunctivae normal.      Pupils: Pupils are equal, round, and reactive to light. Neck:      Thyroid: No thyroid mass or thyromegaly. Vascular: No JVD. Trachea: Trachea and phonation normal.   Cardiovascular:      Rate and Rhythm: Normal rate and regular rhythm. Pulmonary:      Effort: No accessory muscle usage or respiratory distress. Musculoskeletal:      Cervical back: Full passive range of motion without pain. Comments: FROM all large muscle groups and joints as witnessed when walking to exam table, getting on, and getting off the exam table. Skin:     General: Skin is warm and dry. Findings: No rash. Comments: Refer to pictures below   Neurological:      Mental Status: She is alert. Motor: No tremor or atrophy. Gait: Gait normal.   Psychiatric:         Speech: Speech normal.         Behavior: Behavior normal.         Thought Content: Thought content normal.                             CONSENT:  The procedure was discussed with the patient. All questions were answered and alternative options discussed. The patient is aware of the risks of bleeding, infection,unsatisfactory scar result. Informed consent paperwork was signed by the patient. PROCEDURE: Right forearm, right thigh, and right back lesions all biopsied with the same technique  Biopsy done using 0.2 cc of 1% lidocaine with epinephrine for anesthesia. A 4 mm punch biopsy was used to obtain the biopsy for the arm and leg lesion but a 3 mm was used for the back lesion. Drysol and pressure dressing was used to control blood loss at the base of site. EBL < 1cc. ASSESSMENT:   Diagnosis Orders   1. Eczema, unspecified type     2.  Dermatitis  Culture, Surgical    AL PUNCH BIOPSY SKIN SINGLE LESION    Specimen to Pathology Outpatient    AL PUNCH BIOPSY SKIN EA SEP/ADDITIONAL LESION    Specimen to Pathology Outpatient    AL PUNCH BIOPSY SKIN EA SEP/ADDITIONAL LESION    Specimen to Pathology Outpatient    Culture, Surgical    fluconazole (DIFLUCAN) 100 MG tablet       Return in about 2 weeks (around 9/29/2021) for for review of outcome of today's

## 2021-09-16 ENCOUNTER — TELEPHONE (OUTPATIENT)
Dept: FAMILY MEDICINE CLINIC | Age: 41
End: 2021-09-16

## 2021-09-16 DIAGNOSIS — L30.9 DERMATITIS: ICD-10-CM

## 2021-09-16 NOTE — TELEPHONE ENCOUNTER
Pt calling    Pt states she is still itching very baddly, and hands are very painful. Patient states it is hard to care for her special needs child. Would like to know if something can be done about the pain she is having. Is another cream an option? States steroid creams provide no relief. States she is unable to straiten fingers out due to pain. multiple daily activities are difficult to complete. States \"stuff\" on body is spreading, getting worse than yesterday.        Pt ph [phone]

## 2021-09-17 ENCOUNTER — NURSE TRIAGE (OUTPATIENT)
Dept: OTHER | Facility: CLINIC | Age: 41
End: 2021-09-17

## 2021-09-17 ENCOUNTER — OFFICE VISIT (OUTPATIENT)
Dept: FAMILY MEDICINE CLINIC | Age: 41
End: 2021-09-17
Payer: COMMERCIAL

## 2021-09-17 ENCOUNTER — OFFICE VISIT (OUTPATIENT)
Dept: ENDOCRINOLOGY | Age: 41
End: 2021-09-17
Payer: COMMERCIAL

## 2021-09-17 VITALS
HEART RATE: 67 BPM | OXYGEN SATURATION: 96 % | WEIGHT: 166 LBS | BODY MASS INDEX: 29.41 KG/M2 | SYSTOLIC BLOOD PRESSURE: 131 MMHG | DIASTOLIC BLOOD PRESSURE: 84 MMHG | HEIGHT: 63 IN

## 2021-09-17 VITALS
WEIGHT: 166 LBS | RESPIRATION RATE: 18 BRPM | DIASTOLIC BLOOD PRESSURE: 84 MMHG | HEART RATE: 67 BPM | OXYGEN SATURATION: 96 % | HEIGHT: 63 IN | BODY MASS INDEX: 29.41 KG/M2 | SYSTOLIC BLOOD PRESSURE: 131 MMHG | TEMPERATURE: 96.7 F

## 2021-09-17 DIAGNOSIS — E11.69 TYPE 2 DIABETES MELLITUS WITH OTHER SPECIFIED COMPLICATION, WITH LONG-TERM CURRENT USE OF INSULIN (HCC): Primary | ICD-10-CM

## 2021-09-17 DIAGNOSIS — Z51.89 WOUND CHECK, ABSCESS: Primary | ICD-10-CM

## 2021-09-17 DIAGNOSIS — L30.9 ECZEMA, UNSPECIFIED TYPE: ICD-10-CM

## 2021-09-17 DIAGNOSIS — L20.84 INTRINSIC ECZEMA: ICD-10-CM

## 2021-09-17 DIAGNOSIS — Z79.4 TYPE 2 DIABETES MELLITUS WITH OTHER SPECIFIED COMPLICATION, WITH LONG-TERM CURRENT USE OF INSULIN (HCC): Primary | ICD-10-CM

## 2021-09-17 PROCEDURE — 99213 OFFICE O/P EST LOW 20 MIN: CPT | Performed by: INTERNAL MEDICINE

## 2021-09-17 PROCEDURE — 3046F HEMOGLOBIN A1C LEVEL >9.0%: CPT | Performed by: INTERNAL MEDICINE

## 2021-09-17 PROCEDURE — 2022F DILAT RTA XM EVC RTNOPTHY: CPT | Performed by: INTERNAL MEDICINE

## 2021-09-17 PROCEDURE — 99213 OFFICE O/P EST LOW 20 MIN: CPT | Performed by: PHYSICIAN ASSISTANT

## 2021-09-17 PROCEDURE — 4004F PT TOBACCO SCREEN RCVD TLK: CPT | Performed by: PHYSICIAN ASSISTANT

## 2021-09-17 PROCEDURE — 4004F PT TOBACCO SCREEN RCVD TLK: CPT | Performed by: INTERNAL MEDICINE

## 2021-09-17 PROCEDURE — G8417 CALC BMI ABV UP PARAM F/U: HCPCS | Performed by: INTERNAL MEDICINE

## 2021-09-17 PROCEDURE — G8427 DOCREV CUR MEDS BY ELIG CLIN: HCPCS | Performed by: INTERNAL MEDICINE

## 2021-09-17 PROCEDURE — G8417 CALC BMI ABV UP PARAM F/U: HCPCS | Performed by: PHYSICIAN ASSISTANT

## 2021-09-17 PROCEDURE — G8427 DOCREV CUR MEDS BY ELIG CLIN: HCPCS | Performed by: PHYSICIAN ASSISTANT

## 2021-09-17 RX ORDER — INSULIN GLARGINE 100 [IU]/ML
INJECTION, SOLUTION SUBCUTANEOUS
Qty: 15 PEN | Refills: 3 | Status: SHIPPED | OUTPATIENT
Start: 2021-09-17

## 2021-09-17 RX ORDER — INSULIN LISPRO 100 [IU]/ML
INJECTION, SOLUTION INTRAVENOUS; SUBCUTANEOUS
Qty: 15 PEN | Refills: 3 | Status: SHIPPED | OUTPATIENT
Start: 2021-09-17

## 2021-09-17 RX ORDER — SULFAMETHOXAZOLE AND TRIMETHOPRIM 800; 160 MG/1; MG/1
1 TABLET ORAL 2 TIMES DAILY
Qty: 10 TABLET | Refills: 0 | Status: SHIPPED | OUTPATIENT
Start: 2021-09-17 | End: 2021-09-22 | Stop reason: DRUGHIGH

## 2021-09-17 RX ORDER — PREDNISONE 1 MG/1
5 TABLET ORAL DAILY
Qty: 30 TABLET | Refills: 1 | Status: SHIPPED | OUTPATIENT
Start: 2021-09-17 | End: 2021-10-17

## 2021-09-17 ASSESSMENT — ENCOUNTER SYMPTOMS: COLOR CHANGE: 1

## 2021-09-17 NOTE — PROGRESS NOTES
2021    Assessment:       Diagnosis Orders   1. Type 2 diabetes mellitus with other specified complication, with long-term current use of insulin ([facility])  CARMELO Weeks MD, Dermatology, [facility]   2. Intrinsic eczema     3.  Eczema, unspecified type  predniSONE (DELTASONE) 5 MG tablet         PLAN:     Orders Placed This Encounter   Procedures   Elia Fox MD, Dermatology, [facility]     Referral Priority:   Routine     Referral Type:   Eval and Treat     Referral Reason:   Specialty Services Required     Referred to Provider:   Cristian Kahn MD     Requested Specialty:   Dermatology     Number of Visits Requested:   1     Increase insulin dose start patient on prednisone 5 mg daily  Patient to see dermatologist follow-up in 4 weeks  Orders Placed This Encounter   Medications    LANTUS SOLOSTAR 100 UNIT/ML injection pen     Sig: Inject 70 units nightly     Dispense:  15 pen     Refill:  3    insulin lispro, 1 Unit Dial, (HUMALOG KWIKPEN) 100 UNIT/ML SOPN     Si-24  units at each meals     Dispense:  15 pen     Refill:  3    predniSONE (DELTASONE) 5 MG tablet     Sig: Take 1 tablet by mouth daily     Dispense:  30 tablet     Refill:  1       Subjective:     Chief Complaint   Patient presents with    Other     skin    Diabetes     Vitals:    21 1506   BP: 131/84   Pulse: 67   SpO2: 96%   Weight: 166 lb (75.3 kg)   Height: 5' 3\" (1.6 m)     Wt Readings from Last 3 Encounters:   21 166 lb (75.3 kg)   09/15/21 168 lb (76.2 kg)   21 168 lb (76.2 kg)     BP Readings from Last 3 Encounters:   21 131/84   21 128/76   21 (!) 135/95     Follow-up on type 2 diabetes patient has been recently having worsening of her eczema was given cream through her primary care still has no relief has not seen a dermatologist increasing stress from multiple reasons currently on Lantus plus Humalog blood sugars are still averaging in the 100-200 range    Diabetes  She presents for her follow-up diabetic visit. She has type 2 diabetes mellitus. Her disease course has been worsening. Risk factors for coronary artery disease include stress. She is currently taking insulin pre-breakfast, pre-dinner, at bedtime and pre-lunch. Her overall blood glucose range is >200 mg/dl.  (Lab Results       Component                Value               Date                       LABA1C                   9.8 (H)             2021              )     Past Medical History:   Diagnosis Date    Anemia during pregnancy 2018    Eczema     Herpes simplex infection of skin     on neck    Hypercholesterolemia     Irregular heart beat     runs of v tach in the past    Obesity     Pregnancy induced hypertension     Type II or unspecified type diabetes mellitus without mention of complication, not stated as uncontrolled     type 2     Past Surgical History:   Procedure Laterality Date    ABDOMEN SURGERY       x 2     SECTION       Social History     Socioeconomic History    Marital status: Single     Spouse name: Not on file    Number of children: Not on file    Years of education: Not on file    Highest education level: Not on file   Occupational History    Not on file   Tobacco Use    Smoking status: Current Every Day Smoker     Packs/day: 0.25     Years: 23.00     Pack years: 5.75     Last attempt to quit: 8/15/2017     Years since quittin.0    Smokeless tobacco: Never Used    Tobacco comment: started age 13   Substance and Sexual Activity    Alcohol use: No     Comment: socially before pregnancy    Drug use: No    Sexual activity: Not Currently     Partners: Male   Other Topics Concern    Not on file   Social History Narrative    Not on file     Social Determinants of Health     Financial Resource Strain: Low Risk     Difficulty of Paying Living Expenses: Not hard at all   Food Insecurity: No Food Insecurity    Worried About Running Out of Food in the Last Year: Never true    Ran Out of Food in the Last Year: Never true   Transportation Needs: No Transportation Needs    Lack of Transportation (Medical): No    Lack of Transportation (Non-Medical):  No   Physical Activity:     Days of Exercise per Week:     Minutes of Exercise per Session:    Stress:     Feeling of Stress :    Social Connections:     Frequency of Communication with Friends and Family:     Frequency of Social Gatherings with Friends and Family:     Attends Sabianism Services:     Active Member of Clubs or Organizations:     Attends Club or Organization Meetings:     Marital Status:    Intimate Partner Violence:     Fear of Current or Ex-Partner:     Emotionally Abused:     Physically Abused:     Sexually Abused:      Family History   Problem Relation Age of Onset    Arthritis Mother     Diabetes Mother     Early Death Father     Heart Attack Father     Heart Disease Father     High Blood Pressure Father     High Cholesterol Father     Diabetes Father     Diabetes Paternal Grandfather     Heart Disease Paternal Grandfather     Birth Defects Son     Arthritis Sister     Cancer Neg Hx      Allergies   Allergen Reactions    Tape Marella Edroy Tape] Rash       Current Outpatient Medications:     triamcinolone (KENALOG) 0.1 % ointment, Apply 2 times daily to affected areas, Disp: 80 g, Rfl: 0    fluconazole (DIFLUCAN) 100 MG tablet, Take 1 tablet by mouth daily for 14 days, Disp: 14 tablet, Rfl: 0    LANTUS SOLOSTAR 100 UNIT/ML injection pen, Inject 60 units nightly, Disp: 15 pen, Rfl: 3    insulin lispro, 1 Unit Dial, (HUMALOG KWIKPEN) 100 UNIT/ML SOPN, 16-20 units at each meals, Disp: 15 pen, Rfl: 3    predniSONE (DELTASONE) 10 MG tablet, Take 1 tablet by mouth daily for 10 days, Disp: 10 tablet, Rfl: 0    hydrOXYzine (ATARAX) 25 MG tablet, Take 1 tablet by mouth every 8 hours as needed for Itching, Disp: 30 tablet, Rfl: 0    Probiotic Acidophilus (FLORANEX) TABS, Take 1 tablet by mouth daily, Disp: 30 tablet, Rfl: 0    tacrolimus (PROTOPIC) 0.03 % ointment, Apply topically 2 times daily. , Disp: 30 g, Rfl: 0    Disposable Gloves (COTTON GLOVES MEDIUM) MISC, Use nightly after applying ointment, Disp: 2 each, Rfl: 1    Insulin Pen Needle (NOVOFINE) 32G X 6 MM MISC, qid, Disp: 300 each, Rfl: 5    Alcohol Swabs PADS, Use qid, Disp: 150 each, Rfl: 06    Crisaborole 2 % OINT, Apply sparingly to the area x BID, Disp: 2 Tube, Rfl: 0    clobetasol (TEMOVATE) 0.05 % ointment, Apply topically 2 times daily. , Disp: 60 g, Rfl: 1    FREESTYLE LANCETS MISC, 1 each by Does not apply route daily, Disp: 200 each, Rfl: 3    blood glucose test strips (FREESTYLE LITE) strip, Use 6 times a day  Pt is pregnant, Disp: 200 strip, Rfl: 3    acyclovir (ZOVIRAX) 5 % ointment, Apply topically 5 times daily Apply topically 5 times daily to affected area for 7 days, Disp: 15 g, Rfl: 0    clotrimazole-betamethasone (LOTRISONE) 1-0.05 % cream, Apply topically 2 times daily. , Disp: 1 Tube, Rfl: 1    miconazole (MICONAZOLE ANTIFUNGAL) 2 % cream, Apply topically 2 times daily. , Disp: 1 Tube, Rfl: 1    Insulin Pen Needle (BD PEN NEEDLE EMRE U/F) 32G X 4 MM MISC, Use 3 times a day, Disp: 200 each, Rfl: 03  Lab Results   Component Value Date     09/02/2021    K 3.8 09/02/2021     09/02/2021    CO2 23 09/02/2021    BUN 8 09/02/2021    CREATININE 0.46 (L) 09/02/2021    GLUCOSE 260 (H) 09/02/2021    CALCIUM 8.8 09/02/2021    PROT 6.6 08/27/2021    LABALBU 3.9 08/27/2021    BILITOT 0.4 08/27/2021    ALKPHOS 62 08/27/2021    AST 20 08/27/2021    ALT 22 08/27/2021    LABGLOM >60.0 09/02/2021    GFRAA >60.0 09/02/2021    GLOB 2.7 08/27/2021     Lab Results   Component Value Date    WBC 6.3 08/27/2021    HGB 14.1 08/27/2021    HCT 39.0 08/27/2021    MCV 88.0 08/27/2021     08/27/2021     Lab Results   Component Value Date    LABA1C 9.8 (H) 09/02/2021    LABA1C 9.6 (H) 12/28/2020    LABA1C 9.3 10/23/2019     Lab Results   Component Value Date    HDL 37 (L) 12/28/2020    HDL 24 (L) 04/06/2012    LDLCALC 83 12/28/2020    LDLCALC NoReslt 04/06/2012    CHOL 143 12/28/2020    CHOL 170 04/06/2012    TRIG 117 12/28/2020    TRIG 1,058 (H) 04/06/2012     No results found for: TESTM  Lab Results   Component Value Date    TSH 1.360 01/15/2016    TSH 0.603 06/20/2013     No results found for: TPOABS    Review of Systems   Skin: Positive for color change and rash. Psychiatric/Behavioral: Positive for dysphoric mood. All other systems reviewed and are negative. Objective:   Physical Exam  Vitals reviewed. Constitutional:       Appearance: Normal appearance. She is obese. HENT:      Head: Normocephalic and atraumatic. Hair is normal.      Right Ear: External ear normal.      Left Ear: External ear normal.      Nose: Nose normal.   Eyes:      General: No scleral icterus. Right eye: No discharge. Left eye: No discharge. Extraocular Movements: Extraocular movements intact. Conjunctiva/sclera: Conjunctivae normal.   Neck:      Trachea: Trachea normal.   Cardiovascular:      Rate and Rhythm: Normal rate. Pulmonary:      Effort: Pulmonary effort is normal.   Musculoskeletal:         General: Normal range of motion. Cervical back: Normal range of motion and neck supple. Skin:         Neurological:      General: No focal deficit present. Mental Status: She is alert and oriented to person, place, and time. Psychiatric:         Mood and Affect: Mood is anxious. Affect is labile.          Behavior: Behavior normal.

## 2021-09-17 NOTE — TELEPHONE ENCOUNTER
Reason for Disposition   [1] Red area or streak [2] large (> 2 in. or 5 cm)    Answer Assessment - Initial Assessment Questions  1. ONSET: \"When did the swelling start? \" (e.g., minutes, hours, days)      Wednesday, 9/15    2. LOCATION: \"What part of the arm is swollen? \"  \"Are both arms swollen or just one arm? \"      R Arm - the lower arm through the wrist     3. SEVERITY: \"How bad is the swelling? \" (e.g., localized; mild, moderate, severe)    - LOCALIZED: Small area of puffiness or swelling on just one arm    - JOINT SWELLING: Swelling of one joint    - MILD: Puffiness or swelling of hand    - MODERATE: Puffiness or swollen feeling of entire arm     - SEVERE: All of arm looks swollen; pitting edema        4. REDNESS: \"Does the swelling look red or infected? \"      Pt states that her arms are already red from the skin condition she has    5. PAIN: \"Is the swelling painful to touch? \" If so, ask: \"How painful is it? \"   (Scale 1-10; mild, moderate or severe)      Arm feels like there is a sunburn, but not where the biopsy is    6. FEVER: \"Do you have a fever? \" If so, ask: \"What is it, how was it measured, and when did it start? \"       No fever    7. CAUSE: \"What do you think is causing the arm swelling? \"      From the biopsy    8. MEDICAL HISTORY: \"Do you have a history of heart failure, kidney disease, liver failure, or cancer?\"        9. RECURRENT SYMPTOM: \"Have you had arm swelling before? \" If so, ask: \"When was the last time? \" \"What happened that time? \"      The skin all over her body itches    10. OTHER SYMPTOMS: \"Do you have any other symptoms? \" (e.g., chest pain, difficulty breathing)          11. PREGNANCY: \"Is there any chance you are pregnant? \" \"When was your last menstrual period? \"    Protocols used: ARM SWELLING AND EDEMA-ADULT-AH    Received call from [facility] with Red Flag Complaint.     Brief description of triage: pt reports that she has swelling in the R lower arm where she had a biopsy taken on Wed., 9/15 and that the bottom of the wound has a small black area. Pt states this situation is causing her some emotional distress. Triage indicates for patient to be seen by Provider today. Care advice provided, patient verbalizes understanding; denies any other questions or concerns; instructed to call back for any new or worsening symptoms. Writer provided warm transfer to [place] [facility] for appointment scheduling. Attention Provider: Thank you for allowing me to participate in the care of your patient. The patient was connected to triage in response to information provided to the E[facility]. Please do not respond through this encounter as the response is not directed to a shared pool.

## 2021-09-17 NOTE — TELEPHONE ENCOUNTER
Pt called this AM. States that she is very uncomfortable and in a lot of pain. Pt states that she needs to take care of not only herself but her 5 yr old son who is disabled. I explain to pt that Dr Sofiya Brush is waiting on results. She called in med on the 15th and then cream yesterday. Protocol for treatment is being followed. Pt then stated that she could not wait for cultures to come back, she needs help now! States that she believes that site of biopsy is infected and wants seen. I offered her RC or ED. Pt again stated that she did not think that we understood what she was going through. I told her that we do what to get her treated but we will need to look at her, that is why I offered RC or ED. She only has a ride for 2pm. So I went ahead and gave her an appt for RC at that time.

## 2021-09-17 NOTE — PROGRESS NOTES
[facility] Encounter  CHIEF COMPLAINT       Chief Complaint   Patient presents with    Rash     rash that started on hands which spread to the arms with possible infection where they biopsied        HISTORY  [address] Panda Castellanos is a 37 y.o. female who presents with:  HPI   Patient is presenting today with CC of pain on the right forearm after having bx on Wednesday. Patient started to notice change in color in the wound and increased pain to the area. Patient denies any fevers, chills, nausea, or vomiting. Still able to move the forearm and bend at the elbow. Patient cleans the area and apply triple abx to the area. Patient is on fluconazole placed by dermatologist. Patient was seen by endocrinologist and was now told that she should not continue fluconazole and was told to start steroid cream.   REVIEW OF SYSTEMS     Review of Systems   Constitutional: Negative for activity change, appetite change, chills and fever. HENT: Negative for congestion, drooling, sinus pressure, sinus pain and sore throat. Eyes: Negative for visual disturbance. Respiratory: Negative for cough, chest tightness and shortness of breath. Cardiovascular: Negative for chest pain. Gastrointestinal: Negative for abdominal pain, diarrhea, nausea and vomiting. Endocrine: Negative for cold intolerance. Genitourinary: Negative for dysuria, flank pain, frequency and hematuria. Musculoskeletal: Negative for arthralgias and back pain. Skin: Positive for wound. Negative for rash. Allergic/Immunologic: Negative for food allergies. Neurological: Negative for weakness, light-headedness, numbness and headaches. Hematological: Does not bruise/bleed easily.      PAST MEDICAL HISTORY         Diagnosis Date    Anemia during pregnancy 2/2/2018    Eczema     Herpes simplex infection of skin     on neck    Hypercholesterolemia     Irregular heart beat     runs of v tach in the past    Obesity     Pregnancy induced hypertension     Type II or unspecified type diabetes mellitus without mention of complication, not stated as uncontrolled     type 2     SURGICAL HISTORY     Patient  has a past surgical history that includes  section and Abdomen surgery (/). CURRENT MEDICATIONS       Previous Medications    ACYCLOVIR (ZOVIRAX) 5 % OINTMENT    Apply topically 5 times daily Apply topically 5 times daily to affected area for 7 days    ALCOHOL SWABS PADS    Use qid    BLOOD GLUCOSE TEST STRIPS (FREESTYLE LITE) STRIP    Use 6 times a day   Pt is pregnant    CLOBETASOL (TEMOVATE) 0.05 % OINTMENT    Apply topically 2 times daily. CLOTRIMAZOLE-BETAMETHASONE (LOTRISONE) 1-0.05 % CREAM    Apply topically 2 times daily. CRISABOROLE 2 % OINT    Apply sparingly to the area x BID    DISPOSABLE GLOVES (COTTON GLOVES MEDIUM) MISC    Use nightly after applying ointment    FLUCONAZOLE (DIFLUCAN) 100 MG TABLET    Take 1 tablet by mouth daily for 14 days    FREESTYLE LANCETS MISC    1 each by Does not apply route daily    HYDROXYZINE (ATARAX) 25 MG TABLET    Take 1 tablet by mouth every 8 hours as needed for Itching    INSULIN LISPRO, 1 UNIT DIAL, (HUMALOG KWIKPEN) 100 UNIT/ML SOPN    20-24  units at each meals    INSULIN PEN NEEDLE (BD PEN NEEDLE EMRE U/F) 32G X 4 MM MISC    Use 3 times a day    INSULIN PEN NEEDLE (NOVOFINE) 32G X 6 MM MISC    qid    LANTUS SOLOSTAR 100 UNIT/ML INJECTION PEN    Inject 70 units nightly    MICONAZOLE (MICONAZOLE ANTIFUNGAL) 2 % CREAM    Apply topically 2 times daily. PREDNISONE (DELTASONE) 5 MG TABLET    Take 1 tablet by mouth daily    PROBIOTIC ACIDOPHILUS (FLORANEX) TABS    Take 1 tablet by mouth daily    TACROLIMUS (PROTOPIC) 0.03 % OINTMENT    Apply topically 2 times daily. TRIAMCINOLONE (KENALOG) 0.1 % OINTMENT    Apply 2 times daily to affected areas     ALLERGIES     Patient is is allergic to tape [adhesive tape].   FAMILY HISTORY Patient'sfamily history includes Arthritis in her mother and sister; Birth Defects in her son; Diabetes in her father, mother, and paternal grandfather; Early Death in her father; Heart Attack in her father; Heart Disease in her father and paternal grandfather; High Blood Pressure in her father; High Cholesterol in her father. SOCIAL HISTORY     Patient  reports that she has been smoking. She has a 5.75 pack-year smoking history. She has never used smokeless tobacco. She reports that she does not drink alcohol and does not use drugs. PHYSICAL EXAM     VITALS  BP: 131/84, Temp: 96.7 °F (35.9 °C), Pulse: 67, Resp: 18, SpO2: 96 %  Physical Exam  Vitals and nursing note reviewed. Constitutional:       General: She is awake. She is not in acute distress. Appearance: Normal appearance. She is well-developed. She is not ill-appearing, toxic-appearing or diaphoretic. HENT:      Head: Normocephalic and atraumatic. Right Ear: Hearing and external ear normal.      Left Ear: Hearing and external ear normal.      Nose: Nose normal.   Eyes:      General: Lids are normal. Vision grossly intact. Gaze aligned appropriately. Conjunctiva/sclera: Conjunctivae normal.   Cardiovascular:      Rate and Rhythm: Normal rate and regular rhythm. Pulses: Normal pulses. Heart sounds: Normal heart sounds, S1 normal and S2 normal.   Pulmonary:      Effort: Pulmonary effort is normal.      Breath sounds: Normal breath sounds and air entry. Musculoskeletal:      Cervical back: Normal range of motion. Skin:     General: Skin is warm. Capillary Refill: Capillary refill takes less than 2 seconds. Comments: Circular punch biopsy that shows brown drainage on the inside. Neurological:      Mental Status: She is alert and oriented to person, place, and time. Gait: Gait is intact.    Psychiatric:         Attention and Perception: Attention normal.         Mood and Affect: Mood normal. Speech: Speech normal.         Behavior: Behavior normal. Behavior is cooperative. READY CARE COURSE   Labs:  No results found for this visit on 09/17/21. IMAGING:  No orders to display     Scheduled Meds:  Continuous Infusions:  PRN Meds:. PROCEDURES:  FINAL IMPRESSION      1. Wound check, abscess      DISPOSITION/PLAN     1. Patient is being seen by multiple providers for her symptoms. Recommend swab of the biopsy. Will place patient on prophylactic abx. Will discontinue if symptoms do not improve. Discussed signs and symptoms which require immediate follow-up in ED/call to 911. Patient verbalized understanding. On this date 9/17/2021 I have spent 20 minutes reviewing previous notes, test results and face to face with the patient discussing the diagnosis and importance of compliance with the treatment plan as well as documenting on the day of the visit. PATIENT REFERRED TO:  No follow-ups on file. DISCHARGE MEDICATIONS:  New Prescriptions    MUPIROCIN (BACTROBAN) 2 % OINTMENT    Apply topically 3 times daily for 10 days. SULFAMETHOXAZOLE-TRIMETHOPRIM (BACTRIM DS;SEPTRA DS) 800-160 MG PER TABLET    Take 1 tablet by mouth 2 times daily for 5 days     Cannot display discharge medications since this is not an admission.        [place], [place]

## 2021-09-18 ASSESSMENT — ENCOUNTER SYMPTOMS
SHORTNESS OF BREATH: 0
DIARRHEA: 0
ABDOMINAL PAIN: 0
SORE THROAT: 0
NAUSEA: 0
BACK PAIN: 0
SINUS PAIN: 0
SINUS PRESSURE: 0
CHEST TIGHTNESS: 0
COUGH: 0
VOMITING: 0

## 2021-09-18 ASSESSMENT — VISUAL ACUITY: OU: 1

## 2021-09-20 ENCOUNTER — TELEPHONE (OUTPATIENT)
Dept: FAMILY MEDICINE CLINIC | Age: 41
End: 2021-09-20

## 2021-09-20 LAB
ANAEROBIC CULTURE: ABNORMAL
GRAM STAIN RESULT: ABNORMAL
ORGANISM: ABNORMAL
WOUND/ABSCESS: ABNORMAL

## 2021-09-20 NOTE — TELEPHONE ENCOUNTER
Pt called back inquiring about labs. Pt aware we are waiting for the lab to be resulted on. Patient states she was concerned it was looking infected. And so she went to the walk in where they did another culture. Pt aware that lab is still is being processed. Pt states she is still having the hand pain, difficult still to complete daily tasks, and states it feels as if it is spreading up her hand. Patient states she would like called with her results.

## 2021-09-21 ENCOUNTER — TELEPHONE (OUTPATIENT)
Dept: FAMILY MEDICINE CLINIC | Age: 41
End: 2021-09-21

## 2021-09-21 ENCOUNTER — HOSPITAL ENCOUNTER (EMERGENCY)
Age: 41
Discharge: HOME | End: 2021-09-21
Attending: STUDENT IN AN ORGANIZED HEALTH CARE EDUCATION/TRAINING PROGRAM
Payer: COMMERCIAL

## 2021-09-21 VITALS
OXYGEN SATURATION: 98 % | HEART RATE: 94 BPM | DIASTOLIC BLOOD PRESSURE: 66 MMHG | RESPIRATION RATE: 20 BRPM | TEMPERATURE: 98.7 F | SYSTOLIC BLOOD PRESSURE: 93 MMHG

## 2021-09-21 DIAGNOSIS — F41.1 ANXIETY STATE: ICD-10-CM

## 2021-09-21 DIAGNOSIS — T14.8XXA OPEN WOUND: ICD-10-CM

## 2021-09-21 DIAGNOSIS — A49.01 MSSA (METHICILLIN SUSCEPTIBLE STAPHYLOCOCCUS AUREUS) INFECTION: Primary | ICD-10-CM

## 2021-09-21 DIAGNOSIS — E11.65 HYPERGLYCEMIA DUE TO DIABETES MELLITUS (HCC): ICD-10-CM

## 2021-09-21 DIAGNOSIS — R21 RASH AND OTHER NONSPECIFIC SKIN ERUPTION: Primary | ICD-10-CM

## 2021-09-21 LAB
ALBUMIN SERPL-MCNC: 4.4 G/DL (ref 3.5–4.6)
ALP BLD-CCNC: 72 U/L (ref 40–130)
ALT SERPL-CCNC: 22 U/L (ref 0–33)
ANION GAP SERPL CALCULATED.3IONS-SCNC: 16 MEQ/L (ref 9–15)
AST SERPL-CCNC: 16 U/L (ref 0–35)
BASOPHILS ABSOLUTE: 0 K/UL (ref 0–0.2)
BASOPHILS RELATIVE PERCENT: 0.5 %
BILIRUB SERPL-MCNC: 0.3 MG/DL (ref 0.2–0.7)
BUN BLDV-MCNC: 6 MG/DL (ref 6–20)
CALCIUM SERPL-MCNC: 8.7 MG/DL (ref 8.5–9.9)
CHLORIDE BLD-SCNC: 99 MEQ/L (ref 95–107)
CO2: 21 MEQ/L (ref 20–31)
CREAT SERPL-MCNC: 0.51 MG/DL (ref 0.5–0.9)
EOSINOPHILS ABSOLUTE: 0.5 K/UL (ref 0–0.7)
EOSINOPHILS RELATIVE PERCENT: 8 %
GFR AFRICAN AMERICAN: >60
GFR NON-AFRICAN AMERICAN: >60
GLOBULIN: 2.4 G/DL (ref 2.3–3.5)
GLUCOSE BLD-MCNC: 293 MG/DL (ref 70–99)
HCT VFR BLD CALC: 43.6 % (ref 37–47)
HEMOGLOBIN: 15.1 G/DL (ref 12–16)
LACTIC ACID: 2.1 MMOL/L (ref 0.5–2.2)
LYMPHOCYTES ABSOLUTE: 1.5 K/UL (ref 1–4.8)
LYMPHOCYTES RELATIVE PERCENT: 23.5 %
MCH RBC QN AUTO: 30.8 PG (ref 27–31.3)
MCHC RBC AUTO-ENTMCNC: 34.7 % (ref 33–37)
MCV RBC AUTO: 88.8 FL (ref 82–100)
MONOCYTES ABSOLUTE: 0.4 K/UL (ref 0.2–0.8)
MONOCYTES RELATIVE PERCENT: 6.1 %
NEUTROPHILS ABSOLUTE: 4.1 K/UL (ref 1.4–6.5)
NEUTROPHILS RELATIVE PERCENT: 61.9 %
PDW BLD-RTO: 13.3 % (ref 11.5–14.5)
PLATELET # BLD: 328 K/UL (ref 130–400)
POTASSIUM SERPL-SCNC: 3.5 MEQ/L (ref 3.4–4.9)
PROCALCITONIN: 0.06 NG/ML (ref 0–0.15)
RBC # BLD: 4.9 M/UL (ref 4.2–5.4)
SEDIMENTATION RATE, ERYTHROCYTE: 8 MM (ref 0–20)
SODIUM BLD-SCNC: 136 MEQ/L (ref 135–144)
TOTAL PROTEIN: 6.8 G/DL (ref 6.3–8)
WBC # BLD: 6.6 K/UL (ref 4.8–10.8)

## 2021-09-21 PROCEDURE — 36415 COLL VENOUS BLD VENIPUNCTURE: CPT

## 2021-09-21 PROCEDURE — 85025 COMPLETE CBC W/AUTO DIFF WBC: CPT

## 2021-09-21 PROCEDURE — 6370000000 HC RX 637 (ALT 250 FOR IP): Performed by: STUDENT IN AN ORGANIZED HEALTH CARE EDUCATION/TRAINING PROGRAM

## 2021-09-21 PROCEDURE — 84145 PROCALCITONIN (PCT): CPT

## 2021-09-21 PROCEDURE — 85652 RBC SED RATE AUTOMATED: CPT

## 2021-09-21 PROCEDURE — 80053 COMPREHEN METABOLIC PANEL: CPT

## 2021-09-21 PROCEDURE — 87040 BLOOD CULTURE FOR BACTERIA: CPT

## 2021-09-21 PROCEDURE — 83605 ASSAY OF LACTIC ACID: CPT

## 2021-09-21 PROCEDURE — 99284 EMERGENCY DEPT VISIT MOD MDM: CPT

## 2021-09-21 RX ORDER — IBUPROFEN 400 MG/1
400 TABLET ORAL ONCE
Status: COMPLETED | OUTPATIENT
Start: 2021-09-21 | End: 2021-09-21

## 2021-09-21 RX ORDER — DIPHENHYDRAMINE HCL 25 MG
25 TABLET ORAL ONCE
Status: COMPLETED | OUTPATIENT
Start: 2021-09-21 | End: 2021-09-21

## 2021-09-21 RX ORDER — IBUPROFEN 400 MG/1
400 TABLET ORAL EVERY 6 HOURS PRN
Qty: 120 TABLET | Refills: 0 | Status: SHIPPED | OUTPATIENT
Start: 2021-09-21 | End: 2022-06-08

## 2021-09-21 RX ORDER — DIPHENHYDRAMINE HCL 25 MG
25 CAPSULE ORAL EVERY 6 HOURS PRN
Qty: 90 CAPSULE | Refills: 0 | Status: SHIPPED | OUTPATIENT
Start: 2021-09-21 | End: 2021-10-21

## 2021-09-21 RX ORDER — ACETAMINOPHEN 500 MG
1000 TABLET ORAL ONCE
Status: COMPLETED | OUTPATIENT
Start: 2021-09-21 | End: 2021-09-21

## 2021-09-21 RX ORDER — ACETAMINOPHEN 500 MG
500 TABLET ORAL 4 TIMES DAILY PRN
Qty: 120 TABLET | Refills: 0 | Status: SHIPPED | OUTPATIENT
Start: 2021-09-21 | End: 2021-09-22 | Stop reason: SDUPTHER

## 2021-09-21 RX ADMIN — IBUPROFEN 400 MG: 400 TABLET, FILM COATED ORAL at 22:37

## 2021-09-21 RX ADMIN — DIPHENHYDRAMINE HCL 25 MG: 25 TABLET ORAL at 22:37

## 2021-09-21 RX ADMIN — ACETAMINOPHEN 1000 MG: 500 TABLET ORAL at 22:37

## 2021-09-21 ASSESSMENT — PAIN DESCRIPTION - ORIENTATION: ORIENTATION: RIGHT

## 2021-09-21 ASSESSMENT — PAIN DESCRIPTION - LOCATION: LOCATION: ARM

## 2021-09-21 ASSESSMENT — PAIN SCALES - GENERAL
PAINLEVEL_OUTOF10: 8
PAINLEVEL_OUTOF10: 8

## 2021-09-21 NOTE — TELEPHONE ENCOUNTER
Pt aware that pathology results have not been read yet by Dr. eJnnie Denson.      Pt aware we will call once results have been released

## 2021-09-21 NOTE — TELEPHONE ENCOUNTER
Spoke with pt. Pt states she does not understand how the first culture was swabbed on dry skin and the second culture was swabbed in a open wound and they both came back positive. Wants to know does she have staph infection in and on her whole body. She just got over infection on her toe and that antibiotic should have cleared it up and she was cleared by the doctor. States she wants to know what to do now and what is going on. Something is wrong with her body. States she is unable to do things with her hands. I advised pt if she feels like she is worsening or unable to do things she can go to hospital for IV. Advised pt that culture showed that bactrim is the proper antibiotic for this specific infection. Also advised pt that biopsy is not back yet.

## 2021-09-21 NOTE — TELEPHONE ENCOUNTER
----- Message from Ami Zavaleta sent at 9/20/2021  1:16 PM EDT -----  Subject: Results Request    QUESTIONS  Which lab or imaging result is the patient calling about? Biopsy on arm  Which provider ordered the test? Wilber Kaiser   At what location was the test performed? Date the test was performed? 2021-09-16  Additional Information for Provider? Urgent; Pt had a biopsy done on her   arm; it has gotten infected; she went to [facility] walk-in clinic and was told   she had an infection; she also states her hard are in excruciating pain;   skin condition is spreading; pt would like to be seen tomorrow after 2pm.   ---------------------------------------------------------------------------  --------------  SeeMore Interactive  What is the best way for the office to contact you? Do not leave any   message, patient will call back for answer  Preferred Call Back Phone Number?  [phone]

## 2021-09-21 NOTE — TELEPHONE ENCOUNTER
The culture demonstrated a bacterial infection under the skin. She was put on the proper antibiotic.

## 2021-09-22 ENCOUNTER — TELEPHONE (OUTPATIENT)
Dept: FAMILY MEDICINE CLINIC | Age: 41
End: 2021-09-22

## 2021-09-22 DIAGNOSIS — L30.9 CHRONIC ECZEMA: ICD-10-CM

## 2021-09-22 DIAGNOSIS — A49.01 MSSA INFECTION, NON-INVASIVE: Primary | ICD-10-CM

## 2021-09-22 DIAGNOSIS — L30.9 DERMATITIS: Primary | ICD-10-CM

## 2021-09-22 RX ORDER — SULFAMETHOXAZOLE AND TRIMETHOPRIM 800; 160 MG/1; MG/1
1 TABLET ORAL 2 TIMES DAILY
Qty: 20 TABLET | Refills: 0 | Status: SHIPPED | OUTPATIENT
Start: 2021-09-22 | End: 2021-10-02

## 2021-09-22 RX ORDER — ACETAMINOPHEN 500 MG
500 TABLET ORAL 4 TIMES DAILY PRN
Qty: 120 TABLET | Refills: 0 | Status: SHIPPED | OUTPATIENT
Start: 2021-09-22

## 2021-09-22 ASSESSMENT — ENCOUNTER SYMPTOMS
SHORTNESS OF BREATH: 0
SORE THROAT: 0
VOMITING: 0
ABDOMINAL PAIN: 0
EYE PAIN: 0
COUGH: 0
NAUSEA: 0
BACK PAIN: 0
DIARRHEA: 0
RHINORRHEA: 0

## 2021-09-22 NOTE — TELEPHONE ENCOUNTER
JOHN    Spoke to pt. Pt will still be coming here on the 30th to hopefully discuss pathology report but also requesting referral.  unknown until they receive referral and are able to schedule pt.

## 2021-09-22 NOTE — ED PROVIDER NOTES
[address] ED  eMERGENCY dEPARTMENT eNCOUnter      Pt Name: Poornima Pineda  MRN: [id number]  [place] 1980  Date of evaluation: 9/21/2021  Provider: Katharine Eubanks MD        HISTORY OF PRESENT ILLNESS      The history is provided by the Patient. Poornima Pineda is a 37 y.o. female with a PMH clinically significant for HLD, Obesity, DMII, pSVT and eczema presenting to the ED via PV c/o worsening rash to her face, trunk, upper and lower extremities with initial onset several weeks ago, worsening over the past 3 to 4 days associated with decreased sleep, anxiety. Characterizes rash as painful, itching, dry and severe. States no exposures to any chemicals, new foods or medications prior to onset of symptoms. Denies any associated fevers, chills, decreased p.o. intake, chest pain, SOP, cough, abdominal pain, N/V/D/C or urinary symptoms. Improved with nothing. States she has been evaluated by dermatology and more recently evaluated by urgent care. Has received multiple medications from both and is unsure what to use. Also concerned that she might have a staph infection due to recent cultures completed by the urgent care facility. States she has tried medications for eczema, steroid creams and multiple antibiotics without any relief. Does not know what to do for this rash. States biopsy results have not yet been completed by dermatology and states she is unsure how long she can wait for the appropriate treatment. States no history of similar previous episodes that have been this severe. State intermittent eczema over the past that would spontaneously resolve. States they have otherwise been feeling well. States she did not eat today due to her anxiety and that she did not take her insulin today due to this. States they will not give her systemic steroids due to her hyperglycemia. This recent family medicine note on 9/17 appreciated.   Evaluated for right forearm pain and wound evaluation, recently status post biopsy by dermatology there. Was started on fluconazole for rash initially, then instructed to use a steroid cream instead. PA noting brown drainage in the wound. Started patient on Bactrim and Bactroban.     PAST MEDICAL HISTORY     Past Medical History:   Diagnosis Date    Anemia during pregnancy 2018    Eczema     Herpes simplex infection of skin     on neck    Hypercholesterolemia     Irregular heart beat     runs of v tach in the past    Obesity     Pregnancy induced hypertension     Type II or unspecified type diabetes mellitus without mention of complication, not stated as uncontrolled     type 2       SURGICAL HISTORY       Past Surgical History:   Procedure Laterality Date    ABDOMEN SURGERY       x 2     SECTION         FAMILY HISTORY       Family History   Problem Relation Age of Onset    Arthritis Mother     Diabetes Mother     Early Death Father     Heart Attack Father     Heart Disease Father     High Blood Pressure Father     High Cholesterol Father     Diabetes Father     Diabetes Paternal Grandfather     Heart Disease Paternal Grandfather     Birth Defects Son     Arthritis Sister     Cancer Neg Hx        SOCIAL HISTORY       Social History     Socioeconomic History    Marital status: Single     Spouse name: None    Number of children: None    Years of education: None    Highest education level: None   Occupational History    None   Tobacco Use    Smoking status: Current Every Day Smoker     Packs/day: 0.25     Years: 23.00     Pack years: 5.75     Last attempt to quit: 8/15/2017     Years since quittin.1    Smokeless tobacco: Never Used    Tobacco comment: started age 13   Substance and Sexual Activity    Alcohol use: No     Comment: socially before pregnancy    Drug use: No    Sexual activity: Not Currently     Partners: Male   Other Topics Concern    None   Social History Narrative    None Take 1 tablet by mouth every 8 hours as needed for Itching, Disp-30 tablet, R-0Normal      Probiotic Acidophilus (FLORANEX) TABS Take 1 tablet by mouth daily, Disp-30 tablet, R-0Print      tacrolimus (PROTOPIC) 0.03 % ointment Apply topically 2 times daily. , Disp-30 g, R-0, Print      Disposable Gloves (COTTON GLOVES MEDIUM) MISC Disp-2 each, R-1, NormalUse nightly after applying ointment      !! Insulin Pen Needle (NOVOFINE) 32G X 6 MM MISC Disp-300 each, R-5, Normalqid      Alcohol Swabs PADS Disp-150 each, R-06, NormalUse qid      Crisaborole 2 % OINT Apply sparingly to the area x BID, Disp-2 Tube,R-0Sample      clobetasol (TEMOVATE) 0.05 % ointment Apply topically 2 times daily. , Disp-60 g,R-1, Normal      FREESTYLE LANCETS MISC DAILY Starting Wed 10/23/2019, Disp-200 each, R-3, Normal      blood glucose test strips (FREESTYLE LITE) strip Disp-200 strip, R-3, NormalUse 6 times a day  Pt is pregnant      acyclovir (ZOVIRAX) 5 % ointment Apply topically 5 times daily Apply topically 5 times daily to affected area for 7 days, Topical, 5 TIMES DAILY Starting Tue 9/3/2019, Disp-15 g, R-0, Normal      clotrimazole-betamethasone (LOTRISONE) 1-0.05 % cream Apply topically 2 times daily. , Disp-1 Tube, R-1, Normal      miconazole (MICONAZOLE ANTIFUNGAL) 2 % cream Apply topically 2 times daily. , Disp-1 Tube, R-1, Normal      !! Insulin Pen Needle (BD PEN NEEDLE EMRE U/F) 32G X 4 MM MISC Disp-200 each, R-03, NormalUse 3 times a day        !! - Potential duplicate medications found. Please discuss with provider. ALLERGIES     Tape Alric Sicks tape]    REVIEW OF SYSTEMS       Review of Systems   Constitutional: Negative for chills and fever. HENT: Negative for rhinorrhea and sore throat. Eyes: Negative for pain and visual disturbance. Respiratory: Negative for cough and shortness of breath. Cardiovascular: Negative for chest pain and palpitations.    Gastrointestinal: Negative for abdominal pain, diarrhea, nausea and vomiting. Genitourinary: Negative for difficulty urinating and dysuria. Musculoskeletal: Positive for myalgias. Negative for back pain and neck pain. Skin: Positive for rash and wound. Neurological: Negative for weakness, numbness and headaches. Psychiatric/Behavioral: Positive for sleep disturbance. The patient is nervous/anxious. PHYSICAL EXAM       ED Triage Vitals [09/21/21 1902]   BP Temp Temp src Pulse Resp SpO2 Height Weight   (!) 179/105 98.7 °F (37.1 °C) -- 119 16 99 % -- --       Physical Exam  Vitals and nursing note reviewed. Constitutional:       General: She is not in acute distress. Appearance: She is not ill-appearing, toxic-appearing or diaphoretic. HENT:      Head: Normocephalic and atraumatic. Mouth/Throat:      Mouth: Mucous membranes are moist. No injury or oral lesions. Dentition: Normal dentition. No gum lesions. Tongue: No lesions. Palate: No lesions. Pharynx: Oropharynx is clear. No oropharyngeal exudate or posterior oropharyngeal erythema. Eyes:      Extraocular Movements: Extraocular movements intact. Conjunctiva/sclera: Conjunctivae normal.      Pupils: Pupils are equal, round, and reactive to light. Cardiovascular:      Rate and Rhythm: Normal rate and regular rhythm. Pulses: Normal pulses. Heart sounds: Normal heart sounds. Pulmonary:      Effort: Pulmonary effort is normal. No respiratory distress. Breath sounds: Normal breath sounds. Abdominal:      General: There is no distension. Palpations: Abdomen is soft. Tenderness: There is no abdominal tenderness. There is no guarding or rebound. Musculoskeletal:         General: No tenderness. Cervical back: Normal range of motion and neck supple. Right lower leg: No edema. Left lower leg: No edema. Skin:     General: Skin is warm and dry. Capillary Refill: Capillary refill takes less than 2 seconds.       Findings: Erythema, rash and wound present. Rash is crusting, macular and scaling. Comments: Scattered erythematous, macular, dry, crusting rash with scattered skin breaks noted over the bilateral upper extremities, worse over the palms and dorsal aspects of the hands, bilateral forearms and bilateral thighs. Also noted less severely over the face and trunk. Punch biopsy wounds with overlying granulation tissue noted over the right volar forearm without significant surrounding erythema, induration or purulent discharge. Also without surrounding warmth. Healing punch biopsy wounds also noted to the right anterior thigh and right thoracic back. Neurological:      General: No focal deficit present. Mental Status: She is alert and oriented to person, place, and time. Psychiatric:         Mood and Affect: Mood is anxious. Affect is tearful. Behavior: Behavior normal.         MDM:   Chart Reviewed: PMH and additional information as noted in HPI obtained from chart review    Vitals:    Vitals:    09/21/21 1902 09/21/21 2130   BP: (!) 179/105 93/66   Pulse: 119 94   Resp: 16 20   Temp: 98.7 °F (37.1 °C)    SpO2: 99% 98%       PROCEDURES:  Unless otherwise noted below, none  Procedures    LABS:  Labs Reviewed   COMPREHENSIVE METABOLIC PANEL - Abnormal; Notable for the following components:       Result Value    Anion Gap 16 (*)     Glucose 293 (*)     All other components within normal limits   CULTURE, BLOOD 1   CULTURE, BLOOD 2   CBC WITH AUTO DIFFERENTIAL   LACTIC ACID, PLASMA   SEDIMENTATION RATE   PROCALCITONIN       ED Course as of Sep 22 1044   Tue Sep 21, 2021   2131 Hyperglycemic without evidence of DKA. Normal bicarb. CMP otherwise unremarkable. Glucose(!): 293 [NA]   2132 WNL   Sed Rate: 8 [NA]   2132 WNL   Procalcitonin: 0.06 [NA]   2132 WNL, lower suspicion for hypoperfusion. Lactic Acid: 2.1 [NA]   2132 Unremarkable.    CBC Auto Differential:    WBC 6.6   RBC 4.90   Hemoglobin Quant 15.1 Hematocrit 43.6   MCV 88.8   MCH 30.8   MCHC 34.7   RDW 13.3   Platelet Count 209   Neutrophils % 61.9   Lymphocyte % 23.5   Monocytes % 6.1   Eosinophils % 8.0   Basophils % 0.5   Neutrophils Absolute 4.1   Lymphocytes Absolute 1.5   Monocytes Absolute 0.4   Eosinophils Absolute 0.5   Basophils Absolute 0.0 [NA]      ED Course User Index  [NA] Mary Warren MD       37 y.o. female with a PMH clinically significant for HLD, Obesity, DMII, pSVT and eczema presenting to the ED via PV c/o worsening rash to her face, trunk, upper and lower extremities with initial onset several weeks ago, worsening over the past 3 to 4 days associated with decreased sleep, anxiety. Upon initial evaluation, Pt Afebrile, HDS and in NAD. PE as noted above. Given findings, clinical presentation most likely consistent w/ nonspecific skin eruption with multiple different cutaneous surfaces including the palms of the hands, upper and lower extremities trunk and face. No involvement of the mucosal membranes. Given dry appearance and cracking skin, symptoms thought possibly secondary to eczema, but dermatology noting concern for possible fungal infection which would also be reasonable given several scattered satellite lesions noted on the back. No evidence of overtly infected wounds at this time. Patient also on Bactrim for any skin wounds at this time. Encouraged patient to restart taking Diflucan as directed by dermatology and to follow-up with dermatology at the first available appointment. Administered acetaminophen and ibuprofen in addition to Benadryl for symptomatic relief in the ED. Although considered administering systemic steroids, patient with hyperglycemia in the ED. No evidence of DKA, but concern for worsening of hyperglycemia in setting of steroids.   Although unable to aid the patient further in the ED, strongly encouraged the patient to follow-up with biopsy results and dermatology which will ultimately allow for directed treatment. Patient was understanding and amenable to the plan of care. Pt was administered   Medications   diphenhydrAMINE (BENADRYL) tablet 25 mg (25 mg Oral Given 9/21/21 2237)   acetaminophen (TYLENOL) tablet 1,000 mg (1,000 mg Oral Given 9/21/21 2237)   ibuprofen (ADVIL;MOTRIN) tablet 400 mg (400 mg Oral Given 9/21/21 2237)       Plan: Discharge home in good condition with meds as noted below and instructions to follow up with PCP and Dermatology. Pt stable and appropriate for further evaluation and management as an outpatient. and Patient understanding and amenable to the POC. CRITICAL CARE TIME   Total CriticalCare time was 0 minutes, excluding separately reportable procedures. There was a high probability of clinically significant/life threatening deterioration in the patient's condition which required my urgent intervention. FINAL IMPRESSION      1. Rash and other nonspecific skin eruption    2. Open wound    3. Hyperglycemia due to diabetes mellitus ([facility] [address]    4.  Anxiety state          DISPOSITION/PLAN   DISPOSITION Decision To Discharge 09/21/2021 10:41:28 PM      Discharge Medication List as of 9/21/2021 10:33 PM      START taking these medications    Details   acetaminophen (TYLENOL) 500 MG tablet Take 1 tablet by mouth 4 times daily as needed for Pain, Disp-120 tablet, R-0Normal      ibuprofen (IBU) 400 MG tablet Take 1 tablet by mouth every 6 hours as needed for Pain, Disp-120 tablet, R-0Normal      diphenhydrAMINE (BENADRYL) 25 MG capsule Take 1 capsule by mouth every 6 hours as needed for Itching, Disp-90 capsule, R-0Normal              MD Michelle Oakes MD  09/22/21 [id number]

## 2021-09-22 NOTE — TELEPHONE ENCOUNTER
----- Message from Enon sent at 9/22/2021  3:05 PM EDT -----  Subject: Referral Request    QUESTIONS   Reason for referral request? Patient is needing a referral to a   Dermatologist. [address] [place], [place] [address]Jamie phone number   [phone] Fax [phone]   Has the physician seen you for this condition before? No   Preferred Specialist (if applicable)? Do you already have an appointment scheduled? No  Additional Information for Provider?   ---------------------------------------------------------------------------  --------------  CALL BACK INFO  What is the best way for the office to contact you? OK to leave message on   voicemail  Preferred Call Back Phone Number?  [phone]

## 2021-09-22 NOTE — ED TRIAGE NOTES
Pt presents to ED with diffuse rash covering back, chest, b/l UE, b/l LE, hands and feet. Open wound on RUE; + purulent drainage. Pt c/o pain and \"itching everywhere\". Pt seen by PCP \"this week\"; put on bactrim and steroids. Pt cannot recall when rash began, or when it started to get worse. Pt high anxiety; poor historian.

## 2021-09-27 LAB
BLOOD CULTURE, ROUTINE: NORMAL
CULTURE, BLOOD 2: NORMAL

## 2021-09-29 ENCOUNTER — TELEPHONE (OUTPATIENT)
Dept: FAMILY MEDICINE CLINIC | Age: 41
End: 2021-09-29

## 2021-09-29 NOTE — TELEPHONE ENCOUNTER
Dr. Sade Constantino's office calling  [facility] calling    States they received a referral. They spoke with patient who shared she had punch biopsies and a pathology lab as well. [facility] requesting test results.      Faxing over [phone]  Attn: Gearl Heimlich

## 2021-09-30 ENCOUNTER — OFFICE VISIT (OUTPATIENT)
Dept: FAMILY MEDICINE CLINIC | Age: 41
End: 2021-09-30
Payer: COMMERCIAL

## 2021-09-30 VITALS — HEART RATE: 80 BPM | OXYGEN SATURATION: 98 % | TEMPERATURE: 96.2 F

## 2021-09-30 DIAGNOSIS — L30.9 CHRONIC ECZEMA: ICD-10-CM

## 2021-09-30 DIAGNOSIS — L30.9 DERMATITIS: Primary | ICD-10-CM

## 2021-09-30 PROCEDURE — 4004F PT TOBACCO SCREEN RCVD TLK: CPT | Performed by: FAMILY MEDICINE

## 2021-09-30 PROCEDURE — G8427 DOCREV CUR MEDS BY ELIG CLIN: HCPCS | Performed by: FAMILY MEDICINE

## 2021-09-30 PROCEDURE — G8417 CALC BMI ABV UP PARAM F/U: HCPCS | Performed by: FAMILY MEDICINE

## 2021-09-30 PROCEDURE — 99214 OFFICE O/P EST MOD 30 MIN: CPT | Performed by: FAMILY MEDICINE

## 2021-09-30 RX ORDER — HYDROXYZINE HYDROCHLORIDE 25 MG/1
25 TABLET, FILM COATED ORAL EVERY 8 HOURS PRN
Qty: 90 TABLET | Refills: 0 | Status: SHIPPED | OUTPATIENT
Start: 2021-09-30 | End: 2021-10-30

## 2021-09-30 NOTE — PROGRESS NOTES
Diagnosis Orders   1. Dermatitis  Allergen, Food, Comprehensive Profile 1    hydrOXYzine (ATARAX) 25 MG tablet   2. Chronic eczema  Allergen, Food, Comprehensive Profile 1    hydrOXYzine (ATARAX) 25 MG tablet     Return for with specialist.  Patient Instructions   Discussed the culture results and that she is on appropriate antibiotics for this. Blood cultures were all negative so that is not the source. Educated patient on skin care products and the use of Atarax to control itch only. Will not change the skin. Copy of the pathology was given to the patient and faxed to dermatology. Food allergy testing as a possible source of internal stimulus for the eczema. Subjective:      Patient ID: Teofilo Edwards is a 37 y.o. female who presents for:  Chief Complaint   Patient presents with    2 [id number]       Patient here to review pathology which shows spongiotic dermatitis. This will be referred and faxed to the dermatology office for consult but she also has been given a copy of this. Her hands are improving her symptoms are improving but she still having a lot of itching. Less swelling. Has not figured out the trigger yet.       Current Outpatient Medications on File Prior to Visit   Medication Sig Dispense Refill    acetaminophen (TYLENOL) 500 MG tablet Take 1 tablet by mouth 4 times daily as needed for Pain 120 tablet 0    ibuprofen (IBU) 400 MG tablet Take 1 tablet by mouth every 6 hours as needed for Pain 120 tablet 0    diphenhydrAMINE (BENADRYL) 25 MG capsule Take 1 capsule by mouth every 6 hours as needed for Itching 90 capsule 0    LANTUS SOLOSTAR 100 UNIT/ML injection pen Inject 70 units nightly 15 pen 3    insulin lispro, 1 Unit Dial, (HUMALOG KWIKPEN) 100 UNIT/ML SOPN 20-24  units at each meals 15 pen 3    predniSONE (DELTASONE) 5 MG tablet Take 1 tablet by mouth daily 30 tablet 1    triamcinolone (KENALOG) 0.1 % ointment Apply 2 times daily to affected areas 80 g 0    tacrolimus (PROTOPIC) 0.03 % ointment Apply topically 2 times daily. 30 g 0    Disposable Gloves (COTTON GLOVES MEDIUM) MISC Use nightly after applying ointment 2 each 1    Insulin Pen Needle (NOVOFINE) 32G X 6 MM MISC qid 300 each 5    Alcohol Swabs PADS Use qid 150 each 06    Crisaborole 2 % OINT Apply sparingly to the area x BID 2 Tube 0    clobetasol (TEMOVATE) 0.05 % ointment Apply topically 2 times daily. 60 g 1    FREESTYLE LANCETS MISC 1 each by Does not apply route daily 200 each 3    blood glucose test strips (FREESTYLE LITE) strip Use 6 times a day   Pt is pregnant 200 strip 3    acyclovir (ZOVIRAX) 5 % ointment Apply topically 5 times daily Apply topically 5 times daily to affected area for 7 days 15 g 0    clotrimazole-betamethasone (LOTRISONE) 1-0.05 % cream Apply topically 2 times daily. 1 Tube 1    miconazole (MICONAZOLE ANTIFUNGAL) 2 % cream Apply topically 2 times daily. 1 Tube 1    Insulin Pen Needle (BD PEN NEEDLE EMRE U/F) 32G X 4 MM MISC Use 3 times a day 200 each 03     No current facility-administered medications on file prior to visit.      Past Medical History:   Diagnosis Date    Anemia during pregnancy 2018    Eczema     Herpes simplex infection of skin     on neck    Hypercholesterolemia     Irregular heart beat     runs of v tach in the past    Obesity     Pregnancy induced hypertension     Type II or unspecified type diabetes mellitus without mention of complication, not stated as uncontrolled     type 2     Past Surgical History:   Procedure Laterality Date    ABDOMEN SURGERY       x 2     SECTION       Social History     Socioeconomic History    Marital status: Single     Spouse name: Not on file    Number of children: Not on file    Years of education: Not on file    Highest education level: Not on file   Occupational History    Not on file   Tobacco Use    Smoking status: Current Every Day Smoker Allergic/Immunologic: Negative for environmental allergies, food allergies and immunocompromised state. Hematological: Negative for adenopathy. Does not bruise/bleed easily. Psychiatric/Behavioral: Negative for behavioral problems and sleep disturbance. Objective:   Pulse 80   Temp 96.2 °F (35.7 °C)   SpO2 98%     Physical Exam  Constitutional:       General: She is not in acute distress. Appearance: Normal appearance. She is well-developed. She is not toxic-appearing. HENT:      Head: Normocephalic and atraumatic. Right Ear: Hearing and tympanic membrane normal.      Left Ear: Hearing and tympanic membrane normal.      Nose: Nose normal. No nasal deformity. Eyes:      General: Lids are normal.         Right eye: No discharge. Left eye: No discharge. Conjunctiva/sclera: Conjunctivae normal.      Pupils: Pupils are equal, round, and reactive to light. Neck:      Thyroid: No thyroid mass or thyromegaly. Vascular: No JVD. Trachea: Trachea and phonation normal.   Cardiovascular:      Rate and Rhythm: Normal rate and regular rhythm. Pulmonary:      Effort: No accessory muscle usage or respiratory distress. Musculoskeletal:      Cervical back: Full passive range of motion without pain. Comments: FROM all large muscle groups and joints as witnessed when walking to exam table, getting on, and getting off the exam table. Skin:     General: Skin is warm and dry. Findings: No rash. Comments: Decreased edema and erythema bilateral hands. Continued flaking. Neurological:      Mental Status: She is alert. Motor: No tremor or atrophy. Gait: Gait normal.   Psychiatric:         Speech: Speech normal.         Behavior: Behavior normal.         Thought Content: Thought content normal.           No results found for this visit on 09/30/21.     Recent Results (from the past 2016 hour(s))   Culture, Wound    Collection Time: 08/25/21  6:50 PM Specimen: Wound   Result Value Ref Range    Gram Stain Result Few WBC's  Moderate Gram positive cocci   (A)     Organism Enterococcus faecalis (A)     WOUND/ABSCESS Heavy growth     Organism Staph aureus MSSA (A)     WOUND/ABSCESS Moderate growth  PBP2= Negative       Organism Enterobacter cloacae complex (A)     WOUND/ABSCESS Moderate growth     Organism Kayla beckerei (A)     WOUND/ABSCESS       Moderate growth  Testing performed by [facility]. See [facility] test for Susceptibility results.          Susceptibility    Enterococcus  faecalis - BACTERIAL SUSCEPTIBILITY PANEL BY ANGELINA     ampicillin <=2 Sensitive mcg/mL     gentamicin-syn SYN-R Resistant mcg/mL     streptomycin-syn SYN-S Sensitive mcg/mL     vancomycin 1 Sensitive mcg/mL    Enterobacter cloacae complex - BACTERIAL SUSCEPTIBILITY PANEL BY ANGELINA     amoxicillin-clavulanate >=32 Resistant mcg/mL     ceFAZolin >=64 Resistant mcg/mL     cefTRIAXone <=1 Sensitive mcg/mL     ciprofloxacin <=0.25 Sensitive mcg/mL     gentamicin <=1 Sensitive mcg/mL     trimethoprim-sulfamethoxazole <=20 Sensitive mcg/mL    Staph aureus mssa - BACTERIAL SUSCEPTIBILITY PANEL BY ANGELINA     ceFAZolin  Sensitive mcg/mL     cefTRIAXone  Sensitive mcg/mL     clindamycin <=0.12 Resistant mcg/mL     gentamicin <=0.5 Sensitive mcg/mL     oxacillin 0.5 Sensitive mcg/mL     trimethoprim-sulfamethoxazole <=10 Sensitive mcg/mL   Comprehensive Metabolic Panel    Collection Time: 08/27/21  6:30 PM   Result Value Ref Range    Sodium 138 135 - 144 mEq/L    Potassium 3.6 3.4 - 4.9 mEq/L    Chloride 104 95 - 107 mEq/L    CO2 23 20 - 31 mEq/L    Anion Gap 11 9 - 15 mEq/L    Glucose 250 (H) 70 - 99 mg/dL    BUN 4 (L) 6 - 20 mg/dL    CREATININE 0.48 (L) 0.50 - 0.90 mg/dL    GFR Non-African American >60.0 >60    GFR  >60.0 >60    Calcium 8.7 8.5 - 9.9 mg/dL    Total Protein 6.6 6.3 - 8.0 g/dL    Albumin 3.9 3.5 - 4.6 g/dL    Total Bilirubin 0.4 0.2 - 0.7 mg/dL Alkaline Phosphatase 62 40 - 130 U/L    ALT 22 0 - 33 U/L    AST 20 0 - 35 U/L    Globulin 2.7 2.3 - 3.5 g/dL   CBC Auto Differential    Collection Time: 08/27/21  6:30 PM   Result Value Ref Range    WBC 6.3 4.8 - 10.8 K/uL    RBC 4.43 4.20 - 5.40 M/uL    Hemoglobin 14.1 12.0 - 16.0 g/dL    Hematocrit 39.0 37.0 - 47.0 %    MCV 88.0 82.0 - 100.0 fL    MCH 31.7 (H) 27.0 - 31.3 pg    MCHC 36.1 33.0 - 37.0 %    RDW 13.2 11.5 - 14.5 %    Platelets 931 472 - 284 K/uL    Neutrophils % 60.4 %    Lymphocytes % 28.2 %    Monocytes % 5.6 %    Eosinophils % 5.1 %    Basophils % 0.7 %    Neutrophils Absolute 3.8 1.4 - 6.5 K/uL    Lymphocytes Absolute 1.8 1.0 - 4.8 K/uL    Monocytes Absolute 0.4 0.2 - 0.8 K/uL    Eosinophils Absolute 0.3 0.0 - 0.7 K/uL    Basophils Absolute 0.0 0.0 - 0.2 K/uL   Procalcitonin    Collection Time: 08/27/21  6:30 PM   Result Value Ref Range    Procalcitonin 0.07 0.00 - 0.15 ng/mL   Lactic Acid, Plasma    Collection Time: 08/27/21  6:30 PM   Result Value Ref Range    Lactic Acid 1.8 0.5 - 2.2 mmol/L   C-Reactive Protein    Collection Time: 08/27/21  6:30 PM   Result Value Ref Range    CRP 17.3 (H) 0.0 - 5.0 mg/L   Culture, Blood 1    Collection Time: 08/27/21  7:03 PM    Specimen: Blood   Result Value Ref Range    Blood Culture, Routine No growth after 5 days of incubation. Sedimentation Rate    Collection Time: 08/27/21  7:03 PM   Result Value Ref Range    Sed Rate 21 (H) 0 - 20 mm   Culture, Blood 2    Collection Time: 08/27/21  7:23 PM    Specimen: Blood   Result Value Ref Range    Culture, Blood 2 No growth after 5 days of incubation.     POCT Glucose    Collection Time: 08/27/21  7:50 PM   Result Value Ref Range    Glucose 194 mg/dL    QC OK? ok    POCT Glucose    Collection Time: 08/27/21  7:50 PM   Result Value Ref Range    POC Glucose 194 (H) 60 - 115 mg/dl    Performed on ACCU-CHEK    MISC [facility] 1    Collection Time: 08/29/21 11:47 AM   Result Value Ref Range    [facility] Prompt 65,070 Misc Test Order SEE NOTE    Basic Metabolic Panel    Collection Time: 09/02/21  2:55 PM   Result Value Ref Range    Sodium 137 135 - 144 mEq/L    Potassium 3.8 3.4 - 4.9 mEq/L    Chloride 100 95 - 107 mEq/L    CO2 23 20 - 31 mEq/L    Anion Gap 14 9 - 15 mEq/L    Glucose 260 (H) 70 - 99 mg/dL    BUN 8 6 - 20 mg/dL    CREATININE 0.46 (L) 0.50 - 0.90 mg/dL    GFR Non-African American >60.0 >60    GFR  >60.0 >60    Calcium 8.8 8.5 - 9.9 mg/dL   Hemoglobin A1C    Collection Time: 09/02/21  2:55 PM   Result Value Ref Range    Hemoglobin A1C 9.8 (H) 4.8 - 5.9 %   Culture, Anaerobic and Aerobic    Collection Time: 09/16/21 10:57 AM    Specimen: Leg   Result Value Ref Range    Gram Stain Result (A)      No WBC's  Rare epithelial cells  Few Gram positive cocci  Rare Gram negative rods      Anaerobic Culture No Anaerobes Isolated     Organism Staph aureus MSSA (A)     WOUND/ABSCESS       Heavy growth  PBP2= Negative  This isolate is presumed to be resistant based on the  detection of inducible Clindamycin resistance. Clindamycin  may still be effective in some patients.       Organism Pseudomonas fluorescens (A)     WOUND/ABSCESS Light growth        Susceptibility    Pseudomonas fluorescens - BACTERIAL SUSCEPTIBILITY PANEL BY ANGELINA     cefepime <=1 Sensitive mcg/mL     ciprofloxacin <=0.25 Sensitive mcg/mL     gentamicin <=1 Sensitive mcg/mL     imipenem <=0.25 Sensitive mcg/mL     piperacillin-tazobactam 8 Sensitive mcg/mL     tobramycin <=1 Sensitive mcg/mL    Staph aureus mssa - BACTERIAL SUSCEPTIBILITY PANEL BY ANGELINA     ceFAZolin  Sensitive mcg/mL     cefTRIAXone  Sensitive mcg/mL     clindamycin 0.25 Resistant mcg/mL     gentamicin <=0.5 Sensitive mcg/mL     oxacillin 0.5 Sensitive mcg/mL     trimethoprim-sulfamethoxazole <=10 Sensitive mcg/mL   Culture, Anaerobic and Aerobic    Collection Time: 09/16/21 11:08 AM    Specimen: Arm   Result Value Ref Range    Gram Stain Result (A)      No WBC's  No epithelial cells  Moderate Gram positive cocci in clusters-resembling Staph      Anaerobic Culture No Anaerobes Isolated     Organism Staph aureus MSSA (A)     WOUND/ABSCESS       Heavy growth  PBP2= Negative  This isolate is presumed to be resistant based on the  detection of inducible Clindamycin resistance. Clindamycin  may still be effective in some patients.          Susceptibility    Staph aureus mssa - BACTERIAL SUSCEPTIBILITY PANEL BY ANGELINA     ceFAZolin  Sensitive mcg/mL     cefTRIAXone  Sensitive mcg/mL     clindamycin 0.25 Resistant mcg/mL     gentamicin <=0.5 Sensitive mcg/mL     oxacillin 0.5 Sensitive mcg/mL     trimethoprim-sulfamethoxazole <=10 Sensitive mcg/mL   Culture, Anaerobic and Aerobic    Collection Time: 09/18/21  9:25 AM    Specimen: Cheek   Result Value Ref Range    Gram Stain Result No WBC's, No organisms seen     Anaerobic Culture No Anaerobes Isolated     Organism Staph aureus MSSA (A)     WOUND/ABSCESS Rare growth  PBP2= Negative          Susceptibility    Staph aureus mssa - BACTERIAL SUSCEPTIBILITY PANEL BY ANGELINA     ceFAZolin  Sensitive mcg/mL     cefTRIAXone  Sensitive mcg/mL     clindamycin 0.25 Resistant mcg/mL     gentamicin <=0.5 Sensitive mcg/mL     oxacillin 0.5 Sensitive mcg/mL     trimethoprim-sulfamethoxazole <=10 Sensitive mcg/mL   CBC Auto Differential    Collection Time: 09/21/21  7:15 PM   Result Value Ref Range    WBC 6.6 4.8 - 10.8 K/uL    RBC 4.90 4.20 - 5.40 M/uL    Hemoglobin 15.1 12.0 - 16.0 g/dL    Hematocrit 43.6 37.0 - 47.0 %    MCV 88.8 82.0 - 100.0 fL    MCH 30.8 27.0 - 31.3 pg    MCHC 34.7 33.0 - 37.0 %    RDW 13.3 11.5 - 14.5 %    Platelets 643 549 - 483 K/uL    Neutrophils % 61.9 %    Lymphocytes % 23.5 %    Monocytes % 6.1 %    Eosinophils % 8.0 %    Basophils % 0.5 %    Neutrophils Absolute 4.1 1.4 - 6.5 K/uL    Lymphocytes Absolute 1.5 1.0 - 4.8 K/uL    Monocytes Absolute 0.4 0.2 - 0.8 K/uL    Eosinophils Absolute 0.5 0.0 - 0.7 K/uL    Basophils Absolute 0.0 0.0 - 0.2 K/uL   Comprehensive Metabolic Panel    Collection Time: 09/21/21  7:15 PM   Result Value Ref Range    Sodium 136 135 - 144 mEq/L    Potassium 3.5 3.4 - 4.9 mEq/L    Chloride 99 95 - 107 mEq/L    CO2 21 20 - 31 mEq/L    Anion Gap 16 (H) 9 - 15 mEq/L    Glucose 293 (H) 70 - 99 mg/dL    BUN 6 6 - 20 mg/dL    CREATININE 0.51 0.50 - 0.90 mg/dL    GFR Non-African American >60.0 >60    GFR  >60.0 >60    Calcium 8.7 8.5 - 9.9 mg/dL    Total Protein 6.8 6.3 - 8.0 g/dL    Albumin 4.4 3.5 - 4.6 g/dL    Total Bilirubin 0.3 0.2 - 0.7 mg/dL    Alkaline Phosphatase 72 40 - 130 U/L    ALT 22 0 - 33 U/L    AST 16 0 - 35 U/L    Globulin 2.4 2.3 - 3.5 g/dL   Lactic Acid, Plasma    Collection Time: 09/21/21  7:15 PM   Result Value Ref Range    Lactic Acid 2.1 0.5 - 2.2 mmol/L   PROCALCITONIN    Collection Time: 09/21/21  7:15 PM   Result Value Ref Range    Procalcitonin 0.06 0.00 - 0.15 ng/mL   Culture, Blood 1    Collection Time: 09/21/21  7:34 PM    Specimen: Blood   Result Value Ref Range    Blood Culture, Routine No growth after 5 days of incubation. Culture, Blood 2    Collection Time: 09/21/21  7:34 PM    Specimen: Blood   Result Value Ref Range    Culture, Blood 2 No growth after 5 days of incubation. Sedimentation Rate    Collection Time: 09/21/21  7:38 PM   Result Value Ref Range    Sed Rate 8 0 - 20 mm   Allergen, Food, Comprehensive Profile 1    Collection Time: 09/30/21  3:35 PM   Result Value Ref Range    IgE 46 IU/mL       [] Pt was seen by provider for      Minutes  Counseling and coordination of care was done for all assessment diagnosis listed for today with patient and any family/friend present. More than 50% of this visit was spent coordinating cuurent care, obtaining information for prior records, and counseling for current plan of action. Assessment:       Diagnosis Orders   1. Dermatitis  Allergen, Food, Comprehensive Profile 1    hydrOXYzine (ATARAX) 25 MG tablet   2. Chronic eczema  Allergen, Food, Comprehensive Profile 1    hydrOXYzine (ATARAX) 25 MG tablet         Orders Placed This Encounter   Procedures    Allergen, Food, Comprehensive Profile 1     Standing Status:   Future     Number of Occurrences:   1     Standing Expiration Date:   9/30/2022       Orders Placed This Encounter   Medications    hydrOXYzine (ATARAX) 25 MG tablet     Sig: Take 1 tablet by mouth every 8 hours as needed for Itching     Dispense:  90 tablet     Refill:  0          Medication List          Accurate as of September 30, 2021 11:59 PM. If you have any questions, ask your nurse or doctor. START taking these medications    hydrOXYzine 25 MG tablet  Commonly known as: ATARAX  Take 1 tablet by mouth every 8 hours as needed for Itching  Started by: Blanca Roa MD        CONTINUE taking these medications    acetaminophen 500 MG tablet  Commonly known as: TYLENOL  Take 1 tablet by mouth 4 times daily as needed for Pain     acyclovir 5 % ointment  Commonly known as: ZOVIRAX  Apply topically 5 times daily Apply topically 5 times daily to affected area for 7 days     Alcohol Swabs Pads  Use qid     blood glucose test strips strip  Commonly known as: FREESTYLE LITE  Use 6 times a day   Pt is pregnant     clobetasol 0.05 % ointment  Commonly known as: TEMOVATE  Apply topically 2 times daily. clotrimazole-betamethasone 1-0.05 % cream  Commonly known as: Lotrisone  Apply topically 2 times daily.      Cotton Gloves Medium Misc  Use nightly after applying ointment     Crisaborole 2 % Oint  Apply sparingly to the area x BID     diphenhydrAMINE 25 MG capsule  Commonly known as: BENADRYL  Take 1 capsule by mouth every 6 hours as needed for Itching     FreeStyle Lancets Misc  1 each by Does not apply route daily     ibuprofen 400 MG tablet  Commonly known as: IBU  Take 1 tablet by mouth every 6 hours as needed for Pain     insulin lispro (1 Unit Dial) 100 UNIT/ML Sopn  Commonly known as: HumaLOG KwikPen  20-24  units at each meals     * Insulin Pen Needle 32G X 4 MM Misc  Commonly known as: BD Pen Needle Brissa U/F  Use 3 times a day     * NovoFine 32G X 6 MM Misc  Generic drug: Insulin Pen Needle  qid     Lantus SoloStar 100 UNIT/ML injection pen  Generic drug: insulin glargine  Inject 70 units nightly     miconazole 2 % cream  Commonly known as: Miconazole Antifungal  Apply topically 2 times daily. predniSONE 5 MG tablet  Commonly known as: DELTASONE  Take 1 tablet by mouth daily     sulfamethoxazole-trimethoprim 800-160 MG per tablet  Commonly known as: BACTRIM DS;SEPTRA DS  Take 1 tablet by mouth 2 times daily for 10 days     tacrolimus 0.03 % ointment  Commonly known as: PROTOPIC  Apply topically 2 times daily. triamcinolone 0.1 % ointment  Commonly known as: KENALOG  Apply 2 times daily to affected areas         * This list has 2 medication(s) that are the same as other medications prescribed for you. Read the directions carefully, and ask your doctor or other care provider to review them with you. Where to Get Your Medications      These medications were sent to [address] [place] [address], [address] [place] [address]  [facility] [address] [place] [address]    Phone: [phone]   · hydrOXYzine 25 MG tablet           Plan:   Return for with specialist.    Patient Instructions   Discussed the culture results and that she is on appropriate antibiotics for this. Blood cultures were all negative so that is not the source. Educated patient on skin care products and the use of Atarax to control itch only. Will not change the skin. Copy of the pathology was given to the patient and faxed to dermatology. Food allergy testing as a possible source of internal stimulus for the eczema. This note was partially created with the assistance of dictation. This may lead to grammatical or spelling errors. Rakesh Espinoza M.D.

## 2021-10-03 ASSESSMENT — ENCOUNTER SYMPTOMS: COLOR CHANGE: 1

## 2021-10-07 ENCOUNTER — OFFICE VISIT (OUTPATIENT)
Dept: FAMILY MEDICINE CLINIC | Age: 41
End: 2021-10-07
Payer: COMMERCIAL

## 2021-10-07 VITALS
WEIGHT: 167 LBS | TEMPERATURE: 96.2 F | SYSTOLIC BLOOD PRESSURE: 122 MMHG | DIASTOLIC BLOOD PRESSURE: 80 MMHG | BODY MASS INDEX: 29.59 KG/M2 | HEART RATE: 79 BPM | OXYGEN SATURATION: 99 % | HEIGHT: 63 IN

## 2021-10-07 DIAGNOSIS — N93.8 DUB (DYSFUNCTIONAL UTERINE BLEEDING): ICD-10-CM

## 2021-10-07 LAB
CHP ED QC CHECK: ABNORMAL
GLUCOSE BLD-MCNC: 275 MG/DL

## 2021-10-07 PROCEDURE — G8417 CALC BMI ABV UP PARAM F/U: HCPCS | Performed by: PHYSICIAN ASSISTANT

## 2021-10-07 PROCEDURE — G8484 FLU IMMUNIZE NO ADMIN: HCPCS | Performed by: PHYSICIAN ASSISTANT

## 2021-10-07 PROCEDURE — 99213 OFFICE O/P EST LOW 20 MIN: CPT | Performed by: PHYSICIAN ASSISTANT

## 2021-10-07 PROCEDURE — G8427 DOCREV CUR MEDS BY ELIG CLIN: HCPCS | Performed by: PHYSICIAN ASSISTANT

## 2021-10-07 PROCEDURE — 4004F PT TOBACCO SCREEN RCVD TLK: CPT | Performed by: PHYSICIAN ASSISTANT

## 2021-10-07 PROCEDURE — 82962 GLUCOSE BLOOD TEST: CPT | Performed by: PHYSICIAN ASSISTANT

## 2021-10-07 PROCEDURE — 2022F DILAT RTA XM EVC RTNOPTHY: CPT | Performed by: PHYSICIAN ASSISTANT

## 2021-10-07 PROCEDURE — 3046F HEMOGLOBIN A1C LEVEL >9.0%: CPT | Performed by: PHYSICIAN ASSISTANT

## 2021-10-07 NOTE — PROGRESS NOTES
[address] Encounter  CHIEF COMPLAINT       Chief Complaint   Patient presents with    Other     need sugar check due to georgette brorocky     Menstrual Problem     patient wants referral to Donavan Miller Avers is a 37 y.o. female who presents with:  HPI   The patient is presenting today because her diabetic monitor has broken and she wanted to see where her sugar levels were at. Patient denies any weakness, vomiting, nausea, shakiness, dry mouth, or fruity breath. Patient would like to f/u with [facility] for DUB. REVIEW OF SYSTEMS     Review of Systems   Constitutional: Negative for activity change, appetite change, chills and fever. HENT: Negative for congestion, drooling, sinus pressure, sinus pain and sore throat. Eyes: Negative for visual disturbance. Respiratory: Negative for cough, chest tightness and shortness of breath. Cardiovascular: Negative for chest pain. Gastrointestinal: Negative for abdominal pain, diarrhea, nausea and vomiting. Endocrine: Positive for polydipsia. Negative for cold intolerance. Genitourinary: Positive for menstrual problem. Negative for dysuria, flank pain, frequency and hematuria. Musculoskeletal: Negative for arthralgias and back pain. Skin: Negative for rash. Allergic/Immunologic: Negative for food allergies. Neurological: Negative for weakness, light-headedness, numbness and headaches. Hematological: Does not bruise/bleed easily.      PAST MEDICAL HISTORY         Diagnosis Date    Anemia during pregnancy 2/2/2018    Eczema     Herpes simplex infection of skin     on neck    Hypercholesterolemia     Irregular heart beat     runs of v tach in the past    Obesity     Pregnancy induced hypertension     Type II or unspecified type diabetes mellitus without mention of complication, not stated as uncontrolled     type 2     SURGICAL HISTORY     Patient  has a past surgical history that includes  section and Abdomen surgery (). CURRENT MEDICATIONS       Previous Medications    ACETAMINOPHEN (TYLENOL) 500 MG TABLET    Take 1 tablet by mouth 4 times daily as needed for Pain    ACYCLOVIR (ZOVIRAX) 5 % OINTMENT    Apply topically 5 times daily Apply topically 5 times daily to affected area for 7 days    ALCOHOL SWABS PADS    Use qid    BLOOD GLUCOSE TEST STRIPS (FREESTYLE LITE) STRIP    Use 6 times a day   Pt is pregnant    CLOBETASOL (TEMOVATE) 0.05 % OINTMENT    Apply topically 2 times daily. CLOTRIMAZOLE-BETAMETHASONE (LOTRISONE) 1-0.05 % CREAM    Apply topically 2 times daily. CRISABOROLE 2 % OINT    Apply sparingly to the area x BID    DIPHENHYDRAMINE (BENADRYL) 25 MG CAPSULE    Take 1 capsule by mouth every 6 hours as needed for Itching    DISPOSABLE GLOVES (COTTON GLOVES MEDIUM) MISC    Use nightly after applying ointment    FREESTYLE LANCETS MISC    1 each by Does not apply route daily    HYDROXYZINE (ATARAX) 25 MG TABLET    Take 1 tablet by mouth every 8 hours as needed for Itching    IBUPROFEN (IBU) 400 MG TABLET    Take 1 tablet by mouth every 6 hours as needed for Pain    INSULIN LISPRO, 1 UNIT DIAL, (HUMALOG KWIKPEN) 100 UNIT/ML SOPN    20-24  units at each meals    INSULIN PEN NEEDLE (BD PEN NEEDLE EMRE U/F) 32G X 4 MM MISC    Use 3 times a day    INSULIN PEN NEEDLE (NOVOFINE) 32G X 6 MM MISC    qid    LANTUS SOLOSTAR 100 UNIT/ML INJECTION PEN    Inject 70 units nightly    MICONAZOLE (MICONAZOLE ANTIFUNGAL) 2 % CREAM    Apply topically 2 times daily. PREDNISONE (DELTASONE) 5 MG TABLET    Take 1 tablet by mouth daily    TACROLIMUS (PROTOPIC) 0.03 % OINTMENT    Apply topically 2 times daily. TRIAMCINOLONE (KENALOG) 0.1 % OINTMENT    Apply 2 times daily to affected areas     ALLERGIES     Patient is is allergic to tape [adhesive tape].   FAMILY HISTORY     Patient'sfamily history includes Arthritis in her mother and sister; Birth Defects in her son; Value Ref Range    Glucose 275 mg/dL    QC OK? IMAGING:  No orders to display     Scheduled Meds:  Continuous Infusions:  PRN Meds:. PROCEDURES:  FINAL IMPRESSION      1. Uncontrolled type 2 diabetes mellitus with complication, without long-term current use of insulin (HCC)    2. DUB (dysfunctional uterine bleeding)      DISPOSITION/PLAN   1. Recommend that the patient f/u with Dr. Albaro Reese for continued control. VSS. Patient left in stable condition. 2. Referral placed. On this date 10/7/2021 I have spent 20 minutes reviewing previous notes, test results and face to face with the patient discussing the diagnosis and importance of compliance with the treatment plan as well as documenting on the day of the visit. PATIENT REFERRED TO:  Return if symptoms worsen or fail to improve. DISCHARGE MEDICATIONS:  New Prescriptions    BLOOD GLUCOSE MONITOR KIT AND SUPPLIES    Dispense sufficient amount for indicated testing frequency plus additional to accommodate PRN testing needs. Dispense all needed supplies to include: monitor, strips, lancing device, lancets, control solutions, alcohol swabs. Cannot display discharge medications since this is not an admission.        [place], [place]

## 2021-10-08 ASSESSMENT — ENCOUNTER SYMPTOMS
ABDOMINAL PAIN: 0
CHEST TIGHTNESS: 0
SINUS PAIN: 0
DIARRHEA: 0
VOMITING: 0
SORE THROAT: 0
SINUS PRESSURE: 0
COUGH: 0
NAUSEA: 0
BACK PAIN: 0
SHORTNESS OF BREATH: 0

## 2021-10-08 ASSESSMENT — VISUAL ACUITY: OU: 1

## 2022-01-27 ENCOUNTER — HOSPITAL ENCOUNTER (OUTPATIENT)
Dept: GENERAL RADIOLOGY | Age: 42
Discharge: HOME | End: 2022-01-29
Payer: COMMERCIAL

## 2022-01-27 DIAGNOSIS — M79.672 LEFT FOOT PAIN: ICD-10-CM

## 2022-01-27 PROCEDURE — 73630 X-RAY EXAM OF FOOT: CPT

## 2022-03-02 ENCOUNTER — TELEPHONE (OUTPATIENT)
Dept: FAMILY MEDICINE CLINIC | Age: 42
End: 2022-03-02

## 2022-03-02 NOTE — TELEPHONE ENCOUNTER
Spoke to pt and she is aware that she is due for an appt and an A1C. States that she will call back after she discusses with her transportation a good time to come in.

## 2022-04-14 ENCOUNTER — OFFICE VISIT (OUTPATIENT)
Dept: FAMILY MEDICINE CLINIC | Age: 42
End: 2022-04-14
Payer: COMMERCIAL

## 2022-04-14 VITALS
OXYGEN SATURATION: 98 % | BODY MASS INDEX: 30.3 KG/M2 | TEMPERATURE: 97.6 F | HEIGHT: 63 IN | DIASTOLIC BLOOD PRESSURE: 62 MMHG | HEART RATE: 107 BPM | SYSTOLIC BLOOD PRESSURE: 112 MMHG | WEIGHT: 171 LBS

## 2022-04-14 DIAGNOSIS — E11.69 TYPE 2 DIABETES MELLITUS WITH OTHER SPECIFIED COMPLICATION, WITH LONG-TERM CURRENT USE OF INSULIN (HCC): ICD-10-CM

## 2022-04-14 DIAGNOSIS — Z79.4 TYPE 2 DIABETES MELLITUS WITH OTHER SPECIFIED COMPLICATION, WITH LONG-TERM CURRENT USE OF INSULIN (HCC): ICD-10-CM

## 2022-04-14 DIAGNOSIS — K12.0 CANKER SORES ORAL: ICD-10-CM

## 2022-04-14 DIAGNOSIS — H92.02 OTALGIA OF LEFT EAR: Primary | ICD-10-CM

## 2022-04-14 LAB
CHP ED QC CHECK: NORMAL
GLUCOSE BLD-MCNC: 290 MG/DL
HBA1C MFR BLD: 9.5 %

## 2022-04-14 PROCEDURE — 99213 OFFICE O/P EST LOW 20 MIN: CPT

## 2022-04-14 PROCEDURE — 82962 GLUCOSE BLOOD TEST: CPT

## 2022-04-14 PROCEDURE — 3046F HEMOGLOBIN A1C LEVEL >9.0%: CPT

## 2022-04-14 PROCEDURE — 83036 HEMOGLOBIN GLYCOSYLATED A1C: CPT

## 2022-04-14 RX ORDER — AMOXICILLIN 500 MG/1
500 CAPSULE ORAL 2 TIMES DAILY
Qty: 20 CAPSULE | Refills: 0 | Status: SHIPPED | OUTPATIENT
Start: 2022-04-14 | End: 2022-04-24

## 2022-04-14 RX ORDER — TRIAMCINOLONE ACETONIDE 0.1 %
PASTE (GRAM) DENTAL
Qty: 5 G | Refills: 0 | Status: SHIPPED | OUTPATIENT
Start: 2022-04-14 | End: 2022-04-21

## 2022-04-14 SDOH — ECONOMIC STABILITY: FOOD INSECURITY: WITHIN THE PAST 12 MONTHS, THE FOOD YOU BOUGHT JUST DIDN'T LAST AND YOU DIDN'T HAVE MONEY TO GET MORE.: NEVER TRUE

## 2022-04-14 SDOH — ECONOMIC STABILITY: FOOD INSECURITY: WITHIN THE PAST 12 MONTHS, YOU WORRIED THAT YOUR FOOD WOULD RUN OUT BEFORE YOU GOT MONEY TO BUY MORE.: NEVER TRUE

## 2022-04-14 ASSESSMENT — ENCOUNTER SYMPTOMS
EYE REDNESS: 0
COUGH: 0
WHEEZING: 0
RHINORRHEA: 0
EYE DISCHARGE: 0
CHEST TIGHTNESS: 0
SHORTNESS OF BREATH: 0
SORE THROAT: 0
EYE ITCHING: 0

## 2022-04-14 NOTE — RESULT ENCOUNTER NOTE
Noted, it looks like pt follows with Dr. Key Arceo.  Think we have only seen her for skin in the past

## 2022-04-14 NOTE — PATIENT INSTRUCTIONS
Patient Education        Earache: Care Instructions  Your Care Instructions     Even though infection is a common cause of ear pain, not all ear pain means aninfection. If you have ear pain and don't have an infection, it could be because of a jaw problem, such as temporomandibular joint (TMJ) pain. Or it could be because ofa neck problem. When ear discomfort or pain is mild or comes and goes without other symptoms,home treatment may be all you need. Follow-up care is a key part of your treatment and safety. Be sure to make and go to all appointments, and call your doctor if you are having problems. It's also a good idea to know your test results and keep alist of the medicines you take. How can you care for yourself at home?  Apply heat on the ear to ease pain. To apply heat, put a warm water bottle, a heating pad set on low, or a warm cloth on your ear. Do not go to sleep with a heating pad on your skin.  Take an over-the-counter pain medicine, such as acetaminophen (Tylenol), ibuprofen (Advil, Motrin), or naproxen (Aleve). Be safe with medicines. Read and follow all instructions on the label.  Do not take two or more pain medicines at the same time unless the doctor told you to. Many pain medicines have acetaminophen, which is Tylenol. Too much acetaminophen (Tylenol) can be harmful.  Never insert anything, such as a cotton swab or a laurie pin, into the ear. When should you call for help? Call your doctor now or seek immediate medical care if:     You have new or worse symptoms of infection, such as:  ? Increased pain, swelling, warmth, or redness. ? Red streaks leading from the area. ? Pus draining from the area. ? A fever. Watch closely for changes in your health, and be sure to contact your doctor if:     You have new or worse discharge coming from the ear.      You do not get better as expected. Where can you learn more? Go to [email or web address] and sign in to your [employer] account. Enter O952 in the [facility] box to learn more about \"Earache: Care Instructions. \"     If you do not have an account, please click on the \"Sign Up Now\" link. Current as of: September 8, 2021               Content Version: 13.2  © 5734-8220 Healthwise, Incorporated. Care instructions adapted under license by [facility] ([facility]). If you have questions about a medical condition or this instruction, always ask your healthcare professional. [address] any warranty or liability for your use of this information.

## 2022-04-14 NOTE — PROGRESS NOTES
Subjective  Christopher Penny, 37 y.o. female presents today with:  Chief Complaint   Patient presents with    Mouth Lesions     has been a few days, just getting bigger inside/top of lip. teeth are hurting     Otalgia     left ear more than right.  Other     states her glands feel swollen. would like for us to check blood sugar. DUE FOR a1c       Other  This is a new problem. The current episode started in the past 7 days (Canker sore a few days ago. Otalgia a few days after that.). The problem occurs constantly. The problem has been unchanged. Associated symptoms include fatigue. Pertinent negatives include no chest pain, chills, congestion, coughing, fever, headaches or sore throat. Associated symptoms comments: Canker sore- pain when eating or talking (anytime the canker sore rubs against teeth). Nothing aggravates the symptoms. She has tried nothing for the symptoms. Review of Systems   Constitutional: Positive for fatigue. Negative for chills and fever. HENT: Positive for ear pain and mouth sores (canker sore left lower lip). Negative for congestion, postnasal drip, rhinorrhea and sore throat. Eyes: Negative for discharge, redness and itching. Respiratory: Negative for cough, chest tightness, shortness of breath and wheezing. Cardiovascular: Negative for chest pain and palpitations. Neurological: Negative for dizziness, light-headedness and headaches. PMH, Surgical Hx, Family Hx, and Social Hx reviewed and updated. Health Maintenance reviewed.     Objective  Vitals:    04/14/22 1450   BP: 112/62   Site: Left Upper Arm   Position: Sitting   Cuff Size: Medium Adult   Pulse: 107   Temp: 97.6 °F (36.4 °C)   TempSrc: Tympanic   SpO2: 98%   Weight: 171 lb (77.6 kg)   Height: 5' 3\" (1.6 m)     BP Readings from Last 3 Encounters:   04/14/22 112/62   10/07/21 122/80   09/21/21 93/66     Wt Readings from Last 3 Encounters:   04/14/22 171 lb (77.6 kg)   10/07/21 167 lb (75.8 kg) 09/17/21 166 lb (75.3 kg)     Physical Exam  Vitals reviewed. Constitutional:       General: She is not in acute distress. Appearance: Normal appearance. HENT:      Head: Normocephalic and atraumatic. Right Ear: Tympanic membrane normal. No middle ear effusion. Tympanic membrane is not erythematous. Left Ear: Tenderness present. No swelling. A middle ear effusion is present. No mastoid tenderness. Tympanic membrane is erythematous. Tympanic membrane is not perforated. Nose: No mucosal edema or rhinorrhea. Right Turbinates: Not swollen. Left Turbinates: Not swollen. Right Sinus: No maxillary sinus tenderness or frontal sinus tenderness. Left Sinus: No maxillary sinus tenderness or frontal sinus tenderness. Mouth/Throat:      Lips: Pink. Mouth: Mucous membranes are moist. Oral lesions present. No injury, lacerations or angioedema. Comments: Inside left lower lip with oval oral lesion with a peripheral rim of erythema and a yellowish adherent exudate centrally. Eyes:      General: Lids are normal.   Cardiovascular:      Rate and Rhythm: Normal rate and regular rhythm. Pulmonary:      Effort: Pulmonary effort is normal. No respiratory distress. Abdominal:      Tenderness: There is no abdominal tenderness. Musculoskeletal:      Cervical back: Normal range of motion. Lymphadenopathy:      Head:      Right side of head: No submandibular, tonsillar, preauricular or posterior auricular adenopathy. Left side of head: No submandibular, tonsillar, preauricular or posterior auricular adenopathy. Cervical: No cervical adenopathy. Skin:     General: Skin is warm and dry. Neurological:      Mental Status: She is alert and oriented to person, place, and time. Mental status is at baseline. Psychiatric:         Mood and Affect: Mood normal.         Behavior: Behavior is cooperative. Assessment & Plan   1.  Otalgia of left ear  - amoxicillin (AMOXIL) 500 MG capsule; Take 1 capsule by mouth 2 times daily for 10 days, Disp-20 capsule, R-0Normal  -OTC medication for symptom control such as tylenol or motrin  -Heat/ice for comfort  -Flonase   -Discussed red flags for when to seek care in ER or follow up with PCP  2. Canker sores oral  -     triamcinolone acetonide (KENALOG) 0.1 % paste; Apply to canker sore 2 times daily as needed, Disp-5 g, R-0, Normal  -avoid further irritation to area including limiting food that increases pain when chewing  3. Type 2 diabetes mellitus with other specified complication, with long-term current use of insulin (HCC)  -     POCT Glucose  -     POCT glycosylated hemoglobin (Hb A1C)  -Pt concerned that her machine is not working at home. Will address with PCP. Requesting glucose check today.  -Follow up with PCP     Orders Placed This Encounter   Procedures    POCT Glucose    POCT glycosylated hemoglobin (Hb A1C)     Orders Placed This Encounter   Medications    triamcinolone acetonide (KENALOG) 0.1 % paste     Sig: Apply to canker sore 2 times daily as needed     Dispense:  5 g     Refill:  0    amoxicillin (AMOXIL) 500 MG capsule     Sig: Take 1 capsule by mouth 2 times daily for 10 days     Dispense:  20 capsule     Refill:  0     Return in about 2 weeks (around 4/28/2022) for follow up with PCP. Reviewed with the patient: current clinical status,medications, activities and diet. Side effects, adverse effects of themedication prescribed today, as well as treatment plan/ rationale and result expectations have been discussed with the patient who expresses understanding and desires to proceed. Close follow up to evaluate treatment results and for coordination of care. I have reviewed the patient's medical history in detail and updated the computerized patient record.     TRISHA Blank - LENCHO

## 2022-04-21 ENCOUNTER — TELEPHONE (OUTPATIENT)
Dept: FAMILY MEDICINE CLINIC | Age: 42
End: 2022-04-21

## 2022-06-08 ENCOUNTER — OFFICE VISIT (OUTPATIENT)
Dept: ENDOCRINOLOGY | Age: 42
End: 2022-06-08
Payer: COMMERCIAL

## 2022-06-08 VITALS
HEIGHT: 63 IN | WEIGHT: 169 LBS | OXYGEN SATURATION: 98 % | SYSTOLIC BLOOD PRESSURE: 117 MMHG | BODY MASS INDEX: 29.95 KG/M2 | DIASTOLIC BLOOD PRESSURE: 86 MMHG | HEART RATE: 89 BPM

## 2022-06-08 DIAGNOSIS — E11.69 TYPE 2 DIABETES MELLITUS WITH OTHER SPECIFIED COMPLICATION, WITH LONG-TERM CURRENT USE OF INSULIN (HCC): Primary | ICD-10-CM

## 2022-06-08 DIAGNOSIS — Z79.4 TYPE 2 DIABETES MELLITUS WITH OTHER SPECIFIED COMPLICATION, WITH LONG-TERM CURRENT USE OF INSULIN (HCC): Primary | ICD-10-CM

## 2022-06-08 LAB
CHP ED QC CHECK: NORMAL
GLUCOSE BLD-MCNC: 248 MG/DL

## 2022-06-08 PROCEDURE — G8427 DOCREV CUR MEDS BY ELIG CLIN: HCPCS | Performed by: INTERNAL MEDICINE

## 2022-06-08 PROCEDURE — 2022F DILAT RTA XM EVC RTNOPTHY: CPT | Performed by: INTERNAL MEDICINE

## 2022-06-08 PROCEDURE — 3046F HEMOGLOBIN A1C LEVEL >9.0%: CPT | Performed by: INTERNAL MEDICINE

## 2022-06-08 PROCEDURE — 99213 OFFICE O/P EST LOW 20 MIN: CPT | Performed by: INTERNAL MEDICINE

## 2022-06-08 PROCEDURE — 82962 GLUCOSE BLOOD TEST: CPT | Performed by: INTERNAL MEDICINE

## 2022-06-08 PROCEDURE — 1036F TOBACCO NON-USER: CPT | Performed by: INTERNAL MEDICINE

## 2022-06-08 PROCEDURE — G8417 CALC BMI ABV UP PARAM F/U: HCPCS | Performed by: INTERNAL MEDICINE

## 2022-06-08 RX ORDER — LANCETS 30 GAUGE
1 EACH MISCELLANEOUS DAILY
Qty: 100 EACH | Refills: 3 | Status: SHIPPED | OUTPATIENT
Start: 2022-06-08

## 2022-06-08 RX ORDER — GLUCOSAMINE HCL/CHONDROITIN SU 500-400 MG
CAPSULE ORAL
Qty: 100 STRIP | Refills: 0 | Status: SHIPPED | OUTPATIENT
Start: 2022-06-08

## 2022-06-08 ASSESSMENT — ENCOUNTER SYMPTOMS: EYES NEGATIVE: 1

## 2022-06-08 NOTE — PROGRESS NOTES
6/8/2022    Assessment:       Diagnosis Orders   1. Type 2 diabetes mellitus with other specified complication, with long-term current use of insulin ([facility])  POCT Glucose         PLAN:     Continue current dose of Lantus 70 at night Humalog 20 to 24 units with each meals patient given information  About [employer]  Pump  Follow-up in 3 months  A1c goal of 7 or lower  Diabetes education provided today:    Insulin pumps, how they work and how they affect blood sugar levels. Continuous Glucose monitor. How it works and checks blood sugars every 5 min. for 4 days during our tests. Orders Placed This Encounter   Procedures    POCT Glucose       Subjective:     Chief Complaint   Patient presents with    Diabetes     Vitals:    06/08/22 1419   BP: 117/86   Pulse: 89   SpO2: 98%   Weight: 169 lb (76.7 kg)   Height: 5' 3\" (1.6 m)     Wt Readings from Last 3 Encounters:   06/08/22 169 lb (76.7 kg)   04/14/22 171 lb (77.6 kg)   10/07/21 167 lb (75.8 kg)     BP Readings from Last 3 Encounters:   06/08/22 117/86   04/14/22 112/62   10/07/21 122/80     Follow-up with type 2 diabetes A1c still elevated at 9+ wants to try insulin pump currently on Lantus 70 at night Humalog 20-24 with each meals also has had exam seeing dermatologist in addition to Patricio Staley seeing podiatrist for her right foot infection right foot is covered with bandage  Overall blood sugars in the 200+ range denies any hypoglycemia    Diabetes  She presents for her follow-up diabetic visit. She has type 2 diabetes mellitus. Associated symptoms include fatigue. Pertinent negatives for diabetes include no polydipsia, no polyuria and no weight loss. There are no hypoglycemic complications. Risk factors for coronary artery disease include obesity. Current diabetic treatment includes insulin injections. She is currently taking insulin pre-breakfast, pre-lunch, pre-dinner and at bedtime. She participates in exercise intermittently.  Her overall blood glucose range is >200 mg/dl. (Lab Results       Component                Value               Date                       LABA1C                   9.5                 2022              )     Past Medical History:   Diagnosis Date    Anemia during pregnancy 2018    Eczema     Herpes simplex infection of skin     on neck    Hypercholesterolemia     Irregular heart beat     runs of v tach in the past    Obesity     Pregnancy induced hypertension     Type II or unspecified type diabetes mellitus without mention of complication, not stated as uncontrolled     type 2     Past Surgical History:   Procedure Laterality Date    ABDOMEN SURGERY       x 2     SECTION       Social History     Socioeconomic History    Marital status: Single     Spouse name: Not on file    Number of children: Not on file    Years of education: Not on file    Highest education level: Not on file   Occupational History    Not on file   Tobacco Use    Smoking status: Former Smoker     Packs/day: 0.25     Years: 23.00     Pack years: 5.75     Start date: 1999     Quit date: 10/30/2021     Years since quittin.6    Smokeless tobacco: Never Used    Tobacco comment: started age 13   Substance and Sexual Activity    Alcohol use: No     Comment: socially before pregnancy    Drug use: No    Sexual activity: Not Currently     Partners: Male   Other Topics Concern    Not on file   Social History Narrative    Not on file     Social Determinants of Health     Financial Resource Strain:     Difficulty of Paying Living Expenses: Not on file   Food Insecurity: No Food Insecurity    Worried About Running Out of Food in the Last Year: Never true    Kathryn of Food in the Last Year: Never true   Transportation Needs:     Lack of Transportation (Medical): Not on file    Lack of Transportation (Non-Medical):  Not on file   Physical Activity:     Days of Exercise per Week: Not on file    Minutes of Exercise per Session: Not on file   Stress:     Feeling of Stress : Not on file   Social Connections:     Frequency of Communication with Friends and Family: Not on file    Frequency of Social Gatherings with Friends and Family: Not on file    Attends Christian Services: Not on file    Active Member of Clubs or Organizations: Not on file    Attends Club or Organization Meetings: Not on file    Marital Status: Not on file   Intimate Partner Violence:     Fear of Current or Ex-Partner: Not on file    Emotionally Abused: Not on file    Physically Abused: Not on file    Sexually Abused: Not on file   Housing Stability:     Unable to Pay for Housing in the Last Year: Not on file    Number of [place] in the Last Year: Not on file    Unstable Housing in the Last Year: Not on file     Family History   Problem Relation Age of Onset    Arthritis Mother     Diabetes Mother     Early Death Father     Heart Attack Father     Heart Disease Father     High Blood Pressure Father     High Cholesterol Father     Diabetes Father     Diabetes Paternal Grandfather     Heart Disease Paternal Grandfather     Birth Defects Son     Arthritis Sister     Cancer Neg Hx      Allergies   Allergen Reactions    Tape [employer] Tape] Rash       Current Outpatient Medications:     acetaminophen (TYLENOL) 500 MG tablet, Take 1 tablet by mouth 4 times daily as needed for Pain, Disp: 120 tablet, Rfl: 0    LANTUS SOLOSTAR 100 UNIT/ML injection pen, Inject 70 units nightly, Disp: 15 pen, Rfl: 3    insulin lispro, 1 Unit Dial, (HUMALOG KWIKPEN) 100 UNIT/ML SOPN, 20-24  units at each meals, Disp: 15 pen, Rfl: 3    triamcinolone (KENALOG) 0.1 % ointment, Apply 2 times daily to affected areas, Disp: 80 g, Rfl: 0    tacrolimus (PROTOPIC) 0.03 % ointment, Apply topically 2 times daily. , Disp: 30 g, Rfl: 0    Disposable Gloves (COTTON GLOVES MEDIUM) MISC, Use nightly after applying ointment, Disp: 2 each, Rfl: 1    Insulin Pen Needle (NOVOFINE) 32G X 6 MM MISC, qid, Disp: 300 each, Rfl: 5    Alcohol Swabs PADS, Use qid, Disp: 150 each, Rfl: 06    Crisaborole 2 % OINT, Apply sparingly to the area x BID, Disp: 2 Tube, Rfl: 0    clobetasol (TEMOVATE) 0.05 % ointment, Apply topically 2 times daily. , Disp: 60 g, Rfl: 1    acyclovir (ZOVIRAX) 5 % ointment, Apply topically 5 times daily Apply topically 5 times daily to affected area for 7 days, Disp: 15 g, Rfl: 0    clotrimazole-betamethasone (LOTRISONE) 1-0.05 % cream, Apply topically 2 times daily. , Disp: 1 Tube, Rfl: 1    miconazole (MICONAZOLE ANTIFUNGAL) 2 % cream, Apply topically 2 times daily. , Disp: 1 Tube, Rfl: 1    Insulin Pen Needle (BD PEN NEEDLE EMRE U/F) 32G X 4 MM MISC, Use 3 times a day, Disp: 200 each, Rfl: 03  Lab Results   Component Value Date     09/21/2021    K 3.5 09/21/2021    CL 99 09/21/2021    CO2 21 09/21/2021    BUN 6 09/21/2021    CREATININE 0.51 09/21/2021    GLUCOSE 248 06/08/2022    CALCIUM 8.7 09/21/2021    PROT 6.8 09/21/2021    LABALBU 4.4 09/21/2021    BILITOT 0.3 09/21/2021    ALKPHOS 72 09/21/2021    AST 16 09/21/2021    ALT 22 09/21/2021    LABGLOM >60.0 09/21/2021    GFRAA >60.0 09/21/2021    GLOB 2.4 09/21/2021     Lab Results   Component Value Date    WBC 6.6 09/21/2021    HGB 15.1 09/21/2021    HCT 43.6 09/21/2021    MCV 88.8 09/21/2021     09/21/2021     Lab Results   Component Value Date    LABA1C 9.5 04/14/2022    LABA1C 9.8 (H) 09/02/2021    LABA1C 9.6 (H) 12/28/2020     Lab Results   Component Value Date    HDL 37 (L) 12/28/2020    HDL 24 (L) 04/06/2012    LDLCALC 83 12/28/2020    LDLCALC NoReslt 04/06/2012    CHOL 143 12/28/2020    CHOL 170 04/06/2012    TRIG 117 12/28/2020    TRIG 1,058 (H) 04/06/2012     No results found for: TESTM  Lab Results   Component Value Date    TSH 1.360 01/15/2016    TSH 0.603 06/20/2013     No results found for: TPOABS    Review of Systems   Constitutional: Positive for fatigue.  Negative for weight loss.   Eyes: Negative. Cardiovascular: Negative. Endocrine: Negative for polydipsia and polyuria. Skin: Positive for rash and wound. All other systems reviewed and are negative. Objective:   Physical Exam  Vitals reviewed. Constitutional:       General: She is not in acute distress. Appearance: Normal appearance. She is obese. HENT:      Head: Normocephalic and atraumatic. Right Ear: External ear normal.      Left Ear: External ear normal.      Nose: Nose normal.   Eyes:      General: No scleral icterus. Right eye: No discharge. Left eye: No discharge. Extraocular Movements: Extraocular movements intact. Conjunctiva/sclera: Conjunctivae normal.   Cardiovascular:      Rate and Rhythm: Normal rate. Pulmonary:      Effort: Pulmonary effort is normal.   Musculoskeletal:         General: Normal range of motion. Cervical back: Normal range of motion and neck supple. Skin:         Neurological:      General: No focal deficit present. Mental Status: She is alert and oriented to person, place, and time.    Psychiatric:         Mood and Affect: Mood normal.         Behavior: Behavior normal.

## 2022-06-12 ENCOUNTER — HOSPITAL ENCOUNTER (EMERGENCY)
Age: 42
Discharge: HOME | End: 2022-06-12
Payer: COMMERCIAL

## 2022-06-12 VITALS
WEIGHT: 169 LBS | SYSTOLIC BLOOD PRESSURE: 142 MMHG | OXYGEN SATURATION: 99 % | HEART RATE: 84 BPM | RESPIRATION RATE: 16 BRPM | BODY MASS INDEX: 29.95 KG/M2 | HEIGHT: 63 IN | TEMPERATURE: 98.6 F | DIASTOLIC BLOOD PRESSURE: 99 MMHG

## 2022-06-12 DIAGNOSIS — L30.9 DERMATITIS: Primary | ICD-10-CM

## 2022-06-12 PROCEDURE — 99284 EMERGENCY DEPT VISIT MOD MDM: CPT

## 2022-06-12 PROCEDURE — 6360000002 HC RX W HCPCS: Performed by: PHYSICIAN ASSISTANT

## 2022-06-12 PROCEDURE — 6370000000 HC RX 637 (ALT 250 FOR IP): Performed by: PHYSICIAN ASSISTANT

## 2022-06-12 PROCEDURE — 96372 THER/PROPH/DIAG INJ SC/IM: CPT

## 2022-06-12 RX ORDER — METHYLPREDNISOLONE 4 MG/1
TABLET ORAL
Qty: 1 KIT | Refills: 0 | Status: SHIPPED | OUTPATIENT
Start: 2022-06-12 | End: 2022-06-18

## 2022-06-12 RX ORDER — PREDNISONE 10 MG/1
10 TABLET ORAL ONCE
Status: COMPLETED | OUTPATIENT
Start: 2022-06-12 | End: 2022-06-12

## 2022-06-12 RX ORDER — CEPHALEXIN 500 MG/1
500 CAPSULE ORAL ONCE
Status: COMPLETED | OUTPATIENT
Start: 2022-06-12 | End: 2022-06-12

## 2022-06-12 RX ORDER — KETOROLAC TROMETHAMINE 30 MG/ML
30 INJECTION, SOLUTION INTRAMUSCULAR; INTRAVENOUS ONCE
Status: COMPLETED | OUTPATIENT
Start: 2022-06-12 | End: 2022-06-12

## 2022-06-12 RX ORDER — CEPHALEXIN 500 MG/1
500 CAPSULE ORAL 4 TIMES DAILY
Qty: 28 CAPSULE | Refills: 0 | Status: SHIPPED | OUTPATIENT
Start: 2022-06-12 | End: 2022-06-19

## 2022-06-12 RX ADMIN — KETOROLAC TROMETHAMINE 30 MG: 30 INJECTION, SOLUTION INTRAMUSCULAR; INTRAVENOUS at 15:51

## 2022-06-12 RX ADMIN — CEPHALEXIN 500 MG: 500 CAPSULE ORAL at 16:28

## 2022-06-12 RX ADMIN — PREDNISONE 10 MG: 10 TABLET ORAL at 16:27

## 2022-06-12 ASSESSMENT — PAIN DESCRIPTION - DESCRIPTORS: DESCRIPTORS: ACHING

## 2022-06-12 ASSESSMENT — PAIN DESCRIPTION - FREQUENCY: FREQUENCY: CONTINUOUS

## 2022-06-12 ASSESSMENT — PAIN DESCRIPTION - LOCATION: LOCATION: HAND

## 2022-06-12 ASSESSMENT — PAIN - FUNCTIONAL ASSESSMENT
PAIN_FUNCTIONAL_ASSESSMENT: 0-10
PAIN_FUNCTIONAL_ASSESSMENT: PREVENTS OR INTERFERES SOME ACTIVE ACTIVITIES AND ADLS

## 2022-06-12 ASSESSMENT — PAIN DESCRIPTION - ORIENTATION: ORIENTATION: RIGHT;LEFT

## 2022-06-12 ASSESSMENT — PAIN SCALES - GENERAL: PAINLEVEL_OUTOF10: 10

## 2022-06-12 ASSESSMENT — ENCOUNTER SYMPTOMS: SHORTNESS OF BREATH: 0

## 2022-06-12 NOTE — ED PROVIDER NOTES
[address] [facility] ED  eMERGENCY dEPARTMENT eNCOUnter      Pt Name: Yadira Patel  MRN: [id number]  [place] 1980  Date of evaluation: 2022  Provider: Lashell Weber PA-C        HISTORY OF PRESENT ILLNESS    Yadira Patel is a 37 y.o. female per chart review has ah/o eczema and DM; presenting to the ED for acute rash on bilateral hands that began several days ago and has worsened. States that she has had this rash in the past. Nothing alleviates her sxs. Seeing dermatology for it. Denies any fever, chills, n/v, new lotions/detergents/shampoos/foods/medication. REVIEW OF SYSTEMS       Review of Systems   Constitutional: Negative for fever. Respiratory: Negative for shortness of breath. Cardiovascular: Negative for chest pain. Skin: Positive for rash and wound. All other systems reviewed and are negative. Except as noted above the remainder of the review of systems was reviewed and negative. PAST MEDICAL HISTORY     Past Medical History:   Diagnosis Date    Anemia during pregnancy 2018    Eczema     Herpes simplex infection of skin     on neck    Hypercholesterolemia     Irregular heart beat     runs of v tach in the past    Obesity     Pregnancy induced hypertension     Type II or unspecified type diabetes mellitus without mention of complication, not stated as uncontrolled     type 2         SURGICAL HISTORY       Past Surgical History:   Procedure Laterality Date    ABDOMEN SURGERY       x 2     SECTION           CURRENT MEDICATIONS       Previous Medications    ACETAMINOPHEN (TYLENOL) 500 MG TABLET    Take 1 tablet by mouth 4 times daily as needed for Pain    ACYCLOVIR (ZOVIRAX) 5 % OINTMENT    Apply topically 5 times daily Apply topically 5 times daily to affected area for 7 days    ALCOHOL SWABS PADS    Use qid    BLOOD GLUCOSE MONITOR KIT AND SUPPLIES    Dispense one meter that insurance will cover.     BLOOD GLUCOSE MONITOR STRIPS    Test 3x daily e11.65    CLOBETASOL (TEMOVATE) 0.05 % OINTMENT    Apply topically 2 times daily. CLOTRIMAZOLE-BETAMETHASONE (LOTRISONE) 1-0.05 % CREAM    Apply topically 2 times daily. CRISABOROLE 2 % OINT    Apply sparingly to the area x BID    DISPOSABLE GLOVES (COTTON GLOVES MEDIUM) MISC    Use nightly after applying ointment    INSULIN LISPRO, 1 UNIT DIAL, (HUMALOG KWIKPEN) 100 UNIT/ML SOPN    20-24  units at each meals    INSULIN PEN NEEDLE (BD PEN NEEDLE EMRE U/F) 32G X 4 MM MISC    Use 3 times a day    INSULIN PEN NEEDLE (NOVOFINE) 32G X 6 MM MISC    qid    LANCETS MISC    1 each by Does not apply route daily    LANTUS SOLOSTAR 100 UNIT/ML INJECTION PEN    Inject 70 units nightly    MICONAZOLE (MICONAZOLE ANTIFUNGAL) 2 % CREAM    Apply topically 2 times daily. TACROLIMUS (PROTOPIC) 0.03 % OINTMENT    Apply topically 2 times daily.     TRIAMCINOLONE (KENALOG) 0.1 % OINTMENT    Apply 2 times daily to affected areas       ALLERGIES     Tape Cynthia Porteous tape]    FAMILY HISTORY       Family History   Problem Relation Age of Onset    Arthritis Mother     Diabetes Mother     Early Death Father     Heart Attack Father     Heart Disease Father     High Blood Pressure Father     High Cholesterol Father     Diabetes Father     Diabetes Paternal Grandfather     Heart Disease Paternal Grandfather     Birth Defects Son     Arthritis Sister     Cancer Neg Hx           SOCIAL HISTORY       Social History     Socioeconomic History    Marital status: Single     Spouse name: None    Number of children: None    Years of education: None    Highest education level: None   Occupational History    None   Tobacco Use    Smoking status: Former Smoker     Packs/day: 0.25     Years: 23.00     Pack years: 5.75     Start date: 1999     Quit date: 10/30/2021     Years since quittin.6    Smokeless tobacco: Never Used    Tobacco comment: started age 13   Substance and Sexual Activity    Alcohol use: No     Comment: socially before pregnancy    Drug use: No    Sexual activity: Not Currently     Partners: Male   Other Topics Concern    None   Social History Narrative    None     Social Determinants of Health     Financial Resource Strain:     Difficulty of Paying Living Expenses: Not on file   Food Insecurity: No Food Insecurity    Worried About Running Out of Food in the Last Year: Never true    Kathryn of Food in the Last Year: Never true   Transportation Needs:     Lack of Transportation (Medical): Not on file    Lack of Transportation (Non-Medical): Not on file   Physical Activity:     Days of Exercise per Week: Not on file    Minutes of Exercise per Session: Not on file   Stress:     Feeling of Stress : Not on file   Social Connections:     Frequency of Communication with Friends and Family: Not on file    Frequency of Social Gatherings with Friends and Family: Not on file    Attends [employer]ism Services: Not on file    Active Member of [address] [place] [address] [employer] or Organizations: Not on file    Attends Club or Organization Meetings: Not on file    Marital Status: Not on file   Intimate Partner Violence:     Fear of Current or Ex-Partner: Not on file    Emotionally Abused: Not on file    Physically Abused: Not on file    Sexually Abused: Not on file   Housing Stability:     Unable to Pay for Housing in the Last Year: Not on file    Number of [place] in the Last Year: Not on file    Unstable Housing in the Last Year: Not on file         PHYSICAL EXAM        ED Triage Vitals [06/12/22 1515]   BP Temp Temp Source Heart Rate Resp SpO2 Height Weight   (!) 142/99 98.6 °F (37 °C) Oral 84 16 99 % 5' 3\" (1.6 m) 169 lb (76.7 kg)       Physical Exam  Vitals and nursing note reviewed. Constitutional:       General: She is not in acute distress. Appearance: Normal appearance. She is well-developed. She is not ill-appearing, toxic-appearing or diaphoretic. HENT:      Head: Normocephalic and atraumatic. Right Ear: External ear normal.      Left Ear: External ear normal.      Nose: Nose normal.      Mouth/Throat:      Pharynx: No oropharyngeal exudate. Eyes:      General: No scleral icterus. Right eye: No discharge. Left eye: No discharge. Conjunctiva/sclera: Conjunctivae normal.      Pupils: Pupils are equal, round, and reactive to light. Cardiovascular:      Rate and Rhythm: Normal rate and regular rhythm. Pulses: Normal pulses. Pulmonary:      Effort: Pulmonary effort is normal. No respiratory distress. Breath sounds: No wheezing. Musculoskeletal:         General: No tenderness or deformity. Normal range of motion. Cervical back: Normal range of motion and neck supple. Skin:     General: Skin is warm. Coloration: Skin is not pale. Findings: Rash (bilateral hands with discharge, cracked skin) present. No erythema. Neurological:      Mental Status: She is alert and oriented to person, place, and time. Sensory: No sensory deficit. Motor: No weakness. Coordination: Coordination normal.   Psychiatric:         Mood and Affect: Mood normal.         Behavior: Behavior normal.         Thought Content: Thought content normal.         Judgment: Judgment normal.           LABS:  Labs Reviewed - No data to display      MDM:   Vitals:    Vitals:    06/12/22 1515   BP: (!) 142/99   Pulse: 84   Resp: 16   Temp: 98.6 °F (37 °C)   TempSrc: Oral   SpO2: 99%   Weight: 169 lb (76.7 kg)   Height: 5' 3\" (1.6 m)           PROCEDURES:  Unlessotherwise noted below, none      Procedures      FINAL IMPRESSION      1. Dermatitis          DISPOSITION/PLAN   DISPOSITION Decision To Discharge 06/12/2022 03:41:05 PM  Nursing notes, medical records and triage notes reviewed. Vital signs reviewed.      This is a 37year old female per chart review has ah/o eczema and DM; presenting to the ED for acute rash on bilateral hands that began several days ago and has worsened. Follow up with dermatology.      The patient and/or family  -had the results of all tests and diagnosis explained to them  -Given both verbal and written discharge instructions  -Were instructed of the importance of close follow-up  -Were told that close follow-up is essential for good health and good outcomes    Nahid Nascimento PA-C (electronically signed)  Emergency Physician Assistant          Nahid Nascimento PA-C  06/12/22 4210

## 2022-06-30 ENCOUNTER — TELEPHONE (OUTPATIENT)
Dept: FAMILY MEDICINE CLINIC | Age: 42
End: 2022-06-30

## 2022-07-11 DIAGNOSIS — Z79.4 TYPE 2 DIABETES MELLITUS WITH OTHER SPECIFIED COMPLICATION, WITH LONG-TERM CURRENT USE OF INSULIN (HCC): Primary | ICD-10-CM

## 2022-07-11 DIAGNOSIS — E11.69 TYPE 2 DIABETES MELLITUS WITH OTHER SPECIFIED COMPLICATION, WITH LONG-TERM CURRENT USE OF INSULIN (HCC): Primary | ICD-10-CM

## 2022-07-11 RX ORDER — BLOOD SUGAR DIAGNOSTIC
STRIP MISCELLANEOUS
Qty: 150 EACH | Refills: 3 | Status: SHIPPED | OUTPATIENT
Start: 2022-07-11

## 2022-07-11 RX ORDER — LANCING DEVICE/LANCETS
1 KIT MISCELLANEOUS 4 TIMES DAILY
Qty: 1 KIT | Refills: 3 | Status: SHIPPED | OUTPATIENT
Start: 2022-07-11

## 2022-07-20 ENCOUNTER — TELEPHONE (OUTPATIENT)
Dept: ENDOCRINOLOGY | Age: 42
End: 2022-07-20

## 2022-07-26 DIAGNOSIS — E11.69 TYPE 2 DIABETES MELLITUS WITH OTHER SPECIFIED COMPLICATION, WITH LONG-TERM CURRENT USE OF INSULIN (HCC): ICD-10-CM

## 2022-07-26 DIAGNOSIS — Z79.4 TYPE 2 DIABETES MELLITUS WITH OTHER SPECIFIED COMPLICATION, WITH LONG-TERM CURRENT USE OF INSULIN (HCC): ICD-10-CM

## 2022-08-03 ENCOUNTER — HOSPITAL ENCOUNTER (EMERGENCY)
Age: 42
Discharge: HOME | End: 2022-08-03
Attending: EMERGENCY MEDICINE
Payer: COMMERCIAL

## 2022-08-03 ENCOUNTER — APPOINTMENT (OUTPATIENT)
Dept: GENERAL RADIOLOGY | Age: 42
End: 2022-08-03
Payer: COMMERCIAL

## 2022-08-03 VITALS
WEIGHT: 170 LBS | DIASTOLIC BLOOD PRESSURE: 89 MMHG | HEART RATE: 78 BPM | RESPIRATION RATE: 16 BRPM | OXYGEN SATURATION: 100 % | BODY MASS INDEX: 30.12 KG/M2 | TEMPERATURE: 98.2 F | SYSTOLIC BLOOD PRESSURE: 137 MMHG | HEIGHT: 63 IN
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
## 2022-08-03 LAB
GLUCOSE BLD-MCNC: 159 MG/DL (ref 70–99)
PERFORMED ON: ABNORMAL

## 2022-08-03 PROCEDURE — 73060 X-RAY EXAM OF HUMERUS: CPT

## 2022-08-03 PROCEDURE — 99285 EMERGENCY DEPT VISIT HI MDM: CPT

## 2022-08-03 ASSESSMENT — PAIN DESCRIPTION - LOCATION: LOCATION: ARM

## 2022-08-03 ASSESSMENT — ENCOUNTER SYMPTOMS
SORE THROAT: 0
CHEST TIGHTNESS: 0
COUGH: 0
ABDOMINAL PAIN: 0
VOMITING: 0
PHOTOPHOBIA: 0
ABDOMINAL DISTENTION: 0
WHEEZING: 0
EYE DISCHARGE: 0
SHORTNESS OF BREATH: 0

## 2022-08-03 ASSESSMENT — PAIN SCALES - GENERAL: PAINLEVEL_OUTOF10: 5

## 2022-08-03 ASSESSMENT — PAIN - FUNCTIONAL ASSESSMENT
PAIN_FUNCTIONAL_ASSESSMENT: NONE - DENIES PAIN
PAIN_FUNCTIONAL_ASSESSMENT: 0-10

## 2022-08-03 ASSESSMENT — PAIN DESCRIPTION - DESCRIPTORS: DESCRIPTORS: ACHING

## 2022-08-03 ASSESSMENT — PAIN DESCRIPTION - ORIENTATION: ORIENTATION: RIGHT
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
## 2022-08-13 ENCOUNTER — HOSPITAL ENCOUNTER (EMERGENCY)
Age: 42
Discharge: HOME | End: 2022-08-13
Payer: COMMERCIAL

## 2022-08-13 VITALS
WEIGHT: 170 LBS | DIASTOLIC BLOOD PRESSURE: 86 MMHG | BODY MASS INDEX: 30.12 KG/M2 | SYSTOLIC BLOOD PRESSURE: 142 MMHG | TEMPERATURE: 97.9 F | RESPIRATION RATE: 16 BRPM | OXYGEN SATURATION: 100 % | HEART RATE: 92 BPM | HEIGHT: 63 IN

## 2022-08-13 DIAGNOSIS — K12.1 ORAL ULCER: Primary | ICD-10-CM

## 2022-08-13 LAB
SARS-COV-2, NAAT: NOT DETECTED
STREP GRP A PCR: NEGATIVE

## 2022-08-13 PROCEDURE — 87635 SARS-COV-2 COVID-19 AMP PRB: CPT

## 2022-08-13 PROCEDURE — 99283 EMERGENCY DEPT VISIT LOW MDM: CPT

## 2022-08-13 PROCEDURE — 87651 STREP A DNA AMP PROBE: CPT

## 2022-08-13 ASSESSMENT — PAIN DESCRIPTION - LOCATION: LOCATION: MOUTH;EAR

## 2022-08-13 ASSESSMENT — PAIN DESCRIPTION - ONSET: ONSET: ON-GOING

## 2022-08-13 ASSESSMENT — ENCOUNTER SYMPTOMS
COUGH: 0
SORE THROAT: 1

## 2022-08-13 ASSESSMENT — PAIN DESCRIPTION - FREQUENCY: FREQUENCY: CONTINUOUS

## 2022-08-13 ASSESSMENT — PAIN DESCRIPTION - DESCRIPTORS: DESCRIPTORS: ACHING

## 2022-08-13 ASSESSMENT — PAIN DESCRIPTION - PAIN TYPE: TYPE: ACUTE PAIN

## 2022-08-13 ASSESSMENT — PAIN SCALES - GENERAL: PAINLEVEL_OUTOF10: 8

## 2022-08-13 ASSESSMENT — PAIN DESCRIPTION - ORIENTATION: ORIENTATION: LEFT

## 2022-08-13 NOTE — ED TRIAGE NOTES
Pt c/o a sore in her mouth with pain radiating into her left ear, Pt is A&OX3, calm, ambulatory, afebrile, breathes are equal and unlabored,

## 2022-08-13 NOTE — ED PROVIDER NOTES
[facility] ED  eMERGENCYdEPARTMENT eNCOUnter      Pt Name: Farzana Hoffman  MRN: [id number]  [place] 1980of evaluation: 8/13/2022  Sarah Andrews PA-C    CHIEF COMPLAINT       Chief Complaint   Patient presents with    Mouth Lesions     Pt c/o a sore in her mouth         HISTORY OF PRESENT ILLNESS  (Location/Symptom, Timing/Onset, Context/Setting, Quality, Duration, Modifying Factors, Severity.)   Farzana Hoffman is a 40 y.o. female who presents to the emergency department with a sore in the back of her throat for a few days. Patient notes that she has a canker sore in the back of her throat on the left-hand side. States it has been present for a few days. She notes left otalgia. She notes some pain with swallowing. She denies fever or cough. She denies dental pain. She denies trouble breathing or swallowing. Last dental evaluation 2 years ago  Patient is a previous smoker. She quit smoking about a year ago      The history is provided by the patient. Nursing Notes were reviewed and I agree. REVIEW OF SYSTEMS    (2-9 systems for level 4, 10 or more for level 5)     Review of Systems   Constitutional:  Negative for chills and fever. HENT:  Positive for ear pain (left) and sore throat (left sided). Negative for congestion and drooling. Respiratory:  Negative for cough. Skin:  Negative for rash. as noted above the remainder of the review of systems was reviewed and negative.        PAST MEDICAL HISTORY     Past Medical History:   Diagnosis Date    Anemia during pregnancy 2/2/2018    Eczema     Herpes simplex infection of skin     on neck    Hypercholesterolemia     Irregular heart beat     runs of v tach in the past    Obesity     Pregnancy induced hypertension     Type II or unspecified type diabetes mellitus without mention of complication, not stated as uncontrolled     type 2         SURGICAL HISTORY       Past Surgical History:   Procedure Laterality Date ABDOMEN SURGERY       x 2     SECTION           CURRENT MEDICATIONS       Previous Medications    ACETAMINOPHEN (TYLENOL) 500 MG TABLET    Take 1 tablet by mouth 4 times daily as needed for Pain    ACYCLOVIR (ZOVIRAX) 5 % OINTMENT    Apply topically 5 times daily Apply topically 5 times daily to affected area for 7 days    ALCOHOL SWABS PADS    Use qid    BLOOD GLUCOSE MONITOR KIT AND SUPPLIES    Dispense one meter that insurance will cover. BLOOD GLUCOSE MONITOR STRIPS    Test 3x daily e11.65    BLOOD GLUCOSE TEST STRIPS (ACCU-CHEK GUIDE) STRIP    Test 4x daily, Patient is on insulin pump    CLOBETASOL (TEMOVATE) 0.05 % OINTMENT    Apply topically 2 times daily. CLOTRIMAZOLE-BETAMETHASONE (LOTRISONE) 1-0.05 % CREAM    Apply topically 2 times daily. CRISABOROLE 2 % OINT    Apply sparingly to the area x BID    DISPOSABLE GLOVES (COTTON GLOVES MEDIUM) MISC    Use nightly after applying ointment    INSULIN LISPRO (HUMALOG) 100 UNIT/ML INJECTION VIAL    VIA patient on Insulin pump max daily [id number] units    INSULIN LISPRO, 1 UNIT DIAL, (HUMALOG KWIKPEN) 100 UNIT/ML SOPN    20-24  units at each meals    INSULIN PEN NEEDLE (BD PEN NEEDLE EMRE U/F) 32G X 4 MM MISC    Use 3 times a day    INSULIN PEN NEEDLE (NOVOFINE) 32G X 6 MM MISC    qid    LANCETS MISC    1 each by Does not apply route daily    LANCETS MISC. (ACCU-CHEK FASTCLIX LANCET) KIT    1 each by Does not apply route 4 times daily Test 4x daily, works with patient insulin pump    LANTUS SOLOSTAR 100 UNIT/ML INJECTION PEN    Inject 70 units nightly    MICONAZOLE (MICONAZOLE ANTIFUNGAL) 2 % CREAM    Apply topically 2 times daily. TACROLIMUS (PROTOPIC) 0.03 % OINTMENT    Apply topically 2 times daily.     TRIAMCINOLONE (KENALOG) 0.1 % OINTMENT    Apply 2 times daily to affected areas       ALLERGIES     Tape Joselin Divers tape]    HISTORY       Family History   Problem Relation Age of Onset    Arthritis Mother     Diabetes Mother Early Death Father     Heart Attack Father     Heart Disease Father     High Blood Pressure Father     High Cholesterol Father     Diabetes Father     Diabetes Paternal Grandfather     Heart Disease Paternal Grandfather     Birth Defects Son     Arthritis Sister     Cancer Neg Hx           SOCIAL HISTORY       Social History     Socioeconomic History    Marital status: Single     Spouse name: None    Number of children: None    Years of education: None    Highest education level: None   Tobacco Use    Smoking status: Former     Packs/day: 0.25     Years: 23.00     Pack years: 5.75     Types: Cigarettes     Start date: 1999     Quit date: 10/30/2021     Years since quittin.7    Smokeless tobacco: Never    Tobacco comments:     started age 13   Substance and Sexual Activity    Alcohol use: No     Comment: socially before pregnancy    Drug use: No    Sexual activity: Not Currently     Partners: Male     Social Determinants of Health     Food Insecurity: No Food Insecurity    Worried About Running Out of Food in the Last Year: Never true    Ran Out of Food in the Last Year: Never true       SCREENINGS    Adonis Coma Scale  Eye Opening: Spontaneous  Best Verbal Response: Oriented  Best Motor Response: Obeys commands  [place] Coma Scale Score: 15      PHYSICAL EXAM    (up to 7 forlevel 4, 8 or more for level 5)     ED Triage Vitals [22 1439]   BP Temp Temp Source Heart Rate Resp SpO2 Height Weight   (!) 142/86 97.9 °F (36.6 °C) Temporal 92 16 100 % 5' 3\" (1.6 m) 170 lb (77.1 kg)       Physical Exam  Vitals and nursing note reviewed. Constitutional:       General: She is not in acute distress. Appearance: She is well-developed. She is not ill-appearing or toxic-appearing. HENT:      Head: Normocephalic and atraumatic. Right Ear: Tympanic membrane, ear canal and external ear normal. There is no impacted cerumen.       Left Ear: Tympanic membrane, ear canal and external ear normal. There is no impacted cerumen. Nose: Nose normal. No congestion or rhinorrhea. Mouth/Throat:      Mouth: Mucous membranes are moist.      Pharynx: Oropharynx is clear. Uvula midline. Posterior oropharyngeal erythema present. No oropharyngeal exudate or uvula swelling. Tonsils: No tonsillar exudate or tonsillar abscesses. Comments: Shallow round ulcerative lesion on left tonsillar pillar. Some surrounding erythema. Uvula is midline. No surrounding swelling. No exudate. No trismus. Teeth are in good condition no acute dental infection noted. No TMJ clicking. No ulcerations noted to gum tongue or buccal mucosa. No lesions to lips. Eyes:      Conjunctiva/sclera: Conjunctivae normal.      Pupils: Pupils are equal, round, and reactive to light. Neck:      Thyroid: No thyromegaly. Trachea: Trachea and phonation normal.   Cardiovascular:      Rate and Rhythm: Normal rate and regular rhythm. Heart sounds: Normal heart sounds. No murmur heard. Pulmonary:      Effort: Pulmonary effort is normal. No respiratory distress. Breath sounds: Normal breath sounds. No wheezing, rhonchi or rales. Musculoskeletal:      Cervical back: Normal range of motion and neck supple. No rigidity. Lymphadenopathy:      Head:      Right side of head: No submental, submandibular or tonsillar adenopathy. Left side of head: No submental, submandibular or tonsillar adenopathy. Cervical: No cervical adenopathy. Skin:     General: Skin is warm and dry. Findings: No rash. Neurological:      Mental Status: She is alert and oriented to person, place, and time. Psychiatric:         Speech: Speech normal.         Behavior: Behavior normal.         Thought Content:  Thought content normal.         Judgment: Judgment normal.         DIAGNOSTIC RESULTS     RADIOLOGY:   Non-plain film images such as CT, Ultrasound and MRI are read by the radiologist. Plain radiographic images are visualized and preliminarilyinterpreted by Cj Castaneda PA-C with the below findings:      Interpretation per the Radiologist below, if available at the time of this note:    No orders to display       LABS:  Labs Reviewed   COVID-19, RAPID   RAPID STREP SCREEN       All other labs were within normal range or not returnedas of this dictation. EMERGENCYDEPARTMENT COURSE and DIFFERENTIAL DIAGNOSIS/MDM:   Vitals:    Vitals:    08/13/22 1439   BP: (!) 142/86   Pulse: 92   Resp: 16   Temp: 97.9 °F (36.6 °C)   TempSrc: Temporal   SpO2: 100%   Weight: 170 lb (77.1 kg)   Height: 5' 3\" (1.6 m)       Rapid strep: Negative      Rapid COVID 19: Negative      MDM  Patient counseled that her infection appears to be of a viral etiology at this time. Symptomatic treatments discussed. Patient counseled to return here or go to the Emergency department if her symptoms change, worsen or do not improve in 1 week. We discussed Chloraseptic spray throat lozenges and salt water gargles. She will follow-up with her PCP next week to reevaluate. If it does not improve she may need to see an ear nose and throat doctor, as a cancerous etiology cannot be fully excluded. Patient voiced understanding . She is to return immediately if trouble breathing or swallowing develop. PROCEDURES:    Procedures      FINAL IMPRESSION      1.  Oral ulcer          DISPOSITION/PLAN   DISPOSITION Decision To Discharge 08/13/2022 04:07:10 PM      PATIENT REFERRED TO:  Cynthia Monte DO  [address] [place] [address]  [phone]    In 3 days      DISCHARGE MEDICATIONS:  New Prescriptions    No medications on file       (Please note thatportions of this note were completed with a voice recognition program.  Efforts were made to edit the dictations but occasionally words are mis-transcribed.)    CODY Hewitt PA-C  08/13/22 CODY Oliveira  08/13/22 7440

## 2022-10-08 ENCOUNTER — APPOINTMENT (OUTPATIENT)
Dept: GENERAL RADIOLOGY | Age: 42
End: 2022-10-08
Payer: COMMERCIAL

## 2022-10-08 ENCOUNTER — HOSPITAL ENCOUNTER (EMERGENCY)
Age: 42
Discharge: HOME | End: 2022-10-08
Payer: COMMERCIAL

## 2022-10-08 VITALS
RESPIRATION RATE: 16 BRPM | SYSTOLIC BLOOD PRESSURE: 121 MMHG | HEART RATE: 89 BPM | HEIGHT: 63 IN | TEMPERATURE: 99 F | WEIGHT: 170 LBS | DIASTOLIC BLOOD PRESSURE: 76 MMHG | BODY MASS INDEX: 30.12 KG/M2 | OXYGEN SATURATION: 100 %

## 2022-10-08 DIAGNOSIS — J20.9 ACUTE BRONCHITIS, UNSPECIFIED ORGANISM: Primary | ICD-10-CM

## 2022-10-08 DIAGNOSIS — E86.0 DEHYDRATION: ICD-10-CM

## 2022-10-08 LAB
ALBUMIN SERPL-MCNC: 4 G/DL (ref 3.5–4.6)
ALP BLD-CCNC: 59 U/L (ref 40–130)
ALT SERPL-CCNC: 16 U/L (ref 0–33)
ANION GAP SERPL CALCULATED.3IONS-SCNC: 11 MEQ/L (ref 9–15)
AST SERPL-CCNC: 14 U/L (ref 0–35)
BASOPHILS ABSOLUTE: 0 K/UL (ref 0–0.2)
BASOPHILS RELATIVE PERCENT: 0.2 %
BILIRUB SERPL-MCNC: 0.7 MG/DL (ref 0.2–0.7)
BUN BLDV-MCNC: 5 MG/DL (ref 6–20)
CALCIUM SERPL-MCNC: 8.7 MG/DL (ref 8.5–9.9)
CHLORIDE BLD-SCNC: 100 MEQ/L (ref 95–107)
CO2: 25 MEQ/L (ref 20–31)
CREAT SERPL-MCNC: 0.53 MG/DL (ref 0.5–0.9)
EOSINOPHILS ABSOLUTE: 0.1 K/UL (ref 0–0.7)
EOSINOPHILS RELATIVE PERCENT: 0.3 %
GFR AFRICAN AMERICAN: >60
GFR NON-AFRICAN AMERICAN: >60
GLOBULIN: 2.9 G/DL (ref 2.3–3.5)
GLUCOSE BLD-MCNC: 183 MG/DL (ref 70–99)
HCT VFR BLD CALC: 40.8 % (ref 37–47)
HEMOGLOBIN: 14 G/DL (ref 12–16)
INFLUENZA A BY PCR: NEGATIVE
INFLUENZA B BY PCR: NEGATIVE
LYMPHOCYTES ABSOLUTE: 1.1 K/UL (ref 1–4.8)
LYMPHOCYTES RELATIVE PERCENT: 6.2 %
MCH RBC QN AUTO: 30.5 PG (ref 27–31.3)
MCHC RBC AUTO-ENTMCNC: 34.2 % (ref 33–37)
MCV RBC AUTO: 89.3 FL (ref 82–100)
MONOCYTES ABSOLUTE: 1.1 K/UL (ref 0.2–0.8)
MONOCYTES RELATIVE PERCENT: 6.1 %
NEUTROPHILS ABSOLUTE: 16.1 K/UL (ref 1.4–6.5)
NEUTROPHILS RELATIVE PERCENT: 87.2 %
PDW BLD-RTO: 13.2 % (ref 11.5–14.5)
PLATELET # BLD: 252 K/UL (ref 130–400)
POTASSIUM SERPL-SCNC: 3.7 MEQ/L (ref 3.4–4.9)
RBC # BLD: 4.57 M/UL (ref 4.2–5.4)
SARS-COV-2, NAAT: NOT DETECTED
SODIUM BLD-SCNC: 136 MEQ/L (ref 135–144)
STREP GRP A PCR: NEGATIVE
TOTAL PROTEIN: 6.9 G/DL (ref 6.3–8)
WBC # BLD: 18.4 K/UL (ref 4.8–10.8)

## 2022-10-08 PROCEDURE — 87635 SARS-COV-2 COVID-19 AMP PRB: CPT

## 2022-10-08 PROCEDURE — 80053 COMPREHEN METABOLIC PANEL: CPT

## 2022-10-08 PROCEDURE — 96365 THER/PROPH/DIAG IV INF INIT: CPT

## 2022-10-08 PROCEDURE — 99284 EMERGENCY DEPT VISIT MOD MDM: CPT

## 2022-10-08 PROCEDURE — 71045 X-RAY EXAM CHEST 1 VIEW: CPT

## 2022-10-08 PROCEDURE — 2500000003 HC RX 250 WO HCPCS: Performed by: PHYSICIAN ASSISTANT

## 2022-10-08 PROCEDURE — 87651 STREP A DNA AMP PROBE: CPT

## 2022-10-08 PROCEDURE — 87502 INFLUENZA DNA AMP PROBE: CPT

## 2022-10-08 PROCEDURE — 36415 COLL VENOUS BLD VENIPUNCTURE: CPT

## 2022-10-08 PROCEDURE — 85025 COMPLETE CBC W/AUTO DIFF WBC: CPT

## 2022-10-08 PROCEDURE — 2580000003 HC RX 258: Performed by: PHYSICIAN ASSISTANT

## 2022-10-08 RX ORDER — DEXTROMETHORPHAN HYDROBROMIDE AND PROMETHAZINE HYDROCHLORIDE 15; 6.25 MG/5ML; MG/5ML
5 SYRUP ORAL 4 TIMES DAILY PRN
Qty: 140 ML | Refills: 0 | Status: SHIPPED | OUTPATIENT
Start: 2022-10-08 | End: 2022-10-15

## 2022-10-08 RX ORDER — 0.9 % SODIUM CHLORIDE 0.9 %
1000 INTRAVENOUS SOLUTION INTRAVENOUS ONCE
Status: COMPLETED | OUTPATIENT
Start: 2022-10-08 | End: 2022-10-08

## 2022-10-08 RX ORDER — DOXYCYCLINE HYCLATE 100 MG
100 TABLET ORAL 2 TIMES DAILY
Qty: 20 TABLET | Refills: 0 | Status: SHIPPED | OUTPATIENT
Start: 2022-10-08 | End: 2022-10-18

## 2022-10-08 RX ADMIN — SODIUM CHLORIDE 1000 ML: 9 INJECTION, SOLUTION INTRAVENOUS at 19:16

## 2022-10-08 RX ADMIN — DOXYCYCLINE 100 MG: 100 INJECTION, POWDER, LYOPHILIZED, FOR SOLUTION INTRAVENOUS at 19:49

## 2022-10-08 ASSESSMENT — ENCOUNTER SYMPTOMS
APNEA: 0
COLOR CHANGE: 0
ABDOMINAL PAIN: 0
SORE THROAT: 1
EYE PAIN: 0
SHORTNESS OF BREATH: 0
ALLERGIC/IMMUNOLOGIC NEGATIVE: 1
TROUBLE SWALLOWING: 0

## 2022-10-08 ASSESSMENT — PAIN DESCRIPTION - PAIN TYPE
TYPE: ACUTE PAIN
TYPE: ACUTE PAIN

## 2022-10-08 ASSESSMENT — PAIN DESCRIPTION - DESCRIPTORS
DESCRIPTORS: ACHING
DESCRIPTORS: ACHING

## 2022-10-08 ASSESSMENT — PAIN - FUNCTIONAL ASSESSMENT
PAIN_FUNCTIONAL_ASSESSMENT: 0-10
PAIN_FUNCTIONAL_ASSESSMENT: 0-10

## 2022-10-08 ASSESSMENT — PAIN SCALES - GENERAL
PAINLEVEL_OUTOF10: 6
PAINLEVEL_OUTOF10: 8

## 2022-10-08 ASSESSMENT — PAIN DESCRIPTION - LOCATION
LOCATION: GENERALIZED
LOCATION: GENERALIZED

## 2022-10-08 NOTE — ED PROVIDER NOTES
[address] [facility] ED  eMERGENCYdEPARTMENT eNCOUnter      Pt Name: Nazario Weller  MRN: [id number]  [place] 1980  Date of evaluation: 10/8/2022  Provider:Palomo Jeffery PA-C    CHIEF COMPLAINT       Chief Complaint   Patient presents with    Generalized Body Aches     General illness x 2days. Denies N/V/D, fever. Type II DM, on insulin pump. BGL by  mg/dL         HISTORY OF PRESENT ILLNESS  (Location/Symptom, Timing/Onset, Context/Setting, Quality, Duration, Modifying Factors, Severity.)   Nazario Weller is a 40 y.o. female who presents to the emergency department with complaints of sore throat, rhinorrhea, nasal congestion, bilateral ear pain, generalized malaise and fatigue and generalized body aches that began last night. Patient states she has not eaten since last night due to poor appetite but denies any vomiting or diarrhea. Patient states a dry cough but denies any chest pain or shortness of breath. No abdominal pain. Nothing taken over-the-counter for the symptoms other than to cough drops    HPI    Nursing Notes were reviewed and I agree. REVIEW OF SYSTEMS    (2-9 systems for level 4, 10 or more for level 5)     Review of Systems   Constitutional:  Positive for appetite change and fatigue. Negative for diaphoresis and fever. HENT:  Positive for congestion, ear pain and sore throat. Negative for hearing loss and trouble swallowing. Eyes:  Negative for pain. Respiratory:  Negative for apnea and shortness of breath. Cardiovascular:  Negative for chest pain. Gastrointestinal:  Negative for abdominal pain. Endocrine: Negative. Genitourinary:  Negative for hematuria. Musculoskeletal:  Positive for myalgias. Negative for neck pain and neck stiffness. Skin:  Negative for color change. Allergic/Immunologic: Negative. Neurological:  Negative for dizziness and numbness. Hematological: Negative. Psychiatric/Behavioral: Negative.      All other systems reviewed and are negative. Except as noted above the remainder of the review of systems was reviewed and negative. PAST MEDICAL HISTORY     Past Medical History:   Diagnosis Date    Anemia during pregnancy 2018    Eczema     Herpes simplex infection of skin     on neck    Hypercholesterolemia     Irregular heart beat     runs of v tach in the past    Obesity     Pregnancy induced hypertension     Type II or unspecified type diabetes mellitus without mention of complication, not stated as uncontrolled     type 2         SURGICAL HISTORY       Past Surgical History:   Procedure Laterality Date    ABDOMEN SURGERY       x 2     SECTION           CURRENT MEDICATIONS       Previous Medications    ACETAMINOPHEN (TYLENOL) 500 MG TABLET    Take 1 tablet by mouth 4 times daily as needed for Pain    ACYCLOVIR (ZOVIRAX) 5 % OINTMENT    Apply topically 5 times daily Apply topically 5 times daily to affected area for 7 days    ALCOHOL SWABS PADS    Use qid    BLOOD GLUCOSE MONITOR KIT AND SUPPLIES    Dispense one meter that insurance will cover. BLOOD GLUCOSE MONITOR STRIPS    Test 3x daily e11.65    BLOOD GLUCOSE TEST STRIPS (ACCU-CHEK GUIDE) STRIP    Test 4x daily, Patient is on insulin pump    CLOBETASOL (TEMOVATE) 0.05 % OINTMENT    Apply topically 2 times daily. CLOTRIMAZOLE-BETAMETHASONE (LOTRISONE) 1-0.05 % CREAM    Apply topically 2 times daily.     CRISABOROLE 2 % OINT    Apply sparingly to the area x BID    DISPOSABLE GLOVES (COTTON GLOVES MEDIUM) MISC    Use nightly after applying ointment    INSULIN LISPRO (HUMALOG) 100 UNIT/ML INJECTION VIAL    VIA patient on Insulin pump max daily [id number] units    INSULIN LISPRO, 1 UNIT DIAL, (HUMALOG KWIKPEN) 100 UNIT/ML SOPN    20-24  units at each meals    INSULIN PEN NEEDLE (BD PEN NEEDLE EMRE U/F) 32G X 4 MM MISC    Use 3 times a day    INSULIN PEN NEEDLE (NOVOFINE) 32G X 6 MM MISC    qid    LANCETS MISC    1 each by Does not apply route daily    LANCETS MISC. (ACCU-CHEK FASTCLIX LANCET) KIT    1 each by Does not apply route 4 times daily Test 4x daily, works with patient insulin pump    LANTUS SOLOSTAR 100 UNIT/ML INJECTION PEN    Inject 70 units nightly    MICONAZOLE (MICONAZOLE ANTIFUNGAL) 2 % CREAM    Apply topically 2 times daily. TACROLIMUS (PROTOPIC) 0.03 % OINTMENT    Apply topically 2 times daily.     TRIAMCINOLONE (KENALOG) 0.1 % OINTMENT    Apply 2 times daily to affected areas       ALLERGIES     Tape Chrystal Pallas tape]    FAMILY HISTORY       Family History   Problem Relation Age of Onset    Arthritis Mother     Diabetes Mother     Early Death Father     Heart Attack Father     Heart Disease Father     High Blood Pressure Father     High Cholesterol Father     Diabetes Father     Diabetes Paternal Grandfather     Heart Disease Paternal Grandfather     Birth Defects Son     Arthritis Sister     Cancer Neg Hx           SOCIAL HISTORY       Social History     Socioeconomic History    Marital status: Single     Spouse name: None    Number of children: None    Years of education: None    Highest education level: None   Tobacco Use    Smoking status: Former     Packs/day: 0.25     Years: 23.00     Pack years: 5.75     Types: Cigarettes     Start date: 1999     Quit date: 10/30/2021     Years since quittin.9    Smokeless tobacco: Never    Tobacco comments:     started age 13   Vaping Use    Vaping Use: Never used   Substance and Sexual Activity    Alcohol use: No     Comment: socially before pregnancy    Drug use: No    Sexual activity: Not Currently     Partners: Male     Social Determinants of Health     Food Insecurity: No Food Insecurity    Worried About Running Out of Food in the Last Year: Never true    Ran Out of Food in the Last Year: Never true       SCREENINGS    [place] Coma Scale  Eye Opening: Spontaneous  Best Verbal Response: Oriented  Best Motor Response: Obeys commands  Adonis Coma Scale Score: 15      PHYSICAL EXAM (up to 7 forlevel 4, 8 or more for level 5)     ED Triage Vitals [10/08/22 1842]   BP Temp Temp Source Heart Rate Resp SpO2 Height Weight   128/81 99 °F (37.2 °C) Oral (!) 109 16 99 % 5' 3\" (1.6 m) 170 lb (77.1 kg)       Physical Exam  Vitals and nursing note reviewed. Constitutional:       General: She is not in acute distress. Appearance: She is well-developed. She is not diaphoretic. HENT:      Head: Normocephalic and atraumatic. Mouth/Throat:      Mouth: Mucous membranes are moist.      Pharynx: No oropharyngeal exudate. Eyes:      General: No scleral icterus. Conjunctiva/sclera: Conjunctivae normal.      Pupils: Pupils are equal, round, and reactive to light. Neck:      Trachea: No tracheal deviation. Cardiovascular:      Rate and Rhythm: Normal rate. Heart sounds: Normal heart sounds. Pulmonary:      Effort: Pulmonary effort is normal. No respiratory distress. Breath sounds: Normal breath sounds. Abdominal:      General: Bowel sounds are normal. There is no distension. Palpations: Abdomen is soft. Musculoskeletal:         General: Normal range of motion. Cervical back: Normal range of motion and neck supple. No rigidity or tenderness. Skin:     General: Skin is warm and dry. Findings: No erythema or rash. Neurological:      Mental Status: She is alert and oriented to person, place, and time. Cranial Nerves: No cranial nerve deficit. Motor: No abnormal muscle tone. Psychiatric:         Behavior: Behavior normal.         Thought Content:  Thought content normal.         Judgment: Judgment normal.         DIAGNOSTIC RESULTS     RADIOLOGY:   Non-plain film images such as CT, Ultrasound and MRI are read by the radiologist. Plain radiographic images are visualized and preliminarilyinterpreted by Amish White PA-C with the below findings:    Neg    Interpretation per the Radiologist below, if available at the time of this note:    XR CHEST PORTABLE    (Results Pending)       LABS:  Labs Reviewed   COMPREHENSIVE METABOLIC PANEL - Abnormal; Notable for the following components:       Result Value    Glucose 183 (*)     BUN 5 (*)     All other components within normal limits   CBC WITH AUTO DIFFERENTIAL - Abnormal; Notable for the following components:    WBC 18.4 (*)     Neutrophils Absolute 16.1 (*)     Monocytes Absolute 1.1 (*)     All other components within normal limits   RAPID INFLUENZA A/B ANTIGENS   COVID-19, RAPID   RAPID STREP SCREEN       All other labs were within normal range or not returnedas of this dictation. EMERGENCYDEPARTMENT COURSE and DIFFERENTIAL DIAGNOSIS/MDM:   Vitals:    Vitals:    10/08/22 1842 10/08/22 2003   BP: 128/81 121/76   Pulse: (!) 109 89   Resp: 16 16   Temp: 99 °F (37.2 °C)    TempSrc: Oral    SpO2: 99% 100%   Weight: 170 lb (77.1 kg)    Height: 5' 3\" (1.6 m)        REASSESSMENT        Patient presents with URI symptoms for the past 1 to 2 days. White count elevated and viral testing negative. Patient has no clinical signs of sepsis on exam today. Patient will be treated with antibiotics to cover for bacterial etiology of her URI. Follow-up with PCP    MDM      PROCEDURES:    Procedures      FINAL IMPRESSION      1. Acute bronchitis, unspecified organism    2.  Dehydration          DISPOSITION/PLAN   DISPOSITION Discharge - Pending Orders Complete 10/08/2022 08:44:02 PM      PATIENT REFERRED TO:  Ambrocio Maxwell DO  [address]  [phone]    Call in 2 days      DISCHARGE MEDICATIONS:  New Prescriptions    DOXYCYCLINE HYCLATE (VIBRA-TABS) 100 MG TABLET    Take 1 tablet by mouth 2 times daily for 10 days    PROMETHAZINE-DEXTROMETHORPHAN (PROMETHAZINE-DM) 6.25-15 MG/5ML SYRUP    Take 5 mLs by mouth 4 times daily as needed for Cough       (Please note that portions of this note were completed with a voice recognition program.  Efforts were made to edit the dictations but occasionally words are mis-transcribed.)    Alea An, CODY An PA-C  10/08/22 2045

## 2022-11-04 ENCOUNTER — OFFICE VISIT (OUTPATIENT)
Dept: FAMILY MEDICINE CLINIC | Age: 42
End: 2022-11-04
Payer: COMMERCIAL

## 2022-11-04 VITALS
HEIGHT: 63 IN | OXYGEN SATURATION: 99 % | BODY MASS INDEX: 30.83 KG/M2 | SYSTOLIC BLOOD PRESSURE: 126 MMHG | DIASTOLIC BLOOD PRESSURE: 80 MMHG | WEIGHT: 174 LBS | HEART RATE: 77 BPM | TEMPERATURE: 97.8 F

## 2022-11-04 DIAGNOSIS — B00.89 HERPES GLADIATORUM: Primary | ICD-10-CM

## 2022-11-04 PROCEDURE — G8427 DOCREV CUR MEDS BY ELIG CLIN: HCPCS | Performed by: STUDENT IN AN ORGANIZED HEALTH CARE EDUCATION/TRAINING PROGRAM

## 2022-11-04 PROCEDURE — 99213 OFFICE O/P EST LOW 20 MIN: CPT | Performed by: STUDENT IN AN ORGANIZED HEALTH CARE EDUCATION/TRAINING PROGRAM

## 2022-11-04 PROCEDURE — G8417 CALC BMI ABV UP PARAM F/U: HCPCS | Performed by: STUDENT IN AN ORGANIZED HEALTH CARE EDUCATION/TRAINING PROGRAM

## 2022-11-04 PROCEDURE — G8484 FLU IMMUNIZE NO ADMIN: HCPCS | Performed by: STUDENT IN AN ORGANIZED HEALTH CARE EDUCATION/TRAINING PROGRAM

## 2022-11-04 PROCEDURE — 1036F TOBACCO NON-USER: CPT | Performed by: STUDENT IN AN ORGANIZED HEALTH CARE EDUCATION/TRAINING PROGRAM

## 2022-11-04 RX ORDER — VALACYCLOVIR HYDROCHLORIDE 1 G/1
1000 TABLET, FILM COATED ORAL 2 TIMES DAILY
Qty: 20 TABLET | Refills: 0 | Status: SHIPPED | OUTPATIENT
Start: 2022-11-04 | End: 2022-11-14

## 2022-11-04 RX ORDER — INSULIN LISPRO 100 [IU]/ML
INJECTION, SOLUTION INTRAVENOUS; SUBCUTANEOUS
COMMUNITY
Start: 2022-10-17

## 2022-11-04 ASSESSMENT — PATIENT HEALTH QUESTIONNAIRE - PHQ9
2. FEELING DOWN, DEPRESSED OR HOPELESS: 0
SUM OF ALL RESPONSES TO PHQ9 QUESTIONS 1 & 2: 0
SUM OF ALL RESPONSES TO PHQ QUESTIONS 1-9: 0
1. LITTLE INTEREST OR PLEASURE IN DOING THINGS: 0
SUM OF ALL RESPONSES TO PHQ QUESTIONS 1-9: 0

## 2022-11-04 ASSESSMENT — ENCOUNTER SYMPTOMS
ABDOMINAL PAIN: 0
SHORTNESS OF BREATH: 0
COUGH: 0
VOMITING: 0
SORE THROAT: 0
SINUS PRESSURE: 0

## 2022-11-04 ASSESSMENT — SOCIAL DETERMINANTS OF HEALTH (SDOH): HOW HARD IS IT FOR YOU TO PAY FOR THE VERY BASICS LIKE FOOD, HOUSING, MEDICAL CARE, AND HEATING?: NOT HARD AT ALL

## 2022-11-04 NOTE — PROGRESS NOTES
2022    Scot Prader (:  1980) is a 40 y.o. female, here for evaluation of the following medical concerns:  Chief Complaint   Patient presents with    Skin Problem     Cold sores on left side of the neck. HPI  Rash  Started yesterday morning  Has had similar rash in the past when she was pregnant with her daughter  Left side of neck  Slight itchiness and soreness  Small blisters on a red base    Review of Systems   Constitutional:  Negative for chills and fever. HENT:  Negative for congestion, sinus pressure and sore throat. Respiratory:  Negative for cough and shortness of breath. Cardiovascular:  Negative for chest pain and palpitations. Gastrointestinal:  Negative for abdominal pain and vomiting. Musculoskeletal:  Negative for arthralgias and myalgias. Skin:  Positive for rash. Negative for wound. Neurological:  Negative for speech difficulty and light-headedness. Psychiatric/Behavioral:  Negative for suicidal ideas. The patient is not nervous/anxious. Prior to Visit Medications    Medication Sig Taking? Authorizing Provider   valACYclovir (VALTREX) 1 g tablet Take 1 tablet by mouth 2 times daily for 10 days Yes Josef Jefferson DO   insulin lispro (HUMALOG) 100 UNIT/ML SOLN injection vial VIA INSULIN PUMP MAX DAILY DOSE 200 UNITS  Historical Provider, MD   Lancets Misc. (ACCU-CHEK FASTCLIX LANCET) KIT 1 each by Does not apply route 4 times daily Test 4x daily, works with patient insulin pump  Mariaelena Flores MD   blood glucose test strips (ACCU-CHEK GUIDE) strip Test 4x daily, Patient is on insulin pump  Mariaelena Flores MD   blood glucose monitor kit and supplies Dispense one meter that insurance will cover.   Mariaelena Flores MD   blood glucose monitor strips Test 3x daily e11.65  Mariaelena Flores MD   Lancets MISC 1 each by Does not apply route daily  Mariaelena Flores MD   acetaminophen (TYLENOL) 500 MG tablet Take 1 tablet by mouth 4 times daily as needed for Pain  Nancy Dykes MD   LANTUS SOLOSTAR 100 UNIT/ML injection pen Inject 70 units nightly  Darcie Weston MD   insulin lispro, 1 Unit Dial, (HUMALOG KWIKPEN) 100 UNIT/ML SOPN 20-24  units at each meals  Ganga Mccormick MD   triamcinolone (KENALOG) 0.1 % ointment Apply 2 times daily to affected areas  Phillip Joseph MD   tacrolimus (PROTOPIC) 0.03 % ointment Apply topically 2 times daily. JO Garcia   Disposable Gloves (COTTON GLOVES MEDIUM) MISC Use nightly after applying ointment  Romero Mohamud DO   Insulin Pen Needle (NOVOFINE) 32G X 6 MM MISC qid  Darcie Weston MD   Alcohol Swabs PADS Use qid  Darcie Weston MD   Crisaborole 2 % OINT Apply sparingly to the area x BID  TRISHA Meier - CNP   clobetasol (TEMOVATE) 0.05 % ointment Apply topically 2 times daily. TRISHA Bolden - CNP   acyclovir (ZOVIRAX) 5 % ointment Apply topically 5 times daily Apply topically 5 times daily to affected area for 7 days  Lear Pod, APRN - CNP   clotrimazole-betamethasone (LOTRISONE) 1-0.05 % cream Apply topically 2 times daily. Serina Ferrell MD   miconazole (MICONAZOLE ANTIFUNGAL) 2 % cream Apply topically 2 times daily.   Serina Ferrell MD   Insulin Pen Needle (BD PEN NEEDLE EMRE U/F) 32G X 4 MM MISC Use 3 times a day  Darcie Weston MD        Medications Discontinued During This Encounter   Medication Reason    insulin lispro (HUMALOG) 100 UNIT/ML injection vial Therapy completed       Allergies   Allergen Reactions    Tape [Adhesive Tape] Rash       Past Medical History:   Diagnosis Date    Anemia during pregnancy 2/2/2018    Eczema     Herpes simplex infection of skin     on neck    Hypercholesterolemia     Irregular heart beat     runs of v tach in the past    Obesity     Pregnancy induced hypertension     Type II or unspecified type diabetes mellitus without mention of complication, not stated as uncontrolled     type 2       Past Surgical History:   Procedure Laterality Date    ABDOMEN SURGERY  2006/2016  x 2     SECTION         Social History     Socioeconomic History    Marital status: Single     Spouse name: Not on file    Number of children: Not on file    Years of education: Not on file    Highest education level: Not on file   Occupational History    Not on file   Tobacco Use    Smoking status: Former     Packs/day: 0.25     Years: 23.00     Pack years: 5.75     Types: Cigarettes     Start date: 1999     Quit date: 10/30/2021     Years since quittin.0    Smokeless tobacco: Never    Tobacco comments:     started age 13   Vaping Use    Vaping Use: Never used   Substance and Sexual Activity    Alcohol use: No     Comment: socially before pregnancy    Drug use: No    Sexual activity: Not Currently     Partners: Male   Other Topics Concern    Not on file   Social History Narrative    Not on file     Social Determinants of Health     Financial Resource Strain: Low Risk     Difficulty of Paying Living Expenses: Not hard at all   Food Insecurity: No Food Insecurity    Worried About Running Out of Food in the Last Year: Never true    Ran Out of Food in the Last Year: Never true   Transportation Needs: Not on file   Physical Activity: Not on file   Stress: Not on file   Social Connections: Not on file   Intimate Partner Violence: Not on file   Housing Stability: Not on file        Family History   Problem Relation Age of Onset    Arthritis Mother     Diabetes Mother     Early Death Father     Heart Attack Father     Heart Disease Father     High Blood Pressure Father     High Cholesterol Father     Diabetes Father     Diabetes Paternal Grandfather     Heart Disease Paternal Grandfather     Birth Defects Son     Arthritis Sister     Cancer Neg Hx        Vitals:    22 1149   BP: 126/80   Pulse: 77   Temp: 97.8 °F (36.6 °C)   SpO2: 99%   Weight: 174 lb (78.9 kg)   Height: 5' 3\" (1.6 m)       Estimated body mass index is 30.82 kg/m² as calculated from the following:    Height as of this encounter: 5' 3\" (1.6 m). Weight as of this encounter: 174 lb (78.9 kg). No results for input(s): WBC, RBC, HGB, HCT, MCV, MCH, MCHC, RDW, PLT, MPV in the last 72 hours. No results for input(s): NA, K, CL, CO2, BUN, CREATININE, GLUCOSE, CALCIUM, PROT, LABALBU, BILITOT, ALKPHOS, AST, ALT in the last 72 hours. Lab Results   Component Value Date/Time    LABA1C 9.5 04/14/2022 03:46 PM       XR CHEST PORTABLE    Result Date: 10/8/2022  Subtle opacity in the left lower lung more lingula which was not definitively seen on prior exam.  Possibly reflects changes related to an underlying infectious/inflammatory process given the provided history. No pleural effusion or pneumothorax. RECOMMENDATION: (Recommend upright PA and lateral chest radiographs 10-12 weeks after resolution of patient's symptoms to ensure complete resolution of radiographic findings.)     Physical Exam  Constitutional:       General: She is not in acute distress. Appearance: Normal appearance. HENT:      Head: Normocephalic and atraumatic. Eyes:      Extraocular Movements: Extraocular movements intact. Conjunctiva/sclera: Conjunctivae normal.   Musculoskeletal:         General: No deformity. Normal range of motion. Skin:     Findings: No lesion or rash. Comments: Blistering erythematous rash on the left lateral neck   Neurological:      General: No focal deficit present. Mental Status: She is alert. Mental status is at baseline. Psychiatric:         Mood and Affect: Mood normal.         Behavior: Behavior normal.         Thought Content: Thought content normal.       ASSESSMENT/PLAN:  1. Herpes gladiatorum    - valACYclovir (VALTREX) 1 g tablet; Take 1 tablet by mouth 2 times daily for 10 days  Dispense: 20 tablet;  Refill: 0    Medications Discontinued During This Encounter   Medication Reason    insulin lispro (HUMALOG) 100 UNIT/ML injection vial Therapy completed ---------------------------------------------------------------------  Side effects, adverse effects of the medication prescribed today, as well as treatment plan/ rationale and result expectations have been discussed with the patient who expresses understanding and desires to proceed. Close follow up to evaluate treatment results and for coordination of care. I have reviewed the patient's medical history in detail and updated the computerized patient record. As always, patient is advised that if symptoms worsen in any way they will proceed to the nearest emergency room. --------------------------------------------------------------------    Return if symptoms worsen or fail to improve. An  electronic signature was used to authenticate this note.     --Candi Paul DO on 11/4/2022 at 12:01 PM

## 2023-01-26 ENCOUNTER — TELEPHONE (OUTPATIENT)
Dept: FAMILY MEDICINE CLINIC | Age: 43
End: 2023-01-26

## 2023-01-26 DIAGNOSIS — E11.69 TYPE 2 DIABETES MELLITUS WITH OTHER SPECIFIED COMPLICATION, WITH LONG-TERM CURRENT USE OF INSULIN (HCC): Primary | ICD-10-CM

## 2023-01-26 DIAGNOSIS — Z79.4 TYPE 2 DIABETES MELLITUS WITH OTHER SPECIFIED COMPLICATION, WITH LONG-TERM CURRENT USE OF INSULIN (HCC): Primary | ICD-10-CM

## 2023-01-26 NOTE — TELEPHONE ENCOUNTER
Patient follows with endocrinology, Dr. Belinda Adkins, but okay to get A1c updated for specialist appointment

## 2023-01-26 NOTE — TELEPHONE ENCOUNTER
Called pt advised her she is over due for appointment and hemoglobin a1c.  She states she will come and have the lab work done when she can but will have to call back to create the f/u because she does not have a ride

## 2023-01-29 LAB
ORGANISM: ABNORMAL
ORGANISM: ABNORMAL
WOUND/ABSCESS: ABNORMAL

## 2023-02-13 ENCOUNTER — OFFICE VISIT (OUTPATIENT)
Dept: FAMILY MEDICINE CLINIC | Age: 43
End: 2023-02-13
Payer: COMMERCIAL

## 2023-02-13 ENCOUNTER — HOSPITAL ENCOUNTER (EMERGENCY)
Age: 43
Discharge: HOME | End: 2023-02-13
Attending: STUDENT IN AN ORGANIZED HEALTH CARE EDUCATION/TRAINING PROGRAM
Payer: COMMERCIAL

## 2023-02-13 VITALS
DIASTOLIC BLOOD PRESSURE: 87 MMHG | BODY MASS INDEX: 30.3 KG/M2 | OXYGEN SATURATION: 99 % | HEART RATE: 93 BPM | RESPIRATION RATE: 16 BRPM | SYSTOLIC BLOOD PRESSURE: 139 MMHG | TEMPERATURE: 98 F | HEIGHT: 63 IN | WEIGHT: 171 LBS

## 2023-02-13 VITALS
SYSTOLIC BLOOD PRESSURE: 122 MMHG | WEIGHT: 172 LBS | HEIGHT: 63 IN | BODY MASS INDEX: 30.48 KG/M2 | DIASTOLIC BLOOD PRESSURE: 80 MMHG

## 2023-02-13 DIAGNOSIS — L02.519 CELLULITIS AND ABSCESS OF HAND: Primary | ICD-10-CM

## 2023-02-13 DIAGNOSIS — M79.642 BILATERAL HAND PAIN: ICD-10-CM

## 2023-02-13 DIAGNOSIS — R21 RASH AND OTHER NONSPECIFIC SKIN ERUPTION: Primary | ICD-10-CM

## 2023-02-13 DIAGNOSIS — L03.119 CELLULITIS AND ABSCESS OF HAND: Primary | ICD-10-CM

## 2023-02-13 DIAGNOSIS — M79.641 BILATERAL HAND PAIN: ICD-10-CM

## 2023-02-13 PROBLEM — Z3A.34 34 WEEKS GESTATION OF PREGNANCY: Status: RESOLVED | Noted: 2018-01-24 | Resolved: 2023-02-13

## 2023-02-13 LAB
ALBUMIN SERPL-MCNC: 4.1 G/DL (ref 3.5–4.6)
ALP BLD-CCNC: 71 U/L (ref 40–130)
ALT SERPL-CCNC: 25 U/L (ref 0–33)
ANION GAP SERPL CALCULATED.3IONS-SCNC: 10 MEQ/L (ref 9–15)
AST SERPL-CCNC: 16 U/L (ref 0–35)
BASOPHILS ABSOLUTE: 0.1 K/UL (ref 0–0.2)
BASOPHILS RELATIVE PERCENT: 0.6 %
BILIRUB SERPL-MCNC: 0.5 MG/DL (ref 0.2–0.7)
BUN BLDV-MCNC: 5 MG/DL (ref 6–20)
CALCIUM SERPL-MCNC: 9 MG/DL (ref 8.5–9.9)
CHLORIDE BLD-SCNC: 104 MEQ/L (ref 95–107)
CO2: 24 MEQ/L (ref 20–31)
CREAT SERPL-MCNC: 0.54 MG/DL (ref 0.5–0.9)
EOSINOPHILS ABSOLUTE: 0.2 K/UL (ref 0–0.7)
EOSINOPHILS RELATIVE PERCENT: 1.4 %
GFR SERPL CREATININE-BSD FRML MDRD: >60 ML/MIN/{1.73_M2}
GLOBULIN: 3.3 G/DL (ref 2.3–3.5)
GLUCOSE BLD-MCNC: 154 MG/DL (ref 70–99)
HCT VFR BLD CALC: 41.2 % (ref 37–47)
HEMOGLOBIN: 14.5 G/DL (ref 12–16)
LACTIC ACID: 1.3 MMOL/L (ref 0.5–2.2)
LYMPHOCYTES ABSOLUTE: 2.1 K/UL (ref 1–4.8)
LYMPHOCYTES RELATIVE PERCENT: 18.2 %
MCH RBC QN AUTO: 30.8 PG (ref 27–31.3)
MCHC RBC AUTO-ENTMCNC: 35.1 % (ref 33–37)
MCV RBC AUTO: 87.8 FL (ref 79.4–94.8)
MONOCYTES ABSOLUTE: 0.8 K/UL (ref 0.2–0.8)
MONOCYTES RELATIVE PERCENT: 6.7 %
NEUTROPHILS ABSOLUTE: 8.5 K/UL (ref 1.4–6.5)
NEUTROPHILS RELATIVE PERCENT: 73.1 %
PDW BLD-RTO: 14.1 % (ref 11.5–14.5)
PLATELET # BLD: 354 K/UL (ref 130–400)
POTASSIUM SERPL-SCNC: 3.7 MEQ/L (ref 3.4–4.9)
RBC # BLD: 4.7 M/UL (ref 4.2–5.4)
SODIUM BLD-SCNC: 138 MEQ/L (ref 135–144)
TOTAL PROTEIN: 7.4 G/DL (ref 6.3–8)
WBC # BLD: 11.6 K/UL (ref 4.8–10.8)

## 2023-02-13 PROCEDURE — 83605 ASSAY OF LACTIC ACID: CPT

## 2023-02-13 PROCEDURE — 6370000000 HC RX 637 (ALT 250 FOR IP): Performed by: STUDENT IN AN ORGANIZED HEALTH CARE EDUCATION/TRAINING PROGRAM

## 2023-02-13 PROCEDURE — 36415 COLL VENOUS BLD VENIPUNCTURE: CPT

## 2023-02-13 PROCEDURE — 80053 COMPREHEN METABOLIC PANEL: CPT

## 2023-02-13 PROCEDURE — 87040 BLOOD CULTURE FOR BACTERIA: CPT

## 2023-02-13 PROCEDURE — 85025 COMPLETE CBC W/AUTO DIFF WBC: CPT

## 2023-02-13 PROCEDURE — G8427 DOCREV CUR MEDS BY ELIG CLIN: HCPCS | Performed by: NURSE PRACTITIONER

## 2023-02-13 PROCEDURE — G8484 FLU IMMUNIZE NO ADMIN: HCPCS | Performed by: NURSE PRACTITIONER

## 2023-02-13 PROCEDURE — 99213 OFFICE O/P EST LOW 20 MIN: CPT | Performed by: NURSE PRACTITIONER

## 2023-02-13 PROCEDURE — G8417 CALC BMI ABV UP PARAM F/U: HCPCS | Performed by: NURSE PRACTITIONER

## 2023-02-13 PROCEDURE — 6370000000 HC RX 637 (ALT 250 FOR IP): Performed by: PHYSICIAN ASSISTANT

## 2023-02-13 PROCEDURE — 99283 EMERGENCY DEPT VISIT LOW MDM: CPT

## 2023-02-13 PROCEDURE — 1036F TOBACCO NON-USER: CPT | Performed by: NURSE PRACTITIONER

## 2023-02-13 RX ORDER — IBUPROFEN 800 MG/1
800 TABLET ORAL ONCE
Status: COMPLETED | OUTPATIENT
Start: 2023-02-13 | End: 2023-02-13

## 2023-02-13 RX ORDER — ACETAMINOPHEN 500 MG
1000 TABLET ORAL EVERY 6 HOURS PRN
Qty: 60 TABLET | Refills: 0 | Status: SHIPPED | OUTPATIENT
Start: 2023-02-13

## 2023-02-13 RX ORDER — AMOXICILLIN AND CLAVULANATE POTASSIUM 875; 125 MG/1; MG/1
1 TABLET, FILM COATED ORAL ONCE
Status: COMPLETED | OUTPATIENT
Start: 2023-02-13 | End: 2023-02-13

## 2023-02-13 RX ORDER — DOXYCYCLINE HYCLATE 100 MG
100 TABLET ORAL 2 TIMES DAILY
Qty: 14 TABLET | Refills: 0 | Status: SHIPPED | OUTPATIENT
Start: 2023-02-13 | End: 2023-02-20

## 2023-02-13 RX ORDER — DOXYCYCLINE HYCLATE 100 MG/1
100 CAPSULE ORAL ONCE
Status: COMPLETED | OUTPATIENT
Start: 2023-02-13 | End: 2023-02-13

## 2023-02-13 RX ORDER — HYDROCODONE BITARTRATE AND ACETAMINOPHEN 5; 325 MG/1; MG/1
1 TABLET ORAL ONCE
Status: COMPLETED | OUTPATIENT
Start: 2023-02-13 | End: 2023-02-13

## 2023-02-13 RX ORDER — TRIAMCINOLONE ACETONIDE 1 MG/G
CREAM TOPICAL
Qty: 15 G | Refills: 0 | Status: SHIPPED | OUTPATIENT
Start: 2023-02-13

## 2023-02-13 RX ORDER — AMOXICILLIN AND CLAVULANATE POTASSIUM 875; 125 MG/1; MG/1
1 TABLET, FILM COATED ORAL 2 TIMES DAILY
Qty: 14 TABLET | Refills: 0 | Status: SHIPPED | OUTPATIENT
Start: 2023-02-13 | End: 2023-02-20

## 2023-02-13 RX ORDER — IBUPROFEN 400 MG/1
400 TABLET ORAL EVERY 6 HOURS PRN
Qty: 120 TABLET | Refills: 0 | Status: SHIPPED | OUTPATIENT
Start: 2023-02-13

## 2023-02-13 RX ADMIN — HYDROCODONE BITARTRATE AND ACETAMINOPHEN 1 TABLET: 5; 325 TABLET ORAL at 18:02

## 2023-02-13 RX ADMIN — DOXYCYCLINE HYCLATE 100 MG: 100 CAPSULE ORAL at 18:23

## 2023-02-13 RX ADMIN — IBUPROFEN 800 MG: 800 TABLET ORAL at 18:02

## 2023-02-13 RX ADMIN — AMOXICILLIN AND CLAVULANATE POTASSIUM 1 TABLET: 875; 125 TABLET, FILM COATED ORAL at 18:23

## 2023-02-13 SDOH — ECONOMIC STABILITY: FOOD INSECURITY: WITHIN THE PAST 12 MONTHS, YOU WORRIED THAT YOUR FOOD WOULD RUN OUT BEFORE YOU GOT MONEY TO BUY MORE.: NEVER TRUE

## 2023-02-13 SDOH — ECONOMIC STABILITY: FOOD INSECURITY: WITHIN THE PAST 12 MONTHS, THE FOOD YOU BOUGHT JUST DIDN'T LAST AND YOU DIDN'T HAVE MONEY TO GET MORE.: NEVER TRUE

## 2023-02-13 SDOH — ECONOMIC STABILITY: INCOME INSECURITY: HOW HARD IS IT FOR YOU TO PAY FOR THE VERY BASICS LIKE FOOD, HOUSING, MEDICAL CARE, AND HEATING?: NOT HARD AT ALL

## 2023-02-13 SDOH — ECONOMIC STABILITY: HOUSING INSECURITY
IN THE LAST 12 MONTHS, WAS THERE A TIME WHEN YOU DID NOT HAVE A STEADY PLACE TO SLEEP OR SLEPT IN A SHELTER (INCLUDING NOW)?: NO

## 2023-02-13 ASSESSMENT — ENCOUNTER SYMPTOMS
NAUSEA: 0
EYE DISCHARGE: 0
VOMITING: 0
COLOR CHANGE: 1
CHEST TIGHTNESS: 0
EYE REDNESS: 0
NAUSEA: 0
WHEEZING: 0
VOICE CHANGE: 0
DIARRHEA: 0
APNEA: 0
VOMITING: 0
TROUBLE SWALLOWING: 0
SINUS PRESSURE: 0
EYE PAIN: 0
SORE THROAT: 0
ANAL BLEEDING: 0
COUGH: 0
ABDOMINAL PAIN: 0
SORE THROAT: 0
RHINORRHEA: 0
CONSTIPATION: 0
DIARRHEA: 0
EYE ITCHING: 0
ABDOMINAL DISTENTION: 0
SHORTNESS OF BREATH: 0
ABDOMINAL PAIN: 0
PHOTOPHOBIA: 0
EYE DISCHARGE: 0
COUGH: 0
SINUS PAIN: 0
BACK PAIN: 0
RHINORRHEA: 0
EYE PAIN: 0
SHORTNESS OF BREATH: 0

## 2023-02-13 ASSESSMENT — VISUAL ACUITY: OU: 1

## 2023-02-13 ASSESSMENT — PAIN SCALES - GENERAL: PAINLEVEL_OUTOF10: 8

## 2023-02-13 ASSESSMENT — PATIENT HEALTH QUESTIONNAIRE - PHQ9
1. LITTLE INTEREST OR PLEASURE IN DOING THINGS: 0
SUM OF ALL RESPONSES TO PHQ9 QUESTIONS 1 & 2: 0
SUM OF ALL RESPONSES TO PHQ QUESTIONS 1-9: 0
2. FEELING DOWN, DEPRESSED OR HOPELESS: 0
SUM OF ALL RESPONSES TO PHQ QUESTIONS 1-9: 0

## 2023-02-13 ASSESSMENT — PAIN - FUNCTIONAL ASSESSMENT: PAIN_FUNCTIONAL_ASSESSMENT: 0-10

## 2023-02-13 ASSESSMENT — PAIN DESCRIPTION - DESCRIPTORS: DESCRIPTORS: BURNING

## 2023-02-13 ASSESSMENT — PAIN DESCRIPTION - LOCATION: LOCATION: HAND

## 2023-02-13 ASSESSMENT — PAIN DESCRIPTION - ORIENTATION: ORIENTATION: RIGHT;LEFT

## 2023-02-13 NOTE — ED PROVIDER NOTES
[facility] ED  eMERGENCY dEPARTMENT eNCOUnter      Pt Name: Popeye Dudley  MRN: [id number]  [place] 1980  Date of evaluation: 2/13/2023  Provider: Vinny Hutchinson Dr       Chief Complaint   Patient presents with    Blister     Blisters on the palm of hands x2days          HISTORY OF PRESENT ILLNESS   (Location/Symptom, Timing/Onset,Context/Setting, Quality, Duration, Modifying Factors, Severity)  Note limiting factors. Popeye Dudley is a 40 y.o. female who presents to the emergency department  pt has hx eczema and has seen specialists times 2 years. Three days ago pt developed blisters on  palms of hands and open/dreid skin to dorsum of hands. Pt is diabetis and was on abx a few weeks ago for wound on foot. She denies n/v.  Symptoms moderate inseverity and worsens with touch/motion. Pt seen by urgent care/pmd today and sent to er. HPI    NursingNotes were reviewed. REVIEW OF SYSTEMS    (2-9 systems for level 4, 10 or more for level 5)     Review of Systems   Constitutional:  Negative for activity change, appetite change and unexpected weight change. HENT:  Negative for ear discharge, nosebleeds and voice change. Eyes:  Negative for discharge. Respiratory:  Negative for apnea. Cardiovascular:  Negative for chest pain. Gastrointestinal:  Negative for abdominal distention, anal bleeding, nausea and vomiting. Musculoskeletal:  Negative for joint swelling. Skin:  Positive for color change, rash and wound. Negative for pallor. Neurological:  Negative for seizures and facial asymmetry. Psychiatric/Behavioral:  Negative for behavioral problems, self-injury and sleep disturbance. All other systems reviewed and are negative. Except as noted above the remainder of the review of systems was reviewed and negative.        PAST MEDICAL HISTORY     Past Medical History:   Diagnosis Date    Anemia during pregnancy 2/2/2018    Eczema     Herpes simplex infection of skin     on neck    Hypercholesterolemia     Irregular heart beat     runs of v tach in the past    Obesity     Pregnancy induced hypertension     Type II or unspecified type diabetes mellitus without mention of complication, not stated as uncontrolled     type 2         SURGICALHISTORY       Past Surgical History:   Procedure Laterality Date    ABDOMEN SURGERY       x 2     SECTION           CURRENT MEDICATIONS       Discharge Medication List as of 2023  6:49 PM        CONTINUE these medications which have NOT CHANGED    Details   insulin lispro (HUMALOG) 100 UNIT/ML SOLN injection vial VIA INSULIN PUMP MAX DAILY DOSE 200 UNITSHistorical Med      Lancets Misc. (ACCU-CHEK FASTCLIX LANCET) KIT 4 TIMES DAILY Starting Mon 2022, Disp-1 kit, R-3, NormalTest 4x daily, works with patient insulin pump      !! blood glucose test strips (ACCU-CHEK GUIDE) strip Test 4x daily, Patient is on insulin pump, Disp-150 each, R-3Normal      blood glucose monitor kit and supplies Dispense one meter that insurance will cover. , Disp-1 kit, R-0, Normal      !! blood glucose monitor strips Test 3x daily e11.65, Disp-100 strip, R-0, Normal      Lancets MISC DAILY Starting Wed 2022, Disp-100 each, R-3, Normal      LANTUS SOLOSTAR 100 UNIT/ML injection pen Inject 70 units nightly, Disp-15 pen, R-3, DAWNormal      insulin lispro, 1 Unit Dial, (HUMALOG KWIKPEN) 100 UNIT/ML SOPN 20-24  units at each meals, Disp-15 pen, R-3Normal      tacrolimus (PROTOPIC) 0.03 % ointment Apply topically 2 times daily. , Disp-30 g, R-0, Print      Disposable Gloves (COTTON GLOVES MEDIUM) MISC Disp-2 each, R-1, NormalUse nightly after applying ointment      !!  Insulin Pen Needle (NOVOFINE) 32G X 6 MM MISC Disp-300 each, R-5, Normalqid      Alcohol Swabs PADS Disp-150 each, R-06, NormalUse qid      Crisaborole 2 % OINT Apply sparingly to the area x BID, Disp-2 Tube,R-0Sample      clobetasol (TEMOVATE) 0.05 % ointment Apply topically 2 times daily. , Disp-60 g,R-1, Normal      acyclovir (ZOVIRAX) 5 % ointment Apply topically 5 times daily Apply topically 5 times daily to affected area for 7 days, Topical, 5 TIMES DAILY Starting Tue 9/3/2019, Disp-15 g, R-0, Normal      clotrimazole-betamethasone (LOTRISONE) 1-0.05 % cream Apply topically 2 times daily. , Disp-1 Tube, R-1, Normal      miconazole (MICONAZOLE ANTIFUNGAL) 2 % cream Apply topically 2 times daily. , Disp-1 Tube, R-1, Normal      !! Insulin Pen Needle (BD PEN NEEDLE EMRE U/F) 32G X 4 MM MISC Disp-200 each, R-03, NormalUse 3 times a day        !! - Potential duplicate medications found. Please discuss with provider.                Tape Rachel Cortez tape]    FAMILY HISTORY       Family History   Problem Relation Age of Onset    Arthritis Mother     Diabetes Mother     Early Death Father     Heart Attack Father     Heart Disease Father     High Blood Pressure Father     High Cholesterol Father     Diabetes Father     Diabetes Paternal Grandfather     Heart Disease Paternal Grandfather     Birth Defects Son     Arthritis Sister     Cancer Neg Hx           SOCIAL HISTORY       Social History     Socioeconomic History    Marital status: Single   Tobacco Use    Smoking status: Former     Packs/day: 0.25     Years: 23.00     Pack years: 5.75     Types: Cigarettes     Start date: 1999     Quit date: 10/30/2021     Years since quittin.2    Smokeless tobacco: Never    Tobacco comments:     started age 13   Vaping Use    Vaping Use: Never used   Substance and Sexual Activity    Alcohol use: No     Comment: socially before pregnancy    Drug use: No    Sexual activity: Not Currently     Partners: Male     Social Determinants of Health     Financial Resource Strain: Low Risk     Difficulty of Paying Living Expenses: Not hard at all   Food Insecurity: No Food Insecurity    Worried About Running Out of Food in the Last Year: Never true    [address] in the Last Year: Never true   Transportation Needs: Unknown    Lack of Transportation (Non-Medical): No   Housing Stability: Unknown    Unstable Housing in the Last Year: No       SCREENINGS   Desmet Coma Scale  Eye Opening: Spontaneous  Best Verbal Response: Oriented  Best Motor Response: Obeys commands  Desmet Coma Scale Score: 15  Ebola Virus Disease (EVD) Screening   Temp: 98 °F (36.7 °C)  CIWA Assessment  BP: 139/87  Heart Rate: 93    PHYSICAL EXAM    (up to 7 for level 4, 8 or more for level 5)     ED Triage Vitals [02/13/23 1505]   BP Temp Temp Source Heart Rate Resp SpO2 Height Weight   (!) 146/90 98 °F (36.7 °C) Temporal (!) 106 16 99 % 5' 3\" (1.6 m) 171 lb (77.6 kg)       Physical Exam    RESULTS     EKG: All EKG's are interpreted by the Emergency Department Physician who either signs or Co-signsthis chart in the absence of a cardiologist.         RADIOLOGY:   Non-plain filmimages such as CT, Ultrasound and MRI are read by the radiologist. Plain radiographic images are visualized and preliminarily interpreted by the emergency physician with the below findings:         Interpretation per the Radiologist below, if available at the time ofthis note:    No orders to display         ED BEDSIDE ULTRASOUND:   Performed by ED Physician - none    LABS:  Labs Reviewed   CBC WITH AUTO DIFFERENTIAL - Abnormal; Notable for the following components:       Result Value    WBC 11.6 (*)     Neutrophils Absolute 8.5 (*)     All other components within normal limits   COMPREHENSIVE METABOLIC PANEL - Abnormal; Notable for the following components:    Glucose 154 (*)     BUN 5 (*)     All other components within normal limits   CULTURE, BLOOD 1    Narrative:     ORDER#: [id number]                          ORDERED BY: AMINAH BRUCE  SOURCE: Blood                              COLLECTED:  02/13/23 15:55  [facility]IB[facility]OT[facility] AT [facility].:                      RECEIVED :  02/13/23 15:55   LACTIC ACID       All other labs were within normal range or not returned as of this dictation. EMERGENCY DEPARTMENT COURSE and DIFFERENTIAL DIAGNOSIS/MDM:   Vitals:    Vitals:    02/13/23 1505 02/13/23 1823   BP: (!) 146/90 139/87   Pulse: (!) 106 93   Resp: 16 16   Temp: 98 °F (36.7 °C)    TempSrc: Temporal    SpO2: 99% 99%   Weight: 171 lb (77.6 kg)    Height: 5' 3\" (1.6 m)             MDM     Amount and/or Complexity of Data Reviewed  Clinical lab tests: reviewed        CRITICAL CARE TIME   Total Critical Care time was   minutes, excluding separately reportableprocedures. There was a high probability of clinicallysignificant/life threatening deterioration in the patient's condition which required my urgent intervention. CONSULTS:  None    PROCEDURES:  Unless otherwise noted below, none     Procedures    FINAL IMPRESSION      1. Rash and other nonspecific skin eruption    2. Bilateral hand pain          DISPOSITION/PLAN   DISPOSITION Decision To Discharge 02/13/2023 06:40:35 PM      PATIENT REFERRED TO:  Brittanie Nath DO  [address] [place] [address]  [phone]    Schedule an appointment as soon as possible for a visit       Mei Montez MD  [facility] 8  [phone]    Schedule an appointment as soon as possible for a visit       DISCHARGE MEDICATIONS:  Discharge Medication List as of 2/13/2023  6:49 PM        START taking these medications    Details   doxycycline hyclate (VIBRA-TABS) 100 MG tablet Take 1 tablet by mouth 2 times daily for 7 days, Disp-14 tablet, R-0Normal      amoxicillin-clavulanate (AUGMENTIN) 875-125 MG per tablet Take 1 tablet by mouth 2 times daily for 7 days, Disp-14 tablet, R-0Normal      triamcinolone (KENALOG) 0.1 % cream Apply topically 2 times daily. , Disp-15 g, R-0, Normal      ibuprofen (IBU) 400 MG tablet Take 1 tablet by mouth every 6 hours as needed for Pain, Disp-120 tablet, R-0Normal                (Please note that portions of this note were completed with a voice recognition program.  Efforts were made to edit the dictations but occasionally words are mis-transcribed.)    Kaleigh Robin PA-C (electronically signed)  Attending Emergency Physician         Kaleigh Robin PA-C  02/16/23 [id number]

## 2023-02-13 NOTE — PROGRESS NOTES
Subjective:      Patient ID: Otis Feliz is a 40 y.o. female who present today with:      Chief Complaint   Patient presents with    Hand Pain     Palms of hands have pus filled blisters sx started a few days ago even though pt has a history of getting them on the back of hands     Patient has a history of severe eczema on the tops of her hands for the last few years  Just in the last month it is affecting her palms now too  In the last 2-3 days she has developed blisters on the palms of her hands   The eczema is open, wheeping  The sores are crusted with honey colored crusts  She is having excruciating pain up her arm  No fever, chills that she is aware of  Her hands are very swollen    She has an 6year old disables son at home  She cannot lift him because she is in so much pain  He recently had strep    Past Medical History:   Diagnosis Date    Anemia during pregnancy 2/2/2018    Eczema     Herpes simplex infection of skin     on neck    Hypercholesterolemia     Irregular heart beat     runs of v tach in the past    Obesity     Pregnancy induced hypertension     Type II or unspecified type diabetes mellitus without mention of complication, not stated as uncontrolled     type 2     Patient Active Problem List    Diagnosis Date Noted    Eczema 09/13/2021    Rash 04/20/2018    Constipation     Acute cystitis without hematuria     36 weeks gestation of pregnancy     Pregnancy with prenatal care elsewhere in third trimester     High-risk pregnancy in second trimester 02/02/2018    Anemia during pregnancy 02/02/2018    Supervision of normal pregnancy 01/24/2018    34 weeks gestation of pregnancy 01/24/2018    Noncompliance 01/24/2018    Uncontrolled type 2 diabetes mellitus with complication, without long-term current use of insulin     Lower abdominal pain     Type 2 diabetes mellitus affecting pregnancy in third trimester, antepartum     Herpes simplex infection of skin     Modified White class B pregestational diabetes mellitus 10/11/2017    History of PSVT (paroxysmal supraventricular tachycardia) 10/11/2017    Hx of pre-eclampsia in prior pregnancy, currently pregnant 10/11/2017    Hx of  section 10/11/2017    Previous  section 10/11/2017    Hx of  delivery, currently pregnant 10/11/2017    History of fetal abnormality in previous pregnancy, currently pregnant 10/11/2017    Late prenatal care affecting pregnancy in second trimester 10/11/2017    Obesity (BMI 30-39.9) 10/11/2017    Uncontrolled diabetes mellitus type 2 without complications     Hyperlipidemia 2012     Past Surgical History:   Procedure Laterality Date    ABDOMEN SURGERY       x 2     SECTION       Social History     Socioeconomic History    Marital status: Single     Spouse name: None    Number of children: None    Years of education: None    Highest education level: None   Tobacco Use    Smoking status: Former     Packs/day: 0.25     Years: 23.00     Pack years: 5.75     Types: Cigarettes     Start date: 1999     Quit date: 10/30/2021     Years since quittin.2    Smokeless tobacco: Never    Tobacco comments:     started age 13   Vaping Use    Vaping Use: Never used   Substance and Sexual Activity    Alcohol use: No     Comment: socially before pregnancy    Drug use: No    Sexual activity: Not Currently     Partners: Male     Social Determinants of Health     Financial Resource Strain: Low Risk     Difficulty of Paying Living Expenses: Not hard at all   Food Insecurity: No Food Insecurity    Worried About Running Out of Food in the Last Year: Never true    Ran Out of Food in the Last Year: Never true   Transportation Needs: Unknown    Lack of Transportation (Non-Medical): No   Housing Stability: Unknown    Unstable Housing in the Last Year: No     Current Outpatient Medications on File Prior to Visit   Medication Sig Dispense Refill    insulin lispro (HUMALOG) 100 UNIT/ML SOLN injection vial VIA INSULIN PUMP MAX DAILY DOSE 200 UNITS      Lancets Misc. (ACCU-CHEK FASTCLIX LANCET) KIT 1 each by Does not apply route 4 times daily Test 4x daily, works with patient insulin pump 1 kit 3    blood glucose test strips (ACCU-CHEK GUIDE) strip Test 4x daily, Patient is on insulin pump 150 each 3    blood glucose monitor kit and supplies Dispense one meter that insurance will cover. 1 kit 0    blood glucose monitor strips Test 3x daily e11.65 100 strip 0    Lancets MISC 1 each by Does not apply route daily 100 each 3    acetaminophen (TYLENOL) 500 MG tablet Take 1 tablet by mouth 4 times daily as needed for Pain 120 tablet 0    LANTUS SOLOSTAR 100 UNIT/ML injection pen Inject 70 units nightly 15 pen 3    insulin lispro, 1 Unit Dial, (HUMALOG KWIKPEN) 100 UNIT/ML SOPN 20-24  units at each meals 15 pen 3    triamcinolone (KENALOG) 0.1 % ointment Apply 2 times daily to affected areas 80 g 0    tacrolimus (PROTOPIC) 0.03 % ointment Apply topically 2 times daily. 30 g 0    Disposable Gloves (COTTON GLOVES MEDIUM) MISC Use nightly after applying ointment 2 each 1    Insulin Pen Needle (NOVOFINE) 32G X 6 MM MISC qid 300 each 5    Alcohol Swabs PADS Use qid 150 each 06    Crisaborole 2 % OINT Apply sparingly to the area x BID 2 Tube 0    clobetasol (TEMOVATE) 0.05 % ointment Apply topically 2 times daily. 60 g 1    acyclovir (ZOVIRAX) 5 % ointment Apply topically 5 times daily Apply topically 5 times daily to affected area for 7 days 15 g 0    clotrimazole-betamethasone (LOTRISONE) 1-0.05 % cream Apply topically 2 times daily. 1 Tube 1    miconazole (MICONAZOLE ANTIFUNGAL) 2 % cream Apply topically 2 times daily. 1 Tube 1    Insulin Pen Needle (BD PEN NEEDLE EMRE U/F) 32G X 4 MM MISC Use 3 times a day 200 each 03     No current facility-administered medications on file prior to visit.      Allergies   Allergen Reactions    Tape Levell [place] Tape] Rash      Review of Systems   Constitutional: Positive for activity change. Negative for appetite change, chills, diaphoresis, fatigue and fever. HENT:  Negative for congestion, ear pain, mouth sores, postnasal drip, rhinorrhea, sinus pressure, sinus pain, sore throat and trouble swallowing. Eyes:  Negative for photophobia, pain, discharge, redness and itching. Respiratory:  Negative for cough, chest tightness, shortness of breath and wheezing. Cardiovascular:  Negative for chest pain. Gastrointestinal:  Negative for abdominal pain, constipation, diarrhea, nausea and vomiting. Genitourinary:  Negative for difficulty urinating, dysuria, flank pain, frequency, hematuria and urgency. Musculoskeletal:  Positive for arthralgias and joint swelling. Skin:  Positive for wound. Neurological:  Negative for seizures, syncope and headaches. Objective:      Vitals:    02/13/23 1329   BP: 122/80   Weight: 172 lb (78 kg)   Height: 5' 3\" (1.6 m)     Physical Exam  Constitutional:       General: She is not in acute distress. Appearance: Normal appearance. She is not ill-appearing. Eyes:      General: Lids are normal. Vision grossly intact. Extraocular Movements: Extraocular movements intact. Conjunctiva/sclera: Conjunctivae normal.      Pupils: Pupils are equal, round, and reactive to light. Cardiovascular:      Rate and Rhythm: Normal rate and regular rhythm. Heart sounds: Normal heart sounds. Pulmonary:      Effort: Pulmonary effort is normal.      Breath sounds: Normal breath sounds and air entry. Skin:     General: Skin is warm and dry. Findings: Rash present. Rash is pustular. Comments: Right palm has 4 pustules on it, open eczema wound down the palm has honey colored crust.   Neurological:      Mental Status: She is alert. Assessment & Plan: Jimy Aguilar was seen today for hand pain.     Diagnoses and all orders for this visit:    Cellulitis and abscess of hand  -TO ER for further evaluation and treatment    Side effects, adverse effects of the medication prescribed today, as well as treatment plan/ rationale and result expectations have been discussed with the patient who expresses understanding and desires to proceed. Close follow up to evaluate treatment results and for coordination of care. I have reviewed the patient's medical history in detail and updated the computerized patient record. As always, patient is advised that if symptoms worsen in any way they will proceed to the nearest emergency room.      Follow up in 48-72 hours if symptoms persist or worsen and as needed    Janny Islas, APRN - CNP

## 2023-02-13 NOTE — ED PROVIDER NOTES
[address] [facility] ED  eMERGENCY dEPARTMENT eNCOUnter      Pt Name: Raji Trevino  MRN: [id number]  [place] 1980  Date of evaluation: 2/13/2023  Provider: Charlotte Pineda MD      HISTORY OF PRESENT ILLNESS      Chief Complaint   Patient presents with    Blister     Blisters on the palm of hands x2days        The history is provided by the Patient. Raji Trevino is a 40 y.o. female with a PMH clinically significant for HTN, HLD, DMII, Obesity, pSVT, Eczema presenting to the ED via PV c/o increased redness, rash and pain to the bilateral palms and backs of the hands has been worsening over the past 3 days with drainage. States that this has never happened in the past in regards to the palms of her hands. Normally has eczema or what is thought to be eczema over the dorsal aspect of the hands. Not aware of any clear triggering event leading to this exacerbation. States that the wounds on the hands are very painful and will rupture leaking yellow fluids. Denies any history of syphilis, gonorrhea, chlamydia. Also denies any history of sexually transmitted infections or concern for sexual transmitted infections at this time. States that she did have a very small patch on the left volar forearm, but the rash appeared different there. States that she has been using multiple different ointments without significant relief. Unsure if 1 is a steroid cream.  States that she has seen multiple dermatologists and they are unsure exactly what her rashes are. States she is not taking anything else for her eczema. Not sure if she had a history of psoriasis, but thinks her sister has. Denies any associated fevers, oral involvement, F/C/D, CP, SOB, cough, abdominal pain, N/V/D/C, urinary symptoms. Has otherwise been feeling well. Per Chart Review: PMH as noted above obtained via outpatient chart review. REVIEW OF SYSTEMS       Review of Systems   Constitutional:  Negative for chills and fever. HENT:  Negative for rhinorrhea and sore throat. Eyes:  Negative for pain and visual disturbance. Respiratory:  Negative for cough and shortness of breath. Cardiovascular:  Negative for chest pain and palpitations. Gastrointestinal:  Negative for abdominal pain, diarrhea, nausea and vomiting. Genitourinary:  Negative for difficulty urinating and dysuria. Musculoskeletal:  Negative for arthralgias, back pain, joint swelling, myalgias and neck pain. Skin:  Positive for rash and wound. Neurological:  Negative for weakness, numbness and headaches.      PAST MEDICAL HISTORY     Past Medical History:   Diagnosis Date    Anemia during pregnancy 2018    Eczema     Herpes simplex infection of skin     on neck    Hypercholesterolemia     Irregular heart beat     runs of v tach in the past    Obesity     Pregnancy induced hypertension     Type II or unspecified type diabetes mellitus without mention of complication, not stated as uncontrolled     type 2       SURGICAL HISTORY       Past Surgical History:   Procedure Laterality Date    ABDOMEN SURGERY       x 2     SECTION         FAMILY HISTORY       Family History   Problem Relation Age of Onset    Arthritis Mother     Diabetes Mother     Early Death Father     Heart Attack Father     Heart Disease Father     High Blood Pressure Father     High Cholesterol Father     Diabetes Father     Diabetes Paternal Grandfather     Heart Disease Paternal Grandfather     Birth Defects Son     Arthritis Sister     Cancer Neg Hx        SOCIAL HISTORY       Social History     Socioeconomic History    Marital status: Single   Tobacco Use    Smoking status: Former     Packs/day: 0.25     Years: 23.00     Pack years: 5.75     Types: Cigarettes     Start date: 1999     Quit date: 10/30/2021     Years since quittin.2    Smokeless tobacco: Never    Tobacco comments:     started age 13   Vaping Use    Vaping Use: Never used   Substance and Sexual Activity    Alcohol use: No     Comment: socially before pregnancy    Drug use: No    Sexual activity: Not Currently     Partners: Male     Social Determinants of Health     Financial Resource Strain: Low Risk     Difficulty of Paying Living Expenses: Not hard at all   Food Insecurity: No Food Insecurity    Worried About [address] in the Last Year: Never true    [address] [facility] [address] in the Last Year: Never true   Transportation Needs: Unknown    Lack of Transportation (Non-Medical): No   Housing Stability: Unknown    Unstable Housing in the Last Year: No       CURRENT MEDICATIONS       Discharge Medication List as of 2/13/2023  6:49 PM        CONTINUE these medications which have NOT CHANGED    Details   insulin lispro (HUMALOG) 100 UNIT/ML SOLN injection vial VIA INSULIN PUMP MAX DAILY DOSE 200 UNITSHistorical Med      Lancets Misc. (ACCU-CHEK FASTCLIX LANCET) KIT 4 TIMES DAILY Starting Mon 7/11/2022, Disp-1 kit, R-3, NormalTest 4x daily, works with patient insulin pump      !! blood glucose test strips (ACCU-CHEK GUIDE) strip Test 4x daily, Patient is on insulin pump, Disp-150 each, R-3Normal      blood glucose monitor kit and supplies Dispense one meter that insurance will cover. , Disp-1 kit, R-0, Normal      !! blood glucose monitor strips Test 3x daily e11.65, Disp-100 strip, R-0, Normal      Lancets MISC DAILY Starting Wed 6/8/2022, Disp-100 each, R-3, Normal      LANTUS SOLOSTAR 100 UNIT/ML injection pen Inject 70 units nightly, Disp-15 pen, R-3, DAWNormal      insulin lispro, 1 Unit Dial, (HUMALOG KWIKPEN) 100 UNIT/ML SOPN 20-24  units at each meals, Disp-15 pen, R-3Normal      tacrolimus (PROTOPIC) 0.03 % ointment Apply topically 2 times daily. , Disp-30 g, R-0, Print      Disposable Gloves (COTTON GLOVES MEDIUM) MISC Disp-2 each, R-1, NormalUse nightly after applying ointment      !!  Insulin Pen Needle (NOVOFINE) 32G X 6 MM MISC Disp-300 each, R-5, Normalqid      Alcohol Swabs PADS Disp-150 each, R-06, NormalUse qid      Crisaborole 2 % OINT Apply sparingly to the area x BID, Disp-2 Tube,R-0Sample      clobetasol (TEMOVATE) 0.05 % ointment Apply topically 2 times daily. , Disp-60 g,R-1, Normal      acyclovir (ZOVIRAX) 5 % ointment Apply topically 5 times daily Apply topically 5 times daily to affected area for 7 days, Topical, 5 TIMES DAILY Starting Tue 9/3/2019, Disp-15 g, R-0, Normal      clotrimazole-betamethasone (LOTRISONE) 1-0.05 % cream Apply topically 2 times daily. , Disp-1 Tube, R-1, Normal      miconazole (MICONAZOLE ANTIFUNGAL) 2 % cream Apply topically 2 times daily. , Disp-1 Tube, R-1, Normal      !! Insulin Pen Needle (BD PEN NEEDLE EMRE U/F) 32G X 4 MM MISC Disp-200 each, R-03, NormalUse 3 times a day        !! - Potential duplicate medications found. Please discuss with provider. ALLERGIES     Tape [adhesive tape]      PHYSICAL EXAM       ED Triage Vitals [02/13/23 1505]   BP Temp Temp Source Heart Rate Resp SpO2 Height Weight   (!) 146/90 98 °F (36.7 °C) Temporal (!) 106 16 99 % 5' 3\" (1.6 m) 171 lb (77.6 kg)       Physical Exam  Vitals and nursing note reviewed. Constitutional:       General: She is not in acute distress. Appearance: She is obese. She is not ill-appearing, toxic-appearing or diaphoretic. HENT:      Head: Normocephalic and atraumatic. Mouth/Throat:      Mouth: Mucous membranes are moist.      Pharynx: Oropharynx is clear. No oropharyngeal exudate or posterior oropharyngeal erythema. Eyes:      Extraocular Movements: Extraocular movements intact. Conjunctiva/sclera: Conjunctivae normal.      Pupils: Pupils are equal, round, and reactive to light. Cardiovascular:      Rate and Rhythm: Normal rate and regular rhythm. Pulses: Normal pulses. Heart sounds: Normal heart sounds. Pulmonary:      Effort: Pulmonary effort is normal. No respiratory distress. Breath sounds: Normal breath sounds. No wheezing or rales.    Chest:      Chest wall: No tenderness. Abdominal:      General: There is no distension. Palpations: Abdomen is soft. Tenderness: There is no abdominal tenderness. There is no guarding or rebound. Musculoskeletal:         General: No tenderness. Cervical back: Normal range of motion and neck supple. Right lower leg: No edema. Left lower leg: No edema. Skin:     General: Skin is warm and dry. Capillary Refill: Capillary refill takes less than 2 seconds. Findings: Lesion and rash present. Rash is crusting, pustular and scaling. Comments: Scaling, crusting, pustular rash to the bilateral palms with yellow serous drainage from ruptured pustules. Significantly TTP. No clear evidence of fluctuance. Neurological:      General: No focal deficit present. Mental Status: She is alert and oriented to person, place, and time. Psychiatric:         Mood and Affect: Mood is anxious.          Behavior: Behavior normal.       MDM:   Chart Reviewed: PMH and additional information as noted in HPI obtained from chart review    Vitals:    Vitals:    02/13/23 1505 02/13/23 1823   BP: (!) 146/90 139/87   Pulse: (!) 106 93   Resp: 16 16   Temp: 98 °F (36.7 °C)    TempSrc: Temporal    SpO2: 99% 99%   Weight: 171 lb (77.6 kg)    Height: 5' 3\" (1.6 m)        PROCEDURES:  Unless otherwise noted below, none  Procedures    LABS:  Labs Reviewed   CBC WITH AUTO DIFFERENTIAL - Abnormal; Notable for the following components:       Result Value    WBC 11.6 (*)     Neutrophils Absolute 8.5 (*)     All other components within normal limits   COMPREHENSIVE METABOLIC PANEL - Abnormal; Notable for the following components:    Glucose 154 (*)     BUN 5 (*)     All other components within normal limits   CULTURE, BLOOD 1   CULTURE, BLOOD 1   LACTIC ACID       No orders to display            40 y.o. female with a PMH clinically significant for HTN, HLD, DMII, Obesity, pSVT, Eczema presenting to the ED via PV c/o increased redness, rash and pain to the bilateral palms and backs of the hands has been worsening over the past 3 days with drainage. Upon initial evaluation, Pt Afebrile, HDS and in NAD. PE as noted above. Labs,  interpreted by myself and as noted above. Given findings, clinical presentation most likely consistent w/ rash to the bilateral palms without clear etiology, but thought secondary to possible psoriatic rash with superimposed bacterial infection not ruled out. Initiated on antibiotics in the ED due to this. We will also discharge with the same. We will also initiate on topical steroids and recommend topical emollients to help maintain moisture. Patient already with a history of following with dermatology and instructed to follow-up with her dermatologist or any dermatologist she chooses. Patient without evidence of mucosal involvement at this time. No concerns for sexually transmitted infections given history. Given findings, stable for further evaluation and management as an outpatient at this time. Pt was administered   Medications   ibuprofen (ADVIL;MOTRIN) tablet 800 mg (800 mg Oral Given 2/13/23 1802)   HYDROcodone-acetaminophen (NORCO) 5-325 MG per tablet 1 tablet (1 tablet Oral Given 2/13/23 1802)   doxycycline hyclate (VIBRAMYCIN) capsule 100 mg (100 mg Oral Given 2/13/23 1823)   amoxicillin-clavulanate (AUGMENTIN) 875-125 MG per tablet 1 tablet (1 tablet Oral Given 2/13/23 1823)       Plan: Discharge home in good condition with meds as noted below and instructions to follow up with [facility] dermatology. Pt stable and appropriate for further evaluation and management as an outpatient. Standard anticipatory guidance and strict return precautions given if any new or worsening symptoms. and Patient understanding and amenable to the POC. CRITICAL CARE TIME   Total CriticalCare time was 0 minutes, excluding separately reportable procedures.   There was a high probability of clinically significant/life threatening deterioration in the patient's condition which required my urgent intervention. FINAL IMPRESSION      1. Rash and other nonspecific skin eruption    2. Bilateral hand pain          DISPOSITION/PLAN   DISPOSITION Decision To Discharge 02/13/2023 06:40:35 PM      Discharge Medication List as of 2/13/2023  6:49 PM        START taking these medications    Details   doxycycline hyclate (VIBRA-TABS) 100 MG tablet Take 1 tablet by mouth 2 times daily for 7 days, Disp-14 tablet, R-0Normal      amoxicillin-clavulanate (AUGMENTIN) 875-125 MG per tablet Take 1 tablet by mouth 2 times daily for 7 days, Disp-14 tablet, R-0Normal      triamcinolone (KENALOG) 0.1 % cream Apply topically 2 times daily. , Disp-15 g, R-0, Normal      ibuprofen (IBU) 400 MG tablet Take 1 tablet by mouth every 6 hours as needed for Pain, Disp-120 tablet, R-0Normal              MD Bethany Kwong MD  02/13/23 7258

## 2023-02-18 LAB — BLOOD CULTURE, ROUTINE: NORMAL

## 2023-02-22 RX ORDER — INSULIN LISPRO 100 [IU]/ML
INJECTION, SOLUTION INTRAVENOUS; SUBCUTANEOUS
Qty: 60 ML | Refills: 1 | Status: SHIPPED | OUTPATIENT
Start: 2023-02-22

## 2023-05-26 ENCOUNTER — TELEPHONE (OUTPATIENT)
Dept: ENDOCRINOLOGY | Age: 43
End: 2023-05-26

## 2023-05-30 ENCOUNTER — OFFICE VISIT (OUTPATIENT)
Dept: FAMILY MEDICINE CLINIC | Age: 43
End: 2023-05-30
Payer: COMMERCIAL

## 2023-05-30 VITALS
BODY MASS INDEX: 30.83 KG/M2 | HEIGHT: 63 IN | TEMPERATURE: 97.8 F | HEART RATE: 90 BPM | OXYGEN SATURATION: 99 % | WEIGHT: 174 LBS

## 2023-05-30 DIAGNOSIS — H92.01 OTALGIA OF RIGHT EAR: Primary | ICD-10-CM

## 2023-05-30 PROCEDURE — G8427 DOCREV CUR MEDS BY ELIG CLIN: HCPCS | Performed by: NURSE PRACTITIONER

## 2023-05-30 PROCEDURE — 99212 OFFICE O/P EST SF 10 MIN: CPT | Performed by: NURSE PRACTITIONER

## 2023-05-30 PROCEDURE — G8417 CALC BMI ABV UP PARAM F/U: HCPCS | Performed by: NURSE PRACTITIONER

## 2023-05-30 PROCEDURE — 1036F TOBACCO NON-USER: CPT | Performed by: NURSE PRACTITIONER

## 2023-05-30 RX ORDER — GUAIFENESIN 600 MG/1
1200 TABLET, EXTENDED RELEASE ORAL 2 TIMES DAILY
Qty: 40 TABLET | Refills: 0 | Status: SHIPPED | OUTPATIENT
Start: 2023-05-30 | End: 2023-06-09

## 2023-05-30 RX ORDER — CETIRIZINE HYDROCHLORIDE, PSEUDOEPHEDRINE HYDROCHLORIDE 5; 120 MG/1; MG/1
1 TABLET, FILM COATED, EXTENDED RELEASE ORAL 2 TIMES DAILY
Qty: 60 TABLET | Refills: 0 | Status: SHIPPED | OUTPATIENT
Start: 2023-05-30 | End: 2023-06-29

## 2023-05-30 RX ORDER — AMOXICILLIN 500 MG/1
500 CAPSULE ORAL 2 TIMES DAILY
Qty: 14 CAPSULE | Refills: 0 | Status: SHIPPED | OUTPATIENT
Start: 2023-05-30 | End: 2023-06-06

## 2023-05-30 NOTE — PROGRESS NOTES
Subjective:      Patient ID: Lauren Fuentes is a 40 y.o. female who presents today for:  Chief Complaint   Patient presents with    Otalgia       HPI SUBJECTIVE:  Lauren Fuentes is a 40 y.o. female with 7 day(s) history of pain at right ear, and coryza, congestion, sore throat, post nasal drip, night sweats, and headache. Temperature not measured at home. OBJECTIVE:  Pulse 90   Temp 97.8 °F (36.6 °C)   Ht 5' 3\" (1.6 m)   Wt 174 lb (78.9 kg)   SpO2 99%   BMI 30.82 kg/m²   General appearance: alert, well appearing, and in no distress and oriented to person, place, and time. Ears: left ear normal, right TM red, dull, bulging  Nose: mucosal congestion and clear rhinorrhea  Oropharynx: mucous membranes moist, pharynx normal without lesions  Neck: adenopathy noted   Lungs: clear to auscultation, no wheezes, rales or rhonchi, symmetric air entry    ASSESSMENT:  Otitis Media    PLAN:  1) See orders for this visit as documented in the electronic medical record. 2) Symptomatic therapy suggested: use acetaminophen, ibuprofen, antihistamine-decongestant of choice, Sudafed prn. 3) Call or return to clinic prn if these symptoms worsen or fail to improve as anticipated.      Past Medical History:   Diagnosis Date    Anemia during pregnancy 2018    Eczema     Herpes simplex infection of skin     on neck    Hypercholesterolemia     Irregular heart beat     runs of v tach in the past    Obesity     Pregnancy induced hypertension     Type II or unspecified type diabetes mellitus without mention of complication, not stated as uncontrolled     type 2     Past Surgical History:   Procedure Laterality Date    ABDOMEN SURGERY       x 2     SECTION       Social History     Socioeconomic History    Marital status: Single     Spouse name: Not on file    Number of children: Not on file    Years of education: Not on file    Highest education level: Not on file   Occupational History    Not on

## 2023-06-02 ENCOUNTER — OFFICE VISIT (OUTPATIENT)
Dept: ENDOCRINOLOGY | Age: 43
End: 2023-06-02

## 2023-06-02 VITALS
WEIGHT: 173 LBS | SYSTOLIC BLOOD PRESSURE: 123 MMHG | HEART RATE: 79 BPM | TEMPERATURE: 97 F | BODY MASS INDEX: 30.65 KG/M2 | OXYGEN SATURATION: 99 % | DIASTOLIC BLOOD PRESSURE: 84 MMHG | HEIGHT: 63 IN

## 2023-06-02 DIAGNOSIS — Z79.4 TYPE 2 DIABETES MELLITUS WITH OTHER SPECIFIED COMPLICATION, WITH LONG-TERM CURRENT USE OF INSULIN (HCC): Primary | ICD-10-CM

## 2023-06-02 DIAGNOSIS — Z46.81 INSULIN PUMP TITRATION: ICD-10-CM

## 2023-06-02 DIAGNOSIS — E11.69 TYPE 2 DIABETES MELLITUS WITH OTHER SPECIFIED COMPLICATION, WITH LONG-TERM CURRENT USE OF INSULIN (HCC): Primary | ICD-10-CM

## 2023-06-02 LAB
CHP ED QC CHECK: NORMAL
GLUCOSE BLD-MCNC: 202 MG/DL
HBA1C MFR BLD: 8.7 %

## 2023-06-02 RX ORDER — SULFAMETHOXAZOLE AND TRIMETHOPRIM 800; 160 MG/1; MG/1
TABLET ORAL
COMMUNITY
Start: 2023-04-27

## 2023-06-08 ASSESSMENT — ENCOUNTER SYMPTOMS: EYES NEGATIVE: 1

## 2023-06-14 ENCOUNTER — TELEPHONE (OUTPATIENT)
Dept: ENDOCRINOLOGY | Age: 43
End: 2023-06-14

## 2023-06-14 NOTE — TELEPHONE ENCOUNTER
[employer] is calling, they need 30 days of downloads and the LMN form faxed to them at [phone]. Any questions please call [phone] option 8.

## 2023-06-16 NOTE — TELEPHONE ENCOUNTER
All the forms, with office note and pump download were fax to [employer]. And I put a note stating that pt doesn't have a CGM for glucose logs.

## 2023-06-22 DIAGNOSIS — E11.9 DIABETES MELLITUS (HCC): ICD-10-CM

## 2023-06-23 NOTE — TELEPHONE ENCOUNTER
[employer] is calling because they never received the LMN form on this patient. Nothing is scanned into the chart so I cannot see what was scanned in. Can you check on this please? If it has not been sent can you please send it? Thank you.

## 2023-07-02 ENCOUNTER — HOSPITAL ENCOUNTER (EMERGENCY)
Age: 43
Discharge: HOME | End: 2023-07-02
Attending: EMERGENCY MEDICINE
Payer: COMMERCIAL

## 2023-07-02 VITALS
BODY MASS INDEX: 31.18 KG/M2 | OXYGEN SATURATION: 99 % | TEMPERATURE: 98.5 F | RESPIRATION RATE: 16 BRPM | HEIGHT: 63 IN | DIASTOLIC BLOOD PRESSURE: 97 MMHG | SYSTOLIC BLOOD PRESSURE: 147 MMHG | WEIGHT: 176 LBS | HEART RATE: 101 BPM

## 2023-07-02 DIAGNOSIS — Z51.89 VISIT FOR WOUND CHECK: Primary | ICD-10-CM

## 2023-07-02 LAB
CHP ED QC CHECK: YES
GLUCOSE BLD-MCNC: 286 MG/DL
GLUCOSE BLD-MCNC: 286 MG/DL (ref 70–99)
PERFORMED ON: ABNORMAL

## 2023-07-02 PROCEDURE — 6370000000 HC RX 637 (ALT 250 FOR IP): Performed by: EMERGENCY MEDICINE

## 2023-07-02 PROCEDURE — 99283 EMERGENCY DEPT VISIT LOW MDM: CPT

## 2023-07-02 RX ORDER — CEPHALEXIN 500 MG/1
500 CAPSULE ORAL 3 TIMES DAILY
Qty: 21 CAPSULE | Refills: 0 | Status: SHIPPED | OUTPATIENT
Start: 2023-07-02 | End: 2023-07-09

## 2023-07-02 RX ORDER — CEPHALEXIN 500 MG/1
500 CAPSULE ORAL ONCE
Status: COMPLETED | OUTPATIENT
Start: 2023-07-02 | End: 2023-07-02

## 2023-07-02 RX ADMIN — CEPHALEXIN 500 MG: 500 CAPSULE ORAL at 13:39

## 2023-07-02 ASSESSMENT — PAIN - FUNCTIONAL ASSESSMENT: PAIN_FUNCTIONAL_ASSESSMENT: NONE - DENIES PAIN

## 2023-07-05 ENCOUNTER — TRANSCRIBE ORDERS (OUTPATIENT)
Dept: GENERAL RADIOLOGY | Age: 43
End: 2023-07-05

## 2023-07-05 ENCOUNTER — HOSPITAL ENCOUNTER (OUTPATIENT)
Dept: GENERAL RADIOLOGY | Age: 43
Discharge: HOME | End: 2023-07-07
Attending: PODIATRIST
Payer: COMMERCIAL

## 2023-07-05 DIAGNOSIS — L97.519 ULCER OF RIGHT FOOT, UNSPECIFIED ULCER STAGE (HCC): Primary | ICD-10-CM

## 2023-07-05 DIAGNOSIS — L97.519 ULCER OF RIGHT FOOT, UNSPECIFIED ULCER STAGE (HCC): ICD-10-CM

## 2023-07-05 PROCEDURE — 73630 X-RAY EXAM OF FOOT: CPT

## 2023-07-08 LAB — BACTERIA SPEC ANAEROBE+AEROBE CULT: NORMAL

## 2023-07-19 ENCOUNTER — HOSPITAL ENCOUNTER (OUTPATIENT)
Dept: GENERAL RADIOLOGY | Age: 43
Discharge: HOME | End: 2023-07-21
Attending: PODIATRIST
Payer: COMMERCIAL

## 2023-07-19 DIAGNOSIS — L97.519 ULCER OF RIGHT FOOT, UNSPECIFIED ULCER STAGE (HCC): ICD-10-CM

## 2023-07-19 PROCEDURE — 73630 X-RAY EXAM OF FOOT: CPT

## 2023-07-23 ENCOUNTER — APPOINTMENT (OUTPATIENT)
Dept: GENERAL RADIOLOGY | Age: 43
End: 2023-07-23
Payer: COMMERCIAL

## 2023-07-23 ENCOUNTER — HOSPITAL ENCOUNTER (EMERGENCY)
Age: 43
Discharge: HOME | End: 2023-07-23
Attending: EMERGENCY MEDICINE
Payer: COMMERCIAL

## 2023-07-23 VITALS
HEART RATE: 89 BPM | SYSTOLIC BLOOD PRESSURE: 117 MMHG | WEIGHT: 175 LBS | OXYGEN SATURATION: 100 % | BODY MASS INDEX: 31.01 KG/M2 | RESPIRATION RATE: 16 BRPM | HEIGHT: 63 IN | TEMPERATURE: 97.9 F | DIASTOLIC BLOOD PRESSURE: 71 MMHG

## 2023-07-23 DIAGNOSIS — E08.621: Primary | ICD-10-CM

## 2023-07-23 DIAGNOSIS — L97.518: Primary | ICD-10-CM

## 2023-07-23 LAB
ALBUMIN SERPL-MCNC: 4.2 G/DL (ref 3.5–4.6)
ALP SERPL-CCNC: 70 U/L (ref 40–130)
ALT SERPL-CCNC: 33 U/L (ref 0–33)
ANION GAP SERPL CALCULATED.3IONS-SCNC: 10 MEQ/L (ref 9–15)
AST SERPL-CCNC: 33 U/L (ref 0–35)
BASOPHILS # BLD: 0 K/UL (ref 0–0.2)
BASOPHILS NFR BLD: 0.6 %
BILIRUB SERPL-MCNC: 0.5 MG/DL (ref 0.2–0.7)
BUN SERPL-MCNC: 5 MG/DL (ref 6–20)
CALCIUM SERPL-MCNC: 9 MG/DL (ref 8.5–9.9)
CHLORIDE SERPL-SCNC: 105 MEQ/L (ref 95–107)
CO2 SERPL-SCNC: 23 MEQ/L (ref 20–31)
CREAT SERPL-MCNC: 0.58 MG/DL (ref 0.5–0.9)
CRP SERPL HS-MCNC: 4.9 MG/L (ref 0–5)
EOSINOPHIL # BLD: 0.2 K/UL (ref 0–0.7)
EOSINOPHIL NFR BLD: 4 %
ERYTHROCYTE [DISTWIDTH] IN BLOOD BY AUTOMATED COUNT: 14 % (ref 11.5–14.5)
ERYTHROCYTE [SEDIMENTATION RATE] IN BLOOD BY WESTERGREN METHOD: 27 MM (ref 0–20)
GLOBULIN SER CALC-MCNC: 2.9 G/DL (ref 2.3–3.5)
GLUCOSE SERPL-MCNC: 202 MG/DL (ref 70–99)
HCT VFR BLD AUTO: 42 % (ref 37–47)
HGB BLD-MCNC: 14.6 G/DL (ref 12–16)
LYMPHOCYTES # BLD: 1.7 K/UL (ref 1–4.8)
LYMPHOCYTES NFR BLD: 32.2 %
MCH RBC QN AUTO: 30.6 PG (ref 27–31.3)
MCHC RBC AUTO-ENTMCNC: 34.7 % (ref 33–37)
MCV RBC AUTO: 88.1 FL (ref 79.4–94.8)
MONOCYTES # BLD: 0.4 K/UL (ref 0.2–0.8)
MONOCYTES NFR BLD: 8.3 %
NEUTROPHILS # BLD: 2.8 K/UL (ref 1.4–6.5)
NEUTS SEG NFR BLD: 54.9 %
PLATELET # BLD AUTO: 327 K/UL (ref 130–400)
POTASSIUM SERPL-SCNC: 4 MEQ/L (ref 3.4–4.9)
PROT SERPL-MCNC: 7.1 G/DL (ref 6.3–8)
RBC # BLD AUTO: 4.77 M/UL (ref 4.2–5.4)
SODIUM SERPL-SCNC: 138 MEQ/L (ref 135–144)
WBC # BLD AUTO: 5.2 K/UL (ref 4.8–10.8)

## 2023-07-23 PROCEDURE — 73630 X-RAY EXAM OF FOOT: CPT

## 2023-07-23 PROCEDURE — 87075 CULTR BACTERIA EXCEPT BLOOD: CPT

## 2023-07-23 PROCEDURE — 87070 CULTURE OTHR SPECIMN AEROBIC: CPT

## 2023-07-23 PROCEDURE — 6370000000 HC RX 637 (ALT 250 FOR IP): Performed by: EMERGENCY MEDICINE

## 2023-07-23 PROCEDURE — 86140 C-REACTIVE PROTEIN: CPT

## 2023-07-23 PROCEDURE — 85652 RBC SED RATE AUTOMATED: CPT

## 2023-07-23 PROCEDURE — 2580000003 HC RX 258: Performed by: EMERGENCY MEDICINE

## 2023-07-23 PROCEDURE — 85025 COMPLETE CBC W/AUTO DIFF WBC: CPT

## 2023-07-23 PROCEDURE — 96367 TX/PROPH/DG ADDL SEQ IV INF: CPT

## 2023-07-23 PROCEDURE — 36415 COLL VENOUS BLD VENIPUNCTURE: CPT

## 2023-07-23 PROCEDURE — 86403 PARTICLE AGGLUT ANTBDY SCRN: CPT

## 2023-07-23 PROCEDURE — 96365 THER/PROPH/DIAG IV INF INIT: CPT

## 2023-07-23 PROCEDURE — 6360000002 HC RX W HCPCS: Performed by: EMERGENCY MEDICINE

## 2023-07-23 PROCEDURE — 80053 COMPREHEN METABOLIC PANEL: CPT

## 2023-07-23 PROCEDURE — 99284 EMERGENCY DEPT VISIT MOD MDM: CPT

## 2023-07-23 RX ORDER — BACITRACIN ZINC 500 [USP'U]/G
OINTMENT TOPICAL ONCE
Status: COMPLETED | OUTPATIENT
Start: 2023-07-23 | End: 2023-07-23

## 2023-07-23 RX ADMIN — BACITRACIN ZINC: 500 OINTMENT TOPICAL at 17:25

## 2023-07-23 RX ADMIN — VANCOMYCIN HYDROCHLORIDE 1250 MG: 1.25 INJECTION, POWDER, LYOPHILIZED, FOR SOLUTION INTRAVENOUS at 16:06

## 2023-07-23 RX ADMIN — PIPERACILLIN AND TAZOBACTAM 3375 MG: 3; .375 INJECTION, POWDER, LYOPHILIZED, FOR SOLUTION INTRAVENOUS at 15:32

## 2023-07-23 ASSESSMENT — ENCOUNTER SYMPTOMS
SHORTNESS OF BREATH: 0
VOMITING: 0
ABDOMINAL PAIN: 0
CHEST TIGHTNESS: 0
NAUSEA: 0
SORE THROAT: 0
EYE PAIN: 0

## 2023-07-23 ASSESSMENT — LIFESTYLE VARIABLES
HOW MANY STANDARD DRINKS CONTAINING ALCOHOL DO YOU HAVE ON A TYPICAL DAY: PATIENT DOES NOT DRINK
HOW OFTEN DO YOU HAVE A DRINK CONTAINING ALCOHOL: NEVER

## 2023-07-23 NOTE — ED PROVIDER NOTES
[facility] ED  EMERGENCY DEPARTMENT ENCOUNTER      Pt Name: Nai Betts  MRN: [id number]  [address] [facility] 1980  Date of evaluation: 7/23/2023  Provider: Archie Pemberton DO    CHIEF COMPLAINT       Chief Complaint   Patient presents with    Wound Check     Broken right foot, sore on bottom of foot getting worse          HISTORY OF PRESENT ILLNESS   (Location/Symptom, Timing/Onset, Context/Setting, Quality, Duration, Modifying Factors, Severity)  Note limiting factors. Nai Betts is a 44 y.o. female who presents to the emergency department . Patient presents with concerns about a draining wound on the bottom of her right foot. Patient is diabetic. She is worried that the infection will go to her bone. Patient saw Dr. Ted Murphy days ago. He wanted her to stay on Bactrim. He discontinued Keflex which have been added by previous provider. Patient is diabetic. HPI    Nursing Notes were reviewed. REVIEW OF SYSTEMS    (2-9 systems for level 4, 10 or more for level 5)     Review of Systems   Constitutional:  Negative for activity change, appetite change and fatigue. HENT:  Negative for congestion and sore throat. Eyes:  Negative for pain and visual disturbance. Respiratory:  Negative for chest tightness and shortness of breath. Cardiovascular:  Negative for chest pain. Gastrointestinal:  Negative for abdominal pain, nausea and vomiting. Endocrine: Negative for polydipsia. Genitourinary:  Negative for flank pain and urgency. Musculoskeletal:  Negative for gait problem and neck stiffness. Skin:  Positive for wound. Negative for rash. Neurological:  Negative for weakness, light-headedness and headaches. Psychiatric/Behavioral:  Negative for confusion and sleep disturbance. Except as noted above the remainder of the review of systems was reviewed and negative.        PAST MEDICAL HISTORY     Past Medical History:   Diagnosis Date    Anemia during pregnancy 2/2/2018

## 2023-07-23 NOTE — ED TRIAGE NOTES
Pt arrived to triage via private vehicle. Pt c/o wound to right foot. Pt states she was recently dx with a broken right foot. Pt saw her PCP on Wednesday and was given antibiotics for a wound on the bottom of her right foot. Pt states the wound is getting worse. Pt states she is diabetic.

## 2023-07-28 LAB — CULTURE WOUND: NORMAL

## 2023-08-26 ENCOUNTER — HOSPITAL ENCOUNTER (EMERGENCY)
Age: 43
Discharge: HOME | End: 2023-08-26
Payer: COMMERCIAL

## 2023-08-26 ENCOUNTER — APPOINTMENT (OUTPATIENT)
Dept: GENERAL RADIOLOGY | Age: 43
End: 2023-08-26
Payer: COMMERCIAL

## 2023-08-26 VITALS
WEIGHT: 170 LBS | TEMPERATURE: 98.2 F | OXYGEN SATURATION: 99 % | HEART RATE: 93 BPM | HEIGHT: 63 IN | DIASTOLIC BLOOD PRESSURE: 106 MMHG | BODY MASS INDEX: 30.12 KG/M2 | SYSTOLIC BLOOD PRESSURE: 160 MMHG | RESPIRATION RATE: 16 BRPM

## 2023-08-26 DIAGNOSIS — S96.912A REPETITIVE STRAIN INJURY OF LEFT FOOT, INITIAL ENCOUNTER: Primary | ICD-10-CM
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
## 2023-08-26 PROCEDURE — 99283 EMERGENCY DEPT VISIT LOW MDM: CPT

## 2023-08-26 PROCEDURE — 73630 X-RAY EXAM OF FOOT: CPT

## 2023-08-26 ASSESSMENT — ENCOUNTER SYMPTOMS
COLOR CHANGE: 0
EYE PAIN: 0
ALLERGIC/IMMUNOLOGIC NEGATIVE: 1
SHORTNESS OF BREATH: 0
BACK PAIN: 0
APNEA: 0
TROUBLE SWALLOWING: 0
ABDOMINAL PAIN: 0

## 2023-08-26 ASSESSMENT — PAIN - FUNCTIONAL ASSESSMENT: PAIN_FUNCTIONAL_ASSESSMENT: NONE - DENIES PAIN

## 2023-08-26 ASSESSMENT — PAIN DESCRIPTION - LOCATION: LOCATION: ANKLE

## 2023-08-26 ASSESSMENT — PAIN DESCRIPTION - ORIENTATION: ORIENTATION: LEFT

## 2023-08-26 ASSESSMENT — PAIN SCALES - GENERAL: PAINLEVEL_OUTOF10: 0

## 2023-08-26 NOTE — ED TRIAGE NOTES
Patient presents with complaints of left ankle swelling since last night, denies injury. Patient currently has a boot on her right foot due to a diabetic foot wound and fracture.  No distress noted on arrival.

## 2023-09-02 ENCOUNTER — APPOINTMENT (OUTPATIENT)
Dept: GENERAL RADIOLOGY | Age: 43
End: 2023-09-02
Payer: COMMERCIAL

## 2023-09-02 ENCOUNTER — HOSPITAL ENCOUNTER (EMERGENCY)
Age: 43
Discharge: HOME | End: 2023-09-02
Payer: COMMERCIAL

## 2023-09-02 VITALS
HEIGHT: 63 IN | DIASTOLIC BLOOD PRESSURE: 91 MMHG | RESPIRATION RATE: 20 BRPM | SYSTOLIC BLOOD PRESSURE: 159 MMHG | BODY MASS INDEX: 30.11 KG/M2 | TEMPERATURE: 97.7 F | OXYGEN SATURATION: 99 % | HEART RATE: 99 BPM

## 2023-09-02 DIAGNOSIS — S92.355A CLOSED NONDISPLACED FRACTURE OF FIFTH METATARSAL BONE OF LEFT FOOT, INITIAL ENCOUNTER: Primary | ICD-10-CM

## 2023-09-02 PROCEDURE — 73630 X-RAY EXAM OF FOOT: CPT

## 2023-09-02 PROCEDURE — 99283 EMERGENCY DEPT VISIT LOW MDM: CPT

## 2023-09-02 RX ORDER — HYDROCODONE BITARTRATE AND ACETAMINOPHEN 5; 325 MG/1; MG/1
1 TABLET ORAL ONCE
Status: DISCONTINUED | OUTPATIENT
Start: 2023-09-02 | End: 2023-09-02

## 2023-09-02 RX ORDER — HYDROCODONE BITARTRATE AND ACETAMINOPHEN 5; 325 MG/1; MG/1
1 TABLET ORAL EVERY 8 HOURS PRN
Qty: 9 TABLET | Refills: 0 | Status: SHIPPED | OUTPATIENT
Start: 2023-09-02 | End: 2023-09-02 | Stop reason: ALTCHOICE

## 2023-09-02 ASSESSMENT — PAIN - FUNCTIONAL ASSESSMENT: PAIN_FUNCTIONAL_ASSESSMENT: NONE - DENIES PAIN

## 2023-09-02 NOTE — ED TRIAGE NOTES
Pt presents to ER from home with left foot injury. Pt was seen last week for injuring her right foot and has fell twice and now she thinks she injured her left foot. Pt has diabetes so cannot really feel anything but she stated, \"I check my feet everyday and noticed that something is off\". Pt is A&Ox4, warm and dry at this time.

## 2023-09-02 NOTE — DISCHARGE INSTRUCTIONS
Take medications as directed. LISA. Follow-up with PCP, Ortho. Return to ED if any new, or worsening symptoms.

## 2023-09-03 NOTE — ED NOTES
Patient splinted with posterior splint   Patient tolerated well  Has crutches at home  Denies need for pain medication      Augustina Hall RN  09/02/23 2009

## 2023-09-03 NOTE — ED PROVIDER NOTES
significant/life threatening deterioration in the patient's condition which required my urgent intervention. CONSULTS:  None    PROCEDURES:  Unless otherwise noted below, none     Procedures    No LOS Charge filed     FINAL IMPRESSION      1. Closed nondisplaced fracture of fifth metatarsal bone of left foot, initial encounter          DISPOSITION/PLAN   DISPOSITION Discharge - Pending Orders Complete 09/02/2023 07:27:48 PM      PATIENT REFERRED TO:  [facility]  [address] [facility] [address]  In 3 days      Manny Quinones, [address] [facility] [address]  [address] [phone]    In 3 days        DISCHARGE MEDICATIONS:  New Prescriptions    No medications on file     Controlled Substances Monitoring:     No flowsheet data found. (Please note that portions of this note were completed with a voice recognition program.  Efforts were made to edit the dictations but occasionally words are mis-transcribed. )    Mile Shelton NP-DAKOTA (electronically signed)    My attending physician is Dr. Kacy Escalona.      HANH Al  09/02/23 2012

## 2023-09-05 ENCOUNTER — TELEPHONE (OUTPATIENT)
Dept: FAMILY MEDICINE CLINIC | Age: 43
End: 2023-09-05

## 2023-09-05 NOTE — TELEPHONE ENCOUNTER
----- Message from Noah Caputo sent at 9/5/2023  9:16 AM EDT -----  Subject: Referral Request    Reason for referral request? Patient needs bone density scan on 9/11appt   for both her feet  Provider patient wants to be referred to(if known):     Provider Phone Number(if known): Additional Information for Provider?   ---------------------------------------------------------------------------  --------------  [address] [place]    [phone];  Do not leave any message, patient will call back for answer  ---------------------------------------------------------------------------  --------------

## 2023-09-06 ENCOUNTER — OFFICE VISIT (OUTPATIENT)
Dept: ENDOCRINOLOGY | Age: 43
End: 2023-09-06

## 2023-09-06 VITALS
WEIGHT: 174 LBS | SYSTOLIC BLOOD PRESSURE: 124 MMHG | HEART RATE: 71 BPM | DIASTOLIC BLOOD PRESSURE: 83 MMHG | HEIGHT: 63 IN | BODY MASS INDEX: 30.83 KG/M2 | OXYGEN SATURATION: 98 %

## 2023-09-06 DIAGNOSIS — E11.69 TYPE 2 DIABETES MELLITUS WITH OTHER SPECIFIED COMPLICATION, WITH LONG-TERM CURRENT USE OF INSULIN (HCC): Primary | ICD-10-CM

## 2023-09-06 DIAGNOSIS — Z79.4 TYPE 2 DIABETES MELLITUS WITH OTHER SPECIFIED COMPLICATION, WITH LONG-TERM CURRENT USE OF INSULIN (HCC): Primary | ICD-10-CM

## 2023-09-06 LAB
CHP ED QC CHECK: NORMAL
GLUCOSE BLD-MCNC: 134 MG/DL
HBA1C MFR BLD: 7.8 %

## 2023-09-06 RX ORDER — BLOOD SUGAR DIAGNOSTIC
STRIP MISCELLANEOUS
Qty: 150 EACH | Refills: 3 | Status: SHIPPED | OUTPATIENT
Start: 2023-09-06

## 2023-09-06 RX ORDER — HYDROCODONE BITARTRATE AND ACETAMINOPHEN 5; 325 MG/1; MG/1
1 TABLET ORAL EVERY 8 HOURS PRN
COMMUNITY
Start: 2023-09-03

## 2023-09-06 RX ORDER — LANCING DEVICE/LANCETS
1 KIT MISCELLANEOUS 4 TIMES DAILY
Qty: 1 KIT | Refills: 3 | Status: SHIPPED | OUTPATIENT
Start: 2023-09-06

## 2023-09-06 RX ORDER — BLOOD PRESSURE TEST KIT
KIT MISCELLANEOUS
Qty: 150 EACH | Refills: 6 | Status: SHIPPED | OUTPATIENT
Start: 2023-09-06

## 2023-09-06 RX ORDER — INSULIN LISPRO 100 [IU]/ML
INJECTION, SOLUTION INTRAVENOUS; SUBCUTANEOUS
Qty: 60 ML | Refills: 1 | Status: SHIPPED | OUTPATIENT
Start: 2023-09-06

## 2023-09-06 NOTE — PROGRESS NOTES
9/6/2023    Assessment:       Diagnosis Orders   1. Type 2 diabetes mellitus with other specified complication, with long-term current use of insulin (HCC)  POCT Glucose    POCT glycosylated hemoglobin (Hb A1C)            PLAN:     Orders Placed This Encounter   Procedures    Basic Metabolic Panel     Standing Status:   Future     Standing Expiration Date:   9/6/2024    Hemoglobin A1C     Standing Status:   Future     Standing Expiration Date:   9/6/2024    Microalbumin / Creatinine Urine Ratio     Standing Status:   Future     Standing Expiration Date:   9/6/2024    POCT Glucose    POCT glycosylated hemoglobin (Hb A1C)     Continue patient on current insulin regimen via pump A1c goal of 7 or lower advised patient to get [employer] 780 updated      Orders Placed This Encounter   Procedures    POCT Glucose    POCT glycosylated hemoglobin (Hb A1C)     No orders of the defined types were placed in this encounter. No follow-ups on file. Subjective:     Chief Complaint   Patient presents with    Diabetes     Vitals:    09/06/23 1448   BP: 124/83   Pulse: 71   SpO2: 98%   Weight: 174 lb (78.9 kg)   Height: 5' 3\" (1.6 m)     Wt Readings from Last 3 Encounters:   09/06/23 174 lb (78.9 kg)   08/26/23 170 lb (77.1 kg)   07/23/23 175 lb (79.4 kg)     BP Readings from Last 3 Encounters:   09/06/23 124/83   09/02/23 (!) 159/91   08/26/23 (!) 160/106     Follow-up with type 2 . Diabetes and recent fracture of right foot and diabetic ulcer hemoglobin A1c of 7.8 currently on Medtronic pump basal rate 3.2 units/h preset bolus of 8 units with each meals fluctuating compliance with diet    Diabetes  She presents for her follow-up diabetic visit. She has type 2 diabetes mellitus. There are no hypoglycemic complications. Symptoms are improving. There are no diabetic complications. Risk factors for coronary artery disease include obesity. Current diabetic treatment includes insulin pump.  Her overall blood glucose range is 180-200

## 2023-09-10 ASSESSMENT — ENCOUNTER SYMPTOMS: EYES NEGATIVE: 1

## 2023-09-11 ENCOUNTER — OFFICE VISIT (OUTPATIENT)
Dept: FAMILY MEDICINE CLINIC | Age: 43
End: 2023-09-11
Payer: COMMERCIAL

## 2023-09-11 VITALS
HEIGHT: 63 IN | OXYGEN SATURATION: 99 % | BODY MASS INDEX: 31.47 KG/M2 | DIASTOLIC BLOOD PRESSURE: 82 MMHG | WEIGHT: 177.6 LBS | SYSTOLIC BLOOD PRESSURE: 120 MMHG | HEART RATE: 88 BPM

## 2023-09-11 DIAGNOSIS — S92.911D: ICD-10-CM

## 2023-09-11 DIAGNOSIS — M25.561 ACUTE PAIN OF RIGHT KNEE: ICD-10-CM

## 2023-09-11 DIAGNOSIS — S92.902D CLOSED FRACTURE OF LEFT FOOT WITH ROUTINE HEALING, SUBSEQUENT ENCOUNTER: ICD-10-CM

## 2023-09-11 DIAGNOSIS — Z13.220 SCREENING FOR LIPID DISORDERS: ICD-10-CM

## 2023-09-11 DIAGNOSIS — N30.90 CYSTITIS WITHOUT HEMATURIA: Primary | ICD-10-CM

## 2023-09-11 DIAGNOSIS — Z12.4 SCREENING FOR CERVICAL CANCER: ICD-10-CM

## 2023-09-11 DIAGNOSIS — N30.90 CYSTITIS WITHOUT HEMATURIA: ICD-10-CM

## 2023-09-11 LAB
BILIRUBIN, POC: NORMAL
BLOOD URINE, POC: NORMAL
CLARITY, POC: NORMAL
COLOR, POC: YELLOW
GLUCOSE URINE, POC: NORMAL
KETONES, POC: NORMAL
LEUKOCYTE EST, POC: NORMAL
NITRITE, POC: NORMAL
PH, POC: 6
PROTEIN, POC: NORMAL
SPECIFIC GRAVITY, POC: 1.02
UROBILINOGEN, POC: 3.5

## 2023-09-11 PROCEDURE — 81003 URINALYSIS AUTO W/O SCOPE: CPT | Performed by: STUDENT IN AN ORGANIZED HEALTH CARE EDUCATION/TRAINING PROGRAM

## 2023-09-11 PROCEDURE — 99214 OFFICE O/P EST MOD 30 MIN: CPT | Performed by: STUDENT IN AN ORGANIZED HEALTH CARE EDUCATION/TRAINING PROGRAM

## 2023-09-11 PROCEDURE — 1036F TOBACCO NON-USER: CPT | Performed by: STUDENT IN AN ORGANIZED HEALTH CARE EDUCATION/TRAINING PROGRAM

## 2023-09-11 PROCEDURE — G8427 DOCREV CUR MEDS BY ELIG CLIN: HCPCS | Performed by: STUDENT IN AN ORGANIZED HEALTH CARE EDUCATION/TRAINING PROGRAM

## 2023-09-11 PROCEDURE — G8417 CALC BMI ABV UP PARAM F/U: HCPCS | Performed by: STUDENT IN AN ORGANIZED HEALTH CARE EDUCATION/TRAINING PROGRAM

## 2023-09-11 ASSESSMENT — ENCOUNTER SYMPTOMS
ABDOMINAL PAIN: 0
COUGH: 0
SORE THROAT: 0
SINUS PRESSURE: 0
VOMITING: 0
SHORTNESS OF BREATH: 0

## 2023-09-11 NOTE — PROGRESS NOTES
Skin:     Findings: No lesion or rash. Neurological:      General: No focal deficit present. Mental Status: She is alert. Mental status is at baseline. Psychiatric:         Mood and Affect: Mood normal.         Behavior: Behavior normal.         Thought Content: Thought content normal.         ASSESSMENT/PLAN:  1. Cystitis without hematuria  UA nonsuggestive of infection, there is 2+ blood however patient states she has been off and on her menstrual cycle  We will send for urine culture and call patient with results  Repeat UA at follow-up appointment to make sure blood has cleared    - POCT Urinalysis No Micro (Auto)  - Culture, Urine; Future    2. Acute pain of right knee  Patient continued to have right knee pain after she fell  X-ray ordered    - XR KNEE RIGHT (1-2 VIEWS); Future    3. Closed fracture of left foot with routine healing, subsequent encounter  Patient is concerned because she broke both of her feet, 1 was nontraumatic  Wondering if she had DEXA scan completed to look for osteopenia/osteoporosis  DEXA is ordered    - DEXA BONE DENSITY AXIAL SKELETON; Future    4. Closed fracture dislocation of interphalangeal joint of multiple toes of right foot with routine healing, subsequent encounter    - DEXA BONE DENSITY AXIAL SKELETON; Future    5. Screening for lipid disorders    - Lipid Panel; Future    6.  Screening for cervical cancer  Has been several years since she had a well woman exam  Referral created for OB/GYN    - Ian Calderon MD, OB-GYN, [place]      Medications Discontinued During This Encounter   Medication Reason    clobetasol (TEMOVATE) 0.05 % ointment LIST CLEANUP    Disposable Gloves (COTTON GLOVES MEDIUM) MISC     triamcinolone (KENALOG) 0.1 % cream     tacrolimus (PROTOPIC) 0.03 % ointment LIST CLEANUP    sulfamethoxazole-trimethoprim (BACTRIM DS;SEPTRA DS) 800-160 MG per tablet     miconazole (MICONAZOLE ANTIFUNGAL) 2 % cream LIST CLEANUP    LANTUS SOLOSTAR 100

## 2023-09-14 DIAGNOSIS — N30.91 CYSTITIS WITH HEMATURIA: Primary | ICD-10-CM

## 2023-09-14 LAB
BACTERIA UR CULT: ABNORMAL
BACTERIA UR CULT: ABNORMAL
ORGANISM: ABNORMAL

## 2023-09-14 RX ORDER — NITROFURANTOIN 25; 75 MG/1; MG/1
100 CAPSULE ORAL 2 TIMES DAILY
Qty: 14 CAPSULE | Refills: 0 | Status: SHIPPED | OUTPATIENT
Start: 2023-09-14 | End: 2023-09-21

## 2023-09-26 ENCOUNTER — TRANSCRIBE ORDERS (OUTPATIENT)
Dept: GENERAL RADIOLOGY | Age: 43
End: 2023-09-26

## 2023-09-26 ENCOUNTER — HOSPITAL ENCOUNTER (OUTPATIENT)
Dept: GENERAL RADIOLOGY | Age: 43
Discharge: HOME | End: 2023-09-28
Attending: PODIATRIST
Payer: COMMERCIAL

## 2023-09-26 DIAGNOSIS — M79.671 RIGHT FOOT PAIN: Primary | ICD-10-CM

## 2023-09-26 DIAGNOSIS — M79.671 RIGHT FOOT PAIN: ICD-10-CM

## 2023-09-26 PROCEDURE — 73630 X-RAY EXAM OF FOOT: CPT

## 2023-10-17 ENCOUNTER — OFFICE VISIT (OUTPATIENT)
Dept: OBGYN CLINIC | Age: 43
End: 2023-10-17
Payer: COMMERCIAL

## 2023-10-17 VITALS
WEIGHT: 178 LBS | DIASTOLIC BLOOD PRESSURE: 74 MMHG | BODY MASS INDEX: 31.54 KG/M2 | SYSTOLIC BLOOD PRESSURE: 112 MMHG | HEIGHT: 63 IN

## 2023-10-17 DIAGNOSIS — Z01.419 ENCOUNTER FOR WELL WOMAN EXAM WITH ROUTINE GYNECOLOGICAL EXAM: Primary | ICD-10-CM

## 2023-10-17 DIAGNOSIS — Z12.31 SCREENING MAMMOGRAM FOR BREAST CANCER: ICD-10-CM

## 2023-10-17 DIAGNOSIS — Z12.4 CERVICAL CANCER SCREENING: ICD-10-CM

## 2023-10-17 DIAGNOSIS — Z11.51 SCREENING FOR HUMAN PAPILLOMAVIRUS: ICD-10-CM

## 2023-10-17 PROCEDURE — G8484 FLU IMMUNIZE NO ADMIN: HCPCS | Performed by: OBSTETRICS & GYNECOLOGY

## 2023-10-17 PROCEDURE — 99386 PREV VISIT NEW AGE 40-64: CPT | Performed by: OBSTETRICS & GYNECOLOGY

## 2023-10-17 ASSESSMENT — ENCOUNTER SYMPTOMS
CONSTIPATION: 0
VOMITING: 0
BLOOD IN STOOL: 0
DIARRHEA: 0
RESPIRATORY NEGATIVE: 1
ALLERGIC/IMMUNOLOGIC NEGATIVE: 1
NAUSEA: 0
EYES NEGATIVE: 1
RECTAL PAIN: 0
ABDOMINAL PAIN: 0
ANAL BLEEDING: 0
ABDOMINAL DISTENTION: 0

## 2023-10-17 ASSESSMENT — VISUAL ACUITY: OU: 1

## 2023-10-17 NOTE — PROGRESS NOTES
The patient was asked if she would like a chaperone present for her intimate exam. She  Declined the chaperone.  Marilu Miramontes CMA ([facility] [facility])
Tenderness: There is no abdominal tenderness. There is no guarding or rebound. Hernia: No hernia is present. There is no hernia in the left inguinal area or right inguinal area. Genitourinary:     General: Normal vulva. Pubic Area: No rash. Labia:         Right: No rash, tenderness, lesion or injury. Left: No rash, tenderness, lesion or injury. Urethra: No prolapse, urethral swelling or urethral lesion. Vagina: Normal. No signs of injury and foreign body. No vaginal discharge, erythema, tenderness or bleeding. Cervix: No cervical motion tenderness, discharge or friability. Uterus: Not deviated, not enlarged, not fixed and not tender. Adnexa:         Right: No mass, tenderness or fullness. Left: No mass, tenderness or fullness. Rectum: Normal.   Musculoskeletal:         General: No tenderness. Normal range of motion. Cervical back: Normal range of motion and neck supple. Lymphadenopathy:      Cervical: No cervical adenopathy. Upper Body:      Right upper body: No supraclavicular or axillary adenopathy. Left upper body: No supraclavicular or axillary adenopathy. Lower Body: No right inguinal adenopathy. No left inguinal adenopathy. Skin:     General: Skin is warm and dry. Coloration: Skin is not pale. Findings: No erythema or rash. Neurological:      Mental Status: She is alert and oriented to person, place, and time. Psychiatric:         Behavior: Behavior normal.         Thought Content: Thought content normal.         Judgment: Judgment normal.         Assessment:      Diagnosis Orders   1. Encounter for well woman exam with routine gynecological exam        2. Cervical cancer screening        3. Screening for human papillomavirus        4. Screening mammogram for breast cancer              Plan:      Medications placedthis encounter:  No orders of the defined types were placed in this encounter.         Orders

## 2023-10-20 ENCOUNTER — TRANSCRIBE ORDERS (OUTPATIENT)
Dept: GENERAL RADIOLOGY | Age: 43
End: 2023-10-20

## 2023-10-20 ENCOUNTER — HOSPITAL ENCOUNTER (OUTPATIENT)
Dept: GENERAL RADIOLOGY | Age: 43
End: 2023-10-20
Attending: PODIATRIST
Payer: COMMERCIAL

## 2023-10-20 DIAGNOSIS — M79.672 BILATERAL FOOT PAIN: ICD-10-CM

## 2023-10-20 DIAGNOSIS — M79.671 BILATERAL FOOT PAIN: ICD-10-CM

## 2023-10-20 DIAGNOSIS — M79.673 PAIN OF FOOT, UNSPECIFIED LATERALITY: ICD-10-CM

## 2023-10-20 DIAGNOSIS — M79.673 PAIN OF FOOT, UNSPECIFIED LATERALITY: Primary | ICD-10-CM

## 2023-10-20 PROCEDURE — 73630 X-RAY EXAM OF FOOT: CPT

## 2023-10-21 ENCOUNTER — APPOINTMENT (OUTPATIENT)
Dept: GENERAL RADIOLOGY | Age: 43
End: 2023-10-21
Payer: COMMERCIAL

## 2023-10-21 ENCOUNTER — HOSPITAL ENCOUNTER (EMERGENCY)
Age: 43
Discharge: HOME | End: 2023-10-21
Payer: COMMERCIAL

## 2023-10-21 VITALS
TEMPERATURE: 98.3 F | OXYGEN SATURATION: 100 % | SYSTOLIC BLOOD PRESSURE: 127 MMHG | RESPIRATION RATE: 18 BRPM | DIASTOLIC BLOOD PRESSURE: 85 MMHG | HEART RATE: 93 BPM

## 2023-10-21 DIAGNOSIS — R05.1 ACUTE COUGH: ICD-10-CM

## 2023-10-21 DIAGNOSIS — R21 RASH AND OTHER NONSPECIFIC SKIN ERUPTION: Primary | ICD-10-CM

## 2023-10-21 DIAGNOSIS — R30.0 DYSURIA: ICD-10-CM

## 2023-10-21 LAB
BILIRUB UR QL STRIP: NEGATIVE
CLARITY UR: CLEAR
COLOR UR: YELLOW
GLUCOSE UR STRIP-MCNC: 100 MG/DL
HGB UR QL STRIP: NEGATIVE
KETONES UR STRIP-MCNC: ABNORMAL MG/DL
LEUKOCYTE ESTERASE UR QL STRIP: NEGATIVE
NITRITE UR QL STRIP: NEGATIVE
PH UR STRIP: 5.5 [PH] (ref 5–9)
PROT UR STRIP-MCNC: NEGATIVE MG/DL
SP GR UR STRIP: 1.01 (ref 1–1.03)
URINE REFLEX TO CULTURE: ABNORMAL
UROBILINOGEN UR STRIP-ACNC: 0.2 E.U./DL

## 2023-10-21 PROCEDURE — 81003 URINALYSIS AUTO W/O SCOPE: CPT

## 2023-10-21 PROCEDURE — 99284 EMERGENCY DEPT VISIT MOD MDM: CPT

## 2023-10-21 PROCEDURE — 71046 X-RAY EXAM CHEST 2 VIEWS: CPT

## 2023-10-21 RX ORDER — PREDNISONE 10 MG/1
20 TABLET ORAL DAILY
Qty: 10 TABLET | Refills: 0 | Status: SHIPPED | OUTPATIENT
Start: 2023-10-21 | End: 2023-10-26

## 2023-10-21 RX ORDER — HYDROXYZINE PAMOATE 25 MG/1
25 CAPSULE ORAL 3 TIMES DAILY PRN
Qty: 15 CAPSULE | Refills: 0 | Status: SHIPPED | OUTPATIENT
Start: 2023-10-21 | End: 2023-11-04

## 2023-10-21 ASSESSMENT — ENCOUNTER SYMPTOMS
SORE THROAT: 0
SHORTNESS OF BREATH: 0
TROUBLE SWALLOWING: 0
ABDOMINAL PAIN: 0
BACK PAIN: 0
COUGH: 1

## 2023-10-21 ASSESSMENT — PAIN - FUNCTIONAL ASSESSMENT
PAIN_FUNCTIONAL_ASSESSMENT: NONE - DENIES PAIN
PAIN_FUNCTIONAL_ASSESSMENT: NONE - DENIES PAIN

## 2023-10-21 ASSESSMENT — PATIENT HEALTH QUESTIONNAIRE - PHQ9
SUM OF ALL RESPONSES TO PHQ9 QUESTIONS 1 & 2: 0
1. LITTLE INTEREST OR PLEASURE IN DOING THINGS: 0
SUM OF ALL RESPONSES TO PHQ QUESTIONS 1-9: 0
2. FEELING DOWN, DEPRESSED OR HOPELESS: 0
SUM OF ALL RESPONSES TO PHQ QUESTIONS 1-9: 0

## 2023-10-21 NOTE — ED TRIAGE NOTES
Patient reports of of a pinpoint red, raised, itchy rash throughout her body. States that rash is different from her eczema. Patient also reports burning with urination. Patient reports a productive cough.

## 2023-10-31 ENCOUNTER — OFFICE VISIT (OUTPATIENT)
Dept: FAMILY MEDICINE CLINIC | Age: 43
End: 2023-10-31
Payer: COMMERCIAL

## 2023-10-31 VITALS
RESPIRATION RATE: 16 BRPM | TEMPERATURE: 96.7 F | WEIGHT: 176.6 LBS | BODY MASS INDEX: 31.29 KG/M2 | OXYGEN SATURATION: 97 % | HEART RATE: 109 BPM | HEIGHT: 63 IN

## 2023-10-31 DIAGNOSIS — J06.9 URI WITH COUGH AND CONGESTION: ICD-10-CM

## 2023-10-31 DIAGNOSIS — J40 BRONCHITIS: Primary | ICD-10-CM

## 2023-10-31 DIAGNOSIS — L30.9 ACUTE DERMATITIS: ICD-10-CM

## 2023-10-31 LAB
INFLUENZA A ANTIBODY: NORMAL
INFLUENZA B ANTIBODY: NORMAL
Lab: NORMAL
PERFORMING INSTRUMENT: NORMAL
QC PASS/FAIL: NORMAL
SARS-COV-2, POC: NORMAL

## 2023-10-31 PROCEDURE — 87426 SARSCOV CORONAVIRUS AG IA: CPT | Performed by: NURSE PRACTITIONER

## 2023-10-31 PROCEDURE — 99213 OFFICE O/P EST LOW 20 MIN: CPT | Performed by: NURSE PRACTITIONER

## 2023-10-31 PROCEDURE — G8484 FLU IMMUNIZE NO ADMIN: HCPCS | Performed by: NURSE PRACTITIONER

## 2023-10-31 PROCEDURE — 87804 INFLUENZA ASSAY W/OPTIC: CPT | Performed by: NURSE PRACTITIONER

## 2023-10-31 PROCEDURE — G8427 DOCREV CUR MEDS BY ELIG CLIN: HCPCS | Performed by: NURSE PRACTITIONER

## 2023-10-31 PROCEDURE — G8417 CALC BMI ABV UP PARAM F/U: HCPCS | Performed by: NURSE PRACTITIONER

## 2023-10-31 PROCEDURE — 1036F TOBACCO NON-USER: CPT | Performed by: NURSE PRACTITIONER

## 2023-10-31 RX ORDER — METHYLPREDNISOLONE 4 MG/1
TABLET ORAL
Qty: 1 KIT | Refills: 0 | Status: SHIPPED | OUTPATIENT
Start: 2023-10-31 | End: 2023-11-06

## 2023-10-31 RX ORDER — DEXTROMETHORPHAN HYDROBROMIDE AND PROMETHAZINE HYDROCHLORIDE 15; 6.25 MG/5ML; MG/5ML
5 SYRUP ORAL 4 TIMES DAILY PRN
Qty: 118 ML | Refills: 0 | Status: SHIPPED | OUTPATIENT
Start: 2023-10-31

## 2023-10-31 RX ORDER — AMOXICILLIN AND CLAVULANATE POTASSIUM 875; 125 MG/1; MG/1
1 TABLET, FILM COATED ORAL 2 TIMES DAILY
Qty: 14 TABLET | Refills: 0 | Status: SHIPPED | OUTPATIENT
Start: 2023-10-31 | End: 2023-11-07

## 2023-10-31 ASSESSMENT — ENCOUNTER SYMPTOMS
CHEST TIGHTNESS: 1
VOMITING: 0
SHORTNESS OF BREATH: 0
DIARRHEA: 0
SORE THROAT: 0
COUGH: 1
WHEEZING: 0
NAUSEA: 0

## 2023-10-31 NOTE — PROGRESS NOTES
Subjective:      Patient ID: Lexy Lacey is a 44 y.o. female who presents today for:  Chief Complaint   Patient presents with    URI     X2wks WITH COUGH   Congestion x5 no fever no chills but chest feels heavy       URI   Associated symptoms include congestion, coughing and rhinorrhea. Pertinent negatives include no diarrhea, ear pain, headaches, nausea, rash, sore throat, vomiting or wheezing.        Has had a cough for about 2 weeks   Then about 5 days ago her nose started   When she coughs phelgm does come up and its yellow  When she blows her nose too its yellow   Coughing more at night   Sometimes keeping her awake   She does not have asthma   Ex smoker   She was recently on some prednisone  She has skin condition   Dots on her arms  They still itch  She has eczma and something else that they cannot figure out             Past Medical History:   Diagnosis Date    Anemia during pregnancy 2018    Eczema     Herpes simplex infection of skin     on neck    Hypercholesterolemia     Irregular heart beat     runs of v tach in the past    Obesity     Pregnancy induced hypertension     Type II or unspecified type diabetes mellitus without mention of complication, not stated as uncontrolled     type 2     Past Surgical History:   Procedure Laterality Date    ABDOMEN SURGERY       x 2     SECTION      TUBAL LIGATION       Social History     Socioeconomic History    Marital status: Single     Spouse name: Not on file    Number of children: Not on file    Years of education: Not on file    Highest education level: Not on file   Occupational History    Not on file   Tobacco Use    Smoking status: Former     Packs/day: 0.25     Years: 23.00     Additional pack years: 0.00     Total pack years: 5.75     Types: Cigarettes     Start date: 1999     Quit date: 10/30/2021     Years since quittin.0     Passive exposure: Past    Smokeless tobacco: Never    Tobacco comments:     started age

## 2023-11-02 ASSESSMENT — ENCOUNTER SYMPTOMS: RHINORRHEA: 1

## 2023-11-07 ENCOUNTER — HOSPITAL ENCOUNTER (OUTPATIENT)
Dept: WOMENS IMAGING | Age: 43
Discharge: HOME | End: 2023-11-09
Attending: OBSTETRICS & GYNECOLOGY
Payer: COMMERCIAL

## 2023-11-07 DIAGNOSIS — Z12.31 SCREENING MAMMOGRAM FOR BREAST CANCER: ICD-10-CM

## 2023-11-07 PROCEDURE — 77063 BREAST TOMOSYNTHESIS BI: CPT

## 2023-11-15 NOTE — PROGRESS NOTES
Diagnosis Orders   1. Abscess       Return for 30 min procedure I & D back. Patient Instructions   Continue antibiotics. Continue to heat wound for 5 minutes at a time 2-3 times a day. Do not eat for longer will cause swelling. Schedule procedure for next week      Subjective:      Patient ID: Muriel Emery is a 45 y.o. female who presents for:  Chief Complaint   Patient presents with    Skin Problem     abcess    Procedure       Patient returns for drainage of an abscess that has recurred multiple times. She was placed on antibiotics multiple days ago however had social interference with obtaining it. She is only had 3 doses of antibiotic. In the meantime she has been heating it extensively. Feels like the wound is still very hard. Still painful. No enlargement. No fever. Current Outpatient Medications on File Prior to Visit   Medication Sig Dispense Refill    doxycycline hyclate (VIBRA-TABS) 100 MG tablet Take 1 tablet by mouth 2 times daily for 10 days 20 tablet 0    cetirizine (ZYRTEC) 10 MG tablet Take 1 tablet by mouth daily 30 tablet 0    LANTUS SOLOSTAR 100 UNIT/ML injection pen Inject 45 units nightly 5 pen 3    triamcinolone (KENALOG) 0.1 % ointment Apply 2 times daily to affected areas 80 g 0    Disposable Gloves (COTTON GLOVES MEDIUM) MISC Use nightly after applying ointment 2 each 1    Insulin Pen Needle (NOVOFINE) 32G X 6 MM MISC qid 300 each 5    insulin lispro, 1 Unit Dial, (HUMALOG KWIKPEN) 100 UNIT/ML SOPN 10 units at each meals 15 pen 3    Alcohol Swabs PADS Use qid 150 each 06    Crisaborole 2 % OINT Apply sparingly to the area x BID 2 Tube 0    clobetasol (TEMOVATE) 0.05 % ointment Apply topically 2 times daily.  60 g 1    FREESTYLE LANCETS MISC 1 each by Does not apply route daily 200 each 3    blood glucose test strips (FREESTYLE LITE) strip Use 6 times a day   Pt is pregnant 200 strip 3    acyclovir (ZOVIRAX) 5 % ointment Apply topically 5 times daily Pt presents to ER via SPF from home for evaluation of generalized weakness. Pt has prior deficits and weakness from stroke, but pt is significantly weaker recently and unable to care for himself. Pt's wife is not able to take care of him either. Pt was supposed to have appointment with PCP today to get pt to SNF, but son able to get pt up and moving today. EMS called. GCS 15.      Triage Assessment (Adult)       Row Name 11/15/23 0943          Triage Assessment    Airway WDL WDL        Respiratory WDL    Respiratory WDL WDL        Skin Circulation/Temperature WDL    Skin Circulation/Temperature WDL WDL        Cardiac WDL    Cardiac WDL WDL        Cognitive/Neuro/Behavioral WDL    Cognitive/Neuro/Behavioral WDL motor response                      Apply topically 5 times daily to affected area for 7 days 15 g 0    clotrimazole-betamethasone (LOTRISONE) 1-0.05 % cream Apply topically 2 times daily. 1 Tube 1    miconazole (MICONAZOLE ANTIFUNGAL) 2 % cream Apply topically 2 times daily. 1 Tube 1    Insulin Pen Needle (BD PEN NEEDLE EMRE U/F) 32G X 4 MM MISC Use 3 times a day 200 each 03     No current facility-administered medications on file prior to visit.      Past Medical History:   Diagnosis Date    Anemia during pregnancy 2018    Herpes simplex infection of skin     on neck    Hypercholesterolemia     Irregular heart beat     runs of v tach in the past    Obesity     Pregnancy induced hypertension     Type II or unspecified type diabetes mellitus without mention of complication, not stated as uncontrolled     type 2     Past Surgical History:   Procedure Laterality Date    ABDOMEN SURGERY       x 2     SECTION       Social History     Socioeconomic History    Marital status: Single     Spouse name: Not on file    Number of children: Not on file    Years of education: Not on file    Highest education level: Not on file   Occupational History    Not on file   Tobacco Use    Smoking status: Current Every Day Smoker     Packs/day: 0.25     Years: 23.00     Pack years: 5.75     Last attempt to quit: 8/15/2017     Years since quitting: 3.7    Smokeless tobacco: Never Used    Tobacco comment: started age 13   Substance and Sexual Activity    Alcohol use: No     Comment: socially before pregnancy    Drug use: No    Sexual activity: Not Currently     Partners: Male   Other Topics Concern    Not on file   Social History Narrative    Not on file     Social Determinants of Health     Financial Resource Strain: Low Risk     Difficulty of Paying Living Expenses: Not hard at all   Food Insecurity: No Food Insecurity    Worried About Running Out of Food in the Last Year: Never true    Kathryn of Food in the Last Year: Never true   Transportation Needs: No Transportation Needs    Lack of Transportation (Medical): No    Lack of Transportation (Non-Medical): No   Physical Activity:     Days of Exercise per Week:     Minutes of Exercise per Session:    Stress:     Feeling of Stress :    Social Connections:     Frequency of Communication with Friends and Family:     Frequency of Social Gatherings with Friends and Family:     Attends Yarsanism Services:     Active Member of Clubs or Organizations:     Attends Club or Organization Meetings:     Marital Status:    Intimate Partner Violence:     Fear of Current or Ex-Partner:     Emotionally Abused:     Physically Abused:     Sexually Abused:      Family History   Problem Relation Age of Onset    Arthritis Mother     Diabetes Mother     Early Death Father     Heart Attack Father     Heart Disease Father     High Blood Pressure Father     High Cholesterol Father     Diabetes Father     Diabetes Paternal Grandfather     Heart Disease Paternal Grandfather     Birth Defects Son     Arthritis Sister     Cancer Neg Hx      Allergies:  Tape [adhesive tape]    Review of Systems   Constitutional: Negative for chills and fever. Skin: Positive for color change and wound. Negative for rash. Allergic/Immunologic: Negative for environmental allergies, food allergies and immunocompromised state. Hematological: Negative for adenopathy. Does not bruise/bleed easily. Psychiatric/Behavioral: Negative for behavioral problems and sleep disturbance. Objective:   Pulse 91   Temp 97.3 °F (36.3 °C)   Ht 5' 3\" (1.6 m)   Wt 172 lb (78 kg)   SpO2 99%   BMI 30.47 kg/m²     Physical Exam  Constitutional:       General: She is not in acute distress. Appearance: Normal appearance. She is well-developed. She is not toxic-appearing. HENT:      Head: Normocephalic and atraumatic.       Right Ear: Hearing and tympanic membrane normal.      Left Ear: Hearing and tympanic membrane normal.      Nose: Nose normal. No nasal deformity. Eyes:      General: Lids are normal.         Right eye: No discharge. Left eye: No discharge. Conjunctiva/sclera: Conjunctivae normal.      Pupils: Pupils are equal, round, and reactive to light. Neck:      Thyroid: No thyroid mass or thyromegaly. Vascular: No JVD. Trachea: Trachea and phonation normal.   Cardiovascular:      Rate and Rhythm: Normal rate and regular rhythm. Pulmonary:      Effort: No accessory muscle usage or respiratory distress. Musculoskeletal:      Cervical back: Full passive range of motion without pain. Comments: FROM all large muscle groups and joints as witnessed when walking to exam table, getting on, and getting off the exam table. Skin:     General: Skin is warm and dry. Findings: No rash. Comments: Refer to picture below   Neurological:      Mental Status: She is alert. Motor: No tremor or atrophy. Gait: Gait normal.   Psychiatric:         Speech: Speech normal.         Behavior: Behavior normal.         Thought Content: Thought content normal.         No results found for this visit on 05/24/21. No results found for this or any previous visit (from the past 2016 hour(s)). Assessment:       Diagnosis Orders   1. Abscess           No orders of the defined types were placed in this encounter. Plan:   Return for 30 min procedure I & D back. Patient Instructions   Continue antibiotics. Continue to heat wound for 5 minutes at a time 2-3 times a day. Do not eat for longer will cause swelling. Schedule procedure for next week      This note was partially created with the assistance of dictation. This may lead to grammatical or spelling errors. Rakesh Dooley M.D.      ASSESSMENT/PLAN:  1.  Abscess on back  - Start antibiotic, pt to apply warm compresses several times a day  - Follow up in a few days for I&D

## 2023-11-30 ENCOUNTER — HOSPITAL ENCOUNTER (OUTPATIENT)
Dept: GENERAL RADIOLOGY | Age: 43
Discharge: HOME | End: 2023-12-02
Attending: PODIATRIST
Payer: COMMERCIAL

## 2023-11-30 DIAGNOSIS — M79.671 RIGHT FOOT PAIN: ICD-10-CM

## 2023-11-30 PROCEDURE — 73630 X-RAY EXAM OF FOOT: CPT

## 2023-12-07 ENCOUNTER — OFFICE VISIT (OUTPATIENT)
Dept: ENDOCRINOLOGY | Age: 43
End: 2023-12-07
Payer: COMMERCIAL

## 2023-12-07 VITALS
DIASTOLIC BLOOD PRESSURE: 87 MMHG | WEIGHT: 175 LBS | OXYGEN SATURATION: 98 % | BODY MASS INDEX: 31.01 KG/M2 | HEIGHT: 63 IN | HEART RATE: 99 BPM | SYSTOLIC BLOOD PRESSURE: 144 MMHG

## 2023-12-07 DIAGNOSIS — Z46.81 INSULIN PUMP TITRATION: ICD-10-CM

## 2023-12-07 DIAGNOSIS — E11.69 TYPE 2 DIABETES MELLITUS WITH OTHER SPECIFIED COMPLICATION, WITH LONG-TERM CURRENT USE OF INSULIN (HCC): Primary | ICD-10-CM

## 2023-12-07 DIAGNOSIS — Z79.4 TYPE 2 DIABETES MELLITUS WITH OTHER SPECIFIED COMPLICATION, WITH LONG-TERM CURRENT USE OF INSULIN (HCC): Primary | ICD-10-CM

## 2023-12-07 LAB
CHP ED QC CHECK: ABNORMAL
GLUCOSE BLD-MCNC: 215 MG/DL
HBA1C MFR BLD: 8.1 %

## 2023-12-07 PROCEDURE — G8484 FLU IMMUNIZE NO ADMIN: HCPCS | Performed by: INTERNAL MEDICINE

## 2023-12-07 PROCEDURE — G8417 CALC BMI ABV UP PARAM F/U: HCPCS | Performed by: INTERNAL MEDICINE

## 2023-12-07 PROCEDURE — 2022F DILAT RTA XM EVC RTNOPTHY: CPT | Performed by: INTERNAL MEDICINE

## 2023-12-07 PROCEDURE — G8427 DOCREV CUR MEDS BY ELIG CLIN: HCPCS | Performed by: INTERNAL MEDICINE

## 2023-12-07 PROCEDURE — 82962 GLUCOSE BLOOD TEST: CPT | Performed by: INTERNAL MEDICINE

## 2023-12-07 PROCEDURE — 99214 OFFICE O/P EST MOD 30 MIN: CPT | Performed by: INTERNAL MEDICINE

## 2023-12-07 PROCEDURE — 1036F TOBACCO NON-USER: CPT | Performed by: INTERNAL MEDICINE

## 2023-12-07 PROCEDURE — 3052F HG A1C>EQUAL 8.0%<EQUAL 9.0%: CPT | Performed by: INTERNAL MEDICINE

## 2023-12-07 PROCEDURE — 83036 HEMOGLOBIN GLYCOSYLATED A1C: CPT | Performed by: INTERNAL MEDICINE

## 2023-12-07 RX ORDER — TRIAMCINOLONE ACETONIDE 1 MG/G
OINTMENT TOPICAL 2 TIMES DAILY
COMMUNITY
Start: 2023-11-16

## 2023-12-07 RX ORDER — HYDROXYZINE HYDROCHLORIDE 10 MG/1
25 TABLET, FILM COATED ORAL EVERY 4 HOURS PRN
COMMUNITY
Start: 2023-11-16

## 2023-12-07 ASSESSMENT — ENCOUNTER SYMPTOMS: EYES NEGATIVE: 1

## 2023-12-07 NOTE — PROGRESS NOTES
12/7/2023    Assessment:       Diagnosis Orders   1. Type 2 diabetes mellitus with other specified complication, with long-term current use of insulin (HCC)  POCT Glucose    POCT glycosylated hemoglobin (Hb A1C)      2. Insulin pump titration              PLAN:     Increase BASAL  RATE  to 3.8 units   /hr  INCREASE PRESET BOLUS TO 12 UNITS  Patient talked to [employer] representative today  To get upgraded  More than 50% of 30-minute spent in patient education and counseling    Diabetes education provided today:    Nutrition as a mainstream of diabetes therapy. [place] about label reading. Insulin pumps, how they work and how they affect blood sugar levels. Continuous Glucose monitor. How it works and checks blood sugars every 5 min. for 4 days during our tests. Managing high and low sugar readings. Orders Placed This Encounter   Procedures    Basic Metabolic Panel     Standing Status:   Future     Standing Expiration Date:   12/7/2024    Hemoglobin A1C     Standing Status:   Future     Standing Expiration Date:   12/7/2024    POCT Glucose    POCT glycosylated hemoglobin (Hb A1C)           Orders Placed This Encounter   Procedures    POCT Glucose    POCT glycosylated hemoglobin (Hb A1C)     No orders of the defined types were placed in this encounter. No follow-ups on file.   Subjective:     Chief Complaint   Patient presents with    Diabetes     Vitals:    12/07/23 1308   BP: (!) 144/87   Pulse: 99   SpO2: 98%   Weight: 79.4 kg (175 lb)   Height: 1.6 m (5' 2.99\")     Wt Readings from Last 3 Encounters:   12/07/23 79.4 kg (175 lb)   10/31/23 80.1 kg (176 lb 9.6 oz)   10/17/23 80.7 kg (178 lb)     BP Readings from Last 3 Encounters:   12/07/23 (!) 144/87   10/21/23 127/85   10/17/23 112/74     Follow-up on type 2 diabetes patient currently on Medtronic insulin pump blood sugars have been higher also is in the process of getting the pump upgraded does not have the sensor as yet  Hemoglobin A1C       Date

## 2024-01-12 ENCOUNTER — OFFICE VISIT (OUTPATIENT)
Dept: FAMILY MEDICINE CLINIC | Age: 44
End: 2024-01-12
Payer: COMMERCIAL

## 2024-01-12 VITALS
DIASTOLIC BLOOD PRESSURE: 66 MMHG | RESPIRATION RATE: 16 BRPM | SYSTOLIC BLOOD PRESSURE: 120 MMHG | TEMPERATURE: 98.1 F | BODY MASS INDEX: 31.5 KG/M2 | WEIGHT: 177.8 LBS | HEART RATE: 77 BPM | OXYGEN SATURATION: 99 % | HEIGHT: 63 IN

## 2024-01-12 DIAGNOSIS — L02.211 CUTANEOUS ABSCESS OF ABDOMINAL WALL: Primary | ICD-10-CM

## 2024-01-12 PROCEDURE — G8427 DOCREV CUR MEDS BY ELIG CLIN: HCPCS | Performed by: NURSE PRACTITIONER

## 2024-01-12 PROCEDURE — 99213 OFFICE O/P EST LOW 20 MIN: CPT | Performed by: NURSE PRACTITIONER

## 2024-01-12 PROCEDURE — 1036F TOBACCO NON-USER: CPT | Performed by: NURSE PRACTITIONER

## 2024-01-12 PROCEDURE — G8484 FLU IMMUNIZE NO ADMIN: HCPCS | Performed by: NURSE PRACTITIONER

## 2024-01-12 PROCEDURE — G8417 CALC BMI ABV UP PARAM F/U: HCPCS | Performed by: NURSE PRACTITIONER

## 2024-01-12 RX ORDER — CEPHALEXIN 500 MG/1
500 CAPSULE ORAL 3 TIMES DAILY
Qty: 21 CAPSULE | Refills: 0 | Status: SHIPPED | OUTPATIENT
Start: 2024-01-12 | End: 2024-01-19

## 2024-01-12 RX ORDER — SULFAMETHOXAZOLE AND TRIMETHOPRIM 800; 160 MG/1; MG/1
1 TABLET ORAL 2 TIMES DAILY
Qty: 20 TABLET | Refills: 0 | Status: SHIPPED | OUTPATIENT
Start: 2024-01-12 | End: 2024-01-22

## 2024-01-12 ASSESSMENT — PATIENT HEALTH QUESTIONNAIRE - PHQ9
1. LITTLE INTEREST OR PLEASURE IN DOING THINGS: 0
SUM OF ALL RESPONSES TO PHQ QUESTIONS 1-9: 0
SUM OF ALL RESPONSES TO PHQ QUESTIONS 1-9: 0
SUM OF ALL RESPONSES TO PHQ9 QUESTIONS 1 & 2: 0
2. FEELING DOWN, DEPRESSED OR HOPELESS: 0
SUM OF ALL RESPONSES TO PHQ QUESTIONS 1-9: 0
SUM OF ALL RESPONSES TO PHQ QUESTIONS 1-9: 0

## 2024-01-12 ASSESSMENT — ENCOUNTER SYMPTOMS
COLOR CHANGE: 1
NAUSEA: 0
DIARRHEA: 0

## 2024-01-12 NOTE — PROGRESS NOTES
encounter.    Orders Placed This Encounter   Medications    cephALEXin (KEFLEX) 500 MG capsule     Sig: Take 1 capsule by mouth 3 times daily for 7 days     Dispense:  21 capsule     Refill:  0    sulfamethoxazole-trimethoprim (BACTRIM DS) 800-160 MG per tablet     Sig: Take 1 tablet by mouth 2 times daily for 10 days     Dispense:  20 tablet     Refill:  0       If symptoms worsen or fail to improve, seek care at the ER   If you experience any of the red flag S/S discussed, seek care at the ER       Reviewed with the patient: current clinical status & medications. Side effects, adverse effects of the medications prescribed today, as well as treatment plan/rationale and result expectations have been discussed with the patient who expressed understanding.      Close follow up to evaluate treatment results and for coordination of care.  I have reviewed the patient's medical history in detail and updated the computerized patient record.      Petra Pablo, TRISHA - NP

## 2024-02-06 DIAGNOSIS — Z79.4 TYPE 2 DIABETES MELLITUS WITH OTHER SPECIFIED COMPLICATION, WITH LONG-TERM CURRENT USE OF INSULIN (HCC): ICD-10-CM

## 2024-02-06 DIAGNOSIS — E11.69 TYPE 2 DIABETES MELLITUS WITH OTHER SPECIFIED COMPLICATION, WITH LONG-TERM CURRENT USE OF INSULIN (HCC): ICD-10-CM

## 2024-02-06 RX ORDER — INSULIN LISPRO 100 [IU]/ML
INJECTION, SOLUTION INTRAVENOUS; SUBCUTANEOUS
Qty: 60 ML | Refills: 1 | Status: SHIPPED | OUTPATIENT
Start: 2024-02-06

## 2024-02-06 NOTE — TELEPHONE ENCOUNTER
Rx request   Requested Prescriptions     Pending Prescriptions Disp Refills    insulin lispro (HUMALOG) 100 UNIT/ML SOLN injection vial 60 mL 1     Sig: VIA INSULIN PUMP MAX DAILY DOSE 200 UNITS     LOV 12/7/2023  Next Visit Date:  Future Appointments   Date Time Provider Department Center   3/14/2024  1:15 PM Ganga Mccormick MD [facility]

## 2024-05-21 DIAGNOSIS — E11.69 TYPE 2 DIABETES MELLITUS WITH OTHER SPECIFIED COMPLICATION, WITH LONG-TERM CURRENT USE OF INSULIN (HCC): ICD-10-CM

## 2024-05-21 DIAGNOSIS — Z79.4 TYPE 2 DIABETES MELLITUS WITH OTHER SPECIFIED COMPLICATION, WITH LONG-TERM CURRENT USE OF INSULIN (HCC): ICD-10-CM

## 2024-05-21 RX ORDER — INSULIN LISPRO 100 [IU]/ML
INJECTION, SOLUTION INTRAVENOUS; SUBCUTANEOUS
Qty: 60 ML | Refills: 3 | Status: SHIPPED | OUTPATIENT
Start: 2024-05-21

## 2024-06-25 ENCOUNTER — APPOINTMENT (OUTPATIENT)
Dept: GENERAL RADIOLOGY | Age: 44
End: 2024-06-25
Payer: COMMERCIAL

## 2024-06-25 ENCOUNTER — HOSPITAL ENCOUNTER (EMERGENCY)
Age: 44
Discharge: HOME | End: 2024-06-25
Attending: STUDENT IN AN ORGANIZED HEALTH CARE EDUCATION/TRAINING PROGRAM
Payer: COMMERCIAL

## 2024-06-25 ENCOUNTER — APPOINTMENT (OUTPATIENT)
Dept: CT IMAGING | Age: 44
End: 2024-06-25
Payer: COMMERCIAL

## 2024-06-25 VITALS
DIASTOLIC BLOOD PRESSURE: 91 MMHG | TEMPERATURE: 98.6 F | OXYGEN SATURATION: 98 % | RESPIRATION RATE: 18 BRPM | SYSTOLIC BLOOD PRESSURE: 163 MMHG | HEART RATE: 94 BPM

## 2024-06-25 DIAGNOSIS — R13.10 DYSPHAGIA, UNSPECIFIED TYPE: Primary | ICD-10-CM

## 2024-06-25 DIAGNOSIS — R22.1 NECK SWELLING: ICD-10-CM

## 2024-06-25 LAB
ALBUMIN SERPL-MCNC: 4 G/DL (ref 3.5–4.6)
ALP SERPL-CCNC: 76 U/L (ref 40–130)
ALT SERPL-CCNC: 29 U/L (ref 0–33)
ANION GAP SERPL CALCULATED.3IONS-SCNC: 9 MEQ/L (ref 9–15)
AST SERPL-CCNC: 23 U/L (ref 0–35)
BASOPHILS # BLD: 0 K/UL (ref 0–0.2)
BASOPHILS NFR BLD: 0.3 %
BILIRUB SERPL-MCNC: 0.4 MG/DL (ref 0.2–0.7)
BUN SERPL-MCNC: 9 MG/DL (ref 6–20)
CALCIUM SERPL-MCNC: 9.3 MG/DL (ref 8.5–9.9)
CHLORIDE SERPL-SCNC: 103 MEQ/L (ref 95–107)
CO2 SERPL-SCNC: 24 MEQ/L (ref 20–31)
CREAT SERPL-MCNC: 0.73 MG/DL (ref 0.5–0.9)
EOSINOPHIL # BLD: 0.2 K/UL (ref 0–0.7)
EOSINOPHIL NFR BLD: 2.1 %
ERYTHROCYTE [DISTWIDTH] IN BLOOD BY AUTOMATED COUNT: 13 % (ref 11.5–14.5)
GLOBULIN SER CALC-MCNC: 2.9 G/DL (ref 2.3–3.5)
GLUCOSE SERPL-MCNC: 282 MG/DL (ref 70–99)
HCT VFR BLD AUTO: 40.5 % (ref 37–47)
HGB BLD-MCNC: 14.4 G/DL (ref 12–16)
LACTATE BLDV-SCNC: 1.7 MMOL/L (ref 0.5–2.2)
LYMPHOCYTES # BLD: 1.8 K/UL (ref 1–4.8)
LYMPHOCYTES NFR BLD: 23.8 %
MAGNESIUM SERPL-MCNC: 2 MG/DL (ref 1.7–2.4)
MCH RBC QN AUTO: 30.4 PG (ref 27–31.3)
MCHC RBC AUTO-ENTMCNC: 35.6 % (ref 33–37)
MCV RBC AUTO: 85.6 FL (ref 79.4–94.8)
MONOCYTES # BLD: 0.6 K/UL (ref 0.2–0.8)
MONOCYTES NFR BLD: 7.2 %
NEUTROPHILS # BLD: 5.1 K/UL (ref 1.4–6.5)
NEUTS SEG NFR BLD: 66.2 %
PLATELET # BLD AUTO: 300 K/UL (ref 130–400)
POTASSIUM SERPL-SCNC: 3.7 MEQ/L (ref 3.4–4.9)
PROT SERPL-MCNC: 6.9 G/DL (ref 6.3–8)
RBC # BLD AUTO: 4.73 M/UL (ref 4.2–5.4)
SODIUM SERPL-SCNC: 136 MEQ/L (ref 135–144)
TSH REFLEX: 0.78 UIU/ML (ref 0.44–3.86)
WBC # BLD AUTO: 7.7 K/UL (ref 4.8–10.8)

## 2024-06-25 PROCEDURE — 80053 COMPREHEN METABOLIC PANEL: CPT

## 2024-06-25 PROCEDURE — 71045 X-RAY EXAM CHEST 1 VIEW: CPT

## 2024-06-25 PROCEDURE — 99285 EMERGENCY DEPT VISIT HI MDM: CPT

## 2024-06-25 PROCEDURE — 36415 COLL VENOUS BLD VENIPUNCTURE: CPT

## 2024-06-25 PROCEDURE — 6360000004 HC RX CONTRAST MEDICATION: Performed by: STUDENT IN AN ORGANIZED HEALTH CARE EDUCATION/TRAINING PROGRAM

## 2024-06-25 PROCEDURE — 83605 ASSAY OF LACTIC ACID: CPT

## 2024-06-25 PROCEDURE — 85025 COMPLETE CBC W/AUTO DIFF WBC: CPT

## 2024-06-25 PROCEDURE — 84443 ASSAY THYROID STIM HORMONE: CPT

## 2024-06-25 PROCEDURE — 83735 ASSAY OF MAGNESIUM: CPT

## 2024-06-25 PROCEDURE — 93005 ELECTROCARDIOGRAM TRACING: CPT | Performed by: STUDENT IN AN ORGANIZED HEALTH CARE EDUCATION/TRAINING PROGRAM

## 2024-06-25 PROCEDURE — 70491 CT SOFT TISSUE NECK W/DYE: CPT

## 2024-06-25 PROCEDURE — 2580000003 HC RX 258: Performed by: STUDENT IN AN ORGANIZED HEALTH CARE EDUCATION/TRAINING PROGRAM

## 2024-06-25 RX ORDER — 0.9 % SODIUM CHLORIDE 0.9 %
1000 INTRAVENOUS SOLUTION INTRAVENOUS ONCE
Status: COMPLETED | OUTPATIENT
Start: 2024-06-25 | End: 2024-06-25

## 2024-06-25 RX ADMIN — SODIUM CHLORIDE 1000 ML: 9 INJECTION, SOLUTION INTRAVENOUS at 19:42

## 2024-06-25 RX ADMIN — IOPAMIDOL 75 ML: 755 INJECTION, SOLUTION INTRAVENOUS at 19:51

## 2024-06-25 ASSESSMENT — LIFESTYLE VARIABLES
HOW OFTEN DO YOU HAVE A DRINK CONTAINING ALCOHOL: NEVER
HOW MANY STANDARD DRINKS CONTAINING ALCOHOL DO YOU HAVE ON A TYPICAL DAY: PATIENT DOES NOT DRINK

## 2024-06-25 ASSESSMENT — PAIN - FUNCTIONAL ASSESSMENT: PAIN_FUNCTIONAL_ASSESSMENT: NONE - DENIES PAIN

## 2024-06-25 NOTE — ED PROVIDER NOTES
Systems  Otherwise as noted in HPI    PAST MEDICAL HISTORY     Past Medical History:   Diagnosis Date    Anemia during pregnancy 2018    Eczema     Herpes simplex infection of skin     on neck    Hypercholesterolemia     Irregular heart beat     runs of v tach in the past    Obesity     Pregnancy induced hypertension     Type II or unspecified type diabetes mellitus without mention of complication, not stated as uncontrolled     type 2       SURGICAL HISTORY       Past Surgical History:   Procedure Laterality Date    ABDOMEN SURGERY       x 2     SECTION      TUBAL LIGATION         FAMILY HISTORY       Family History   Problem Relation Age of Onset    Arthritis Mother     Diabetes Mother     Early Death Father     Heart Attack Father     Heart Disease Father     High Blood Pressure Father     High Cholesterol Father     Diabetes Father     Arthritis Sister     Birth Defects Son     Diabetes Paternal Grandfather     Heart Disease Paternal Grandfather     Breast Cancer Paternal Cousin     Cancer Neg Hx        SOCIAL HISTORY       Social History     Socioeconomic History    Marital status: Single   Tobacco Use    Smoking status: Former     Current packs/day: 0.00     Average packs/day: 0.3 packs/day for 23.0 years (5.8 ttl pk-yrs)     Types: Cigarettes     Start date: 1999     Quit date: 10/30/2021     Years since quittin.6     Passive exposure: Past    Smokeless tobacco: Never    Tobacco comments:     started age 17   Vaping Use    Vaping Use: Never used   Substance and Sexual Activity    Alcohol use: No     Comment: socially before pregnancy    Drug use: No    Sexual activity: Not Currently     Partners: Male     Birth control/protection: Female Sterilization     Social Determinants of Health     Financial Resource Strain: Low Risk  (2023)    Overall Financial Resource Strain (CARDIA)     Difficulty of Paying Living Expenses: Not hard at all   Transportation Needs: Unknown

## 2024-06-25 NOTE — ED TRIAGE NOTES
Patient state difficulty swallowing solid foods. States going on for 3-4 months. Patient states happening more frequently. Denies issues swallowing liquids. States Feels like food is stuck in esophagus. Patient denies sob. States \"I think my neck is swollen today\". States has not seen PCP for this.

## 2024-06-26 LAB
EKG ATRIAL RATE: 82 BPM
EKG P AXIS: 50 DEGREES
EKG P-R INTERVAL: 126 MS
EKG Q-T INTERVAL: 356 MS
EKG QRS DURATION: 90 MS
EKG QTC CALCULATION (BAZETT): 415 MS
EKG R AXIS: 74 DEGREES
EKG T AXIS: 41 DEGREES
EKG VENTRICULAR RATE: 82 BPM

## 2024-06-26 PROCEDURE — 93010 ELECTROCARDIOGRAM REPORT: CPT | Performed by: INTERNAL MEDICINE

## 2024-06-26 NOTE — DISCHARGE INSTRUCTIONS
Follow up with the gastroenterologist/GI doctor.  Return for any difficulty breathing or worsening of any of your symptoms

## 2024-07-08 ENCOUNTER — OFFICE VISIT (OUTPATIENT)
Dept: GASTROENTEROLOGY | Age: 44
End: 2024-07-08
Payer: COMMERCIAL

## 2024-07-08 ENCOUNTER — PREP FOR PROCEDURE (OUTPATIENT)
Dept: GASTROENTEROLOGY | Age: 44
End: 2024-07-08

## 2024-07-08 VITALS
DIASTOLIC BLOOD PRESSURE: 78 MMHG | HEART RATE: 90 BPM | BODY MASS INDEX: 32.24 KG/M2 | OXYGEN SATURATION: 98 % | WEIGHT: 182 LBS | SYSTOLIC BLOOD PRESSURE: 126 MMHG

## 2024-07-08 DIAGNOSIS — R13.10 DYSPHAGIA, UNSPECIFIED TYPE: Primary | ICD-10-CM

## 2024-07-08 PROCEDURE — 99204 OFFICE O/P NEW MOD 45 MIN: CPT | Performed by: INTERNAL MEDICINE

## 2024-07-08 PROCEDURE — G8417 CALC BMI ABV UP PARAM F/U: HCPCS | Performed by: INTERNAL MEDICINE

## 2024-07-08 PROCEDURE — 1036F TOBACCO NON-USER: CPT | Performed by: INTERNAL MEDICINE

## 2024-07-08 PROCEDURE — G8427 DOCREV CUR MEDS BY ELIG CLIN: HCPCS | Performed by: INTERNAL MEDICINE

## 2024-07-08 RX ORDER — SODIUM CHLORIDE 0.9 % (FLUSH) 0.9 %
5-40 SYRINGE (ML) INJECTION PRN
Status: CANCELLED | OUTPATIENT
Start: 2024-07-08

## 2024-07-08 RX ORDER — SODIUM CHLORIDE 9 MG/ML
INJECTION, SOLUTION INTRAVENOUS CONTINUOUS
Status: CANCELLED | OUTPATIENT
Start: 2024-07-08

## 2024-07-08 RX ORDER — SODIUM CHLORIDE 9 MG/ML
INJECTION, SOLUTION INTRAVENOUS PRN
Status: CANCELLED | OUTPATIENT
Start: 2024-07-08

## 2024-07-08 RX ORDER — SODIUM CHLORIDE 0.9 % (FLUSH) 0.9 %
5-40 SYRINGE (ML) INJECTION EVERY 12 HOURS SCHEDULED
Status: CANCELLED | OUTPATIENT
Start: 2024-07-08

## 2024-07-08 RX ORDER — PANTOPRAZOLE SODIUM 40 MG/1
40 TABLET, DELAYED RELEASE ORAL
Qty: 90 TABLET | Refills: 1 | Status: SHIPPED | OUTPATIENT
Start: 2024-07-08

## 2024-07-08 ASSESSMENT — ENCOUNTER SYMPTOMS
ABDOMINAL PAIN: 1
ABDOMINAL DISTENTION: 0
CHEST TIGHTNESS: 0
TROUBLE SWALLOWING: 1
VOICE CHANGE: 0
PHOTOPHOBIA: 0
EYE PAIN: 0
WHEEZING: 0
SHORTNESS OF BREATH: 0
NAUSEA: 0
RECTAL PAIN: 0
DIARRHEA: 0
COLOR CHANGE: 0
VOMITING: 0
CONSTIPATION: 0
BLOOD IN STOOL: 0
EYE REDNESS: 0

## 2024-07-08 NOTE — PROGRESS NOTES
Subjective:      Patient ID: Carli Bustamante is a 44 y.o. female who presents today for:  Chief Complaint   Patient presents with    Dysphagia       HPI  This is a very pleasant 44-year-old who came in today for further evaluation management.  Patient mentioned that she had to go to emergency room's she feeling that food is slow to progress.  She feels that food is often  stuck and she point toward upper chest.  The food ultimately goes down verbatim.  Denies prior history of food impaction.  Denies associated nausea or vomiting.  No melena or hematochezia.  Symptoms started almost a year ago however progressively frequently worse.  Occurring with solids but intermittent in nature.  No melena or hematochezia.  Patient came in today to establish care and for further [facility] management with no associated weight loss.  Past Medical History:   Diagnosis Date    Anemia during pregnancy 2018    Eczema     Herpes simplex infection of skin     on neck    Hypercholesterolemia     Irregular heart beat     runs of v tach in the past    Obesity     Pregnancy induced hypertension     Type II or unspecified type diabetes mellitus without mention of complication, not stated as uncontrolled     type 2     Past Surgical History:   Procedure Laterality Date    ABDOMEN SURGERY       x 2     SECTION      TUBAL LIGATION       Social History     Socioeconomic History    Marital status: Single     Spouse name: Not on file    Number of children: Not on file    Years of education: Not on file    Highest education level: Not on file   Occupational History    Not on file   Tobacco Use    Smoking status: Former     Current packs/day: 0.00     Average packs/day: 0.3 packs/day for 23.0 years (5.8 ttl pk-yrs)     Types: Cigarettes     Start date: 1999     Quit date: 10/30/2021     Years since quittin.6     Passive exposure: Past    Smokeless tobacco: Never    Tobacco comments:     started age 17   Vaping Use

## 2024-07-25 ENCOUNTER — OFFICE VISIT (OUTPATIENT)
Dept: FAMILY MEDICINE CLINIC | Age: 44
End: 2024-07-25
Payer: COMMERCIAL

## 2024-07-25 VITALS
WEIGHT: 183.6 LBS | DIASTOLIC BLOOD PRESSURE: 80 MMHG | BODY MASS INDEX: 32.53 KG/M2 | RESPIRATION RATE: 16 BRPM | OXYGEN SATURATION: 99 % | TEMPERATURE: 97.5 F | HEIGHT: 63 IN | SYSTOLIC BLOOD PRESSURE: 122 MMHG | HEART RATE: 83 BPM

## 2024-07-25 DIAGNOSIS — K13.79 ACUTE ORAL PAIN: ICD-10-CM

## 2024-07-25 DIAGNOSIS — K12.0 APHTHOUS ULCER OF MOUTH: Primary | ICD-10-CM

## 2024-07-25 PROCEDURE — 99213 OFFICE O/P EST LOW 20 MIN: CPT | Performed by: NURSE PRACTITIONER

## 2024-07-25 PROCEDURE — G8427 DOCREV CUR MEDS BY ELIG CLIN: HCPCS | Performed by: NURSE PRACTITIONER

## 2024-07-25 PROCEDURE — 1036F TOBACCO NON-USER: CPT | Performed by: NURSE PRACTITIONER

## 2024-07-25 PROCEDURE — G8417 CALC BMI ABV UP PARAM F/U: HCPCS | Performed by: NURSE PRACTITIONER

## 2024-07-25 RX ORDER — TRIAMCINOLONE ACETONIDE 0.1 %
PASTE (GRAM) DENTAL
Qty: 15 G | Refills: 3 | Status: SHIPPED | OUTPATIENT
Start: 2024-07-25

## 2024-07-25 ASSESSMENT — ENCOUNTER SYMPTOMS
NAUSEA: 0
COUGH: 0
SORE THROAT: 0
TROUBLE SWALLOWING: 0
VOICE CHANGE: 0

## 2024-07-25 NOTE — PROGRESS NOTES
Subjective  Carli Bustamante, 44 y.o. female presents today with:  Chief Complaint   Patient presents with    Mouth Lesions     Sores in mouth onset yesterday pain when talking. Has gotten these before        HPI  Presents to  for oral lesions   Sites appeared yesterday   2 canker sores present   C/o oral discomfort   Burning in mouth   History of canker sores   Denies fever or chills                 Past Medical History:   Diagnosis Date    Anemia during pregnancy 2018    Eczema     Herpes simplex infection of skin     on neck    Hypercholesterolemia     Irregular heart beat     runs of v tach in the past    Obesity     Pregnancy induced hypertension     Type II or unspecified type diabetes mellitus without mention of complication, not stated as uncontrolled     type 2      Past Surgical History:   Procedure Laterality Date    ABDOMEN SURGERY       x 2     SECTION      TUBAL LIGATION       Family History   Problem Relation Age of Onset    Arthritis Mother     Diabetes Mother     Early Death Father     Heart Attack Father     Heart Disease Father     High Blood Pressure Father     High Cholesterol Father     Diabetes Father     Arthritis Sister     Birth Defects Son     Diabetes Paternal Grandfather     Heart Disease Paternal Grandfather     Breast Cancer Paternal Cousin     Cancer Neg Hx              Review of Systems   Constitutional:  Negative for activity change, appetite change, chills and fever.   HENT:  Positive for mouth sores. Negative for sore throat, trouble swallowing and voice change.    Respiratory:  Negative for cough.    Gastrointestinal:  Negative for nausea.   Musculoskeletal:  Negative for neck stiffness.         PMH, Surgical Hx, Family Hx, and Social Hx reviewed and updated.  Health Maintenance reviewed.          Objective  Vitals:    24 1432   BP: 122/80   Site: Left Upper Arm   Position: Sitting   Cuff Size: Large Adult   Pulse: 83   Resp: 16

## 2024-08-02 ENCOUNTER — OFFICE VISIT (OUTPATIENT)
Dept: FAMILY MEDICINE CLINIC | Age: 44
End: 2024-08-02
Payer: COMMERCIAL

## 2024-08-02 VITALS
OXYGEN SATURATION: 98 % | TEMPERATURE: 98.2 F | SYSTOLIC BLOOD PRESSURE: 138 MMHG | BODY MASS INDEX: 32.18 KG/M2 | HEIGHT: 63 IN | HEART RATE: 108 BPM | WEIGHT: 181.6 LBS | DIASTOLIC BLOOD PRESSURE: 88 MMHG

## 2024-08-02 DIAGNOSIS — J06.9 URI WITH COUGH AND CONGESTION: Primary | ICD-10-CM

## 2024-08-02 LAB
Lab: NORMAL
PERFORMING INSTRUMENT: NORMAL
QC PASS/FAIL: NORMAL
SARS-COV-2, POC: NORMAL

## 2024-08-02 PROCEDURE — 87426 SARSCOV CORONAVIRUS AG IA: CPT | Performed by: NURSE PRACTITIONER

## 2024-08-02 PROCEDURE — G8417 CALC BMI ABV UP PARAM F/U: HCPCS | Performed by: NURSE PRACTITIONER

## 2024-08-02 PROCEDURE — G8427 DOCREV CUR MEDS BY ELIG CLIN: HCPCS | Performed by: NURSE PRACTITIONER

## 2024-08-02 PROCEDURE — 1036F TOBACCO NON-USER: CPT | Performed by: NURSE PRACTITIONER

## 2024-08-02 PROCEDURE — 99213 OFFICE O/P EST LOW 20 MIN: CPT | Performed by: NURSE PRACTITIONER

## 2024-08-02 RX ORDER — AMOXICILLIN 500 MG/1
500 CAPSULE ORAL 3 TIMES DAILY
Qty: 21 CAPSULE | Refills: 0 | Status: SHIPPED | OUTPATIENT
Start: 2024-08-02 | End: 2024-08-09

## 2024-08-02 ASSESSMENT — ENCOUNTER SYMPTOMS
VOMITING: 0
DIARRHEA: 0
CHEST TIGHTNESS: 0
SORE THROAT: 0
COUGH: 1
NAUSEA: 0
WHEEZING: 0
SHORTNESS OF BREATH: 0
RHINORRHEA: 0

## 2024-08-02 NOTE — PROGRESS NOTES
Subjective  Carli Bustamante, 44 y.o. female presents today with:  Chief Complaint   Patient presents with    URI     Cough on and off for about 1 mos. Pt states that the cough started about few wks ago. She believes that it might be weather/allergy related. Notes that she is getting SOB in the heat       HPI  Presents to [facility] for cough and congestion   Reports cough on/off over last couple of weeks but this week more persistent  Her kids had had nasal drainage prior to her developing cough   C/o audible chest congestion   Cough is dry   Denies chest tightness   Denies SOB  Denies wheezing   Denies GI abnormalities   Eating and drinking   Denies chills                 Past Medical History:   Diagnosis Date    Anemia during pregnancy 2018    Eczema     Herpes simplex infection of skin     on neck    Hypercholesterolemia     Irregular heart beat     runs of v tach in the past    Obesity     Pregnancy induced hypertension     Type II or unspecified type diabetes mellitus without mention of complication, not stated as uncontrolled     type 2      Past Surgical History:   Procedure Laterality Date    ABDOMEN SURGERY       x 2     SECTION      TUBAL LIGATION       Family History   Problem Relation Age of Onset    Arthritis Mother     Diabetes Mother     Early Death Father     Heart Attack Father     Heart Disease Father     High Blood Pressure Father     High Cholesterol Father     Diabetes Father     Arthritis Sister     Birth Defects Son     Diabetes Paternal Grandfather     Heart Disease Paternal Grandfather     Breast Cancer Paternal Cousin     Cancer Neg Hx            Review of Systems   Constitutional:  Negative for activity change, appetite change, chills, fatigue and fever.   HENT:  Negative for congestion, ear pain, rhinorrhea and sore throat.    Respiratory:  Positive for cough. Negative for chest tightness, shortness of breath and wheezing.    Gastrointestinal:  Negative for

## 2024-08-18 ENCOUNTER — APPOINTMENT (OUTPATIENT)
Dept: GENERAL RADIOLOGY | Age: 44
DRG: 720 | End: 2024-08-18
Payer: COMMERCIAL

## 2024-08-18 ENCOUNTER — HOSPITAL ENCOUNTER (INPATIENT)
Age: 44
LOS: 1 days | Discharge: ANOTHER HOSPITAL | DRG: 720 | End: 2024-08-19
Attending: EMERGENCY MEDICINE | Admitting: INTERNAL MEDICINE
Payer: COMMERCIAL

## 2024-08-18 DIAGNOSIS — E13.621 DIABETIC ULCER OF RIGHT FOOT ASSOCIATED WITH OTHER SPECIFIED DIABETES MELLITUS, UNSPECIFIED PART OF FOOT, UNSPECIFIED ULCER STAGE (HCC): Primary | ICD-10-CM

## 2024-08-18 DIAGNOSIS — L03.90 CELLULITIS, UNSPECIFIED CELLULITIS SITE: ICD-10-CM

## 2024-08-18 DIAGNOSIS — A41.9 SEPTICEMIA (HCC): ICD-10-CM

## 2024-08-18 DIAGNOSIS — L97.519 DIABETIC ULCER OF RIGHT FOOT ASSOCIATED WITH OTHER SPECIFIED DIABETES MELLITUS, UNSPECIFIED PART OF FOOT, UNSPECIFIED ULCER STAGE (HCC): Primary | ICD-10-CM

## 2024-08-18 LAB
ALBUMIN SERPL-MCNC: 4 G/DL (ref 3.5–4.6)
ALP SERPL-CCNC: 65 U/L (ref 40–130)
ALT SERPL-CCNC: 20 U/L (ref 0–33)
ANION GAP SERPL CALCULATED.3IONS-SCNC: 12 MEQ/L (ref 9–15)
AST SERPL-CCNC: 15 U/L (ref 0–35)
B PARAP IS1001 DNA NPH QL NAA+NON-PROBE: NOT DETECTED
B PERT.PT PRMT NPH QL NAA+NON-PROBE: NOT DETECTED
BASOPHILS # BLD: 0 K/UL (ref 0–0.2)
BASOPHILS NFR BLD: 0.2 %
BILIRUB SERPL-MCNC: 0.6 MG/DL (ref 0.2–0.7)
BILIRUB UR QL STRIP: NEGATIVE
BUN SERPL-MCNC: 7 MG/DL (ref 6–20)
C PNEUM DNA NPH QL NAA+NON-PROBE: NOT DETECTED
CALCIUM SERPL-MCNC: 9.1 MG/DL (ref 8.5–9.9)
CHLORIDE SERPL-SCNC: 100 MEQ/L (ref 95–107)
CLARITY UR: CLEAR
CO2 SERPL-SCNC: 20 MEQ/L (ref 20–31)
COLOR UR: YELLOW
CREAT SERPL-MCNC: 0.6 MG/DL (ref 0.5–0.9)
EOSINOPHIL # BLD: 0 K/UL (ref 0–0.7)
EOSINOPHIL NFR BLD: 0.2 %
ERYTHROCYTE [DISTWIDTH] IN BLOOD BY AUTOMATED COUNT: 13.1 % (ref 11.5–14.5)
FLUAV RNA NPH QL NAA+NON-PROBE: NOT DETECTED
FLUBV RNA NPH QL NAA+NON-PROBE: NOT DETECTED
GLOBULIN SER CALC-MCNC: 3 G/DL (ref 2.3–3.5)
GLUCOSE SERPL-MCNC: 226 MG/DL (ref 70–99)
GLUCOSE UR STRIP-MCNC: 500 MG/DL
HADV DNA NPH QL NAA+NON-PROBE: NOT DETECTED
HCOV 229E RNA NPH QL NAA+NON-PROBE: NOT DETECTED
HCOV HKU1 RNA NPH QL NAA+NON-PROBE: NOT DETECTED
HCOV NL63 RNA NPH QL NAA+NON-PROBE: NOT DETECTED
HCOV OC43 RNA NPH QL NAA+NON-PROBE: NOT DETECTED
HCT VFR BLD AUTO: 41.5 % (ref 37–47)
HGB BLD-MCNC: 14.8 G/DL (ref 12–16)
HGB UR QL STRIP: NEGATIVE
HMPV RNA NPH QL NAA+NON-PROBE: NOT DETECTED
HPIV1 RNA NPH QL NAA+NON-PROBE: NOT DETECTED
HPIV2 RNA NPH QL NAA+NON-PROBE: NOT DETECTED
HPIV3 RNA NPH QL NAA+NON-PROBE: NOT DETECTED
HPIV4 RNA NPH QL NAA+NON-PROBE: NOT DETECTED
KETONES UR STRIP-MCNC: 15 MG/DL
LACTIC ACID, SEPSIS: 1.9 MMOL/L (ref 0.5–1.9)
LEUKOCYTE ESTERASE UR QL STRIP: NEGATIVE
LIPASE SERPL-CCNC: 24 U/L (ref 12–95)
LYMPHOCYTES # BLD: 0.5 K/UL (ref 1–4.8)
LYMPHOCYTES NFR BLD: 3.2 %
M PNEUMO DNA NPH QL NAA+NON-PROBE: NOT DETECTED
MAGNESIUM SERPL-MCNC: 1.7 MG/DL (ref 1.7–2.4)
MCH RBC QN AUTO: 30.6 PG (ref 27–31.3)
MCHC RBC AUTO-ENTMCNC: 35.7 % (ref 33–37)
MCV RBC AUTO: 85.9 FL (ref 79.4–94.8)
MONOCYTES # BLD: 0.8 K/UL (ref 0.2–0.8)
MONOCYTES NFR BLD: 5.3 %
NEUTROPHILS # BLD: 14.1 K/UL (ref 1.4–6.5)
NEUTS SEG NFR BLD: 90.6 %
NITRITE UR QL STRIP: NEGATIVE
PH UR STRIP: 6 [PH] (ref 5–9)
PLATELET # BLD AUTO: 257 K/UL (ref 130–400)
POTASSIUM SERPL-SCNC: 3.8 MEQ/L (ref 3.4–4.9)
PROCALCITONIN SERPL IA-MCNC: 0.21 NG/ML (ref 0–0.15)
PROT SERPL-MCNC: 7 G/DL (ref 6.3–8)
PROT UR STRIP-MCNC: NEGATIVE MG/DL
RBC # BLD AUTO: 4.83 M/UL (ref 4.2–5.4)
RSV RNA NPH QL NAA+NON-PROBE: NOT DETECTED
RV+EV RNA NPH QL NAA+NON-PROBE: NOT DETECTED
SARS-COV-2 RNA NPH QL NAA+NON-PROBE: NOT DETECTED
SODIUM SERPL-SCNC: 132 MEQ/L (ref 135–144)
SP GR UR STRIP: 1.01 (ref 1–1.03)
URINE REFLEX TO CULTURE: ABNORMAL
UROBILINOGEN UR STRIP-ACNC: 0.2 E.U./DL
WBC # BLD AUTO: 15.6 K/UL (ref 4.8–10.8)

## 2024-08-18 PROCEDURE — 2580000003 HC RX 258: Performed by: EMERGENCY MEDICINE

## 2024-08-18 PROCEDURE — 96375 TX/PRO/DX INJ NEW DRUG ADDON: CPT

## 2024-08-18 PROCEDURE — 80053 COMPREHEN METABOLIC PANEL: CPT

## 2024-08-18 PROCEDURE — 93005 ELECTROCARDIOGRAM TRACING: CPT | Performed by: EMERGENCY MEDICINE

## 2024-08-18 PROCEDURE — 96365 THER/PROPH/DIAG IV INF INIT: CPT

## 2024-08-18 PROCEDURE — 81003 URINALYSIS AUTO W/O SCOPE: CPT

## 2024-08-18 PROCEDURE — 73630 X-RAY EXAM OF FOOT: CPT

## 2024-08-18 PROCEDURE — 83735 ASSAY OF MAGNESIUM: CPT

## 2024-08-18 PROCEDURE — 71045 X-RAY EXAM CHEST 1 VIEW: CPT

## 2024-08-18 PROCEDURE — 85025 COMPLETE CBC W/AUTO DIFF WBC: CPT

## 2024-08-18 PROCEDURE — 6370000000 HC RX 637 (ALT 250 FOR IP): Performed by: EMERGENCY MEDICINE

## 2024-08-18 PROCEDURE — 0202U NFCT DS 22 TRGT SARS-COV-2: CPT

## 2024-08-18 PROCEDURE — 6360000002 HC RX W HCPCS: Performed by: EMERGENCY MEDICINE

## 2024-08-18 PROCEDURE — 1200000000 HC SEMI PRIVATE

## 2024-08-18 PROCEDURE — 83605 ASSAY OF LACTIC ACID: CPT

## 2024-08-18 PROCEDURE — 99285 EMERGENCY DEPT VISIT HI MDM: CPT

## 2024-08-18 PROCEDURE — 36415 COLL VENOUS BLD VENIPUNCTURE: CPT

## 2024-08-18 PROCEDURE — 87150 DNA/RNA AMPLIFIED PROBE: CPT

## 2024-08-18 PROCEDURE — 84145 PROCALCITONIN (PCT): CPT

## 2024-08-18 PROCEDURE — 87040 BLOOD CULTURE FOR BACTERIA: CPT

## 2024-08-18 PROCEDURE — 83690 ASSAY OF LIPASE: CPT

## 2024-08-18 RX ORDER — KETOROLAC TROMETHAMINE 15 MG/ML
15 INJECTION, SOLUTION INTRAMUSCULAR; INTRAVENOUS ONCE
Status: COMPLETED | OUTPATIENT
Start: 2024-08-18 | End: 2024-08-18

## 2024-08-18 RX ORDER — FENTANYL CITRATE 50 UG/ML
50 INJECTION, SOLUTION INTRAMUSCULAR; INTRAVENOUS ONCE
Status: COMPLETED | OUTPATIENT
Start: 2024-08-18 | End: 2024-08-18

## 2024-08-18 RX ORDER — ACETAMINOPHEN 500 MG
1000 TABLET ORAL ONCE
Status: COMPLETED | OUTPATIENT
Start: 2024-08-18 | End: 2024-08-18

## 2024-08-18 RX ORDER — 0.9 % SODIUM CHLORIDE 0.9 %
2000 INTRAVENOUS SOLUTION INTRAVENOUS ONCE
Status: COMPLETED | OUTPATIENT
Start: 2024-08-18 | End: 2024-08-18

## 2024-08-18 RX ORDER — ONDANSETRON 2 MG/ML
4 INJECTION INTRAMUSCULAR; INTRAVENOUS ONCE
Status: COMPLETED | OUTPATIENT
Start: 2024-08-18 | End: 2024-08-18

## 2024-08-18 RX ORDER — 0.9 % SODIUM CHLORIDE 0.9 %
1000 INTRAVENOUS SOLUTION INTRAVENOUS ONCE
Status: COMPLETED | OUTPATIENT
Start: 2024-08-18 | End: 2024-08-19

## 2024-08-18 RX ADMIN — PIPERACILLIN AND TAZOBACTAM 4500 MG: 4; .5 INJECTION, POWDER, LYOPHILIZED, FOR SOLUTION INTRAVENOUS at 21:21

## 2024-08-18 RX ADMIN — SODIUM CHLORIDE 2000 ML: 9 INJECTION, SOLUTION INTRAVENOUS at 21:19

## 2024-08-18 RX ADMIN — KETOROLAC TROMETHAMINE 15 MG: 15 INJECTION, SOLUTION INTRAMUSCULAR; INTRAVENOUS at 21:32

## 2024-08-18 RX ADMIN — ACETAMINOPHEN 1000 MG: 500 TABLET ORAL at 21:25

## 2024-08-18 RX ADMIN — FENTANYL CITRATE 50 MCG: 50 INJECTION, SOLUTION INTRAMUSCULAR; INTRAVENOUS at 21:26

## 2024-08-18 RX ADMIN — VANCOMYCIN HYDROCHLORIDE 1750 MG: 1 INJECTION, POWDER, LYOPHILIZED, FOR SOLUTION INTRAVENOUS at 21:32

## 2024-08-18 RX ADMIN — ONDANSETRON 4 MG: 2 INJECTION INTRAMUSCULAR; INTRAVENOUS at 21:27

## 2024-08-18 RX ADMIN — SODIUM CHLORIDE 1000 ML: 9 INJECTION, SOLUTION INTRAVENOUS at 21:36

## 2024-08-18 ASSESSMENT — PAIN SCALES - GENERAL
PAINLEVEL_OUTOF10: 5

## 2024-08-18 ASSESSMENT — PAIN DESCRIPTION - DESCRIPTORS
DESCRIPTORS: BURNING

## 2024-08-18 ASSESSMENT — PAIN DESCRIPTION - ORIENTATION: ORIENTATION: RIGHT

## 2024-08-18 ASSESSMENT — PAIN DESCRIPTION - LOCATION
LOCATION: FOOT

## 2024-08-19 ENCOUNTER — APPOINTMENT (OUTPATIENT)
Dept: MRI IMAGING | Age: 44
DRG: 720 | End: 2024-08-19
Payer: COMMERCIAL

## 2024-08-19 ENCOUNTER — HOSPITAL ENCOUNTER (INPATIENT)
Age: 44
LOS: 4 days | Discharge: HOME | End: 2024-08-23
Attending: INTERNAL MEDICINE | Admitting: INTERNAL MEDICINE
Payer: COMMERCIAL

## 2024-08-19 VITALS
TEMPERATURE: 98.6 F | OXYGEN SATURATION: 100 % | DIASTOLIC BLOOD PRESSURE: 67 MMHG | RESPIRATION RATE: 18 BRPM | BODY MASS INDEX: 34.14 KG/M2 | WEIGHT: 192.7 LBS | HEART RATE: 91 BPM | SYSTOLIC BLOOD PRESSURE: 119 MMHG

## 2024-08-19 DIAGNOSIS — E11.628 DIABETIC INFECTION OF RIGHT FOOT (HCC): ICD-10-CM

## 2024-08-19 DIAGNOSIS — L03.90 SEPSIS DUE TO CELLULITIS (HCC): Primary | ICD-10-CM

## 2024-08-19 DIAGNOSIS — A41.9 SEPSIS DUE TO CELLULITIS (HCC): Primary | ICD-10-CM

## 2024-08-19 DIAGNOSIS — L08.9 DIABETIC INFECTION OF RIGHT FOOT (HCC): ICD-10-CM

## 2024-08-19 DIAGNOSIS — Z96.41 INSULIN PUMP IN PLACE: ICD-10-CM

## 2024-08-19 DIAGNOSIS — O24.113 TYPE 2 DIABETES MELLITUS AFFECTING PREGNANCY IN THIRD TRIMESTER, ANTEPARTUM: ICD-10-CM

## 2024-08-19 LAB
ALBUMIN SERPL-MCNC: 3.2 G/DL (ref 3.5–4.6)
ALP SERPL-CCNC: 50 U/L (ref 40–130)
ALT SERPL-CCNC: 18 U/L (ref 0–33)
ANION GAP SERPL CALCULATED.3IONS-SCNC: 7 MEQ/L (ref 9–15)
AST SERPL-CCNC: 15 U/L (ref 0–35)
BASOPHILS # BLD: 0 K/UL (ref 0–0.2)
BASOPHILS NFR BLD: 0.1 %
BILIRUB SERPL-MCNC: 0.4 MG/DL (ref 0.2–0.7)
BUN SERPL-MCNC: 5 MG/DL (ref 6–20)
CALCIUM SERPL-MCNC: 7.7 MG/DL (ref 8.5–9.9)
CHLORIDE SERPL-SCNC: 106 MEQ/L (ref 95–107)
CO2 SERPL-SCNC: 22 MEQ/L (ref 20–31)
CREAT SERPL-MCNC: 0.53 MG/DL (ref 0.5–0.9)
EKG ATRIAL RATE: 131 BPM
EKG P AXIS: 50 DEGREES
EKG P-R INTERVAL: 140 MS
EKG Q-T INTERVAL: 298 MS
EKG QRS DURATION: 78 MS
EKG QTC CALCULATION (BAZETT): 440 MS
EKG R AXIS: 77 DEGREES
EKG T AXIS: 35 DEGREES
EKG VENTRICULAR RATE: 131 BPM
EOSINOPHIL # BLD: 0 K/UL (ref 0–0.7)
EOSINOPHIL NFR BLD: 0.2 %
ERYTHROCYTE [DISTWIDTH] IN BLOOD BY AUTOMATED COUNT: 13.3 % (ref 11.5–14.5)
ESTIMATED AVERAGE GLUCOSE: 194 MG/DL
GLOBULIN SER CALC-MCNC: 2.5 G/DL (ref 2.3–3.5)
GLUCOSE BLD-MCNC: 218 MG/DL (ref 70–99)
GLUCOSE BLD-MCNC: 223 MG/DL (ref 70–99)
GLUCOSE BLD-MCNC: 259 MG/DL (ref 70–99)
GLUCOSE BLD-MCNC: 262 MG/DL (ref 70–99)
GLUCOSE SERPL-MCNC: 209 MG/DL (ref 70–99)
HBA1C MFR BLD: 8.4 % (ref 4–6)
HCT VFR BLD AUTO: 35.1 % (ref 37–47)
HGB BLD-MCNC: 12.6 G/DL (ref 12–16)
LYMPHOCYTES # BLD: 0.4 K/UL (ref 1–4.8)
LYMPHOCYTES NFR BLD: 5.4 %
MCH RBC QN AUTO: 31 PG (ref 27–31.3)
MCHC RBC AUTO-ENTMCNC: 35.9 % (ref 33–37)
MCV RBC AUTO: 86.2 FL (ref 79.4–94.8)
MONOCYTES # BLD: 0.5 K/UL (ref 0.2–0.8)
MONOCYTES NFR BLD: 6.1 %
NEUTROPHILS # BLD: 7.2 K/UL (ref 1.4–6.5)
NEUTS SEG NFR BLD: 87.7 %
PERFORMED ON: ABNORMAL
PLATELET # BLD AUTO: 184 K/UL (ref 130–400)
POTASSIUM SERPL-SCNC: 3.6 MEQ/L (ref 3.4–4.9)
PROT SERPL-MCNC: 5.7 G/DL (ref 6.3–8)
RBC # BLD AUTO: 4.07 M/UL (ref 4.2–5.4)
SODIUM SERPL-SCNC: 135 MEQ/L (ref 135–144)
WBC # BLD AUTO: 8.2 K/UL (ref 4.8–10.8)

## 2024-08-19 PROCEDURE — 6370000000 HC RX 637 (ALT 250 FOR IP): Performed by: INTERNAL MEDICINE

## 2024-08-19 PROCEDURE — 2580000003 HC RX 258: Performed by: INTERNAL MEDICINE

## 2024-08-19 PROCEDURE — 36415 COLL VENOUS BLD VENIPUNCTURE: CPT

## 2024-08-19 PROCEDURE — 87070 CULTURE OTHR SPECIMN AEROBIC: CPT

## 2024-08-19 PROCEDURE — 6370000000 HC RX 637 (ALT 250 FOR IP)

## 2024-08-19 PROCEDURE — 2580000003 HC RX 258

## 2024-08-19 PROCEDURE — 83036 HEMOGLOBIN GLYCOSYLATED A1C: CPT

## 2024-08-19 PROCEDURE — 93010 ELECTROCARDIOGRAM REPORT: CPT | Performed by: INTERNAL MEDICINE

## 2024-08-19 PROCEDURE — 85025 COMPLETE CBC W/AUTO DIFF WBC: CPT

## 2024-08-19 PROCEDURE — 80053 COMPREHEN METABOLIC PANEL: CPT

## 2024-08-19 PROCEDURE — 73718 MRI LOWER EXTREMITY W/O DYE: CPT

## 2024-08-19 PROCEDURE — 6360000002 HC RX W HCPCS

## 2024-08-19 PROCEDURE — 1210000000 HC MED SURG R&B

## 2024-08-19 PROCEDURE — 6360000002 HC RX W HCPCS: Performed by: INTERNAL MEDICINE

## 2024-08-19 RX ORDER — ACETAMINOPHEN 650 MG/1
650 SUPPOSITORY RECTAL EVERY 6 HOURS PRN
Status: DISCONTINUED | OUTPATIENT
Start: 2024-08-19 | End: 2024-08-19 | Stop reason: HOSPADM

## 2024-08-19 RX ORDER — SODIUM CHLORIDE 0.9 % (FLUSH) 0.9 %
5-40 SYRINGE (ML) INJECTION PRN
Status: CANCELLED | OUTPATIENT
Start: 2024-08-19

## 2024-08-19 RX ORDER — POLYETHYLENE GLYCOL 3350 17 G/17G
17 POWDER, FOR SOLUTION ORAL DAILY PRN
Status: CANCELLED | OUTPATIENT
Start: 2024-08-19

## 2024-08-19 RX ORDER — LANOLIN ALCOHOL/MO/W.PET/CERES
3 CREAM (GRAM) TOPICAL NIGHTLY
Status: CANCELLED | OUTPATIENT
Start: 2024-08-19

## 2024-08-19 RX ORDER — SODIUM CHLORIDE 9 MG/ML
INJECTION, SOLUTION INTRAVENOUS CONTINUOUS
Status: CANCELLED | OUTPATIENT
Start: 2024-08-19 | End: 2024-08-19

## 2024-08-19 RX ORDER — GLUCAGON 1 MG/ML
1 KIT INJECTION PRN
Status: DISCONTINUED | OUTPATIENT
Start: 2024-08-19 | End: 2024-08-23 | Stop reason: HOSPADM

## 2024-08-19 RX ORDER — GLUCAGON 1 MG/ML
1 KIT INJECTION PRN
Status: DISCONTINUED | OUTPATIENT
Start: 2024-08-19 | End: 2024-08-19 | Stop reason: HOSPADM

## 2024-08-19 RX ORDER — INSULIN LISPRO 100 [IU]/ML
6 INJECTION, SOLUTION INTRAVENOUS; SUBCUTANEOUS
Status: DISCONTINUED | OUTPATIENT
Start: 2024-08-19 | End: 2024-08-19

## 2024-08-19 RX ORDER — KETOROLAC TROMETHAMINE 15 MG/ML
15 INJECTION, SOLUTION INTRAMUSCULAR; INTRAVENOUS EVERY 6 HOURS PRN
Status: DISCONTINUED | OUTPATIENT
Start: 2024-08-19 | End: 2024-08-19 | Stop reason: HOSPADM

## 2024-08-19 RX ORDER — GLUCAGON 1 MG/ML
1 KIT INJECTION PRN
Status: DISCONTINUED | OUTPATIENT
Start: 2024-08-19 | End: 2024-08-20

## 2024-08-19 RX ORDER — SODIUM CHLORIDE 0.9 % (FLUSH) 0.9 %
5-40 SYRINGE (ML) INJECTION EVERY 12 HOURS SCHEDULED
Status: CANCELLED | OUTPATIENT
Start: 2024-08-19

## 2024-08-19 RX ORDER — INSULIN GLARGINE 100 [IU]/ML
0.25 INJECTION, SOLUTION SUBCUTANEOUS NIGHTLY
Status: DISCONTINUED | OUTPATIENT
Start: 2024-08-19 | End: 2024-08-19

## 2024-08-19 RX ORDER — SODIUM CHLORIDE 0.9 % (FLUSH) 0.9 %
5-40 SYRINGE (ML) INJECTION PRN
Status: DISCONTINUED | OUTPATIENT
Start: 2024-08-19 | End: 2024-08-23 | Stop reason: HOSPADM

## 2024-08-19 RX ORDER — SODIUM CHLORIDE 9 MG/ML
INJECTION, SOLUTION INTRAVENOUS PRN
Status: CANCELLED | OUTPATIENT
Start: 2024-08-19

## 2024-08-19 RX ORDER — SODIUM CHLORIDE 9 MG/ML
INJECTION, SOLUTION INTRAVENOUS CONTINUOUS
Status: DISCONTINUED | OUTPATIENT
Start: 2024-08-19 | End: 2024-08-19

## 2024-08-19 RX ORDER — DEXTROSE MONOHYDRATE 100 MG/ML
INJECTION, SOLUTION INTRAVENOUS CONTINUOUS PRN
Status: DISCONTINUED | OUTPATIENT
Start: 2024-08-19 | End: 2024-08-20

## 2024-08-19 RX ORDER — INSULIN LISPRO 100 [IU]/ML
0-8 INJECTION, SOLUTION INTRAVENOUS; SUBCUTANEOUS
Status: CANCELLED | OUTPATIENT
Start: 2024-08-19

## 2024-08-19 RX ORDER — POLYETHYLENE GLYCOL 3350 17 G/17G
17 POWDER, FOR SOLUTION ORAL DAILY PRN
Status: DISCONTINUED | OUTPATIENT
Start: 2024-08-19 | End: 2024-08-23 | Stop reason: HOSPADM

## 2024-08-19 RX ORDER — SODIUM CHLORIDE 9 MG/ML
INJECTION, SOLUTION INTRAVENOUS CONTINUOUS
Status: DISCONTINUED | OUTPATIENT
Start: 2024-08-19 | End: 2024-08-19 | Stop reason: HOSPADM

## 2024-08-19 RX ORDER — SODIUM CHLORIDE 0.9 % (FLUSH) 0.9 %
5-40 SYRINGE (ML) INJECTION PRN
Status: DISCONTINUED | OUTPATIENT
Start: 2024-08-19 | End: 2024-08-19 | Stop reason: HOSPADM

## 2024-08-19 RX ORDER — GLUCAGON 1 MG/ML
1 KIT INJECTION PRN
Status: CANCELLED | OUTPATIENT
Start: 2024-08-19

## 2024-08-19 RX ORDER — ACETAMINOPHEN 650 MG/1
650 SUPPOSITORY RECTAL EVERY 6 HOURS PRN
Status: DISCONTINUED | OUTPATIENT
Start: 2024-08-19 | End: 2024-08-20

## 2024-08-19 RX ORDER — LANOLIN ALCOHOL/MO/W.PET/CERES
3 CREAM (GRAM) TOPICAL NIGHTLY
Status: DISCONTINUED | OUTPATIENT
Start: 2024-08-19 | End: 2024-08-19 | Stop reason: HOSPADM

## 2024-08-19 RX ORDER — INSULIN LISPRO 100 [IU]/ML
0-4 INJECTION, SOLUTION INTRAVENOUS; SUBCUTANEOUS NIGHTLY
Status: DISCONTINUED | OUTPATIENT
Start: 2024-08-19 | End: 2024-08-19 | Stop reason: HOSPADM

## 2024-08-19 RX ORDER — DEXTROSE MONOHYDRATE 100 MG/ML
INJECTION, SOLUTION INTRAVENOUS CONTINUOUS PRN
Status: DISCONTINUED | OUTPATIENT
Start: 2024-08-19 | End: 2024-08-23 | Stop reason: HOSPADM

## 2024-08-19 RX ORDER — POLYETHYLENE GLYCOL 3350 17 G/17G
17 POWDER, FOR SOLUTION ORAL DAILY PRN
Status: DISCONTINUED | OUTPATIENT
Start: 2024-08-19 | End: 2024-08-19 | Stop reason: HOSPADM

## 2024-08-19 RX ORDER — SODIUM CHLORIDE 0.9 % (FLUSH) 0.9 %
5-40 SYRINGE (ML) INJECTION EVERY 12 HOURS SCHEDULED
Status: DISCONTINUED | OUTPATIENT
Start: 2024-08-19 | End: 2024-08-23 | Stop reason: HOSPADM

## 2024-08-19 RX ORDER — SODIUM CHLORIDE 9 MG/ML
INJECTION, SOLUTION INTRAVENOUS PRN
Status: DISCONTINUED | OUTPATIENT
Start: 2024-08-19 | End: 2024-08-23 | Stop reason: HOSPADM

## 2024-08-19 RX ORDER — INSULIN LISPRO 100 [IU]/ML
6 INJECTION, SOLUTION INTRAVENOUS; SUBCUTANEOUS
Status: DISCONTINUED | OUTPATIENT
Start: 2024-08-19 | End: 2024-08-19 | Stop reason: HOSPADM

## 2024-08-19 RX ORDER — POTASSIUM CHLORIDE 7.45 MG/ML
10 INJECTION INTRAVENOUS PRN
Status: DISCONTINUED | OUTPATIENT
Start: 2024-08-19 | End: 2024-08-19 | Stop reason: HOSPADM

## 2024-08-19 RX ORDER — INSULIN GLARGINE 100 [IU]/ML
0.25 INJECTION, SOLUTION SUBCUTANEOUS NIGHTLY
Status: DISCONTINUED | OUTPATIENT
Start: 2024-08-19 | End: 2024-08-19 | Stop reason: HOSPADM

## 2024-08-19 RX ORDER — INSULIN LISPRO 100 [IU]/ML
6 INJECTION, SOLUTION INTRAVENOUS; SUBCUTANEOUS
Status: CANCELLED | OUTPATIENT
Start: 2024-08-19

## 2024-08-19 RX ORDER — INSULIN LISPRO 100 [IU]/ML
0-8 INJECTION, SOLUTION INTRAVENOUS; SUBCUTANEOUS
Status: DISCONTINUED | OUTPATIENT
Start: 2024-08-19 | End: 2024-08-19 | Stop reason: HOSPADM

## 2024-08-19 RX ORDER — POTASSIUM CHLORIDE 20 MEQ/1
40 TABLET, EXTENDED RELEASE ORAL PRN
Status: DISCONTINUED | OUTPATIENT
Start: 2024-08-19 | End: 2024-08-19 | Stop reason: HOSPADM

## 2024-08-19 RX ORDER — ACETAMINOPHEN 325 MG/1
650 TABLET ORAL EVERY 6 HOURS PRN
Status: DISCONTINUED | OUTPATIENT
Start: 2024-08-19 | End: 2024-08-20

## 2024-08-19 RX ORDER — POTASSIUM CHLORIDE 1500 MG/1
20 TABLET, EXTENDED RELEASE ORAL ONCE
Status: COMPLETED | OUTPATIENT
Start: 2024-08-19 | End: 2024-08-19

## 2024-08-19 RX ORDER — ONDANSETRON 2 MG/ML
4 INJECTION INTRAMUSCULAR; INTRAVENOUS EVERY 6 HOURS PRN
Status: DISCONTINUED | OUTPATIENT
Start: 2024-08-19 | End: 2024-08-19 | Stop reason: HOSPADM

## 2024-08-19 RX ORDER — KETOROLAC TROMETHAMINE 15 MG/ML
15 INJECTION, SOLUTION INTRAMUSCULAR; INTRAVENOUS EVERY 6 HOURS PRN
Status: DISCONTINUED | OUTPATIENT
Start: 2024-08-19 | End: 2024-08-23 | Stop reason: HOSPADM

## 2024-08-19 RX ORDER — ACETAMINOPHEN 325 MG/1
650 TABLET ORAL EVERY 6 HOURS PRN
Status: DISCONTINUED | OUTPATIENT
Start: 2024-08-19 | End: 2024-08-19 | Stop reason: HOSPADM

## 2024-08-19 RX ORDER — DEXTROSE MONOHYDRATE 100 MG/ML
INJECTION, SOLUTION INTRAVENOUS CONTINUOUS PRN
Status: CANCELLED | OUTPATIENT
Start: 2024-08-19

## 2024-08-19 RX ORDER — ENOXAPARIN SODIUM 100 MG/ML
40 INJECTION SUBCUTANEOUS DAILY
Status: DISCONTINUED | OUTPATIENT
Start: 2024-08-19 | End: 2024-08-19 | Stop reason: HOSPADM

## 2024-08-19 RX ORDER — INSULIN LISPRO 100 [IU]/ML
0-4 INJECTION, SOLUTION INTRAVENOUS; SUBCUTANEOUS NIGHTLY
Status: DISCONTINUED | OUTPATIENT
Start: 2024-08-19 | End: 2024-08-20 | Stop reason: ALTCHOICE

## 2024-08-19 RX ORDER — ACETAMINOPHEN 650 MG/1
650 SUPPOSITORY RECTAL EVERY 6 HOURS PRN
Status: CANCELLED | OUTPATIENT
Start: 2024-08-19

## 2024-08-19 RX ORDER — SODIUM CHLORIDE 9 MG/ML
INJECTION, SOLUTION INTRAVENOUS PRN
Status: DISCONTINUED | OUTPATIENT
Start: 2024-08-19 | End: 2024-08-19 | Stop reason: HOSPADM

## 2024-08-19 RX ORDER — INSULIN LISPRO 100 [IU]/ML
0-4 INJECTION, SOLUTION INTRAVENOUS; SUBCUTANEOUS NIGHTLY
Status: CANCELLED | OUTPATIENT
Start: 2024-08-19

## 2024-08-19 RX ORDER — ACETAMINOPHEN 325 MG/1
650 TABLET ORAL EVERY 6 HOURS PRN
Status: CANCELLED | OUTPATIENT
Start: 2024-08-19

## 2024-08-19 RX ORDER — SODIUM CHLORIDE 0.9 % (FLUSH) 0.9 %
5-40 SYRINGE (ML) INJECTION EVERY 12 HOURS SCHEDULED
Status: DISCONTINUED | OUTPATIENT
Start: 2024-08-19 | End: 2024-08-19 | Stop reason: HOSPADM

## 2024-08-19 RX ORDER — INSULIN GLARGINE 100 [IU]/ML
0.25 INJECTION, SOLUTION SUBCUTANEOUS NIGHTLY
Status: CANCELLED | OUTPATIENT
Start: 2024-08-19

## 2024-08-19 RX ORDER — NICOTINE POLACRILEX 4 MG
15 LOZENGE BUCCAL PRN
Status: DISCONTINUED | OUTPATIENT
Start: 2024-08-19 | End: 2024-08-23 | Stop reason: HOSPADM

## 2024-08-19 RX ORDER — ENOXAPARIN SODIUM 100 MG/ML
40 INJECTION SUBCUTANEOUS DAILY
Status: DISCONTINUED | OUTPATIENT
Start: 2024-08-20 | End: 2024-08-23 | Stop reason: HOSPADM

## 2024-08-19 RX ORDER — DEXTROSE MONOHYDRATE 100 MG/ML
INJECTION, SOLUTION INTRAVENOUS CONTINUOUS PRN
Status: DISCONTINUED | OUTPATIENT
Start: 2024-08-19 | End: 2024-08-19 | Stop reason: HOSPADM

## 2024-08-19 RX ORDER — ENOXAPARIN SODIUM 100 MG/ML
40 INJECTION SUBCUTANEOUS DAILY
Status: CANCELLED | OUTPATIENT
Start: 2024-08-20

## 2024-08-19 RX ORDER — INSULIN LISPRO 100 [IU]/ML
0-8 INJECTION, SOLUTION INTRAVENOUS; SUBCUTANEOUS
Status: DISCONTINUED | OUTPATIENT
Start: 2024-08-19 | End: 2024-08-20 | Stop reason: ALTCHOICE

## 2024-08-19 RX ORDER — MAGNESIUM SULFATE IN WATER 40 MG/ML
2000 INJECTION, SOLUTION INTRAVENOUS PRN
Status: DISCONTINUED | OUTPATIENT
Start: 2024-08-19 | End: 2024-08-19 | Stop reason: HOSPADM

## 2024-08-19 RX ORDER — ONDANSETRON 4 MG/1
4 TABLET, ORALLY DISINTEGRATING ORAL EVERY 8 HOURS PRN
Status: DISCONTINUED | OUTPATIENT
Start: 2024-08-19 | End: 2024-08-19 | Stop reason: HOSPADM

## 2024-08-19 RX ORDER — KETOROLAC TROMETHAMINE 15 MG/ML
15 INJECTION, SOLUTION INTRAMUSCULAR; INTRAVENOUS EVERY 6 HOURS PRN
Status: CANCELLED | OUTPATIENT
Start: 2024-08-19 | End: 2024-08-24

## 2024-08-19 RX ORDER — LANOLIN ALCOHOL/MO/W.PET/CERES
3 CREAM (GRAM) TOPICAL NIGHTLY
Status: DISCONTINUED | OUTPATIENT
Start: 2024-08-19 | End: 2024-08-23 | Stop reason: HOSPADM

## 2024-08-19 RX ADMIN — INSULIN LISPRO 2 UNITS: 100 INJECTION, SOLUTION INTRAVENOUS; SUBCUTANEOUS at 12:11

## 2024-08-19 RX ADMIN — PIPERACILLIN AND TAZOBACTAM 3375 MG: 3; .375 INJECTION, POWDER, LYOPHILIZED, FOR SOLUTION INTRAVENOUS at 08:04

## 2024-08-19 RX ADMIN — ACETAMINOPHEN 650 MG: 325 TABLET ORAL at 21:34

## 2024-08-19 RX ADMIN — POTASSIUM CHLORIDE 20 MEQ: 1500 TABLET, EXTENDED RELEASE ORAL at 21:34

## 2024-08-19 RX ADMIN — SODIUM CHLORIDE: 9 INJECTION, SOLUTION INTRAVENOUS at 01:07

## 2024-08-19 RX ADMIN — SODIUM CHLORIDE, PRESERVATIVE FREE 10 ML: 5 INJECTION INTRAVENOUS at 13:21

## 2024-08-19 RX ADMIN — INSULIN LISPRO 2 UNITS: 100 INJECTION, SOLUTION INTRAVENOUS; SUBCUTANEOUS at 08:47

## 2024-08-19 RX ADMIN — SODIUM CHLORIDE 1500 MG: 9 INJECTION, SOLUTION INTRAVENOUS at 12:20

## 2024-08-19 RX ADMIN — SODIUM CHLORIDE: 9 INJECTION, SOLUTION INTRAVENOUS at 13:44

## 2024-08-19 RX ADMIN — Medication 3 MG: at 21:37

## 2024-08-19 RX ADMIN — SODIUM CHLORIDE, PRESERVATIVE FREE 10 ML: 5 INJECTION INTRAVENOUS at 21:15

## 2024-08-19 RX ADMIN — KETOROLAC TROMETHAMINE 15 MG: 15 INJECTION, SOLUTION INTRAMUSCULAR; INTRAVENOUS at 21:36

## 2024-08-19 RX ADMIN — PIPERACILLIN AND TAZOBACTAM 3375 MG: 3; .375 INJECTION, POWDER, LYOPHILIZED, FOR SOLUTION INTRAVENOUS at 19:37

## 2024-08-19 RX ADMIN — ENOXAPARIN SODIUM 40 MG: 100 INJECTION SUBCUTANEOUS at 13:47

## 2024-08-19 RX ADMIN — INSULIN LISPRO 6 UNITS: 100 INJECTION, SOLUTION INTRAVENOUS; SUBCUTANEOUS at 12:10

## 2024-08-19 ASSESSMENT — PAIN SCALES - GENERAL: PAINLEVEL_OUTOF10: 8

## 2024-08-19 ASSESSMENT — PAIN DESCRIPTION - ORIENTATION: ORIENTATION: RIGHT

## 2024-08-19 ASSESSMENT — PAIN DESCRIPTION - LOCATION: LOCATION: FOOT

## 2024-08-19 ASSESSMENT — PAIN DESCRIPTION - DESCRIPTORS: DESCRIPTORS: ACHING;BURNING

## 2024-08-19 ASSESSMENT — PAIN - FUNCTIONAL ASSESSMENT: PAIN_FUNCTIONAL_ASSESSMENT: ACTIVITIES ARE NOT PREVENTED

## 2024-08-19 NOTE — ED PROVIDER NOTES
[facility]  EMERGENCY DEPARTMENT ENCOUNTER      Pt Name: Carli Bustamante  MRN: [id number]  Birthdate 1980  Date of evaluation: 8/18/2024  Provider: Grover Tucker MD  8:28 PM EDT    CHIEF COMPLAINT       Chief Complaint   Patient presents with    Wound Check     Right foot wound          HISTORY OF PRESENT ILLNESS   (Location/Symptom, Timing/Onset, Context/Setting, Quality, Duration, Modifying Factors, Severity)  Note limiting factors.   Carli Bustamante is a 44 y.o. female who presents to the emergency department via EMS for evaluation of foot wound.  She states that on Friday she noticed that her foot wound had opened up.  That yesterday she noticed that there was some discharge and last night she began to have foot pain.  Today at [facility] she began to have chills, fever.  Took Tylenol without significant relief.  Had an episode of nonbloody emesis.  Denies severe headache or vision change, neck stiffness, chest pain or difficulty breathing, cough, abdominal pain, dysuria, vaginal complaints, numbness or focal weakness.  Denies traumatic injury.  She says that she previously broke this foot.  Has never had surgery on it.  Had a previous wound that was cleared by podiatry and resolved  HPI  Chart reviewed notes history of anemia, hyperlipidemia, pregnancy-induced hypertension, diabetes  Nursing Notes were reviewed.    REVIEW OF SYSTEMS    (2-9 systems for level 4, 10 or more for level 5)     Review of Systems   Constitutional:  Positive for chills, fatigue and fever.   Eyes:  Negative for visual disturbance.   Respiratory:  Negative for shortness of breath.    Cardiovascular:  Negative for chest pain.   Gastrointestinal:  Positive for nausea and vomiting. Negative for abdominal pain.   Genitourinary:  Negative for dysuria and flank pain.   Musculoskeletal:  Negative for neck stiffness.        Foot wound discharge, pain, redness   Neurological:  Negative for weakness, numbness and headaches.   All

## 2024-08-19 NOTE — H&P
handled by other specialist.  Additional workup and treatment should be done in outpatient setting by patient's PCP and other outpatient providers.     In addition to examining and evaluating patient, I spent additional time explaining care normal and abnormal findings and treatment plan all of patient's questions were answered.  Case will be discussed with nursing staff when appropriate.  Family will be updated if and when appropriate.    Electronically signed by TRISHA Hudson CNP on 8/18/2024 at 10:35 PM      NOTE: This report was transcribed using voice recognition software. Every effort was made to ensure accuracy; however, inadvertent computerized transcription errors may be present.

## 2024-08-19 NOTE — ED PROVIDER NOTES
Addendum:    Patient can be transferred to [facility] for admission since [facility] has no bed availability.  She is in agreement.  Dr Joseph accepting.  MRI of the foot to be obtained prior to transfer to rule out osteomyelitis.     Yasmin Melendez, DO  08/19/24 1139

## 2024-08-19 NOTE — PROGRESS NOTES
Pharmacy Note  Vancomycin Consult    Carli Bustamante is a 44 y.o. female started on Vancomycin for sepsis/foot wound; consult received from Ting Lujan APRN - CNP to manage therapy. Also receiving the following antibiotics: Zosyn.    Sepsis due to cellulitis (HCC) [L03.90, A41.9]  Allergies: Tape [adhesive tape]    Temp max: 98.6 *F    Cultures  Recent Labs     08/18/24 2058   COVID19 Not Detected      , Weight - Scale: 87.4 kg (192 lb 11.2 oz), Body mass index is 34.14 kg/m².       Recent Labs     08/18/24 2058   CREATININE 0.60   Estimated Creatinine Clearance: 127 mL/min (based on SCr of 0.6 mg/dL).  .    Goal AUC24,ss Level: 400-600 mg/L.hr    Assessment/Plan:  Will initiate vancomycin 1,500 mg IV every 12 hours. Vancomycin 1,750 mg IV x1 given in ED. LOT 7 days. Predicted AUC24,ss 503 mg/L.hr, Ctrough,ss 12.6 mg/L, PAUC 75 %, Pconc 16 %. Random Vancomycin level ordered for 8/20 with morning labs.  Timing of future trough levels will be determined based on culture results, renal function, and clinical response.    Thank you for the consult.  Will continue to follow.  Ashley Torre, JonnaD [facility] 8/19/2024 1:54 AM

## 2024-08-19 NOTE — ED NOTES
Spoke with pt and pt states ok with transfer to [place]. Updated Tanya RN nursing supervisor and Dr. Melendez at this time

## 2024-08-19 NOTE — PROGRESS NOTES
[facility]   Pharmacy Pharmacokinetic Monitoring Service - Vancomycin    Consulting Provider: PATSY Lujan   Indication: sepsis  Target Concentration: Goal AUC/ANGELINA 400-600 mg*hr/L  Day of Therapy: 2    Pertinent Laboratory Values:   Wt Readings from Last 1 Encounters:   08/18/24 87.4 kg (192 lb 11.2 oz)     Temp Readings from Last 1 Encounters:   08/18/24 98.6 °F (37 °C)     Estimated Creatinine Clearance: 143 mL/min (based on SCr of 0.53 mg/dL).  Recent Labs     08/18/24 2058 08/19/24  0828   CREATININE 0.60 0.53   BUN 7 5*   WBC 15.6* 8.2     Procalcitonin: 0.21 (8-18-24)    Recent vancomycin administrations                     vancomycin (VANCOCIN) 1,500 mg in sodium chloride 0.9 % 500 mL IVPB (ADDAVIAL) (mg) 1,500 mg New Bag 08/19/24 1220    vancomycin (VANCOCIN) 1,750 mg in sodium chloride 0.9 % 500 mL IVPB (mg) 1,750 mg New Bag 08/18/24 2132                    Assessment:  Date/Time Current Dose   Projected AUC   8-19-24 1300 1500mg q 12 hr   450 (TRss= 10.5)       Plan:  Current dosing regimen is therapeutic  Continue current dose  Vancomycin concentration previously ordered for 8-20-24 @ 0600   Pharmacy will continue to monitor patient and adjust therapy as indicated    Thank you for the consult,  Wilmer Jean-Baptiste [facility]  8/19/2024 12:52 PM

## 2024-08-19 NOTE — PROGRESS NOTES
Physician Progress Note    8/19/2024   11:09 AM    Name:  Carli Bustamante  MRN:    [id number]     IP Day: 1     Admit Date: 8/18/2024  8:25 PM  PCP: Jayashree Wtason DO    Code Status:  Full Code    Subjective:     Feels better. Some tingling discomfort in R foot.     Current Facility-Administered Medications   Medication Dose Route Frequency Provider Last Rate Last Admin    sodium chloride flush 0.9 % injection 5-40 mL  5-40 mL IntraVENous 2 times per day Repko, Ting, APRN - CNP        sodium chloride flush 0.9 % injection 5-40 mL  5-40 mL IntraVENous PRN Repko, Ting, APRN - CNP        0.9 % sodium chloride infusion   IntraVENous PRN Repko, Ting, APRN - CNP        potassium chloride (KLOR-CON M) extended release tablet 40 mEq  40 mEq Oral PRN Repko, Ting, APRN - CNP        Or    potassium bicarb-citric acid (EFFER-K) effervescent tablet 40 mEq  40 mEq Oral PRN Repko, Ting, APRN - CNP        Or    potassium chloride 10 mEq/100 mL IVPB (Peripheral Line)  10 mEq IntraVENous PRN Repko, Ting, APRN - CNP        magnesium sulfate 2000 mg in 50 mL IVPB premix  2,000 mg IntraVENous PRN Repko, Ting, APRN - CNP        enoxaparin (LOVENOX) injection 40 mg  40 mg SubCUTAneous Daily Repko, Ting, APRN - CNP        ondansetron (ZOFRAN-ODT) disintegrating tablet 4 mg  4 mg Oral Q8H PRN Repko, Ting, APRN - CNP        Or    ondansetron (ZOFRAN) injection 4 mg  4 mg IntraVENous Q6H PRN Repko, Ting, APRN - CNP        melatonin tablet 3 mg  3 mg Oral Nightly Repko, Ting, APRN - CNP        polyethylene glycol (GLYCOLAX) packet 17 g  17 g Oral Daily PRN Repko, Ting, APRN - CNP        acetaminophen (TYLENOL) tablet 650 mg  650 mg Oral Q6H PRN Repko, Ting, APRN - CNP        Or    acetaminophen (TYLENOL) suppository 650 mg  650 mg Rectal Q6H PRN Repko, Ting, APRN - CNP        glucose chewable tablet 16 g  4 tablet Oral PRN Repko, Ting, APRN - CNP        dextrose bolus

## 2024-08-19 NOTE — CARE COORDINATION
I replied to THA Martínez [facility] Supervisor. I reviewed this patient's chart and she is appropriate to transfer to our med/surg unit.

## 2024-08-20 PROBLEM — Z96.41 INSULIN PUMP IN PLACE: Status: ACTIVE | Noted: 2024-08-20

## 2024-08-20 PROBLEM — E11.65 POORLY CONTROLLED DIABETES MELLITUS (HCC): Status: ACTIVE | Noted: 2024-08-20

## 2024-08-20 LAB
A BAUMANNII DNA BLD POS QL NAA+NON-PROBE: NOT DETECTED
ALBUMIN SERPL-MCNC: 3.2 G/DL (ref 3.5–4.6)
ALP SERPL-CCNC: 56 U/L (ref 40–130)
ALT SERPL-CCNC: 15 U/L (ref 0–33)
ANION GAP SERPL CALCULATED.3IONS-SCNC: 10 MEQ/L (ref 9–15)
AST SERPL-CCNC: 24 U/L (ref 0–35)
BACTERIA BLD CULT: ABNORMAL
BASOPHILS # BLD: 0 K/UL (ref 0–0.1)
BASOPHILS NFR BLD: 0.6 % (ref 0.1–1.2)
BILIRUB SERPL-MCNC: 0.3 MG/DL (ref 0.2–0.7)
BUN SERPL-MCNC: 7 MG/DL (ref 6–20)
C ALBICANS DNA BLD POS QL NAA+NON-PROBE: NOT DETECTED
C AURIS DNA BLD POS QL NAA+PROBE: NOT DETECTED
C GLABRATA DNA BLD POS QL NAA+NON-PROBE: NOT DETECTED
C KRUSEI DNA BLD POS QL NAA+NON-PROBE: NOT DETECTED
C PARAP DNA BLD POS QL NAA+NON-PROBE: NOT DETECTED
C TROPICLS DNA BLD POS QL NAA+NON-PROBE: NOT DETECTED
CALCIUM SERPL-MCNC: 8.1 MG/DL (ref 8.5–9.9)
CHLORIDE SERPL-SCNC: 109 MEQ/L (ref 95–107)
CO2 SERPL-SCNC: 20 MEQ/L (ref 20–31)
CREAT SERPL-MCNC: 0.43 MG/DL (ref 0.5–0.9)
CRYPTOCOCCUS NEOFORMANS/GATTII BY PCR: NOT DETECTED
E CLOAC COMP DNA BLD POS NAA+NON-PROBE: NOT DETECTED
E COLI DNA BLD POS QL NAA+NON-PROBE: NOT DETECTED
E FAECALIS DNA BLD POS QL NAA+PROBE: NOT DETECTED
E FAECIUM DNA BLD POS QL NAA+PROBE: NOT DETECTED
ENTEROBACT DNA BLD POS QL NAA+NON-PROBE: NOT DETECTED
ENTEROCOC DNA BLD POS QL NAA+NON-PROBE: NOT DETECTED
EOSINOPHIL # BLD: 0.2 K/UL (ref 0–0.4)
EOSINOPHIL NFR BLD: 4.2 % (ref 0.7–5.8)
ERYTHROCYTE [DISTWIDTH] IN BLOOD BY AUTOMATED COUNT: 13.2 % (ref 11.7–14.4)
GLOBULIN SER CALC-MCNC: 2.5 G/DL (ref 2.3–3.5)
GLUCOSE BLD-MCNC: 194 MG/DL (ref 70–99)
GLUCOSE BLD-MCNC: 202 MG/DL (ref 70–99)
GLUCOSE BLD-MCNC: 250 MG/DL (ref 70–99)
GLUCOSE BLD-MCNC: 256 MG/DL (ref 70–99)
GLUCOSE SERPL-MCNC: 231 MG/DL (ref 70–99)
GN BLD CULTURE PNL BLD POS NAA+PROBE: NOT DETECTED
GP B STREP DNA BLD POS QL NAA+NON-PROBE: NOT DETECTED
HCT VFR BLD AUTO: 34.8 % (ref 37–47)
HGB BLD-MCNC: 12 G/DL (ref 11.2–15.7)
IMM GRANULOCYTES # BLD: 0 K/UL
IMM GRANULOCYTES NFR BLD: 0.6 %
K OXYTOCA DNA BLD POS QL NAA+NON-PROBE: NOT DETECTED
K PNEUMON DNA SPEC QL NAA+PROBE: NOT DETECTED
K. AEROGENES DNA SPEC QL NAA+PROBE: NOT DETECTED
L MONOCYTOG DNA BLD POS QL NAA+NON-PROBE: NOT DETECTED
LYMPHOCYTES # BLD: 0.8 K/UL (ref 1.2–3.7)
LYMPHOCYTES NFR BLD: 22.9 %
MCH RBC QN AUTO: 30.4 PG (ref 25.6–32.2)
MCHC RBC AUTO-ENTMCNC: 34.5 % (ref 32.2–35.5)
MCV RBC AUTO: 88.1 FL (ref 79.4–94.8)
MONOCYTES # BLD: 0.5 K/UL (ref 0.2–0.9)
MONOCYTES NFR BLD: 13 % (ref 4.7–12.5)
N MEN DNA BLD POS QL NAA+NON-PROBE: NOT DETECTED
NEUTROPHILS # BLD: 2.1 K/UL (ref 1.6–6.1)
NEUTS SEG NFR BLD: 58.7 % (ref 34–71.1)
P AERUGINOSA DNA BLD POS NAA+NON-PROBE: NOT DETECTED
PERFORMED ON: ABNORMAL
PLATELET # BLD AUTO: 190 K/UL (ref 182–369)
PLATELET BLD QL SMEAR: ADEQUATE
POTASSIUM SERPL-SCNC: 3.8 MEQ/L (ref 3.4–4.9)
PROT SERPL-MCNC: 5.7 G/DL (ref 6.3–8)
PROTEUS SP DNA BLD POS QL NAA+NON-PROBE: NOT DETECTED
RBC # BLD AUTO: 3.95 M/UL (ref 3.93–5.22)
S AUREUS DNA BLD POS QL NAA+NON-PROBE: NOT DETECTED
S AUREUS+CONS DNA BLD POS NAA+NON-PROBE: NOT DETECTED
S EPIDERMIDIS DNA BLD POS QL NAA+PROBE: NOT DETECTED
S LUGDUNENSIS DNA BLD POS QL NAA+PROBE: NOT DETECTED
S MALTOPH DNA BLD POS QL NAA+PROBE: NOT DETECTED
S MARCESCENS DNA BLD POS NAA+NON-PROBE: NOT DETECTED
S PNEUM DNA BLD POS QL NAA+NON-PROBE: NOT DETECTED
S PYO DNA BLD POS QL NAA+NON-PROBE: NOT DETECTED
SALMONELLA DNA BLD POS QL NAA+PROBE: NOT DETECTED
SLIDE REVIEW: ABNORMAL
SODIUM SERPL-SCNC: 139 MEQ/L (ref 135–144)
STREPTOCOCCUS DNA BLD POS NAA+NON-PROBE: NOT DETECTED
VANCOMYCIN SERPL-MCNC: 11.9 UG/ML (ref 10–40)
WBC # BLD AUTO: 3.5 K/UL (ref 4–10)

## 2024-08-20 PROCEDURE — 36415 COLL VENOUS BLD VENIPUNCTURE: CPT

## 2024-08-20 PROCEDURE — 1210000000 HC MED SURG R&B

## 2024-08-20 PROCEDURE — 85025 COMPLETE CBC W/AUTO DIFF WBC: CPT

## 2024-08-20 PROCEDURE — 97162 PT EVAL MOD COMPLEX 30 MIN: CPT

## 2024-08-20 PROCEDURE — 2580000003 HC RX 258: Performed by: INTERNAL MEDICINE

## 2024-08-20 PROCEDURE — 80053 COMPREHEN METABOLIC PANEL: CPT

## 2024-08-20 PROCEDURE — 80202 ASSAY OF VANCOMYCIN: CPT

## 2024-08-20 PROCEDURE — 6360000002 HC RX W HCPCS: Performed by: INTERNAL MEDICINE

## 2024-08-20 PROCEDURE — 97166 OT EVAL MOD COMPLEX 45 MIN: CPT

## 2024-08-20 PROCEDURE — 6370000000 HC RX 637 (ALT 250 FOR IP): Performed by: INTERNAL MEDICINE

## 2024-08-20 PROCEDURE — 97535 SELF CARE MNGMENT TRAINING: CPT

## 2024-08-20 PROCEDURE — 99222 1ST HOSP IP/OBS MODERATE 55: CPT | Performed by: INTERNAL MEDICINE

## 2024-08-20 RX ORDER — ACETAMINOPHEN 325 MG/1
650 TABLET ORAL EVERY 6 HOURS PRN
Status: DISCONTINUED | OUTPATIENT
Start: 2024-08-20 | End: 2024-08-23 | Stop reason: HOSPADM

## 2024-08-20 RX ORDER — ACETAMINOPHEN 650 MG/1
650 SUPPOSITORY RECTAL EVERY 6 HOURS PRN
Status: DISCONTINUED | OUTPATIENT
Start: 2024-08-20 | End: 2024-08-23 | Stop reason: HOSPADM

## 2024-08-20 RX ORDER — POTASSIUM CHLORIDE 1500 MG/1
20 TABLET, EXTENDED RELEASE ORAL ONCE
Status: COMPLETED | OUTPATIENT
Start: 2024-08-20 | End: 2024-08-20

## 2024-08-20 RX ADMIN — PIPERACILLIN AND TAZOBACTAM 3375 MG: 3; .375 INJECTION, POWDER, LYOPHILIZED, FOR SOLUTION INTRAVENOUS at 02:03

## 2024-08-20 RX ADMIN — Medication 3 MG: at 21:13

## 2024-08-20 RX ADMIN — SODIUM CHLORIDE 1250 MG: 9 INJECTION, SOLUTION INTRAVENOUS at 22:20

## 2024-08-20 RX ADMIN — KETOROLAC TROMETHAMINE 15 MG: 15 INJECTION, SOLUTION INTRAMUSCULAR; INTRAVENOUS at 21:11

## 2024-08-20 RX ADMIN — ENOXAPARIN SODIUM 40 MG: 100 INJECTION SUBCUTANEOUS at 10:08

## 2024-08-20 RX ADMIN — ACETAMINOPHEN 650 MG: 325 TABLET ORAL at 15:55

## 2024-08-20 RX ADMIN — PIPERACILLIN AND TAZOBACTAM 3375 MG: 3; .375 INJECTION, POWDER, LYOPHILIZED, FOR SOLUTION INTRAVENOUS at 10:12

## 2024-08-20 RX ADMIN — VANCOMYCIN HYDROCHLORIDE 1500 MG: 1 INJECTION, POWDER, LYOPHILIZED, FOR SOLUTION INTRAVENOUS at 00:14

## 2024-08-20 RX ADMIN — SODIUM CHLORIDE, PRESERVATIVE FREE 10 ML: 5 INJECTION INTRAVENOUS at 10:14

## 2024-08-20 RX ADMIN — POTASSIUM CHLORIDE 20 MEQ: 1500 TABLET, EXTENDED RELEASE ORAL at 13:45

## 2024-08-20 RX ADMIN — VANCOMYCIN HYDROCHLORIDE 1500 MG: 1 INJECTION, POWDER, LYOPHILIZED, FOR SOLUTION INTRAVENOUS at 13:45

## 2024-08-20 RX ADMIN — PIPERACILLIN AND TAZOBACTAM 3375 MG: 3; .375 INJECTION, POWDER, LYOPHILIZED, FOR SOLUTION INTRAVENOUS at 17:41

## 2024-08-20 ASSESSMENT — PAIN DESCRIPTION - ORIENTATION
ORIENTATION: RIGHT

## 2024-08-20 ASSESSMENT — PAIN SCALES - GENERAL
PAINLEVEL_OUTOF10: 8
PAINLEVEL_OUTOF10: 3
PAINLEVEL_OUTOF10: 8
PAINLEVEL_OUTOF10: 8

## 2024-08-20 ASSESSMENT — PAIN - FUNCTIONAL ASSESSMENT
PAIN_FUNCTIONAL_ASSESSMENT: ACTIVITIES ARE NOT PREVENTED
PAIN_FUNCTIONAL_ASSESSMENT: ACTIVITIES ARE NOT PREVENTED

## 2024-08-20 ASSESSMENT — PAIN DESCRIPTION - LOCATION
LOCATION: FOOT

## 2024-08-20 ASSESSMENT — PAIN DESCRIPTION - DESCRIPTORS
DESCRIPTORS: ACHING;BURNING
DESCRIPTORS: BURNING
DESCRIPTORS: ACHING;BURNING

## 2024-08-20 ASSESSMENT — PAIN SCALES - WONG BAKER: WONGBAKER_NUMERICALRESPONSE: NO HURT

## 2024-08-20 NOTE — PROGRESS NOTES
16:30 Received pt from [facility] ED, alert and oriented x 4, VSS. Called  ED to receive report on pt.

## 2024-08-20 NOTE — CONSULTS
[facility]                  [address] [place] [address]                              CONSULTATION      PATIENT NAME: JOE DAMICO            : 1980  MED REC NO: [id number]                          ROOM: [id number]  ACCOUNT NO: [id number]                       ADMIT DATE: 2024  PROVIDER: Ganga Mccormick MD    ENDOCRINE CONSULT    CONSULT DATE: 2024    REFERRING PHYSICIAN:  MANOJ HOFFMAN      REASON FOR CONSULT:  Management of type 2 diabetes with insulin pump management.    CHIEF COMPLAINT AND HISTORY OF PRESENT ILLNESS:  Obtained through prior H and P.  This is a virtual visit.  The patient was admitted at [facility] in [place].  The patient is a 44-year-old female with known history of diabetes, seen by Endocrinology Service, last visit was in December of last year, on [employer] insulin pump, admitted through [facility] because of foul-smelling right foot ulcer.  She has been getting treatment off and on.  Has been on antibiotics without any improvement.  Initial admitting diagnosis was infected diabetic foot ulcer over the right 4th and 5th distal metatarsal plantar area.  Podiatry has been consulted.  The patient has been on insulin pump.  Blood sugars have been in the 200 to 250 range.  Hemoglobin A1c was 8.4.  A1c done 6 to 8 months ago was 8.1.  Electrolytes were reviewed.  Sodium was 139, potassium was 3.8, chloride 109, CO2 was 20, BUN 7, creatinine 0.43.  The patient is on Humalog coverage, continues on insulin pump, was on IV vancomycin and Zosyn.    PAST MEDICAL HISTORY:  Significant for type 2 diabetes, history of hypercholesterolemia, eczema, obesity.    PAST SURGICAL HISTORY:  Abdominal surgery, C section, tubal ligation.    FAMILY HISTORY:  Significant for diabetes, hyperlipidemia, arthritis, birth defects, breast cancer, early death, heart disease, hypertension.    PERSONAL AND SOCIAL HISTORY:  Does smoke cigarettes.

## 2024-08-20 NOTE — PLAN OF CARE
Problem: Discharge Planning  Goal: Discharge to home or other facility with appropriate resources  Outcome: Progressing  Flowsheets (Taken 8/19/2024 1947 by Mikki Gonzalez RN)  Discharge to home or other facility with appropriate resources: Identify discharge learning needs (meds, wound care, etc)     Problem: Pain  Goal: Verbalizes/displays adequate comfort level or baseline comfort level  Outcome: Progressing     Problem: Skin/Tissue Integrity - Adult  Goal: Skin integrity remains intact  Reactivated  Goal: Incisions, wounds, or drain sites healing without S/S of infection  Reactivated     Problem: Infection - Adult  Goal: Absence of infection at discharge  Reactivated  Goal: Absence of infection during hospitalization  Reactivated  Goal: Absence of fever/infection during anticipated neutropenic period  Reactivated

## 2024-08-20 NOTE — PROGRESS NOTES
Facility/Department: [facility] MED SURG UNIT  Daily Treatment Note  NAME: Carli Bustamante  : 1980  MRN: [id number]    Date of Service: 2024    Order acknowledged:  Restrictions  Restrictions/Precautions  Restrictions/Precautions: Fall Risk  Position Activity Restriction  Other position/activity restrictions: 24 Ok to wear offloading shoe with ambulation per Dr Dhillon.         Candace Amaro, PT              English

## 2024-08-20 NOTE — PROGRESS NOTES
[facility]   Pharmacy Pharmacokinetic Monitoring Service - Vancomycin    Consulting Provider: Dr Joseph   Indication: sepsis  Target Concentration: Goal AUC/ANGELINA 400-600 mg*hr/L  Day of Therapy: 3    Pertinent Laboratory Values:   Wt Readings from Last 1 Encounters:   08/19/24 83.7 kg (184 lb 8 oz)     Temp Readings from Last 1 Encounters:   08/20/24 98.1 °F (36.7 °C) (Oral)     Estimated Creatinine Clearance: 173 mL/min (A) (based on SCr of 0.43 mg/dL (L)).  Recent Labs     08/19/24  0828 08/20/24  0607 08/20/24  0608   CREATININE 0.53 0.43*  --    BUN 5* 7  --    WBC 8.2  --  3.5*       Recent vancomycin administrations                     vancomycin (VANCOCIN) 1,500 mg in sodium chloride 0.9 % 500 mL IVPB (mg) 1,500 mg New Bag 08/20/24 1345     1,500 mg New Bag  0014    vancomycin (VANCOCIN) 1,500 mg in sodium chloride 0.9 % 500 mL IVPB (ADDAVIAL) ()  Restarted 08/19/24 1320     1,500 mg New Bag  1220    vancomycin (VANCOCIN) 1,750 mg in sodium chloride 0.9 % 500 mL IVPB (mg) 1,750 mg New Bag 08/18/24 2132                    Assessment:  Date/Time Current Dose Concentration Timing of Concentration (h) AUC   8-20-24 1545 1500mg q 12 hr 11.9 6 hr post 362 (TRss=7.4)   Note: Serum concentrations collected for AUC dosing may appear elevated if collected in close proximity to the dose administered, this is not necessarily an indication of toxicity    Plan:  Current dosing regimen is subtherapeutic  Increase dose to 1250mg q 8 hr , [employer] AUC= 449 and TRss= 11.7.  Will order trough level for 8-22-24 0530  Pharmacy will continue to monitor patient and adjust therapy as indicated    Thank you for the consult,  Wilmer Jean-Baptiste, [facility]  8/20/2024 3:34 PM

## 2024-08-20 NOTE — PROGRESS NOTES
Facility/Department: [facility] MED SURG UNIT  Occupational Therapy Initial Assessment    Name: Carli Bustamante  : 1980  MRN: [id number]  Date of Service: 2024    Discharge Recommendations:  Continue to assess pending progress, Patient would benefit from continued therapy after discharge          Patient Diagnosis(es): Sepsis  Past Medical History:  has a past medical history of Anemia during pregnancy, Eczema, Herpes simplex infection of skin, Hypercholesterolemia, Irregular heart beat, Obesity, Pregnancy induced hypertension, and Type II or unspecified type diabetes mellitus without mention of complication, not stated as uncontrolled.  Past Surgical History:  has a past surgical history that includes  section; Abdomen surgery (); and Tubal ligation.    Treatment Diagnosis: Decreased ADL/IADL performance d/t R foot wound.      Assessment   Performance deficits / Impairments: Decreased functional mobility ;Decreased endurance;Decreased ADL status;Decreased posture;Decreased balance;Decreased safe awareness;Decreased high-level IADLs;Decreased strength  Assessment: Pt is a 45 y/o F admitted to [facility] after debridement of R foot performed at [facility], pt transferred to this facility d/t no bed available. PLOF pt is from two level home with family, states she has a 11 y/o son who is disabled and in a w/c, pt reports she needs to lift son up and also manage his w/c. Pt states she has had several falls with injury to B feet from trying to use knee scooter and crutches. OT eval completed with pt unsteady during standing and walking trials, off-loading shoe used to relieve pressure from wound site, pt noted to have some improvement with use. Need to clear with podiatry if off loading shoe is okay. Pt has several barriers to healing and unsure of best d/c plan, will continue to assess.  Treatment Diagnosis: Decreased ADL/IADL performance d/t R foot wound.  REQUIRES OT FOLLOW-UP: Yes  Activity  Tolerance  Activity Tolerance: Patient limited by fatigue;Patient limited by pain        Plan   Occupational Therapy Plan  Times Per Week: 4-7  Times Per Day: Once a day  Current Treatment Recommendations: Strengthening, Balance training, Safety education & training, Self-Care / ADL, Pain management, Home management training, Functional mobility training, Patient/Caregiver education & training, Endurance training, Equipment evaluation, education, & procurement, Positioning     Restrictions  Restrictions/Precautions  Restrictions/Precautions: Fall Risk  Position Activity Restriction  Other position/activity restrictions: Wound on R foot, lateral front aspect, awaiting podiatry consult and recommendations for WB    Subjective   General  Chart Reviewed: Yes, Progress Notes, History and Physical  Patient assessed for rehabilitation services?: Yes  Response to previous treatment: Patient reporting fatigue but able to participate  Family / Caregiver Present: No  Referring Practitioner: Dr. Joseph  Diagnosis: Sepsis, cellulitis  Subjective  Subjective: \"I'm worred about caring for my son and managing his w/c\"  6/10 R foot with WB  Social/Functional History  Social/Functional History  Lives With: Family (disabled son in w/c, 17yo dtr, 9yo son, 7yo dtr)  Type of Home: House ([place])  Home Layout: Two level (10-12 stairs L rail up)  Home Access: Ramped entrance  Bathroom Shower/Tub: Tub/Shower unit (second floor, 1/2 bath first floor)  Bathroom Toilet: Standard  Bathroom Equipment:  (Reports no DME)  Bathroom Accessibility: Not accessible  Home Equipment:  (had knee scooter but loaned it away)  Has the patient had two or more falls in the past year or any fall with injury in the past year?: Yes  Receives Help From: Family  ADL Assistance: Independent  Homemaking Assistance: Independent  Homemaking Responsibilities: Yes  Ambulation Assistance: Independent  Transfer Assistance: Independent  Active : No  Occupation:

## 2024-08-20 NOTE — CONSULTS
PODIATRIC MEDICINE AND SURGERY       CONSULT HISTORY AND PHYSICAL         ASSESSMENT:  This 44 y.o. female with PMH of DM presents with recurrent diabetic foot ulcer      Plan:  Patient examined and evaluated  Previous and today's labs reviewed   Prior imaging reviewed : MRI negative for osteomyelitis  Debridement at [place] by podiatry service at bedside.   Wound care ordered: silver alginate and DSD daily.  Offloading wedge shoe with cane assist for all ambulation.  OK to shower with wound covered.  Continue IV antibiotics, anticipate d/c home on po antibiotics in 2-3 days  Patient to f/u with Dr. Cordero outpatient, already established  Wound culture pending.      HPI: This very pleasant 44 y.o. year old female with PMH of DM seen today for right recurrent diabetic foot ulcer with infection.  Patient states that the ulcer reopened over the weekend and progressed rapidly to a black, discolored draining area, She experienced low grade fever, nausea and pain in the foot which prompted her visit to the ED.  Patient states that their pain is mild but improved and is aching in nature.  Patient states that she has felt well since receiving antibiotics.      Past Medical History:   Diagnosis Date    Anemia during pregnancy 2018    Eczema     Herpes simplex infection of skin     on neck    Hypercholesterolemia     Irregular heart beat     runs of v tach in the past    Obesity     Pregnancy induced hypertension     Type II or unspecified type diabetes mellitus without mention of complication, not stated as uncontrolled     type 2       Past Surgical History:   Procedure Laterality Date    ABDOMEN SURGERY       x 2     SECTION      TUBAL LIGATION         [unfilled]    Allergies   Allergen Reactions    Tape [Adhesive Tape] Rash       Family History   Problem Relation Age of Onset    Arthritis Mother     Diabetes Mother     Early Death Father     Heart Attack Father     Heart Disease Father

## 2024-08-20 NOTE — ED NOTES
[facility]   Emergency Department Culture Follow-Up       Carli Bustamante (CSN: [id number]) was seen and evaluated at [facility] Emergency Department on 8/18 by provider Dr. Tucker.    CULTURE RESULT TYPE: A blood culture was positive and is growing 1 of 2 resembling staph.   .      Treatment Course:    Patient was treated with vancomycin here and transferred to [facility].    Follow-Up:    No follow up, patient is currently being treated with vancomycin.     Thank you,    Irene Anderson, PharmD 8/20/2024 3:08 PM  Clinical Pharmacy Specialist, Emergency Medicine

## 2024-08-20 NOTE — H&P
Hospital Medicine  History and Physical    Patient:  Carli Bustamante  MRN: [id number]    CHIEF COMPLAINT:  No chief complaint on file.      History Obtained From:  Patient, EMR  Primary Care Physician: Jayashree Watson DO    HISTORY OF PRESENT ILLNESS:   The patient is a 44 y.o. female with PMH of diabetes.  She came to the emergency room with pain, swelling and foul smelling of the right foot ulcer.  She has had this ulcer for a while on and off.  She takes antibiotic for a week or so.  Infection heals but comes back.  No fever or chills.  No chest pain or palpitation.  No abdominal pain nausea or vomiting.    Past Medical History:      Diagnosis Date    Anemia during pregnancy 2018    Eczema     Herpes simplex infection of skin     on neck    Hypercholesterolemia     Irregular heart beat     runs of v tach in the past    Obesity     Pregnancy induced hypertension     Type II or unspecified type diabetes mellitus without mention of complication, not stated as uncontrolled     type 2       Past Surgical History:      Procedure Laterality Date    ABDOMEN SURGERY       x 2     SECTION      TUBAL LIGATION         Medications Prior to Admission:    Prior to Admission medications    Medication Sig Start Date End Date Taking? Authorizing Provider   insulin lispro (HUMALOG,ADMELOG) 100 UNIT/ML SOLN injection vial VIA INSULIN PUMP MAX DAILY DOSE 200 UNITS 24   Ganga Mccormick MD   Lancets Misc. (ACCU-CHEK FASTCLIX LANCET) KIT 1 each by Does not apply route 4 times daily Test 4x daily, works with patient insulin pump 23   Ganga Mccormick MD   blood glucose test strips (ACCU-CHEK GUIDE) strip Test 4x daily, Patient is on insulin pump 23   Ganga Mccormick MD   Alcohol Swabs PADS Use qid 23   Ganga Mccormick MD   acetaminophen (TYLENOL) 500 MG tablet Take 2 tablets by mouth every 6 hours as needed for Pain or Fever 23   Wilfredo Clayton MD   blood glucose monitor kit and supplies  patient has 1 inch stage III ulceration involving the right plantar fourth and fifth metatarsal area which was debrided [facility] to transfer here.  Pus was draining at the time of debridement.  Neurologic: Mental status: Alert, oriented, thought content appropriate     Recent Labs     08/18/24 2058 08/19/24 0828 08/20/24  0608   WBC 15.6* 8.2 3.5*   HGB 14.8 12.6 12.0    184 190     Recent Labs     08/18/24 2058 08/19/24 0828 08/20/24  0607   * 135 139   K 3.8 3.6 3.8    106 109*   CO2 20 22 20   BUN 7 5* 7   CREATININE 0.60 0.53 0.43*   GLUCOSE 226* 209* 231*   AST 15 15 24   ALT 20 18 15   BILITOT 0.6 0.4 0.3   ALKPHOS 65 50 56     Troponin T: No results for input(s): \"TROPONINI\" in the last 72 hours.    ABGs: No results found for: \"PHART\", \"PO2ART\", \"ADL2FKR\"  INR: No results for input(s): \"INR\" in the last 72 hours.  URINALYSIS:  Recent Labs     08/18/24  2221   COLORU Yellow   PHUR 6.0   CLARITYU Clear   LEUKOCYTESUR Negative   UROBILINOGEN 0.2   BILIRUBINUR Negative   BLOODU Negative   GLUCOSEU 500*     -----------------------------------------------------------------   MRI FOOT RIGHT WO CONTRAST    Result Date: 8/19/2024  EXAMINATION: MRI OF THE RIGHT FOOT WITHOUT CONTRAST, 8/19/2024 12:47 pm TECHNIQUE: Multiplanar multisequence MRI of the right foot was performed without the administration of intravenous contrast. COMPARISON: Radiographs 08/18/2024 HISTORY: ORDERING SYSTEM PROVIDED HISTORY: R/o osteomyelitis at the base of 5th toe, distal 5th metatarsal, Distal rt 5th metatarsal TECHNOLOGIST PROVIDED HISTORY: Reason for exam:->R/o osteomyelitis at the base of 5th toe, distal 5th metatarsal Reason for exam:->Distal rt 5th metatarsal What is the area of interest?->Forefoot What reading provider will be dictating this exam?->CRC FINDINGS: LISFRANC JOINT: Visualized Lisfranc ligament is intact. No significant Lisfranc interval widening or significant

## 2024-08-20 NOTE — PROGRESS NOTES
Facility/Department: [facility] SURG UNIT  Physical Therapy Initial Assessment    Name: Carli Bustamante  : 1980  MRN: [id number]  Date of Service: 2024    Discharge Recommendations:  Continue to assess pending progress, Therapy recommended at discharge          Patient Diagnosis(es): difficulty walking with R foot sepsis/cellulitis  Past Medical History:  has a past medical history of Anemia during pregnancy, Eczema, Herpes simplex infection of skin, Hypercholesterolemia, Irregular heart beat, Obesity, Pregnancy induced hypertension, and Type II or unspecified type diabetes mellitus without mention of complication, not stated as uncontrolled.  Past Surgical History:  has a past surgical history that includes  section; Abdomen surgery (); and Tubal ligation.    Assessment   Body Structures, Functions, Activity Limitations Requiring Skilled Therapeutic Intervention: Decreased functional mobility ;Decreased balance;Decreased strength;Decreased tolerance to work activity;Increased pain;Decreased posture  Assessment: 44yof with R distal foot wound, diabetic with neuropathy, hx of falls when trying to use knee scooter and crutches. Pt noted to have dec endurance and dec functional ambulation. Pt approrpiate for skilled PT follow. Discharge determined by medical needs. Pt may benefit from cane at discharge if still WBAT RLE. Pt  agrees to plan and goals  Treatment Diagnosis: difficulty walking with R foot sepsis  Therapy Prognosis: Good  Requires PT Follow-Up: Yes  Activity Tolerance  Activity Tolerance: Patient tolerated treatment well;Patient limited by fatigue;Patient limited by endurance     Plan   Physical Therapy Plan  General Plan: 5-7 times per week (1-2x per day)  Current Treatment Recommendations: Strengthening, Balance training, Functional mobility training, Transfer training, Endurance training, Stair training, Gait training, Home exercise program, Safety education & training,

## 2024-08-21 LAB
ALBUMIN SERPL-MCNC: 3.3 G/DL (ref 3.5–4.6)
ALP SERPL-CCNC: 65 U/L (ref 40–130)
ALT SERPL-CCNC: 22 U/L (ref 0–33)
ANION GAP SERPL CALCULATED.3IONS-SCNC: 11 MEQ/L (ref 9–15)
AST SERPL-CCNC: 23 U/L (ref 0–35)
BACTERIA SPEC ANAEROBE+AEROBE CULT: NORMAL
BASOPHILS # BLD: 0 K/UL (ref 0–0.1)
BASOPHILS NFR BLD: 0.4 % (ref 0.1–1.2)
BILIRUB SERPL-MCNC: 0.3 MG/DL (ref 0.2–0.7)
BUN SERPL-MCNC: 9 MG/DL (ref 6–20)
CALCIUM SERPL-MCNC: 8.4 MG/DL (ref 8.5–9.9)
CHLORIDE SERPL-SCNC: 107 MEQ/L (ref 95–107)
CO2 SERPL-SCNC: 21 MEQ/L (ref 20–31)
CREAT SERPL-MCNC: 0.45 MG/DL (ref 0.5–0.9)
EOSINOPHIL # BLD: 0.3 K/UL (ref 0–0.4)
EOSINOPHIL NFR BLD: 7.1 % (ref 0.7–5.8)
ERYTHROCYTE [DISTWIDTH] IN BLOOD BY AUTOMATED COUNT: 13.1 % (ref 11.7–14.4)
GLOBULIN SER CALC-MCNC: 2.7 G/DL (ref 2.3–3.5)
GLUCOSE BLD-MCNC: 195 MG/DL (ref 70–99)
GLUCOSE BLD-MCNC: 219 MG/DL (ref 70–99)
GLUCOSE BLD-MCNC: 266 MG/DL (ref 70–99)
GLUCOSE BLD-MCNC: 289 MG/DL (ref 70–99)
GLUCOSE SERPL-MCNC: 211 MG/DL (ref 70–99)
HCT VFR BLD AUTO: 35.7 % (ref 37–47)
HGB BLD-MCNC: 12.9 G/DL (ref 11.2–15.7)
IMM GRANULOCYTES # BLD: 0 K/UL
IMM GRANULOCYTES NFR BLD: 0.6 %
LYMPHOCYTES # BLD: 1.2 K/UL (ref 1.2–3.7)
LYMPHOCYTES NFR BLD: 26.4 %
MCH RBC QN AUTO: 31.2 PG (ref 25.6–32.2)
MCHC RBC AUTO-ENTMCNC: 36.1 % (ref 32.2–35.5)
MCV RBC AUTO: 86.2 FL (ref 79.4–94.8)
MONOCYTES # BLD: 0.6 K/UL (ref 0.2–0.9)
MONOCYTES NFR BLD: 12.9 % (ref 4.7–12.5)
NEUTROPHILS # BLD: 2.5 K/UL (ref 1.6–6.1)
NEUTS SEG NFR BLD: 52.6 % (ref 34–71.1)
PERFORMED ON: ABNORMAL
PLATELET # BLD AUTO: 226 K/UL (ref 182–369)
POTASSIUM SERPL-SCNC: 3.8 MEQ/L (ref 3.4–4.9)
PROT SERPL-MCNC: 6 G/DL (ref 6.3–8)
RBC # BLD AUTO: 4.14 M/UL (ref 3.93–5.22)
SODIUM SERPL-SCNC: 139 MEQ/L (ref 135–144)
WBC # BLD AUTO: 4.7 K/UL (ref 4–10)

## 2024-08-21 PROCEDURE — 36415 COLL VENOUS BLD VENIPUNCTURE: CPT

## 2024-08-21 PROCEDURE — 6370000000 HC RX 637 (ALT 250 FOR IP): Performed by: INTERNAL MEDICINE

## 2024-08-21 PROCEDURE — 97535 SELF CARE MNGMENT TRAINING: CPT

## 2024-08-21 PROCEDURE — 97530 THERAPEUTIC ACTIVITIES: CPT

## 2024-08-21 PROCEDURE — 99254 IP/OBS CNSLTJ NEW/EST MOD 60: CPT | Performed by: INTERNAL MEDICINE

## 2024-08-21 PROCEDURE — 6360000002 HC RX W HCPCS: Performed by: INTERNAL MEDICINE

## 2024-08-21 PROCEDURE — 1210000000 HC MED SURG R&B

## 2024-08-21 PROCEDURE — 97110 THERAPEUTIC EXERCISES: CPT

## 2024-08-21 PROCEDURE — 85025 COMPLETE CBC W/AUTO DIFF WBC: CPT

## 2024-08-21 PROCEDURE — 97116 GAIT TRAINING THERAPY: CPT

## 2024-08-21 PROCEDURE — 87040 BLOOD CULTURE FOR BACTERIA: CPT

## 2024-08-21 PROCEDURE — 80053 COMPREHEN METABOLIC PANEL: CPT

## 2024-08-21 PROCEDURE — 2580000003 HC RX 258: Performed by: INTERNAL MEDICINE

## 2024-08-21 RX ORDER — DOXYCYCLINE 100 MG/1
100 CAPSULE ORAL EVERY 12 HOURS SCHEDULED
Status: DISCONTINUED | OUTPATIENT
Start: 2024-08-21 | End: 2024-08-23 | Stop reason: HOSPADM

## 2024-08-21 RX ADMIN — PIPERACILLIN AND TAZOBACTAM 3375 MG: 3; .375 INJECTION, POWDER, LYOPHILIZED, FOR SOLUTION INTRAVENOUS at 09:55

## 2024-08-21 RX ADMIN — PIPERACILLIN AND TAZOBACTAM 3375 MG: 3; .375 INJECTION, POWDER, LYOPHILIZED, FOR SOLUTION INTRAVENOUS at 02:25

## 2024-08-21 RX ADMIN — ENOXAPARIN SODIUM 40 MG: 100 INJECTION SUBCUTANEOUS at 09:50

## 2024-08-21 RX ADMIN — Medication 3 MG: at 20:14

## 2024-08-21 RX ADMIN — DOXYCYCLINE HYCLATE 100 MG: 100 CAPSULE ORAL at 15:34

## 2024-08-21 RX ADMIN — SODIUM CHLORIDE 1250 MG: 9 INJECTION, SOLUTION INTRAVENOUS at 06:31

## 2024-08-21 RX ADMIN — SODIUM CHLORIDE, PRESERVATIVE FREE 10 ML: 5 INJECTION INTRAVENOUS at 09:57

## 2024-08-21 RX ADMIN — PIPERACILLIN AND TAZOBACTAM 3375 MG: 3; .375 INJECTION, POWDER, LYOPHILIZED, FOR SOLUTION INTRAVENOUS at 18:05

## 2024-08-21 RX ADMIN — DOXYCYCLINE HYCLATE 100 MG: 100 CAPSULE ORAL at 22:41

## 2024-08-21 ASSESSMENT — ENCOUNTER SYMPTOMS
RESPIRATORY NEGATIVE: 1
GASTROINTESTINAL NEGATIVE: 1
EYES NEGATIVE: 1
COLOR CHANGE: 1

## 2024-08-21 NOTE — PROGRESS NOTES
Pt alert and oriented. Pt has some pain in her right foot, pt medicated per mar. Pt up and independent. Pt has insulin pump on and getting continuous insulin. Pt denies any needs at this time. Will continue to monitor pt.  Electronically signed by Agnes Avendaño RN on 8/20/2024 at 9:46 PM

## 2024-08-21 NOTE — PROGRESS NOTES
Spiritual Health Assessment/Progress Note  [facility]    Initial Encounter,  ,  ,      Name: Carli Bustamante MRN: [id number]    Age: 44 y.o.     Sex: female   Language: English   Jewish: Church   Sepsis due to cellulitis (HCC)     Date: 8/21/2024            Total Time Calculated: 25 min              Spiritual Assessment began in [facility] SURG UNIT        Referral/Consult From: Rounding   Encounter Overview/Reason: Initial Encounter  Service Provided For: Patient    Met with patient. Has strong ifeoma and feels supported by family and ifeoma community.    Ifeoma, Belief, Meaning:   Patient is connected with a ifeoma tradition or spiritual practice  Family/Friends No family/friends present      Importance and Influence:  Patient has no beliefs influential to healthcare decision-making identified during this visit  Family/Friends no family/friends present    Community:  Patient is connected with a spiritual community and feels well-supported. Support system includes: Children, Ifeoma Community, and Extended family  Family/Friends Other: unknown    Assessment and Plan of Care:     Patient Interventions include: Facilitated expression of thoughts and feelings and Affirmed coping skills/support systems  Family/Friends Interventions include: Other: unknown    Patient Plan of Care: No spiritual needs identified for follow-up  Family/Friends Plan of Care: Other: unknown    Electronically signed by Chaplain Danny Intern on 8/21/2024 at 1:36 PM

## 2024-08-21 NOTE — CONSULTS
Infectious Disease     Patient Name: Carli Bustamante  Date: 8/21/2024  YOB: 1980  Medical Record Number: [id number]              History of Present Illness:  Diabetes    Patient has had ulceration lateral aspect right foot ongoing for more than a year sometimes completely closing then breaking open and draining periodically    Patient presented with recurrent foot ulcer plantar aspect right foot  Found to have drainage erythema edema foul-smelling drainage  Debrided prior to admission  No probe to bone  Underwent bedside debridement        RI FOOT RIGHT WO CONTRAST     Result Date: 8/19/2024  EXAMINATION: MRI OF THE RIGHT FOOT WITHOUT CONTRAST, 8/19/2024 12:47 pm TECHNIQUE: Multiplanar multisequence MRI of the right foot was performed without the administration of intravenous contrast. COMPARISON: Radiographs 08/18/2024 HISTORY: ORDERING SYSTEM PROVIDED HISTORY: R/o osteomyelitis at the base of 5th toe, distal 5th metatarsal, Distal rt 5th metatarsal TECHNOLOGIST PROVIDED HISTORY: Reason for exam:->R/o osteomyelitis at the base of 5th toe, distal 5th metatarsal Reason for exam:->Distal rt 5th metatarsal What is the area of interest?->Forefoot What reading provider will be dictating this exam?->CRC FINDINGS: LISFRANC JOINT: Visualized Lisfranc ligament is intact. No significant Lisfranc interval widening or significant periligamentous edema. BONE MARROW: No evidence of fracture.  Nonspecific bone marrow edema in the proximal and mid portions of the 2nd through 4th metatarsals.  This is favored to represent bone contusion or stress reaction.  Similar findings also present in the middle and lateral cuneiform is a. old healed fracture of the fist metatarsal diaphysis with angulation.  No evidence of osteomyelitis otherwise. GREATER AND LESSER MTP JOINTS: No significant joint effusion or osseous erosions. No significant degenerative changes. Normal alignment. SOFT TISSUES: Soft tissue ulceration of the  with prenatal care elsewhere in third trimester    Rash    Eczema    Dysphagia    Sepsis due to cellulitis (HCC)    Poorly controlled diabetes mellitus (HCC)    Insulin pump in place         PLAN:    Diabetic foot infection right foot  Right foot abscess      MRI does not show definitive osteomyelitis in the foot    Given appearance of foot and MRI findings induration of infection based on patient's history recommend 4 weeks IV antibiotic therapy final antibiotic decision to be made based on cultures    Continue vancomycin and Zosyn   If cultures does not show MRSA will be able to switch to IV Invanz for 4 weeks of therapy        Wound cultures from debridement when patient was in emergency department showed no growth 1 blood culture is currently growing gram-positive cocci in clusters identification pending

## 2024-08-21 NOTE — PROGRESS NOTES
Facility/Department: [facility] MED SURG UNIT  Daily Treatment Note  NAME: Carli Bustamante  : 1980  MRN: [id number]    Date of Service: 2024    Discharge Recommendations:  Continue to assess pending progress, Patient would benefit from continued therapy after discharge         Patient Diagnosis(es): Sepsis     Assessment    Assessment: Pt presents in bed and is willing for shower, okay to shower if R foot is kept dry, wrapped foot with plastic to ensure safety, pt is unsteady on feet and had several LOB, toe off-loading shoe was used today with reduced pain in foot. Continue to assess appropriate d/c plan.  Activity Tolerance: Patient limited by endurance  Discharge Recommendations: Continue to assess pending progress;Patient would benefit from continued therapy after discharge      Plan   Occupational Therapy Plan  Times Per Week: 4-7  Times Per Day: Once a day  Current Treatment Recommendations: Strengthening;Balance training;Safety education & training;Self-Care / ADL;Pain management;Home management training;Functional mobility training;Patient/Caregiver education & training;Endurance training;Equipment evaluation, education, & procurement;Positioning     Restrictions  Front off-loading shoe R foot, WBAT RLE    Subjective   Subjective  Subjective: \"I would like to shower.\"  Pain: 3/10 burning R foot  Orientation  Overall Orientation Status: Within Normal Limits  Orientation Level: Oriented X4  Pain: 3/10 burning  Cognition  Overall Cognitive Status: WNL  Cognition Comment: Alert, pleasant, cooperative        Objective    Bed Mobility Training  Bed Mobility Training: Yes  Overall Level of Assistance: Stand-by assistance  Supine to Sit: Stand-by assistance  Scooting: Stand-by assistance  Balance  Sitting: Intact  Standing: Impaired;With support (SPC used, decreased balance, needs hands on assist. Several LOB)  Transfer Training  Transfer Training: Yes  Overall Level of Assistance: Contact-guard assistance (With

## 2024-08-21 NOTE — PLAN OF CARE
Problem: Discharge Planning  Goal: Discharge to home or other facility with appropriate resources  8/20/2024 2142 by Agnes Avendaño RN  Outcome: Progressing  8/20/2024 1538 by Stefania Colunga RN  Outcome: Progressing     Problem: Pain  Goal: Verbalizes/displays adequate comfort level or baseline comfort level  8/20/2024 2142 by Agnes Avendaño RN  Outcome: Progressing  8/20/2024 1538 by Stefania Colunga RN  Outcome: Progressing     Problem: Skin/Tissue Integrity - Adult  Goal: Skin integrity remains intact  8/20/2024 2142 by Agnes Avendaño RN  Outcome: Progressing  8/20/2024 1538 by Stefania Colunga RN  Outcome: Progressing  Goal: Incisions, wounds, or drain sites healing without S/S of infection  8/20/2024 2142 by Agnes Avendaño RN  Outcome: Progressing  8/20/2024 1538 by Stefania Colunga RN  Outcome: Progressing     Problem: Infection - Adult  Goal: Absence of infection at discharge  8/20/2024 2142 by Agnes Avendaño RN  Outcome: Progressing  8/20/2024 1538 by Stefania Colunga RN  Outcome: Progressing  Goal: Absence of infection during hospitalization  8/20/2024 2142 by Agnes Avendaño RN  Outcome: Progressing  8/20/2024 1538 by Stefania Colunga RN  Outcome: Progressing  Goal: Absence of fever/infection during anticipated neutropenic period  8/20/2024 2142 by Agnes Avendaño RN  Outcome: Progressing  8/20/2024 1538 by Stefania Colunga RN  Outcome: Progressing     Problem: Chronic Conditions and Co-morbidities  Goal: Patient's chronic conditions and co-morbidity symptoms are monitored and maintained or improved  8/20/2024 2142 by Agnes Avendaño RN  Outcome: Progressing  8/20/2024 1538 by Stefania Colunga RN  Outcome: Progressing

## 2024-08-21 NOTE — PROGRESS NOTES
Minimal discomfort in the right foot.  No chest pain or palpitation.  No abdominal pain nausea vomiting.    HEENT: Head: Normocephalic, no lesions, without obvious abnormality.  Neck: no adenopathy, no carotid bruit, no JVD, supple, symmetrical, trachea midline, and thyroid not enlarged, symmetric, no tenderness/mass/nodules  Lungs: clear to auscultation bilaterally  Heart: regular rate and rhythm, S1, S2 normal, no murmur, click, rub or gallop  Abdomen: soft, non-tender; bowel sounds normal; no masses,  no organomegaly  Extremities: extremities normal, atraumatic, no cyanosis or edema palpable dorsalis pedis pulse.  The patient has 1 inch stage III ulceration involving the right plantar fourth and fifth metatarsal area which was debrided by podiatry resident at [facility] prior to transfer here.  Pus was draining at the time of debridement.  Neurologic: Mental status: Alert, oriented, thought content appropriate       *Infected diabetic foot ulcer involving the right fourth and fifth distal metatarsal plantar area.  Status post bedside debridement by podiatry at [facility] prior to transfer here.  Patient was seen by podiatrist here who did not recommend to perform any additional debridement.  MRI is negative for osteomyelitis.  Patient has been on Zosyn and vancomycin.  Change to Zosyn and doxycycline.    *Positive blood culture, 1 out of 2 bottles.  Staph.  Suspect contamination.  Wait for the final ID and sensitivity.  Requested repeat blood culture     *Hypertension, fairly stable     *Diabetes on insulin pump, slightly uncontrolled managed by endocrinology     *History of SVT     *Multiple other medical issues not listed above

## 2024-08-21 NOTE — PROGRESS NOTES
Consult received - chart review complete. Podiatry orders placed for wound dressings and management. Wound ostomy to sign off, please reconsult if needed.    Oli Fonseca, BALDEMARN, RN, Wound/Ostomy Nurse on 8/21/2024 at 7:12 AM

## 2024-08-21 NOTE — PROGRESS NOTES
Two sets of blood cultures drawn from two different sites per protocol. Specimens walked down to lab and  aware.

## 2024-08-21 NOTE — PROGRESS NOTES
Physical Therapy  Facility/Department: [facility] MED SURG UNIT  Daily Treatment Note  NAME: Carli Bustamante  : 1980  MRN: [id number]    Date of Service: 2024    Discharge Recommendations:  (P) Continue to assess pending progress, Therapy recommended at discharge        Patient Diagnosis(es):     Assessment   Assessment: (P) Pt. able to demo controlled bed mobility and sit to stand tsf's with modified technique. Pt. limited to use R LE due to c/o foot pain. Applied R foot surgical shoe to ambulate. Pt. demo's slow step to gait with decrease R LE weight bear tolerance demonstrating fair minus to fair balance using single point cane. Gait distance limited to 30 feet due to pain and difficulty reported. Following, pt tolerates limited LE ther ex reps seated and standing to assist with strength and weight bear tolerance of R LE.  Activity Tolerance: Patient limited by endurance     Plan    Physical Therapy Plan  General Plan: (P) 5-7 times per week (1-2)  Current Treatment Recommendations: (P) Strengthening;Balance training;Functional mobility training;Transfer training;Endurance training;Stair training;Gait training;Home exercise program;Safety education & training;Patient/Caregiver education & training;Therapeutic activities;Equipment evaluation, education, & procurement     Restrictions  Restrictions/Precautions  Restrictions/Precautions: Fall Risk  Position Activity Restriction  Other position/activity restrictions: 24 Ok to wear offloading shoe with ambulation     Subjective    Subjective  Subjective: Pt. reports feeling a little better since taking a shower. My right foot has a constant burning sensation 8/10.  Pain: 3/10 burning  Orientation  Overall Orientation Status: Within Normal Limits  Orientation Level: Oriented X4  Cognition  Overall Cognitive Status: WNL  Cognition Comment: Alert, pleasant, cooperative     Objective   Vitals     Bed Mobility Training  Bed Mobility Training: (P) Yes  Overall

## 2024-08-22 ENCOUNTER — HOSPITAL ENCOUNTER (OUTPATIENT)
Dept: INTERVENTIONAL RADIOLOGY/VASCULAR | Age: 44
Discharge: HOME | End: 2024-08-24
Payer: COMMERCIAL

## 2024-08-22 LAB
BACTERIA BLD CULT ORG #2: NORMAL
BACTERIA BLD CULT: NORMAL
GLUCOSE BLD-MCNC: 202 MG/DL (ref 70–99)
GLUCOSE BLD-MCNC: 239 MG/DL (ref 70–99)
GLUCOSE BLD-MCNC: 311 MG/DL (ref 70–99)
GLUCOSE BLD-MCNC: 330 MG/DL (ref 70–99)
PERFORMED ON: ABNORMAL

## 2024-08-22 PROCEDURE — 2580000003 HC RX 258: Performed by: INTERNAL MEDICINE

## 2024-08-22 PROCEDURE — 02H633Z INSERTION OF INFUSION DEVICE INTO RIGHT ATRIUM, PERCUTANEOUS APPROACH: ICD-10-PCS | Performed by: INTERNAL MEDICINE

## 2024-08-22 PROCEDURE — 36573 INSJ PICC RS&I 5 YR+: CPT

## 2024-08-22 PROCEDURE — 6370000000 HC RX 637 (ALT 250 FOR IP): Performed by: INTERNAL MEDICINE

## 2024-08-22 PROCEDURE — 1210000000 HC MED SURG R&B

## 2024-08-22 PROCEDURE — 2709999900 IR PICC WO SQ PORT/PUMP > 5 YEARS

## 2024-08-22 PROCEDURE — 6360000002 HC RX W HCPCS: Performed by: INTERNAL MEDICINE

## 2024-08-22 PROCEDURE — 97530 THERAPEUTIC ACTIVITIES: CPT

## 2024-08-22 RX ORDER — LIDOCAINE HYDROCHLORIDE 20 MG/ML
5 INJECTION, SOLUTION INFILTRATION; PERINEURAL
OUTPATIENT
Start: 2024-08-23

## 2024-08-22 RX ADMIN — DOXYCYCLINE HYCLATE 100 MG: 100 CAPSULE ORAL at 20:02

## 2024-08-22 RX ADMIN — PIPERACILLIN AND TAZOBACTAM 3375 MG: 3; .375 INJECTION, POWDER, LYOPHILIZED, FOR SOLUTION INTRAVENOUS at 02:13

## 2024-08-22 RX ADMIN — Medication 3 MG: at 20:03

## 2024-08-22 RX ADMIN — PIPERACILLIN AND TAZOBACTAM 3375 MG: 3; .375 INJECTION, POWDER, LYOPHILIZED, FOR SOLUTION INTRAVENOUS at 17:53

## 2024-08-22 RX ADMIN — PIPERACILLIN AND TAZOBACTAM 3375 MG: 3; .375 INJECTION, POWDER, LYOPHILIZED, FOR SOLUTION INTRAVENOUS at 10:41

## 2024-08-22 RX ADMIN — SODIUM CHLORIDE, PRESERVATIVE FREE 10 ML: 5 INJECTION INTRAVENOUS at 20:09

## 2024-08-22 RX ADMIN — KETOROLAC TROMETHAMINE 15 MG: 15 INJECTION, SOLUTION INTRAMUSCULAR; INTRAVENOUS at 02:10

## 2024-08-22 RX ADMIN — DOXYCYCLINE HYCLATE 100 MG: 100 CAPSULE ORAL at 08:56

## 2024-08-22 RX ADMIN — SODIUM CHLORIDE, PRESERVATIVE FREE 10 ML: 5 INJECTION INTRAVENOUS at 08:56

## 2024-08-22 RX ADMIN — ACETAMINOPHEN 650 MG: 325 TABLET ORAL at 17:43

## 2024-08-22 ASSESSMENT — PAIN DESCRIPTION - ORIENTATION: ORIENTATION: RIGHT

## 2024-08-22 ASSESSMENT — PAIN DESCRIPTION - LOCATION
LOCATION: HEAD
LOCATION: FOOT

## 2024-08-22 ASSESSMENT — PAIN SCALES - GENERAL
PAINLEVEL_OUTOF10: 0
PAINLEVEL_OUTOF10: 0
PAINLEVEL_OUTOF10: 8
PAINLEVEL_OUTOF10: 0
PAINLEVEL_OUTOF10: 0
PAINLEVEL_OUTOF10: 3

## 2024-08-22 ASSESSMENT — PAIN SCALES - WONG BAKER: WONGBAKER_NUMERICALRESPONSE: NO HURT

## 2024-08-22 ASSESSMENT — PAIN DESCRIPTION - DESCRIPTORS: DESCRIPTORS: THROBBING

## 2024-08-22 NOTE — PLAN OF CARE
Problem: Discharge Planning  Goal: Discharge to home or other facility with appropriate resources  Outcome: Progressing     Problem: Pain  Goal: Verbalizes/displays adequate comfort level or baseline comfort level  Outcome: Progressing     Problem: Skin/Tissue Integrity - Adult  Goal: Skin integrity remains intact  Outcome: Progressing  Goal: Incisions, wounds, or drain sites healing without S/S of infection  Outcome: Progressing     Problem: Infection - Adult  Goal: Absence of infection at discharge  Outcome: Progressing  Goal: Absence of infection during hospitalization  Outcome: Progressing  Goal: Absence of fever/infection during anticipated neutropenic period  Outcome: Progressing     Problem: Chronic Conditions and Co-morbidities  Goal: Patient's chronic conditions and co-morbidity symptoms are monitored and maintained or improved  Outcome: Progressing

## 2024-08-22 NOTE — PROGRESS NOTES
Pt alert and oriented. Pt independent in room. Pt call light in reach. Pt denies any pain or needs at this time. Pt insulin pump infusing at 3.8 units continuous. Will continue to monitor pt.  Electronically signed by Agnes Avendaño RN on 8/21/2024 at 9:32 PM

## 2024-08-22 NOTE — PROGRESS NOTES
Patient alert and oriented. Up in room independendly. Meds administered per MAR. Denies needs at this time

## 2024-08-22 NOTE — PROGRESS NOTES
Podiatry Progress Note  Carli Bustamante  Subjective :   Patient seen and examined this afternoon. PICC placed. Feeling well, right foot pain has improved to occasional burning pain. IV antibiotics for 4 weeks per ID. No growth on wound culture. Blood culture positive x 1        Objective     /73   Pulse 76   Temp 98.2 °F (36.8 °C)   Resp 16   Ht 1.6 m (5' 3\")   Wt 83.7 kg (184 lb 8 oz)   LMP 07/28/2024   SpO2 98%   BMI 32.68 kg/m²      I/O:  Intake/Output Summary (Last 24 hours) at 8/22/2024 1735  Last data filed at 8/22/2024 0621  Gross per 24 hour   Intake 580 ml   Output --   Net 580 ml              Wt Readings from Last 3 Encounters:   08/19/24 83.7 kg (184 lb 8 oz)   08/18/24 87.4 kg (192 lb 11.2 oz)   08/19/24 83.3 kg (183 lb 11.2 oz)       LABS:    Recent Labs     08/20/24  0608 08/21/24  0602   WBC 3.5* 4.7   HGB 12.0 12.9   HCT 34.8* 35.7*    226        Recent Labs     08/21/24  0602      K 3.8      CO2 21   BUN 9   CREATININE 0.45*      No results for input(s): \"INR\", \"APTT\" in the last 72 hours.    Invalid input(s): \"PROT\"      Exam:     DERMATOLOGIC:   Ulceration right foot much improved. Granular wound bed, well hydrated. Mild keratotic margins. Decreased in size to 1.4cm x 0.4cm x 0.2cm. No purulence expressed. Cellulitis resolved.     ASSESSMENT/PLAN:   Pt. is a 44 y.o. female with right diabetic foot infection, sepsis    1. Continue local wound care, silver alginate and DSD, wedge offloading shoe for all ambulation    8/22/2024 5:35 PM

## 2024-08-22 NOTE — PROGRESS NOTES
No new symptoms since yesterday.    HEENT: Head: Normocephalic, no lesions, without obvious abnormality.  Neck: no adenopathy, no carotid bruit, no JVD, supple, symmetrical, trachea midline, and thyroid not enlarged, symmetric, no tenderness/mass/nodules  Lungs: clear to auscultation bilaterally  Heart: regular rate and rhythm, S1, S2 normal, no murmur, click, rub or gallop  Abdomen: soft, non-tender; bowel sounds normal; no masses,  no organomegaly  Extremities: extremities normal, atraumatic, no cyanosis or edema palpable dorsalis pedis pulse.  The patient has 1 inch stage III ulceration involving the right plantar fourth and fifth metatarsal area which was debrided by podiatry resident at [facility] prior to transfer here.  Pus was draining at the time of debridement.  Persistent local tenderness.  No pus or drainage.  Neurologic: Mental status: Alert, oriented, thought content appropriate       *Infected diabetic foot ulcer involving the right fourth and fifth distal metatarsal plantar area.  Status post bedside debridement by podiatry at [facility] prior to transfer here.  Patient was seen by podiatrist here who did not recommend to perform any additional debridement.  MRI is negative for osteomyelitis.  Patient has been on Zosyn and vancomycin.  Change to Zosyn and doxycycline.  Patient was seen by infectious disease recommended 4 weeks worth of intravenous antibiotic pending culture report.  Proceed to insert the PICC line.    *Positive blood culture, 1 out of 2 bottles.  Staph.  Suspect contamination.  Wait for the final ID and sensitivity.  Requested repeat blood culture     *Hypertension, fairly stable     *Diabetes on insulin pump, slightly uncontrolled managed by endocrinology     *History of SVT.     *Multiple other medical issues not listed above

## 2024-08-22 NOTE — PROGRESS NOTES
Physical Therapy  Facility/Department: [facility] MED SURG UNIT  Daily Treatment Note  NAME: Carli Bustamante  : 1980  MRN: [id number]    Date of Service: 2024    Discharge Recommendations:  (P) Continue to assess pending progress, Therapy recommended at discharge        Patient Diagnosis(es):     Assessment   Assessment: (P) Pt. reports leaving for transport to [place] for picc line at arrival. Assisted pt with donning R LE surgical shoe with pt education to don at all times during weight bear to assist with recovery. Pt. able to get out of bed and tsf to EMT stretcher by ambulating ~ 7 feet with cane. Pt. demo's 0 LOB. However, pt. flops with decrease control during stand to sit.  Activity Tolerance: (P) Patient limited by endurance     Plan    Physical Therapy Plan  General Plan: (P) 5-7 times per week (1-2)  Current Treatment Recommendations: (P) Strengthening;Balance training;Functional mobility training;Transfer training;Endurance training;Stair training;Gait training;Home exercise program;Safety education & training;Patient/Caregiver education & training;Therapeutic activities;Equipment evaluation, education, & procurement     Restrictions  Restrictions/Precautions  Restrictions/Precautions: Fall Risk  Position Activity Restriction  Other position/activity restrictions: 24 Ok to wear offloading shoe with ambulation     Subjective    Subjective  Subjective: (P) Pt. inbed and reports she is leaving for transportation to [place] for picc line.     Objective   Vitals     Bed Mobility Training  Overall Level of Assistance: (P) Stand-by assistance;Supervision  Supine to Sit: (P) Stand-by assistance;Supervision  Sit to Supine: (P) Stand-by assistance (with stretcher surface.)  Balance  Sitting: (P) Intact  Standing: (P) Impaired;With support (SPC)  Transfer Training  Transfer Training: (P) Yes  Overall Level of Assistance: (P) Contact-guard assistance;Stand-by assistance  Interventions: (P) Safety awareness

## 2024-08-22 NOTE — PROGRESS NOTES
Patient left unit via cot with EMS for transport to [facility] for PICC line placement

## 2024-08-23 VITALS
WEIGHT: 184.5 LBS | HEART RATE: 80 BPM | HEIGHT: 63 IN | SYSTOLIC BLOOD PRESSURE: 116 MMHG | BODY MASS INDEX: 32.69 KG/M2 | DIASTOLIC BLOOD PRESSURE: 75 MMHG | RESPIRATION RATE: 18 BRPM | OXYGEN SATURATION: 97 % | TEMPERATURE: 98.2 F

## 2024-08-23 LAB
ANION GAP SERPL CALCULATED.3IONS-SCNC: 12 MEQ/L (ref 9–15)
BUN SERPL-MCNC: 13 MG/DL (ref 6–20)
CALCIUM SERPL-MCNC: 9.2 MG/DL (ref 8.5–9.9)
CHLORIDE SERPL-SCNC: 104 MEQ/L (ref 95–107)
CO2 SERPL-SCNC: 22 MEQ/L (ref 20–31)
CREAT SERPL-MCNC: 0.57 MG/DL (ref 0.5–0.9)
GLUCOSE BLD-MCNC: 256 MG/DL (ref 70–99)
GLUCOSE BLD-MCNC: 256 MG/DL (ref 70–99)
GLUCOSE SERPL-MCNC: 246 MG/DL (ref 70–99)
PERFORMED ON: ABNORMAL
PERFORMED ON: ABNORMAL
POTASSIUM SERPL-SCNC: 4.2 MEQ/L (ref 3.4–4.9)
SODIUM SERPL-SCNC: 138 MEQ/L (ref 135–144)

## 2024-08-23 PROCEDURE — 6370000000 HC RX 637 (ALT 250 FOR IP): Performed by: INTERNAL MEDICINE

## 2024-08-23 PROCEDURE — 80048 BASIC METABOLIC PNL TOTAL CA: CPT

## 2024-08-23 PROCEDURE — 97530 THERAPEUTIC ACTIVITIES: CPT

## 2024-08-23 PROCEDURE — 6360000002 HC RX W HCPCS: Performed by: INTERNAL MEDICINE

## 2024-08-23 PROCEDURE — 99232 SBSQ HOSP IP/OBS MODERATE 35: CPT | Performed by: PHYSICIAN ASSISTANT

## 2024-08-23 PROCEDURE — 36415 COLL VENOUS BLD VENIPUNCTURE: CPT

## 2024-08-23 PROCEDURE — 97110 THERAPEUTIC EXERCISES: CPT

## 2024-08-23 PROCEDURE — 97116 GAIT TRAINING THERAPY: CPT

## 2024-08-23 PROCEDURE — 2580000003 HC RX 258: Performed by: INTERNAL MEDICINE

## 2024-08-23 RX ORDER — SODIUM CHLORIDE 0.9 % (FLUSH) 0.9 %
5-40 SYRINGE (ML) INJECTION PRN
Status: CANCELLED | OUTPATIENT
Start: 2024-08-24

## 2024-08-23 RX ORDER — SODIUM CHLORIDE 9 MG/ML
5-250 INJECTION, SOLUTION INTRAVENOUS PRN
Status: CANCELLED | OUTPATIENT
Start: 2024-08-24

## 2024-08-23 RX ORDER — HEPARIN 100 UNIT/ML
500 SYRINGE INTRAVENOUS PRN
Status: CANCELLED | OUTPATIENT
Start: 2024-08-24

## 2024-08-23 RX ORDER — LANOLIN ALCOHOL/MO/W.PET/CERES
3 CREAM (GRAM) TOPICAL NIGHTLY
Qty: 30 TABLET | Refills: 1 | Status: SHIPPED | OUTPATIENT
Start: 2024-08-23

## 2024-08-23 RX ADMIN — PIPERACILLIN AND TAZOBACTAM 3375 MG: 3; .375 INJECTION, POWDER, LYOPHILIZED, FOR SOLUTION INTRAVENOUS at 02:58

## 2024-08-23 RX ADMIN — PIPERACILLIN AND TAZOBACTAM 3375 MG: 3; .375 INJECTION, POWDER, LYOPHILIZED, FOR SOLUTION INTRAVENOUS at 10:02

## 2024-08-23 RX ADMIN — DOXYCYCLINE HYCLATE 100 MG: 100 CAPSULE ORAL at 08:34

## 2024-08-23 NOTE — PROGRESS NOTES
I called and spoke with Briana at [facility] out patient infusion center at [phone] and set up start date for tomorrow, 8/24/24, of IVAB Invanz daily for 28 days. Patient is directed to go to ER for Saturday and Sunday, being that it is the weekend and will be directed where to get the infusion. 10 am time requested by patient per primary nurse, and Briana advised that would be just fine. I faxed facesheet and IVAB order to out patient infusion dept at [phone] as requested by Briana.

## 2024-08-23 NOTE — PROGRESS NOTES
Educated pt on how to change her foot dressing. She states she has been watching how we do it. I provided her with band aids to make the process easier for her. IV dc'ed. Pt states she will have a ride whenever we have her ready to go. Pt has belongings packed.

## 2024-08-23 NOTE — PROGRESS NOTES
Physical Therapy  Facility/Department: [facility] MED SURG UNIT  Daily Treatment Note  NAME: Carli Bustamante  : 1980  MRN: [id number]    Date of Service: 2024    Discharge Recommendations:  (P) Continue to assess pending progress, Therapy recommended at discharge        Patient Diagnosis(es): Foot sepsis, MRSA    Assessment  Assessment: (P) Pt stated to leave facility soon. HEP retrieved, gone through and given with pt verbally understanding and demo's ther ex well with VC\"s given for proper form of each Ther ex. Pt initially had trouble demo'ing AP's but with instruction and without B shoes pt able to demo well. Pt donned R offloading shoe due to MRSA wound and L home shoe with some A for R side only. Pt ambulated further distances with 2 pt gait pattern w/ use of straight cane and CGA with 2, 180 turns. Pt continually needed VC's to maintain forward gaze. Pt progressing well towards goals.  Activity Tolerance: (P) Patient limited by endurance    Plan  Physical Therapy Plan  General Plan: (P) 5-7 times per week  Current Treatment Recommendations: (P) Strengthening;Balance training;Functional mobility training;Transfer training;Endurance training;Stair training;Gait training;Home exercise program;Safety education & training;Patient/Caregiver education & training;Therapeutic activities;Equipment evaluation, education, & procurement    Restrictions  Restrictions/Precautions  Restrictions/Precautions: Fall Risk  Position Activity Restriction  Other position/activity restrictions: 24 Ok to wear offloading shoe with ambulation     Subjective   Subjective  Subjective: (P) t in bed at arrival and greeable to therapy. Pt sttes she is leaving soon.    Objective  Vitals     Bed Mobility Training  Bed Mobility Training: (P) Yes  Overall Level of Assistance: (P) Supervision  Interventions: (P) Visual cues  Rolling: (P) Supervision  Supine to Sit: (P) Stand-by assistance;Supervision  Sit to Supine: (P)

## 2024-08-23 NOTE — DISCHARGE SUMMARY
Hospital Medicine Discharge Summary    aCrli Bustamante  :  1980  MRN:  [id number]    Admit date:  2024  Discharge date:  2024    Admitting Physician:  Estefania Joseph MD  Primary Care Physician:  Jayashree Watson,       Discharge Diagnoses:      *Infected diabetic foot ulcer involving the right fourth and fifth distal metatarsal plantar area.  Status post bedside debridement by podiatry at [facility] prior to transfer here.  Patient was seen by podiatrist here who did not recommend to perform any additional debridement.  MRI is negative for osteomyelitis.  Patient has been on Zosyn and vancomycin.  Patient was seen by ID team.  Dr. Kauffman recommended 4 weeks worth of intravenous Invanz.     *Positive blood culture, 1 out of 2 bottles.  Staph.  Suspect contamination.  Wait for the final ID and sensitivity.  Repeat blood culture thus far is negative.     *Hypertension, fairly stable     *Diabetes on insulin pump, slightly uncontrolled managed by endocrinology.  Patient follows up with Dr. Mccormick.     *History of SVT.     *Multiple other medical issues not listed above      Hospital Course:   Carli Bustamante is a 44 y.o. female that was admitted and treated at [facility] for the aforementioned medical issues.  Her foot ulcer has been debrided at [facility] by podiatry prior to transfer to [facility].  MRI does not show osteomyelitis.  Patient was seen by podiatrist here at [facility] who did not recommend additional I&D.  Patient is to follow-up with the wound center.  Patient was seen by infectious disease recommended 4 weeks worth of intravenous ertapenem.      Head: Normocephalic, no lesions, without obvious abnormality.  Neck: no adenopathy, no carotid bruit, no JVD, supple, symmetrical, trachea midline, and thyroid not enlarged, symmetric, no tenderness/mass/nodules  Lungs: clear to auscultation bilaterally  Heart: regular rate and rhythm, S1, S2 normal, no murmur, click, rub or  that I have and have not addressed during your hospitalization.      Discharging you from the hospital does not mean that your medical care ends here and now. You may still need additional work up, investigation, monitoring, and treatment to be handled from this point on by outside providers including your PCP, Jayashree Watson DO , Specialists and other healthcare providers.      Please review your list of discharge medications prior to resuming medications you might still have at home, as the medications you need to be taking, dosages or how often you must take them may have changed. For medication questions, contact your retail pharmacy and your PCP, Jayashree Watson DO .     ** I STRONGLY RECOMMEND that you follow up with Jayashree Watson DO within 3 to 5 days for a post hospitalization evaluation. This specific office visit is covered by your insurance, and is not the same as your annual doctor visit/ check up. This office visit is important, as it may prevent need for repeat and/or future hospitalizations.**    Your medical team at [facility] appreciates the opportunity to work with you to get well!    Sincerely,  Estefania Joseph MD

## 2024-08-23 NOTE — PROGRESS NOTES
Patient is alert and oriented. Discussed her discharge plan of care, she states she spoke with her  at [facility] and that she will be able to go to the outpatient infusion center once a day, with their help, for antibiotic therapy. She states she has a disabled son at home and needs to be at home to care for him. Pt states she has some discomfort to her right foot at times. Dressing is clean, dry and intact, no drainage noted. Call light in reach.

## 2024-08-23 NOTE — PROGRESS NOTES
Endocrinology Progress Note    Assessment and Plan:   Assessment-  Type 2 insulin-dependent diabetes  Diabetic foot ulcer  Sepsis    Plan-  Medtronic insulin pump being used  Instructed the patient to increase her bolus to 20 units 3 times daily 15 to 20 minutes before meals  Basal rates changed to 4 units/h, was 3.8 units/h  Advised patient to decrease her bolus as her diabetic wound ulcer improves and her blood glucose levels normalized  Follow-up with Dr. Mccormick in 2 weeks    POC Glucose:   Recent Labs     08/22/24  1125 08/22/24  1715 08/22/24  2002 08/23/24  0635 08/23/24  1128   POCGLU 311* 202* 330* 256* 256*     HGBA1C:  Lab Results   Component Value Date    LABA1C 8.4 (H) 08/19/2024    LABA1C 8.1 (H) 12/07/2023    LABA1C 7.8 09/06/2023     CBC:   Recent Labs     08/21/24  0602   WBC 4.7   HGB 12.9        CMP:    Lab Results   Component Value Date     08/23/2024    K 4.2 08/23/2024     08/23/2024    CO2 22 08/23/2024    BUN 13 08/23/2024    CREATININE 0.57 08/23/2024    GLUCOSE 246 (H) 08/23/2024    CALCIUM 9.2 08/23/2024    BILITOT 0.3 08/21/2024    ALKPHOS 65 08/21/2024    AST 23 08/21/2024    ALT 22 08/21/2024    LABGLOM >90.0 08/23/2024    GFRAA >60.0 10/08/2022    GLOB 2.7 08/21/2024       Lab Results   Component Value Date    TSH 0.783 06/25/2024    TSH 1.360 01/15/2016    TSH 0.603 06/20/2013     No results found for: \"TPOABS\"      CC: No chief complaint on file.      Subjective:   Interval History: Carli is a 44-year-old type 2 insulin-dependent diabetic female on the Medtronic insulin pump.  She developed a right lower extremity diabetic wound ulcer, became septic and was admitted to [facility] for IV antibiotics.  Her symptoms improved and she is being discharged home today.  Insulin dosing changes were made.  She is to call and follow-up with Dr. Dalton in 2 weeks.    Review of systems: denies polyuria, polydipsia, ABD pain, flank pain, N/V/D, or diaphoresis  Medications:    Scheduled Meds:   doxycycline  100 mg Oral 2 times per day    Insulin Pump - Bolus Dose   SubCUTAneous 4x Daily AC & HS    sodium chloride flush  5-40 mL IntraVENous 2 times per day    [Held by provider] enoxaparin  40 mg SubCUTAneous Daily    melatonin  3 mg Oral Nightly    piperacillin-tazobactam  3,375 mg IntraVENous Q8H    Insulin Pump - Basal Dose   SubCUTAneous Daily     Continuous Infusions:   sodium chloride      dextrose         Objective:   Vitals: /75   Pulse 80   Temp 98.2 °F (36.8 °C) (Oral)   Resp 18   Ht 1.6 m (5' 3\")   Wt 83.7 kg (184 lb 8 oz)   LMP 2024   SpO2 97%   BMI 32.68 kg/m²    Wt Readings from Last 3 Encounters:   24 83.7 kg (184 lb 8 oz)   24 87.4 kg (192 lb 11.2 oz)   24 83.3 kg (183 lb 11.2 oz)        General appearance: alert, appears stated age, cooperative, and no distress  Skin: Skin color, texture, turgor normal. No rashes or lesions.  Neck: thyroid normal size, non-tender,  without nodularity  Lungs: clear to auscultation bilaterally  Heart: regular rate and rhythm, S1, S2 normal, no murmur, click, rub or gallop  Abdomen: soft, non-tender. Bowel sounds normal. No masses,  no organomegaly.  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     History of PSVT (paroxysmal supraventricular tachycardia)     Hx of pre-eclampsia in prior pregnancy, currently pregnant     Hx of  section     Previous  section     Hx of  delivery, currently pregnant     History of fetal abnormality in previous pregnancy, currently pregnant     Late prenatal care affecting pregnancy in second trimester     Obesity (BMI 30-39.9)     Herpes simplex infection of skin     Lower abdominal pain     Type 2 diabetes mellitus affecting pregnancy in  third trimester, antepartum     Diabetic infection of right foot (HCC)     Supervision of normal pregnancy     Noncompliance     High-risk pregnancy in second trimester     Anemia during pregnancy     Constipation     Acute cystitis without hematuria     Pregnancy with prenatal care elsewhere in third trimester     Rash     Eczema     Dysphagia     Sepsis (HCC)     Poorly controlled diabetes mellitus (HCC)     Insulin pump in place        Electronically signed by JO Ernst on 8/23/2024 at 4:06 PM

## 2024-08-23 NOTE — PROGRESS NOTES
Physical Therapy  Facility/Department: [facility] MED SURG UNIT  Daily Treatment Note  NAME: Carli Bustamante  : 1980  MRN: [id number]    Date of Service: 2024    Discharge Recommendations:  (P) Continue to assess pending progress, Therapy recommended at discharge        Patient Diagnosis(es): The primary encounter diagnosis was Sepsis due to cellulitis (HCC). Diagnoses of Diabetic infection of right foot (HCC), Insulin pump in place, and Type 2 diabetes mellitus affecting pregnancy in third trimester, antepartum were also pertinent to this visit.    Assessment  Assessment: (P) Pt donned R offloading shoe and regular L tennis shoe prior to ambulation. Gait training with focus on longer strides in step through gait pattern with F follow through. Pt has increased difficulty due to leg length discrepensy due to greater cindy on R offloading shoe. Seated Ther Ex at EOB to increase B LE strength with good pt tolerance and VC's for proper form and count of reps.  Activity Tolerance: (P) Patient limited by endurance    Plan  Physical Therapy Plan  General Plan: (P) 5-7 times per week  Current Treatment Recommendations: (P) Strengthening;Balance training;Functional mobility training;Transfer training;Endurance training;Stair training;Gait training;Home exercise program;Safety education & training;Patient/Caregiver education & training;Therapeutic activities;Equipment evaluation, education, & procurement    Restrictions  Restrictions/Precautions  Restrictions/Precautions: Fall Risk  Position Activity Restriction  Other position/activity restrictions: 24 Ok to wear offloading shoe with ambulation     Subjective   Subjective  Subjective: (P) Pt in bed and agreeable to therapy. pt connected to IV  Cognition  Overall Cognitive Status: (P) WNL    Objective  Vitals     Bed Mobility Training  Bed Mobility Training: (P) Yes  Overall Level of Assistance: (P) Supervision  Interventions: (P) Visual cues  Rolling: (P)  Supervision  Supine to Sit: (P) Stand-by assistance;Supervision  Sit to Supine: (P) Supervision  Scooting: (P) Stand-by assistance;Supervision  Balance  Sitting: (P) Intact  Standing: (P) Impaired;With support  Standing - Static: (P) Fair  Standing - Dynamic: (P) Fair  Transfer Training  Transfer Training: (P) Yes  Overall Level of Assistance: (P) Contact-guard assistance;Stand-by assistance  Interventions: (P) Safety awareness training;Verbal cues  Sit to Stand: (P) Stand-by assistance  Stand to Sit: (P) Stand-by assistance  Stand Pivot Transfers: (P) Stand-by assistance;Contact-guard assistance  Gait Training  Gait Training: (P) Yes  Right Side Weight Bearing: (P) As tolerated  Left Side Weight Bearing: (P) As tolerated  Gait  Gait Training: (P) Yes  Left Side Weight Bearing: (P) As tolerated  Right Side Weight Bearing: (P) As tolerated  Overall Level of Assistance: (P) Contact-guard assistance  Distance (ft): (P)  (65' x 2 w/ 2 180 degree turns.)  Assistive Device: (P) Cane, straight  Interventions: (P) Verbal cues;Safety awareness training  Base of Support: (P) Widened  Speed/Tiff: (P) Slow  Stance: (P) Right decreased  Gait Abnormalities: (P) Antalgic;Step to gait     PT Exercises  A/AROM Exercises: (P) seated AROM B LE's: AP's x20, marches x 10, LAQ's x 10 w/ 2 sec H, pillow squeeze/ GS x 10 w/ 5 sec H.            Goals  Short Term Goals  Time Frame for Short Term Goals: 3-5 days if needed medically  Short Term Goal 1: ambulate >= 75 ft  with least AD and following WB RLE as determined by MD modified independent  Short Term Goal 2: 10 stairs one rial , marked time <= close supervision  Short Term Goal 3: tolerated x10 LE CECE to gain strength  and endurance for functional mobility  Long Term Goals  Time Frame for Long Term Goals : same as [facility] medical pt  Patient Goals   Patient Goals : \"I need my wound and foot to be better so I can go home.\"    Education  Patient Education  Education Given To: (P)  Patient  Education Provided: (P) Home Exercise Program  Education Provided Comments: (P) VC's for proper form of seated Ther ex.  Education Method: (P) Verbal;Demonstration  Barriers to Learning: (P) None  Education Outcome: (P) Demonstrated understanding    AM-PAC - Mobility              Therapy Time   Individual Concurrent Group Co-treatment   Time In (P) 1250         Time Out (P) 1330         Minutes (P) 40                 Agnes Pelayo, PTA

## 2024-08-23 NOTE — DISCHARGE INSTRUCTIONS
Follow up with  @PCP@ in the next 7 days or sooner if needed.    Please return to ER or call 911 if you develop any significant signs or symptoms.    I may or may not have addressed all of your symptoms, medical issues,  Illnesses, or all of the abnormal blood work or imaging therefore please ask your PCP and other specialists to obtain [facility] record entirely to follow up on all of your symptoms, illnesses, abnormal labs, imaging and findings that I have and have not addressed during your hospitalization.    Please check your blood sugar 3 times a day and document result on the log.  Please provide log to your primary care doctor or diabetes specialist for review.  Please call your doctor if your blood sugar is creeping up above 300 or dropping below 100.  Please use the sugar tablet if your blood sugar drops below 80.       Discharging you from the hospital does not mean that your medical care ends here and now. You may still need to have additional work up, investigation, monitoring, surveillance, and treatment to be handled from this point on by in the out patient setting by  out patient providers including your PCP, Specialists and other healthcare providers.     For medication questions, contact your retail pharmacy and your PCP.    Your medical team at [facility] appreciates the opportunity to work with you to get well!    Estefania Joseph MD

## 2024-08-23 NOTE — PROGRESS NOTES
Right foot dressing changed, no drainage noted, pt tolerated well. Pt provided with supplies for several dressing changes. Perfect serve sent to Dr. Kauffman asking for antibiotic orders.

## 2024-08-23 NOTE — PROGRESS NOTES
[facility] Initial Discharge Assessment    Met with Patient to discuss discharge plan.        PCP: Jayashree Watson DO                                  Date of Last Visit: \" it might have been this year.\"  Patient has follow up appointment with Dr. Murguia on 8/30/24    If no PCP, list provided? N/A    Discharge Planning    Living Arrangements: independently at home.  Patient reports caring for own ADLS along with taking care of handicap son.  Patient reports that adult daughter also assists with patient's handicap son's care     Who do you live with? Adult daughter and three minor children     Who helps you with your care:  self    If lives at home:     Do you have any barriers navigating in your home? No    Patient can perform ADL?  Yes    Current Services (outpatient and in home) :  None    Dialysis: No    Is transportation available to get to your appointments? Yes- Patient reports having friends and family that aid with transportation needs.  SS reviewed transportation to medical appointment resource available through patient's insurance. Patient reports that she is aware of resource however, \" They can take hours to get you.\"  Patient identifies [employer] members as supportive.  Patient reports that she has made arrangement with [employer] members to aid with transportation for same day infusions.  It is also worth noting that patient is scheduled to be seen at [facility] wound care center.  This  spoke with Keeley at the wound care center whom reports that transportation assistance is available through [employer] or [employer] should patient advise office day before appointment that transportation is needed.  This  advised patient of above along with providing contact for [facility] wound care center and encouraging patient to call the office on Monday 8/26 should patient need transport for 8/27 wound care appointment.  Patient voices understanding.    DME Equipment:  Patient has insulin

## 2024-08-23 NOTE — DISCHARGE INSTR - COC
Continuity of Care Form    Patient Name: Carli Bustamante   :  1980  MRN:  [id number]    Admit date:  2024  Discharge date:  ***    Code Status Order: Full Code   Advance Directives:   Advance Care Flowsheet Documentation             Admitting Physician:  Estefania Joseph MD  PCP: Jayashree Watson DO    Discharging Nurse: ***  Discharging Hospital Unit/Room#: 0218/0218-01  Discharging Unit Phone Number: ***    Emergency Contact:   Extended Emergency Contact Information  Primary Emergency Contact: Irene Bustamante   [facility]  Home Phone: [phone]  Relation: Parent    Past Surgical History:  Past Surgical History:   Procedure Laterality Date    ABDOMEN SURGERY       x 2     SECTION      TUBAL LIGATION         Immunization History:   Immunization History   Administered Date(s) Administered    Influenza Vaccine, unspecified formulation 2011    Influenza, FLUARIX, FLULAVAL, FLUZONE (age 6 mo+) and AFLURIA, (age 3 y+), Quadv PF, 0.5mL 2018    TDaP, ADACEL (age 10y-64y), BOOSTRIX (age 10y+), IM, 0.5mL 2018       Active Problems:  Patient Active Problem List   Diagnosis Code    Uncontrolled diabetes mellitus type 2 without complications [id number]    Hyperlipidemia E78.5    Modified White class B pregestational diabetes mellitus O24.319    History of PSVT (paroxysmal supraventricular tachycardia) Z86.79    Hx of pre-eclampsia in prior pregnancy, currently pregnant O09.299    Hx of  section Z98.891    Previous  section Z98.891    Hx of  delivery, currently pregnant O09.899    History of fetal abnormality in previous pregnancy, currently pregnant O09.299    Late prenatal care affecting pregnancy in second trimester O09.32    Obesity (BMI 30-39.9) E66.9    Herpes simplex infection of skin B00.9    Lower abdominal pain R10.30    Type 2 diabetes mellitus affecting pregnancy in third trimester, antepartum O24.113    Diabetic infection of  Score:  Readmission Risk              Risk of Unplanned Readmission:  8             [facility] Out-[facility] infusion [facility]  Address: [address]   Phone: [phone]  Appt: Saturday Aug. 24th at 10am      [facility]  Address: [address]  Phone: [phone]  Appt: Tuesday Aug. 27th at 3:45pm  Please call Monday Aug. 26th to arrange transport if needed.      / signature: Electronically signed by CHARLEEN Sutherland on 8/23/24 at 3:26 PM EDT    PHYSICIAN SECTION    Prognosis: Good    Condition at Discharge: Stable    Rehab Potential (if transferring to Rehab): Good    Recommended Labs or Other Treatments After Discharge: CBC, CMP, CRP weekly for 5 weeks, result is to be communicated to PCP and Dr. Kafufman.  I will not be able to follow-up on any lab abnormalities postdischarge from acute care.    Physician Certification: I certify the above information and transfer of Carli Bustamante  is necessary for the continuing treatment of the diagnosis listed and that she requires Home Care for less 30 days.     Update Admission H&P: No change in H&P    PHYSICIAN SIGNATURE:  Electronically signed by Estefania Joseph MD on 8/23/24 at 10:51 AM EDT

## 2024-08-24 ENCOUNTER — HOSPITAL ENCOUNTER (OUTPATIENT)
Dept: INFUSION THERAPY | Age: 44
Setting detail: INFUSION SERIES
Discharge: HOME | End: 2024-08-24
Payer: COMMERCIAL

## 2024-08-24 VITALS
TEMPERATURE: 97.9 F | RESPIRATION RATE: 18 BRPM | DIASTOLIC BLOOD PRESSURE: 80 MMHG | HEART RATE: 96 BPM | SYSTOLIC BLOOD PRESSURE: 123 MMHG

## 2024-08-24 DIAGNOSIS — A41.9 SEPSIS DUE TO CELLULITIS (HCC): Primary | ICD-10-CM

## 2024-08-24 DIAGNOSIS — L03.90 SEPSIS DUE TO CELLULITIS (HCC): Primary | ICD-10-CM

## 2024-08-24 LAB
BACTERIA BLD CULT ORG #2: NORMAL
CULTURE, BLOOD ID SENSITIVITY: NORMAL

## 2024-08-24 PROCEDURE — 6360000002 HC RX W HCPCS: Performed by: INTERNAL MEDICINE

## 2024-08-24 PROCEDURE — 96365 THER/PROPH/DIAG IV INF INIT: CPT

## 2024-08-24 PROCEDURE — 2580000003 HC RX 258: Performed by: INTERNAL MEDICINE

## 2024-08-24 RX ORDER — HEPARIN 100 UNIT/ML
500 SYRINGE INTRAVENOUS PRN
Status: CANCELLED | OUTPATIENT
Start: 2024-08-25

## 2024-08-24 RX ORDER — SODIUM CHLORIDE 0.9 % (FLUSH) 0.9 %
5-40 SYRINGE (ML) INJECTION PRN
Status: DISCONTINUED | OUTPATIENT
Start: 2024-08-24 | End: 2024-08-25 | Stop reason: HOSPADM

## 2024-08-24 RX ORDER — SODIUM CHLORIDE 0.9 % (FLUSH) 0.9 %
5-40 SYRINGE (ML) INJECTION PRN
Status: CANCELLED | OUTPATIENT
Start: 2024-08-25

## 2024-08-24 RX ORDER — SODIUM CHLORIDE 9 MG/ML
5-250 INJECTION, SOLUTION INTRAVENOUS PRN
Status: CANCELLED | OUTPATIENT
Start: 2024-08-25

## 2024-08-24 RX ADMIN — ERTAPENEM SODIUM 1000 MG: 1 INJECTION INTRAMUSCULAR; INTRAVENOUS at 10:17

## 2024-08-24 RX ADMIN — SODIUM CHLORIDE, PRESERVATIVE FREE 10 ML: 5 INJECTION INTRAVENOUS at 10:15

## 2024-08-24 ASSESSMENT — PAIN DESCRIPTION - LOCATION: LOCATION: FOOT;LEG

## 2024-08-24 ASSESSMENT — PAIN SCALES - GENERAL: PAINLEVEL_OUTOF10: 4

## 2024-08-24 ASSESSMENT — PAIN DESCRIPTION - DESCRIPTORS: DESCRIPTORS: BURNING

## 2024-08-24 ASSESSMENT — PAIN DESCRIPTION - ORIENTATION: ORIENTATION: RIGHT;LEFT

## 2024-08-24 NOTE — PROGRESS NOTES
Pt left with friend via w/c. Information was given on med.  Pt will return tomorrow, and is aware of procedure.  All equipment used in the care for this patient has been cleaned.

## 2024-08-24 NOTE — PROGRESS NOTES
Pt arrives to C w/c, with Himanshu, friend,  for infusion. Pt is made comfortable in chair.  Call light in reach.

## 2024-08-25 ENCOUNTER — HOSPITAL ENCOUNTER (OUTPATIENT)
Dept: INFUSION THERAPY | Age: 44
Setting detail: INFUSION SERIES
Discharge: HOME | End: 2024-08-25
Payer: COMMERCIAL

## 2024-08-25 VITALS
OXYGEN SATURATION: 98 % | HEART RATE: 85 BPM | SYSTOLIC BLOOD PRESSURE: 130 MMHG | TEMPERATURE: 98.1 F | DIASTOLIC BLOOD PRESSURE: 73 MMHG

## 2024-08-25 DIAGNOSIS — A41.9 SEPSIS DUE TO CELLULITIS (HCC): Primary | ICD-10-CM

## 2024-08-25 DIAGNOSIS — L03.90 SEPSIS DUE TO CELLULITIS (HCC): Primary | ICD-10-CM

## 2024-08-25 PROCEDURE — 2580000003 HC RX 258: Performed by: INTERNAL MEDICINE

## 2024-08-25 PROCEDURE — 96365 THER/PROPH/DIAG IV INF INIT: CPT

## 2024-08-25 PROCEDURE — 6360000002 HC RX W HCPCS: Performed by: INTERNAL MEDICINE

## 2024-08-25 RX ORDER — SODIUM CHLORIDE 9 MG/ML
5-250 INJECTION, SOLUTION INTRAVENOUS PRN
Status: CANCELLED | OUTPATIENT
Start: 2024-08-26

## 2024-08-25 RX ORDER — SODIUM CHLORIDE 0.9 % (FLUSH) 0.9 %
5-40 SYRINGE (ML) INJECTION PRN
Status: CANCELLED | OUTPATIENT
Start: 2024-08-26

## 2024-08-25 RX ORDER — HEPARIN 100 UNIT/ML
500 SYRINGE INTRAVENOUS PRN
Status: CANCELLED | OUTPATIENT
Start: 2024-08-26

## 2024-08-25 RX ADMIN — ERTAPENEM SODIUM 1000 MG: 1 INJECTION INTRAMUSCULAR; INTRAVENOUS at 10:25

## 2024-08-25 ASSESSMENT — PAIN DESCRIPTION - DESCRIPTORS: DESCRIPTORS: BURNING

## 2024-08-25 ASSESSMENT — PAIN DESCRIPTION - ORIENTATION: ORIENTATION: RIGHT

## 2024-08-25 ASSESSMENT — PAIN SCALES - GENERAL: PAINLEVEL_OUTOF10: 6

## 2024-08-25 ASSESSMENT — PAIN DESCRIPTION - LOCATION: LOCATION: FOOT

## 2024-08-25 NOTE — FLOWSHEET NOTE
Infusion completed, pt tolerated well. Patient left unit via wheelchair. All equipment used in the care for this patient has been cleaned.

## 2024-08-26 ENCOUNTER — HOSPITAL ENCOUNTER (OUTPATIENT)
Dept: INFUSION THERAPY | Age: 44
Setting detail: INFUSION SERIES
Discharge: HOME | End: 2024-08-26
Payer: COMMERCIAL

## 2024-08-26 VITALS
HEART RATE: 88 BPM | SYSTOLIC BLOOD PRESSURE: 139 MMHG | RESPIRATION RATE: 18 BRPM | TEMPERATURE: 98.9 F | DIASTOLIC BLOOD PRESSURE: 84 MMHG | OXYGEN SATURATION: 98 %

## 2024-08-26 DIAGNOSIS — A41.9 SEPSIS DUE TO CELLULITIS (HCC): Primary | ICD-10-CM

## 2024-08-26 DIAGNOSIS — L03.90 SEPSIS DUE TO CELLULITIS (HCC): Primary | ICD-10-CM

## 2024-08-26 LAB
BACTERIA BLD CULT ORG #2: NORMAL
BACTERIA BLD CULT: NORMAL

## 2024-08-26 PROCEDURE — 2580000003 HC RX 258: Performed by: INTERNAL MEDICINE

## 2024-08-26 PROCEDURE — 6360000002 HC RX W HCPCS: Performed by: INTERNAL MEDICINE

## 2024-08-26 PROCEDURE — 96365 THER/PROPH/DIAG IV INF INIT: CPT

## 2024-08-26 RX ORDER — HEPARIN 100 UNIT/ML
500 SYRINGE INTRAVENOUS PRN
Status: CANCELLED | OUTPATIENT
Start: 2024-08-27

## 2024-08-26 RX ORDER — SODIUM CHLORIDE 0.9 % (FLUSH) 0.9 %
5-40 SYRINGE (ML) INJECTION PRN
Status: CANCELLED | OUTPATIENT
Start: 2024-08-27

## 2024-08-26 RX ORDER — SODIUM CHLORIDE 9 MG/ML
5-250 INJECTION, SOLUTION INTRAVENOUS PRN
Status: CANCELLED | OUTPATIENT
Start: 2024-08-27

## 2024-08-26 RX ADMIN — ERTAPENEM SODIUM 1000 MG: 1 INJECTION INTRAMUSCULAR; INTRAVENOUS at 10:21

## 2024-08-26 NOTE — PROGRESS NOTES
Patient arrived at 1000. Electronically signed by Jessica Tariq RN on 8/26/2024 at 10:04 AM VSS, assessment completed. Patient stated she had zero pain. Electronically signed by Jessica Tariq RN on 8/26/2024 at 10:26 AM

## 2024-08-26 NOTE — PROGRESS NOTES
[facility] OUTPATIENT INFUSION CENTER     PT arrived via:  [x] Ambulatory         [] Wheelchair  []With transport                [] Other:     [x]PT oriented to room and call light in reach.   [x] Vital signs obtained and are stable.   []Medication education provided and reviewed with patient.

## 2024-08-27 ENCOUNTER — HOSPITAL ENCOUNTER (OUTPATIENT)
Dept: WOUND CARE | Age: 44
Discharge: HOME | End: 2024-08-27
Attending: STUDENT IN AN ORGANIZED HEALTH CARE EDUCATION/TRAINING PROGRAM
Payer: COMMERCIAL

## 2024-08-27 ENCOUNTER — HOSPITAL ENCOUNTER (OUTPATIENT)
Dept: INFUSION THERAPY | Age: 44
Setting detail: INFUSION SERIES
Discharge: HOME | End: 2024-08-27
Payer: COMMERCIAL

## 2024-08-27 VITALS
HEART RATE: 89 BPM | DIASTOLIC BLOOD PRESSURE: 87 MMHG | TEMPERATURE: 98 F | OXYGEN SATURATION: 98 % | RESPIRATION RATE: 17 BRPM | SYSTOLIC BLOOD PRESSURE: 133 MMHG

## 2024-08-27 VITALS
HEART RATE: 108 BPM | RESPIRATION RATE: 16 BRPM | SYSTOLIC BLOOD PRESSURE: 159 MMHG | TEMPERATURE: 97 F | DIASTOLIC BLOOD PRESSURE: 96 MMHG

## 2024-08-27 DIAGNOSIS — L03.90 SEPSIS DUE TO CELLULITIS (HCC): Primary | ICD-10-CM

## 2024-08-27 DIAGNOSIS — A41.9 SEPSIS DUE TO CELLULITIS (HCC): Primary | ICD-10-CM

## 2024-08-27 PROCEDURE — 2580000003 HC RX 258: Performed by: INTERNAL MEDICINE

## 2024-08-27 PROCEDURE — 99213 OFFICE O/P EST LOW 20 MIN: CPT

## 2024-08-27 PROCEDURE — 11042 DBRDMT SUBQ TIS 1ST 20SQCM/<: CPT

## 2024-08-27 PROCEDURE — 6360000002 HC RX W HCPCS: Performed by: INTERNAL MEDICINE

## 2024-08-27 PROCEDURE — 96365 THER/PROPH/DIAG IV INF INIT: CPT

## 2024-08-27 RX ORDER — SODIUM CHLORIDE 9 MG/ML
5-250 INJECTION, SOLUTION INTRAVENOUS PRN
Status: CANCELLED | OUTPATIENT
Start: 2024-08-28

## 2024-08-27 RX ORDER — HEPARIN 100 UNIT/ML
500 SYRINGE INTRAVENOUS PRN
Status: CANCELLED | OUTPATIENT
Start: 2024-08-28

## 2024-08-27 RX ORDER — SODIUM CHLORIDE 0.9 % (FLUSH) 0.9 %
5-40 SYRINGE (ML) INJECTION PRN
Status: CANCELLED | OUTPATIENT
Start: 2024-08-28

## 2024-08-27 RX ADMIN — ERTAPENEM SODIUM 1000 MG: 1 INJECTION INTRAMUSCULAR; INTRAVENOUS at 10:22

## 2024-08-27 ASSESSMENT — PAIN DESCRIPTION - ORIENTATION
ORIENTATION: RIGHT
ORIENTATION: RIGHT

## 2024-08-27 ASSESSMENT — PAIN DESCRIPTION - LOCATION
LOCATION: FOOT
LOCATION: FOOT

## 2024-08-27 ASSESSMENT — PAIN SCALES - GENERAL
PAINLEVEL_OUTOF10: 6
PAINLEVEL_OUTOF10: 8

## 2024-08-27 ASSESSMENT — PAIN DESCRIPTION - DESCRIPTORS
DESCRIPTORS: BURNING
DESCRIPTORS: BURNING

## 2024-08-27 NOTE — PLAN OF CARE
Wound Care Supplies      Supply Company:     [facility] [address] [facility] [address] [place] [id number]  p: [phone] f: [phone]     Ordering Center:     [facility]  [address] [place] [address]  [phone]  WOUND CARE [phone]  FAX NUMBER [phone]    Patient Information:      Carli Bustamante  [address] [address]   [phone]   : 1980  AGE: 44 y.o.     GENDER: female   TODAYS DATE:  2024    Insurance:      PRIMARY INSURANCE:  Plan: [employer] [place]ICA[place]  Coverage: [employer]  Effective Date: 2017  [id number] - (Medicaid Managed)    SECONDARY INSURANCE:  Plan:   Coverage:   Effective Date:   [unfilled]    [unfilled]   [unfilled]     Patient Wound Information:      Problem List Items Addressed This Visit    None      WOUNDS REQUIRING DRESSING SUPPLIES:     Wound 24 Foot Right;Plantar #1 (Active)   Wound Image   24 1353   Wound Etiology Diabetic 24 1353   Dressing Status New dressing applied 24 1012   Wound Cleansed Cleansed with saline 24 1353   Dressing/Treatment Collagen with Ag;Dry dressing 24 1441   Offloading for Diabetic Foot Ulcers Offloading ordered;Post op shoe 24 1441   Wound Length (cm) 0.7 cm 24 1353   Wound Width (cm) 1.4 cm 24 1353   Wound Depth (cm) 0.3 cm 24 1353   Wound Surface Area (cm^2) 0.98 cm^2 24 1353   Change in Wound Size % (l*w) 34.67 24 1353   Wound Volume (cm^3) 0.294 cm^3 24 1353   Undermining Starts ___ O'Clock 12 24 1353   Undermining Ends___ O'Clock 3 24 1353   Undermining Maxium Distance (cm) 0.3 24 1353   Wound Assessment Slough 24 1353   Drainage Amount Small (< 25%) 24 1353   Drainage Description Serosanguinous 24 1353   Odor None 24   Edna-wound Assessment Hyperkeratosis (callous) 24   Margins Undefined edges 24    Wound Thickness Description not for Pressure Injury Full thickness 08/27/24 1353   Number of days: 7          Supplies Requested :      WOUND #: 1   PRIMARY DRESSING:  Collagen with silver  SECONDARY DRESSING:  None   Cover and Secure with: 4X4 gauze pad  Bulky roll gauze     FREQUENCY OF DRESSING CHANGES:  Every other day             ADDITIONAL ITEMS:  [] Gloves Small  [x] Gloves Medium [] Gloves Large [] Gloves XLarge  [] Tape 1\" [] Tape 2\" [] Tape 3\"  [x] Medipore Tape  [x] Saline  [] Skin Prep   [] Adhesive Remover   [] Cotton Tip Applicators   [] Other:    Patient Wound(s) Debrided: [x] Yes   [] No    Debribement Type: subcutaneous tissue    Debridement Date: 8/27/2024    Patient currently being seen by Home Health: [] Yes   [x] No    Duration for needed supplies:  []15  [x]30  []60  []90 Days    Provider Information:      PROVIDER'S NAME:  PRINCESS DIMAS DPM   NPI: [id number]

## 2024-08-27 NOTE — PROGRESS NOTES
[facility] OUTPATIENT INFUSION CENTER     PT arrived via:  [x] Ambulatory         [] Wheelchair  []With transport                [] Other:     [x]PT oriented to room and call light in reach.   [x] Vital signs obtained and are stable.   []Medication education provided and reviewed with patient.

## 2024-08-27 NOTE — PROGRESS NOTES
Infusion completed. Pt tolerated well.  No c/o or requests. Pt leaving with family member.    All equipment used in the care for this patient has been cleaned.

## 2024-08-27 NOTE — WOUND CARE
[facility] Physician Billing Sheet.     Carli Bustamante  AGE: 44 y.o.   GENDER: female  : 1980  TODAY'S DATE:  2024    ICD-10 CODES  L97.597  L03.115  E11.42      PHYSICIAN PROCEDURES    CPT CODE  55177  22838      Electronically signed by Alistair Brand DPM on 2024 at 3:12 PM

## 2024-08-27 NOTE — DISCHARGE INSTRUCTIONS
[facility] Wound Center and Hyperbaric Medicine   Physician Orders and Discharge Instructions  [facility]  [address] [place]  [address]  Telephone: [phone]      [phone]      NAME:  Carli Bustamante          YOB: 1980  MEDICAL RECORD NUMBER:  [id number]    Your  is:  Jeannette    Home Care/Facility:    Wound Location: Right Plantar Foot     Dressing orders: 1.Cleanse wound(s) with normal saline.  2. Apply dry QUE  Or equivalent to wound bed.  3. Moisten QUE with a few drops of normal saline.  4. Cover with 4x4's and wrap with gauze (errol or kerlix)  5. Change every other day (Tuesday, Thursday, Saturday)      Compression: none     Offloading Device: Forefoot offloading shoe with felt     Other Instructions:  Continue taking antibiotics per Infectious Disease.     Keep all dressings clean, dry and intact.  Keep pressure off the wound(s) at all times.     Follow up visit   1 Week September 3, 2024 at 3:30pm    Please give 24 hour notice if unable to keep appointment. [phone]    If you experience any of the following, please call the Wound Care Service at  [phone] or go to the nearest emergency room.   *Increase in pain *Temperature over 101 *Increase in drainage from your wound or a foul odor  *Uncontrolled swelling *Need for compression bandage changes due to slippage, breakthrough drainage       PLEASE NOTE: IF YOU ARE UNABLE TO OBTAIN WOUND SUPPLIES, CONTINUE TO USE THE SUPPLIES YOU HAVE AVAILABLE UNTIL YOU ARE ABLE TO REACH US. IT IS MOST IMPORTANT TO KEEP THE WOUND COVERED AT ALL TIMES

## 2024-08-28 ENCOUNTER — HOSPITAL ENCOUNTER (OUTPATIENT)
Dept: INFUSION THERAPY | Age: 44
Setting detail: INFUSION SERIES
Discharge: HOME | End: 2024-08-28
Payer: COMMERCIAL

## 2024-08-28 VITALS
HEART RATE: 78 BPM | RESPIRATION RATE: 18 BRPM | SYSTOLIC BLOOD PRESSURE: 145 MMHG | TEMPERATURE: 97.2 F | OXYGEN SATURATION: 99 % | DIASTOLIC BLOOD PRESSURE: 79 MMHG

## 2024-08-28 DIAGNOSIS — A41.9 SEPSIS DUE TO CELLULITIS (HCC): Primary | ICD-10-CM

## 2024-08-28 DIAGNOSIS — L03.90 SEPSIS DUE TO CELLULITIS (HCC): Primary | ICD-10-CM

## 2024-08-28 LAB
BASOPHILS # BLD: 0 K/UL (ref 0–0.2)
BASOPHILS NFR BLD: 0.4 %
CRP SERPL HS-MCNC: 14.1 MG/L (ref 0–5)
EOSINOPHIL # BLD: 0.3 K/UL (ref 0–0.7)
EOSINOPHIL NFR BLD: 4.1 %
ERYTHROCYTE [DISTWIDTH] IN BLOOD BY AUTOMATED COUNT: 12.9 % (ref 11.5–14.5)
HCT VFR BLD AUTO: 35 % (ref 37–47)
HGB BLD-MCNC: 12.9 G/DL (ref 12–16)
LYMPHOCYTES # BLD: 1.7 K/UL (ref 1–4.8)
LYMPHOCYTES NFR BLD: 23 %
MCH RBC QN AUTO: 30.9 PG (ref 27–31.3)
MCHC RBC AUTO-ENTMCNC: 36.9 % (ref 33–37)
MCV RBC AUTO: 83.9 FL (ref 79.4–94.8)
MONOCYTES # BLD: 0.5 K/UL (ref 0.2–0.8)
MONOCYTES NFR BLD: 6.5 %
NEUTROPHILS # BLD: 4.7 K/UL (ref 1.4–6.5)
NEUTS SEG NFR BLD: 65.6 %
PLATELET # BLD AUTO: 267 K/UL (ref 130–400)
RBC # BLD AUTO: 4.17 M/UL (ref 4.2–5.4)
WBC # BLD AUTO: 7.2 K/UL (ref 4.8–10.8)

## 2024-08-28 PROCEDURE — 36592 COLLECT BLOOD FROM PICC: CPT

## 2024-08-28 PROCEDURE — 6360000002 HC RX W HCPCS: Performed by: INTERNAL MEDICINE

## 2024-08-28 PROCEDURE — 86140 C-REACTIVE PROTEIN: CPT

## 2024-08-28 PROCEDURE — 85025 COMPLETE CBC W/AUTO DIFF WBC: CPT

## 2024-08-28 PROCEDURE — 2580000003 HC RX 258: Performed by: INTERNAL MEDICINE

## 2024-08-28 PROCEDURE — 96365 THER/PROPH/DIAG IV INF INIT: CPT

## 2024-08-28 RX ORDER — SODIUM CHLORIDE 0.9 % (FLUSH) 0.9 %
5-40 SYRINGE (ML) INJECTION PRN
Status: CANCELLED | OUTPATIENT
Start: 2024-08-29

## 2024-08-28 RX ORDER — SODIUM CHLORIDE 9 MG/ML
5-250 INJECTION, SOLUTION INTRAVENOUS PRN
Status: CANCELLED | OUTPATIENT
Start: 2024-08-29

## 2024-08-28 RX ORDER — HEPARIN 100 UNIT/ML
500 SYRINGE INTRAVENOUS PRN
Status: CANCELLED | OUTPATIENT
Start: 2024-08-29

## 2024-08-28 RX ADMIN — ERTAPENEM SODIUM 1000 MG: 1 INJECTION INTRAMUSCULAR; INTRAVENOUS at 10:41

## 2024-08-28 NOTE — PROGRESS NOTES
[facility] OUTPATIENT INFUSION CENTER      [] Observation period completed. PT without signs/symptoms of reaction.  [x] Observation period not applicable.     PT discharged [x] Ambulatory       [] Wheelchair  []With transport                [] Other:     [] AVS  provided to patient.   [x] Room and equipment cleaned.

## 2024-08-28 NOTE — PROGRESS NOTES
[facility] OUTPATIENT INFUSION CENTER     PT arrived via:  [x] Ambulatory         [] Wheelchair  []With transport                [] Other:     [x]PT oriented to room and call light in reach.   [x] Vital signs obtained and are stable.   []Medication education provided and reviewed with patient.

## 2024-08-29 ENCOUNTER — HOSPITAL ENCOUNTER (OUTPATIENT)
Dept: INFUSION THERAPY | Age: 44
Setting detail: INFUSION SERIES
Discharge: HOME | End: 2024-08-29
Payer: COMMERCIAL

## 2024-08-29 VITALS
RESPIRATION RATE: 18 BRPM | OXYGEN SATURATION: 99 % | DIASTOLIC BLOOD PRESSURE: 82 MMHG | HEART RATE: 84 BPM | SYSTOLIC BLOOD PRESSURE: 124 MMHG

## 2024-08-29 DIAGNOSIS — A41.9 SEPSIS DUE TO CELLULITIS (HCC): Primary | ICD-10-CM

## 2024-08-29 DIAGNOSIS — L03.90 SEPSIS DUE TO CELLULITIS (HCC): Primary | ICD-10-CM

## 2024-08-29 PROCEDURE — 6360000002 HC RX W HCPCS: Performed by: INTERNAL MEDICINE

## 2024-08-29 PROCEDURE — 2580000003 HC RX 258: Performed by: INTERNAL MEDICINE

## 2024-08-29 PROCEDURE — 96365 THER/PROPH/DIAG IV INF INIT: CPT

## 2024-08-29 RX ORDER — SODIUM CHLORIDE 9 MG/ML
5-250 INJECTION, SOLUTION INTRAVENOUS PRN
Status: CANCELLED | OUTPATIENT
Start: 2024-08-30

## 2024-08-29 RX ORDER — SODIUM CHLORIDE 0.9 % (FLUSH) 0.9 %
5-40 SYRINGE (ML) INJECTION PRN
Status: CANCELLED | OUTPATIENT
Start: 2024-08-30

## 2024-08-29 RX ORDER — HEPARIN 100 UNIT/ML
500 SYRINGE INTRAVENOUS PRN
Status: CANCELLED | OUTPATIENT
Start: 2024-08-30

## 2024-08-29 RX ADMIN — ERTAPENEM SODIUM 1000 MG: 1 INJECTION INTRAMUSCULAR; INTRAVENOUS at 10:16

## 2024-08-29 NOTE — FLOWSHEET NOTE
Patient to the floor ambulatory for her infusion. Vital signs taken. No distress noted. Call light within reach.

## 2024-08-30 ENCOUNTER — HOSPITAL ENCOUNTER (OUTPATIENT)
Dept: INFUSION THERAPY | Age: 44
Setting detail: INFUSION SERIES
Discharge: HOME | End: 2024-08-30
Payer: COMMERCIAL

## 2024-08-30 ENCOUNTER — TELEMEDICINE (OUTPATIENT)
Dept: FAMILY MEDICINE CLINIC | Age: 44
End: 2024-08-30
Payer: COMMERCIAL

## 2024-08-30 VITALS
HEART RATE: 80 BPM | SYSTOLIC BLOOD PRESSURE: 144 MMHG | RESPIRATION RATE: 18 BRPM | TEMPERATURE: 97 F | DIASTOLIC BLOOD PRESSURE: 82 MMHG | OXYGEN SATURATION: 99 %

## 2024-08-30 DIAGNOSIS — L03.90 SEPSIS DUE TO CELLULITIS (HCC): Primary | ICD-10-CM

## 2024-08-30 DIAGNOSIS — E11.628 DIABETIC INFECTION OF RIGHT FOOT (HCC): Primary | ICD-10-CM

## 2024-08-30 DIAGNOSIS — A41.9 SEPSIS DUE TO CELLULITIS (HCC): Primary | ICD-10-CM

## 2024-08-30 DIAGNOSIS — L08.9 DIABETIC INFECTION OF RIGHT FOOT (HCC): Primary | ICD-10-CM

## 2024-08-30 DIAGNOSIS — E11.621 TYPE 2 DIABETES MELLITUS WITH FOOT ULCER, WITH LONG-TERM CURRENT USE OF INSULIN (HCC): ICD-10-CM

## 2024-08-30 DIAGNOSIS — L97.509 TYPE 2 DIABETES MELLITUS WITH FOOT ULCER, WITH LONG-TERM CURRENT USE OF INSULIN (HCC): ICD-10-CM

## 2024-08-30 DIAGNOSIS — Z79.4 TYPE 2 DIABETES MELLITUS WITH FOOT ULCER, WITH LONG-TERM CURRENT USE OF INSULIN (HCC): ICD-10-CM

## 2024-08-30 PROCEDURE — 2580000003 HC RX 258: Performed by: INTERNAL MEDICINE

## 2024-08-30 PROCEDURE — 2022F DILAT RTA XM EVC RTNOPTHY: CPT | Performed by: FAMILY MEDICINE

## 2024-08-30 PROCEDURE — 1111F DSCHRG MED/CURRENT MED MERGE: CPT | Performed by: FAMILY MEDICINE

## 2024-08-30 PROCEDURE — G8427 DOCREV CUR MEDS BY ELIG CLIN: HCPCS | Performed by: FAMILY MEDICINE

## 2024-08-30 PROCEDURE — 6360000002 HC RX W HCPCS: Performed by: INTERNAL MEDICINE

## 2024-08-30 PROCEDURE — 96365 THER/PROPH/DIAG IV INF INIT: CPT

## 2024-08-30 PROCEDURE — 3052F HG A1C>EQUAL 8.0%<EQUAL 9.0%: CPT | Performed by: FAMILY MEDICINE

## 2024-08-30 PROCEDURE — 99213 OFFICE O/P EST LOW 20 MIN: CPT | Performed by: FAMILY MEDICINE

## 2024-08-30 RX ORDER — SODIUM CHLORIDE 0.9 % (FLUSH) 0.9 %
5-40 SYRINGE (ML) INJECTION PRN
Status: CANCELLED | OUTPATIENT
Start: 2024-08-31

## 2024-08-30 RX ORDER — SODIUM CHLORIDE 9 MG/ML
5-250 INJECTION, SOLUTION INTRAVENOUS PRN
Status: CANCELLED | OUTPATIENT
Start: 2024-08-31

## 2024-08-30 RX ORDER — HEPARIN 100 UNIT/ML
500 SYRINGE INTRAVENOUS PRN
Status: CANCELLED | OUTPATIENT
Start: 2024-08-31

## 2024-08-30 RX ADMIN — ERTAPENEM SODIUM 1000 MG: 1 INJECTION INTRAMUSCULAR; INTRAVENOUS at 10:20

## 2024-08-30 NOTE — FLOWSHEET NOTE
Patient infusion completed, left unit ambulatory. All equipment used in the care for this patient has been cleaned.

## 2024-08-30 NOTE — PROGRESS NOTES
2024    TELEHEALTH EVALUATION -- Audio/Visual   Due to COVID 19 outbreak, patient's office visit was converted to a virtual visit.  Patient was contacted and agreed to proceed with a virtual visit via [employer] Video Visit  The risks and benefits of converting to a virtual visit were discussed .  Patient also understood that insurance coverage and co-pays are up to their individual insurance plans.    HPI:    Carli Bustamante (:  1980) has requested an audio/video evaluation for the following concern(s):  Chief Complaint   Patient presents with    Follow-Up from [facility], foot infection       Virtual hospital f/u  Previously with Dr. Watson    Sugars have been \"ok\"  A few high numbers  Lowest since home 113        Admit date:  2024                      Discharge date:  2024     Admitting Physician:  Estefania Joseph MD  Primary Care Physician:  Jayashree Watson,         Discharge Diagnoses:      *Infected diabetic foot ulcer involving the right fourth and fifth distal metatarsal plantar area.  Status post bedside debridement by podiatry at [facility] prior to transfer here.  Patient was seen by podiatrist here who did not recommend to perform any additional debridement.  MRI is negative for osteomyelitis.  Patient has been on Zosyn and vancomycin.  Patient was seen by ID team.  Dr. Kauffman recommended 4 weeks worth of intravenous Invanz.     *Positive blood culture, 1 out of 2 bottles.  Staph.  Suspect contamination.  Wait for the final ID and sensitivity.  Repeat blood culture thus far is negative.     *Hypertension, fairly stable     *Diabetes on insulin pump, slightly uncontrolled managed by endocrinology.  Patient follows up with Dr. Mccormick.     *History of SVT.     *Multiple other medical issues not listed above        Hospital Course:   Carli Bustamante is a 44 y.o. female that was admitted and treated at [facility] for the aforementioned medical issues.  Her

## 2024-08-31 ENCOUNTER — HOSPITAL ENCOUNTER (OUTPATIENT)
Dept: INFUSION THERAPY | Age: 44
Setting detail: INFUSION SERIES
Discharge: HOME | End: 2024-08-31
Payer: COMMERCIAL

## 2024-08-31 VITALS
HEART RATE: 94 BPM | OXYGEN SATURATION: 97 % | SYSTOLIC BLOOD PRESSURE: 139 MMHG | DIASTOLIC BLOOD PRESSURE: 84 MMHG | RESPIRATION RATE: 18 BRPM | TEMPERATURE: 97.3 F

## 2024-08-31 DIAGNOSIS — L03.90 SEPSIS DUE TO CELLULITIS (HCC): Primary | ICD-10-CM

## 2024-08-31 DIAGNOSIS — A41.9 SEPSIS DUE TO CELLULITIS (HCC): Primary | ICD-10-CM

## 2024-08-31 PROCEDURE — 6360000002 HC RX W HCPCS: Performed by: INTERNAL MEDICINE

## 2024-08-31 PROCEDURE — 96365 THER/PROPH/DIAG IV INF INIT: CPT

## 2024-08-31 PROCEDURE — 2580000003 HC RX 258: Performed by: INTERNAL MEDICINE

## 2024-08-31 RX ORDER — SODIUM CHLORIDE 9 MG/ML
5-250 INJECTION, SOLUTION INTRAVENOUS PRN
Status: CANCELLED | OUTPATIENT
Start: 2024-09-01

## 2024-08-31 RX ORDER — SODIUM CHLORIDE 0.9 % (FLUSH) 0.9 %
5-40 SYRINGE (ML) INJECTION PRN
Status: CANCELLED | OUTPATIENT
Start: 2024-09-01

## 2024-08-31 RX ORDER — HEPARIN 100 UNIT/ML
500 SYRINGE INTRAVENOUS PRN
Status: CANCELLED | OUTPATIENT
Start: 2024-09-01

## 2024-08-31 RX ADMIN — ERTAPENEM SODIUM 1000 MG: 1 INJECTION INTRAMUSCULAR; INTRAVENOUS at 10:01

## 2024-08-31 NOTE — FLOWSHEET NOTE
Patient arrived ambulatory with family member for infusion, vitals completed, call light within reach

## 2024-09-01 ENCOUNTER — HOSPITAL ENCOUNTER (OUTPATIENT)
Dept: INFUSION THERAPY | Age: 44
Setting detail: INFUSION SERIES
Discharge: HOME | End: 2024-09-01
Payer: COMMERCIAL

## 2024-09-01 VITALS
DIASTOLIC BLOOD PRESSURE: 82 MMHG | RESPIRATION RATE: 18 BRPM | HEART RATE: 82 BPM | OXYGEN SATURATION: 97 % | TEMPERATURE: 97.5 F | SYSTOLIC BLOOD PRESSURE: 128 MMHG

## 2024-09-01 DIAGNOSIS — A41.9 SEPSIS DUE TO CELLULITIS (HCC): Primary | ICD-10-CM

## 2024-09-01 DIAGNOSIS — L03.90 SEPSIS DUE TO CELLULITIS (HCC): Primary | ICD-10-CM

## 2024-09-01 PROCEDURE — 96365 THER/PROPH/DIAG IV INF INIT: CPT

## 2024-09-01 PROCEDURE — 2580000003 HC RX 258: Performed by: INTERNAL MEDICINE

## 2024-09-01 PROCEDURE — 6360000002 HC RX W HCPCS: Performed by: INTERNAL MEDICINE

## 2024-09-01 RX ORDER — HEPARIN 100 UNIT/ML
500 SYRINGE INTRAVENOUS PRN
Status: CANCELLED | OUTPATIENT
Start: 2024-09-02

## 2024-09-01 RX ORDER — SODIUM CHLORIDE 9 MG/ML
5-250 INJECTION, SOLUTION INTRAVENOUS PRN
Status: CANCELLED | OUTPATIENT
Start: 2024-09-02

## 2024-09-01 RX ORDER — SODIUM CHLORIDE 0.9 % (FLUSH) 0.9 %
5-40 SYRINGE (ML) INJECTION PRN
Status: CANCELLED | OUTPATIENT
Start: 2024-09-02

## 2024-09-01 RX ADMIN — ERTAPENEM SODIUM 1000 MG: 1 INJECTION INTRAMUSCULAR; INTRAVENOUS at 10:05

## 2024-09-01 ASSESSMENT — PAIN SCALES - GENERAL: PAINLEVEL_OUTOF10: 2

## 2024-09-01 ASSESSMENT — PAIN DESCRIPTION - DESCRIPTORS: DESCRIPTORS: SORE

## 2024-09-01 ASSESSMENT — PAIN DESCRIPTION - LOCATION: LOCATION: ARM

## 2024-09-01 ASSESSMENT — PAIN DESCRIPTION - ORIENTATION: ORIENTATION: RIGHT

## 2024-09-01 NOTE — FLOWSHEET NOTE
Pt ambulatory upon arrival to the infusion center. Pt is accompanied by her sister. Pt states she is having some mild pain/ soreness at her PICC site. Pt confirms that the soreness is more present when she is remaining active and moving her arm, and that the soreness subsides come bedtime/ nighttime.

## 2024-09-02 ENCOUNTER — HOSPITAL ENCOUNTER (OUTPATIENT)
Dept: INFUSION THERAPY | Age: 44
Setting detail: INFUSION SERIES
Discharge: HOME | End: 2024-09-02
Payer: COMMERCIAL

## 2024-09-02 VITALS
RESPIRATION RATE: 16 BRPM | HEART RATE: 88 BPM | SYSTOLIC BLOOD PRESSURE: 118 MMHG | DIASTOLIC BLOOD PRESSURE: 85 MMHG | TEMPERATURE: 97.7 F | OXYGEN SATURATION: 100 %

## 2024-09-02 DIAGNOSIS — A41.9 SEPSIS DUE TO CELLULITIS (HCC): Primary | ICD-10-CM

## 2024-09-02 DIAGNOSIS — L03.90 SEPSIS DUE TO CELLULITIS (HCC): Primary | ICD-10-CM

## 2024-09-02 PROCEDURE — 2580000003 HC RX 258: Performed by: INTERNAL MEDICINE

## 2024-09-02 PROCEDURE — 6360000002 HC RX W HCPCS: Performed by: INTERNAL MEDICINE

## 2024-09-02 PROCEDURE — 96365 THER/PROPH/DIAG IV INF INIT: CPT

## 2024-09-02 RX ORDER — SODIUM CHLORIDE 0.9 % (FLUSH) 0.9 %
5-40 SYRINGE (ML) INJECTION PRN
Status: CANCELLED | OUTPATIENT
Start: 2024-09-03

## 2024-09-02 RX ORDER — SODIUM CHLORIDE 9 MG/ML
5-250 INJECTION, SOLUTION INTRAVENOUS PRN
Status: CANCELLED | OUTPATIENT
Start: 2024-09-03

## 2024-09-02 RX ORDER — HEPARIN 100 UNIT/ML
500 SYRINGE INTRAVENOUS PRN
Status: CANCELLED | OUTPATIENT
Start: 2024-09-03

## 2024-09-02 RX ADMIN — ERTAPENEM SODIUM 1000 MG: 1 INJECTION INTRAMUSCULAR; INTRAVENOUS at 10:28

## 2024-09-02 ASSESSMENT — PAIN SCALES - GENERAL: PAINLEVEL_OUTOF10: 0

## 2024-09-02 NOTE — PROGRESS NOTES
Patient arrives to 2W in stable condition for OP infusion accompanied by mother. VSS, NAD noted, resp reg, easy, unlabored. Double lumen PICC line flushed with brisk blood return noted to both lumens. Denies any needs and states no complaints at this time.

## 2024-09-02 NOTE — PROGRESS NOTES
IV infusion completed at this time. Patient tolerated well and denies any needs or questions. PICC line flushed and alcohol caps applied.

## 2024-09-03 ENCOUNTER — HOSPITAL ENCOUNTER (OUTPATIENT)
Dept: WOUND CARE | Age: 44
Discharge: HOME | End: 2024-09-03
Payer: COMMERCIAL

## 2024-09-03 ENCOUNTER — HOSPITAL ENCOUNTER (OUTPATIENT)
Dept: INFUSION THERAPY | Age: 44
Setting detail: INFUSION SERIES
Discharge: HOME | End: 2024-09-03
Payer: COMMERCIAL

## 2024-09-03 VITALS
RESPIRATION RATE: 16 BRPM | DIASTOLIC BLOOD PRESSURE: 74 MMHG | TEMPERATURE: 97 F | HEART RATE: 80 BPM | OXYGEN SATURATION: 99 % | SYSTOLIC BLOOD PRESSURE: 135 MMHG

## 2024-09-03 VITALS
DIASTOLIC BLOOD PRESSURE: 81 MMHG | SYSTOLIC BLOOD PRESSURE: 122 MMHG | RESPIRATION RATE: 17 BRPM | TEMPERATURE: 97.2 F | HEART RATE: 91 BPM

## 2024-09-03 DIAGNOSIS — L03.90 SEPSIS DUE TO CELLULITIS (HCC): Primary | ICD-10-CM

## 2024-09-03 DIAGNOSIS — A41.9 SEPSIS DUE TO CELLULITIS (HCC): Primary | ICD-10-CM

## 2024-09-03 PROCEDURE — 6360000002 HC RX W HCPCS: Performed by: INTERNAL MEDICINE

## 2024-09-03 PROCEDURE — 11042 DBRDMT SUBQ TIS 1ST 20SQCM/<: CPT

## 2024-09-03 PROCEDURE — 96365 THER/PROPH/DIAG IV INF INIT: CPT

## 2024-09-03 PROCEDURE — 2580000003 HC RX 258: Performed by: INTERNAL MEDICINE

## 2024-09-03 RX ORDER — SODIUM CHLORIDE 0.9 % (FLUSH) 0.9 %
5-40 SYRINGE (ML) INJECTION PRN
Status: CANCELLED | OUTPATIENT
Start: 2024-09-04

## 2024-09-03 RX ORDER — SODIUM CHLORIDE 9 MG/ML
5-250 INJECTION, SOLUTION INTRAVENOUS PRN
Status: CANCELLED | OUTPATIENT
Start: 2024-09-04

## 2024-09-03 RX ORDER — HEPARIN 100 UNIT/ML
500 SYRINGE INTRAVENOUS PRN
Status: CANCELLED | OUTPATIENT
Start: 2024-09-04

## 2024-09-03 RX ADMIN — ERTAPENEM SODIUM 1000 MG: 1 INJECTION INTRAMUSCULAR; INTRAVENOUS at 10:11

## 2024-09-03 ASSESSMENT — PAIN SCALES - GENERAL: PAINLEVEL_OUTOF10: 4

## 2024-09-03 NOTE — DISCHARGE INSTRUCTIONS
[facility] Wound Center and Hyperbaric Medicine   Physician Orders and Discharge Instructions  [facility]  [address] [place]  [address]  Telephone: [phone]      [phone]        NAME:  Carli Bustamante                                                                                         YOB: 1980  MEDICAL RECORD NUMBER:  [id number]     Your  is:  Jeannette     Home Care/Facility:     Wound Location: Right Plantar Foot      Dressing orders: 1.Cleanse wound(s) with normal saline.  2. Apply dry QUE  Or equivalent to wound bed.  3. Moisten QUE with a few drops of normal saline.  4. Cover with 4x4's and wrap with gauze (errol or kerlix)  5. Change every other day (Tuesday, Thursday, Saturday)        Compression: none      Offloading Device: Forefoot offloading shoe with felt      Other Instructions:  Continue taking antibiotics per Infectious Disease.      Keep all dressings clean, dry and intact.  Keep pressure off the wound(s) at all times.      Follow up visit   2 Weeks September 17, 2024 at 3:30pm     Please give 24 hour notice if unable to keep appointment. [phone]     If you experience any of the following, please call the Wound Care Service at  [phone] or go to the nearest emergency room.        *Increase in pain*Temperature over 101*Increase in drainage from your wound or a foul odor  *Uncontrolled swelling*Need for compression bandage changes due to slippage, breakthrough drainage       PLEASE NOTE: IF YOU ARE UNABLE TO OBTAIN WOUND SUPPLIES, CONTINUE TO USE THE SUPPLIES YOU HAVE AVAILABLE UNTIL YOU ARE ABLE TO REACH US. IT IS MOST IMPORTANT TO KEEP THE WOUND COVERED AT ALL TIMES

## 2024-09-03 NOTE — PROGRESS NOTES
[facility] OUTPATIENT INFUSION CENTER     PT arrived via:  [x] Ambulatory         [] Wheelchair  []With transport                [] Other:     [x]PT oriented to room and call light in reach.   [x] Vital signs obtained and are stable.   []Medication education provided and reviewed with patient.

## 2024-09-03 NOTE — WOUND CARE
[facility] Physician Billing Sheet.     Carli Bustamante  AGE: 44 y.o.   GENDER: female  : 1980  TODAY'S DATE:  9/3/2024    ICD-10 CODES  L97.597  L03.115  E11.42      PHYSICIAN PROCEDURES    CPT CODE  03137      Electronically signed by Alistair Brand DPM on 9/3/2024 at 4:14 PM

## 2024-09-03 NOTE — PROGRESS NOTES
[facility] OUTPATIENT INFUSION CENTER      [] Observation period completed. PT without signs/symptoms of reaction.  [x] Observation period not applicable.     PT discharged [x] Ambulatory       [] Wheelchair  []With transport                [] Other:     [] AVS  provided to patient.   [x] Room and equipment cleaned.

## 2024-09-04 ENCOUNTER — HOSPITAL ENCOUNTER (OUTPATIENT)
Dept: INFUSION THERAPY | Age: 44
Setting detail: INFUSION SERIES
Discharge: HOME | End: 2024-09-04
Payer: COMMERCIAL

## 2024-09-04 VITALS
SYSTOLIC BLOOD PRESSURE: 134 MMHG | HEART RATE: 94 BPM | RESPIRATION RATE: 16 BRPM | DIASTOLIC BLOOD PRESSURE: 86 MMHG | TEMPERATURE: 96.8 F | OXYGEN SATURATION: 98 %

## 2024-09-04 DIAGNOSIS — A41.9 SEPSIS DUE TO CELLULITIS (HCC): Primary | ICD-10-CM

## 2024-09-04 DIAGNOSIS — L03.90 SEPSIS DUE TO CELLULITIS (HCC): Primary | ICD-10-CM

## 2024-09-04 LAB
BASOPHILS # BLD: 0.1 K/UL (ref 0–0.2)
BASOPHILS NFR BLD: 0.6 %
CRP SERPL HS-MCNC: 6.3 MG/L (ref 0–5)
EOSINOPHIL # BLD: 0.3 K/UL (ref 0–0.7)
EOSINOPHIL NFR BLD: 3.4 %
ERYTHROCYTE [DISTWIDTH] IN BLOOD BY AUTOMATED COUNT: 13 % (ref 11.5–14.5)
HCT VFR BLD AUTO: 37.9 % (ref 37–47)
HGB BLD-MCNC: 13.7 G/DL (ref 12–16)
LYMPHOCYTES # BLD: 1.7 K/UL (ref 1–4.8)
LYMPHOCYTES NFR BLD: 19 %
MCH RBC QN AUTO: 30.9 PG (ref 27–31.3)
MCHC RBC AUTO-ENTMCNC: 36.1 % (ref 33–37)
MCV RBC AUTO: 85.6 FL (ref 79.4–94.8)
MONOCYTES # BLD: 0.5 K/UL (ref 0.2–0.8)
MONOCYTES NFR BLD: 6 %
NEUTROPHILS # BLD: 6.2 K/UL (ref 1.4–6.5)
NEUTS SEG NFR BLD: 70.7 %
PLATELET # BLD AUTO: 369 K/UL (ref 130–400)
RBC # BLD AUTO: 4.43 M/UL (ref 4.2–5.4)
WBC # BLD AUTO: 8.8 K/UL (ref 4.8–10.8)

## 2024-09-04 PROCEDURE — 36592 COLLECT BLOOD FROM PICC: CPT

## 2024-09-04 PROCEDURE — 2580000003 HC RX 258: Performed by: INTERNAL MEDICINE

## 2024-09-04 PROCEDURE — 96365 THER/PROPH/DIAG IV INF INIT: CPT

## 2024-09-04 PROCEDURE — 6360000002 HC RX W HCPCS: Performed by: INTERNAL MEDICINE

## 2024-09-04 PROCEDURE — 86140 C-REACTIVE PROTEIN: CPT

## 2024-09-04 PROCEDURE — 85025 COMPLETE CBC W/AUTO DIFF WBC: CPT

## 2024-09-04 RX ORDER — SODIUM CHLORIDE 9 MG/ML
5-250 INJECTION, SOLUTION INTRAVENOUS PRN
Status: CANCELLED | OUTPATIENT
Start: 2024-09-05

## 2024-09-04 RX ORDER — SODIUM CHLORIDE 0.9 % (FLUSH) 0.9 %
5-40 SYRINGE (ML) INJECTION PRN
Status: CANCELLED | OUTPATIENT
Start: 2024-09-05

## 2024-09-04 RX ORDER — HEPARIN 100 UNIT/ML
500 SYRINGE INTRAVENOUS PRN
Status: CANCELLED | OUTPATIENT
Start: 2024-09-05

## 2024-09-04 RX ADMIN — ERTAPENEM SODIUM 1000 MG: 1 INJECTION INTRAMUSCULAR; INTRAVENOUS at 10:10

## 2024-09-04 NOTE — PROGRESS NOTES
Pt left unit ambulatory. All equipment used in care has been cleaned.    Electronically signed by Keyla Dan RN on 9/4/2024 at 10:42 AM

## 2024-09-04 NOTE — PROGRESS NOTES
Pt arrived to unit ambulatory. Vital signs obtained. CRP and CBC collected via picc line and sent to lab. Picc line dressing changed. IV invanz infusing at this time. Call light in reach.       Electronically signed by Keyla Dan RN on 9/4/2024 at 10:13 AM

## 2024-09-05 ENCOUNTER — HOSPITAL ENCOUNTER (OUTPATIENT)
Dept: INFUSION THERAPY | Age: 44
Setting detail: INFUSION SERIES
Discharge: HOME | End: 2024-09-05
Payer: COMMERCIAL

## 2024-09-05 VITALS
RESPIRATION RATE: 16 BRPM | TEMPERATURE: 96.4 F | DIASTOLIC BLOOD PRESSURE: 87 MMHG | HEART RATE: 83 BPM | SYSTOLIC BLOOD PRESSURE: 129 MMHG | OXYGEN SATURATION: 100 %

## 2024-09-05 DIAGNOSIS — A41.9 SEPSIS DUE TO CELLULITIS (HCC): Primary | ICD-10-CM

## 2024-09-05 DIAGNOSIS — L03.90 SEPSIS DUE TO CELLULITIS (HCC): Primary | ICD-10-CM

## 2024-09-05 PROCEDURE — 2580000003 HC RX 258: Performed by: INTERNAL MEDICINE

## 2024-09-05 PROCEDURE — 6360000002 HC RX W HCPCS: Performed by: INTERNAL MEDICINE

## 2024-09-05 PROCEDURE — 96365 THER/PROPH/DIAG IV INF INIT: CPT

## 2024-09-05 RX ORDER — SODIUM CHLORIDE 0.9 % (FLUSH) 0.9 %
5-40 SYRINGE (ML) INJECTION PRN
Status: CANCELLED | OUTPATIENT
Start: 2024-09-06

## 2024-09-05 RX ORDER — HEPARIN 100 UNIT/ML
500 SYRINGE INTRAVENOUS PRN
Status: CANCELLED | OUTPATIENT
Start: 2024-09-06

## 2024-09-05 RX ORDER — SODIUM CHLORIDE 9 MG/ML
5-250 INJECTION, SOLUTION INTRAVENOUS PRN
Status: CANCELLED | OUTPATIENT
Start: 2024-09-06

## 2024-09-05 RX ADMIN — ERTAPENEM SODIUM 1000 MG: 1 INJECTION INTRAMUSCULAR; INTRAVENOUS at 10:06

## 2024-09-05 NOTE — PROGRESS NOTES
Pt arrived to unit ambulatory. Vital signs obtained. Invanz infusion started. Call light in reach.    Electronically signed by Keyla Dan RN on 9/5/2024 at 10:10 AM

## 2024-09-05 NOTE — PROGRESS NOTES
Pt left unit ambulatory with family member. All equipment used in care has been cleaned.     Electronically signed by Keyla Dan RN on 9/5/2024 at 10:40 AM

## 2024-09-06 ENCOUNTER — HOSPITAL ENCOUNTER (OUTPATIENT)
Dept: INFUSION THERAPY | Age: 44
Setting detail: INFUSION SERIES
Discharge: HOME | End: 2024-09-06
Payer: COMMERCIAL

## 2024-09-06 VITALS
TEMPERATURE: 97.6 F | RESPIRATION RATE: 18 BRPM | HEART RATE: 88 BPM | OXYGEN SATURATION: 99 % | DIASTOLIC BLOOD PRESSURE: 75 MMHG | SYSTOLIC BLOOD PRESSURE: 133 MMHG

## 2024-09-06 DIAGNOSIS — A41.9 SEPSIS DUE TO CELLULITIS (HCC): Primary | ICD-10-CM

## 2024-09-06 DIAGNOSIS — L03.90 SEPSIS DUE TO CELLULITIS (HCC): Primary | ICD-10-CM

## 2024-09-06 PROCEDURE — 2580000003 HC RX 258: Performed by: INTERNAL MEDICINE

## 2024-09-06 PROCEDURE — 96365 THER/PROPH/DIAG IV INF INIT: CPT

## 2024-09-06 PROCEDURE — 6360000002 HC RX W HCPCS: Performed by: INTERNAL MEDICINE

## 2024-09-06 RX ORDER — SODIUM CHLORIDE 0.9 % (FLUSH) 0.9 %
5-40 SYRINGE (ML) INJECTION PRN
Status: CANCELLED | OUTPATIENT
Start: 2024-09-07

## 2024-09-06 RX ORDER — SODIUM CHLORIDE 9 MG/ML
5-250 INJECTION, SOLUTION INTRAVENOUS PRN
Status: CANCELLED | OUTPATIENT
Start: 2024-09-07

## 2024-09-06 RX ORDER — HEPARIN 100 UNIT/ML
500 SYRINGE INTRAVENOUS PRN
Status: CANCELLED | OUTPATIENT
Start: 2024-09-07

## 2024-09-06 RX ADMIN — ERTAPENEM SODIUM 1000 MG: 1 INJECTION INTRAMUSCULAR; INTRAVENOUS at 10:21

## 2024-09-06 ASSESSMENT — PAIN DESCRIPTION - LOCATION: LOCATION: FOOT

## 2024-09-06 ASSESSMENT — PAIN DESCRIPTION - DESCRIPTORS: DESCRIPTORS: BURNING

## 2024-09-06 ASSESSMENT — PAIN DESCRIPTION - ORIENTATION: ORIENTATION: RIGHT

## 2024-09-06 ASSESSMENT — PAIN - FUNCTIONAL ASSESSMENT: PAIN_FUNCTIONAL_ASSESSMENT: PREVENTS OR INTERFERES SOME ACTIVE ACTIVITIES AND ADLS

## 2024-09-06 ASSESSMENT — PAIN DESCRIPTION - PAIN TYPE: TYPE: ACUTE PAIN

## 2024-09-06 ASSESSMENT — PAIN SCALES - GENERAL: PAINLEVEL_OUTOF10: 3

## 2024-09-06 ASSESSMENT — PAIN DESCRIPTION - ONSET: ONSET: ON-GOING

## 2024-09-06 ASSESSMENT — PAIN DESCRIPTION - FREQUENCY: FREQUENCY: INTERMITTENT

## 2024-09-06 NOTE — PROGRESS NOTES
1020: Patient arrived for transfusion of iv antibiotics to right arm PICC. Vitals obtained. Family at bedside. Patient has 3/10 foot pain which is tolerable at this time. Call bell within reach. Will continue to monitor.     1056: Patient amb off unit with family with steady gait. Tolerated infusion. All equipment cleaned.

## 2024-09-07 ENCOUNTER — HOSPITAL ENCOUNTER (OUTPATIENT)
Dept: INFUSION THERAPY | Age: 44
Setting detail: INFUSION SERIES
Discharge: HOME | End: 2024-09-07
Payer: COMMERCIAL

## 2024-09-07 VITALS
TEMPERATURE: 98 F | SYSTOLIC BLOOD PRESSURE: 122 MMHG | RESPIRATION RATE: 18 BRPM | DIASTOLIC BLOOD PRESSURE: 76 MMHG | OXYGEN SATURATION: 99 % | HEART RATE: 96 BPM

## 2024-09-07 DIAGNOSIS — L03.90 SEPSIS DUE TO CELLULITIS (HCC): Primary | ICD-10-CM

## 2024-09-07 DIAGNOSIS — A41.9 SEPSIS DUE TO CELLULITIS (HCC): Primary | ICD-10-CM

## 2024-09-07 PROCEDURE — 2580000003 HC RX 258: Performed by: INTERNAL MEDICINE

## 2024-09-07 PROCEDURE — 6360000002 HC RX W HCPCS: Performed by: INTERNAL MEDICINE

## 2024-09-07 PROCEDURE — 96365 THER/PROPH/DIAG IV INF INIT: CPT

## 2024-09-07 RX ORDER — HEPARIN 100 UNIT/ML
500 SYRINGE INTRAVENOUS PRN
Status: CANCELLED | OUTPATIENT
Start: 2024-09-08

## 2024-09-07 RX ORDER — SODIUM CHLORIDE 9 MG/ML
5-250 INJECTION, SOLUTION INTRAVENOUS PRN
Status: CANCELLED | OUTPATIENT
Start: 2024-09-08

## 2024-09-07 RX ORDER — SODIUM CHLORIDE 0.9 % (FLUSH) 0.9 %
5-40 SYRINGE (ML) INJECTION PRN
Status: CANCELLED | OUTPATIENT
Start: 2024-09-08

## 2024-09-07 RX ADMIN — ERTAPENEM SODIUM 1000 MG: 1 INJECTION INTRAMUSCULAR; INTRAVENOUS at 10:35

## 2024-09-07 NOTE — FLOWSHEET NOTE
1015- Patient ambulatory to unit for outpatient infusion. Placed in room 163 at this time. Right arm PICC dressing remains clean, dry and intact. Both ports are flushed and patent with blood return noted. Has no complaints or concerns at this time. Pharmacy notified to send up antibiotic. Call light placed in her reach.    1035- IV Invanz started at this time. Vital signs stable. Call light remains in reach.     1105- IV antibiotic completed. No signs of any transfusion reaction noted. Has no complaints or concerns at this time. Vital signs remain stable. New alcohol cap placed on PICC port. Patient aware that she is to return to this unit tomorrow for her next dose.     Electronically signed by Miguelina Villalba RN on 9/7/2024 at 11:09 AM

## 2024-09-08 ENCOUNTER — HOSPITAL ENCOUNTER (OUTPATIENT)
Dept: INFUSION THERAPY | Age: 44
Setting detail: INFUSION SERIES
Discharge: HOME | End: 2024-09-08
Payer: COMMERCIAL

## 2024-09-08 VITALS
DIASTOLIC BLOOD PRESSURE: 82 MMHG | HEART RATE: 90 BPM | OXYGEN SATURATION: 99 % | TEMPERATURE: 98 F | RESPIRATION RATE: 18 BRPM | SYSTOLIC BLOOD PRESSURE: 125 MMHG

## 2024-09-08 DIAGNOSIS — L03.90 SEPSIS DUE TO CELLULITIS (HCC): Primary | ICD-10-CM

## 2024-09-08 DIAGNOSIS — A41.9 SEPSIS DUE TO CELLULITIS (HCC): Primary | ICD-10-CM

## 2024-09-08 PROCEDURE — 6360000002 HC RX W HCPCS: Performed by: INTERNAL MEDICINE

## 2024-09-08 PROCEDURE — 96365 THER/PROPH/DIAG IV INF INIT: CPT

## 2024-09-08 PROCEDURE — 2580000003 HC RX 258: Performed by: INTERNAL MEDICINE

## 2024-09-08 RX ORDER — SODIUM CHLORIDE 0.9 % (FLUSH) 0.9 %
5-40 SYRINGE (ML) INJECTION PRN
Status: CANCELLED | OUTPATIENT
Start: 2024-09-09

## 2024-09-08 RX ORDER — SODIUM CHLORIDE 9 MG/ML
5-250 INJECTION, SOLUTION INTRAVENOUS PRN
Status: CANCELLED | OUTPATIENT
Start: 2024-09-09

## 2024-09-08 RX ORDER — HEPARIN 100 UNIT/ML
500 SYRINGE INTRAVENOUS PRN
Status: CANCELLED | OUTPATIENT
Start: 2024-09-09

## 2024-09-08 RX ADMIN — ERTAPENEM SODIUM 1000 MG: 1 INJECTION INTRAMUSCULAR; INTRAVENOUS at 10:32

## 2024-09-09 ENCOUNTER — HOSPITAL ENCOUNTER (OUTPATIENT)
Dept: INFUSION THERAPY | Age: 44
Setting detail: INFUSION SERIES
Discharge: HOME | End: 2024-09-09
Payer: COMMERCIAL

## 2024-09-09 VITALS
OXYGEN SATURATION: 99 % | SYSTOLIC BLOOD PRESSURE: 127 MMHG | DIASTOLIC BLOOD PRESSURE: 82 MMHG | RESPIRATION RATE: 18 BRPM | HEART RATE: 88 BPM | TEMPERATURE: 97.3 F

## 2024-09-09 DIAGNOSIS — L03.90 SEPSIS DUE TO CELLULITIS (HCC): Primary | ICD-10-CM

## 2024-09-09 DIAGNOSIS — A41.9 SEPSIS DUE TO CELLULITIS (HCC): Primary | ICD-10-CM

## 2024-09-09 PROCEDURE — 96365 THER/PROPH/DIAG IV INF INIT: CPT

## 2024-09-09 PROCEDURE — 2580000003 HC RX 258: Performed by: INTERNAL MEDICINE

## 2024-09-09 PROCEDURE — 6360000002 HC RX W HCPCS: Performed by: INTERNAL MEDICINE

## 2024-09-09 RX ORDER — HEPARIN 100 UNIT/ML
500 SYRINGE INTRAVENOUS PRN
Status: CANCELLED | OUTPATIENT
Start: 2024-09-10

## 2024-09-09 RX ORDER — SODIUM CHLORIDE 0.9 % (FLUSH) 0.9 %
5-40 SYRINGE (ML) INJECTION PRN
Status: CANCELLED | OUTPATIENT
Start: 2024-09-10

## 2024-09-09 RX ORDER — SODIUM CHLORIDE 9 MG/ML
5-250 INJECTION, SOLUTION INTRAVENOUS PRN
Status: CANCELLED | OUTPATIENT
Start: 2024-09-10

## 2024-09-09 RX ADMIN — ERTAPENEM SODIUM 1000 MG: 1 INJECTION INTRAMUSCULAR; INTRAVENOUS at 10:35

## 2024-09-09 ASSESSMENT — PAIN DESCRIPTION - ORIENTATION: ORIENTATION: RIGHT

## 2024-09-09 ASSESSMENT — PAIN DESCRIPTION - DESCRIPTORS: DESCRIPTORS: ACHING

## 2024-09-09 ASSESSMENT — PAIN DESCRIPTION - LOCATION: LOCATION: FOOT

## 2024-09-09 ASSESSMENT — PAIN SCALES - GENERAL: PAINLEVEL_OUTOF10: 6

## 2024-09-10 ENCOUNTER — HOSPITAL ENCOUNTER (OUTPATIENT)
Dept: INFUSION THERAPY | Age: 44
Setting detail: INFUSION SERIES
Discharge: HOME | End: 2024-09-10
Payer: COMMERCIAL

## 2024-09-10 VITALS
RESPIRATION RATE: 18 BRPM | TEMPERATURE: 97.3 F | SYSTOLIC BLOOD PRESSURE: 134 MMHG | DIASTOLIC BLOOD PRESSURE: 85 MMHG | OXYGEN SATURATION: 99 % | HEART RATE: 96 BPM

## 2024-09-10 DIAGNOSIS — L03.90 SEPSIS DUE TO CELLULITIS (HCC): Primary | ICD-10-CM

## 2024-09-10 DIAGNOSIS — A41.9 SEPSIS DUE TO CELLULITIS (HCC): Primary | ICD-10-CM

## 2024-09-10 PROCEDURE — 6360000002 HC RX W HCPCS: Performed by: INTERNAL MEDICINE

## 2024-09-10 PROCEDURE — 2580000003 HC RX 258: Performed by: INTERNAL MEDICINE

## 2024-09-10 PROCEDURE — 96365 THER/PROPH/DIAG IV INF INIT: CPT

## 2024-09-10 RX ORDER — SODIUM CHLORIDE 9 MG/ML
5-250 INJECTION, SOLUTION INTRAVENOUS PRN
Status: CANCELLED | OUTPATIENT
Start: 2024-09-11

## 2024-09-10 RX ORDER — HEPARIN 100 UNIT/ML
500 SYRINGE INTRAVENOUS PRN
Status: CANCELLED | OUTPATIENT
Start: 2024-09-11

## 2024-09-10 RX ORDER — SODIUM CHLORIDE 0.9 % (FLUSH) 0.9 %
5-40 SYRINGE (ML) INJECTION PRN
Status: CANCELLED | OUTPATIENT
Start: 2024-09-11

## 2024-09-10 RX ADMIN — ERTAPENEM SODIUM 1000 MG: 1 INJECTION INTRAMUSCULAR; INTRAVENOUS at 10:20

## 2024-09-10 ASSESSMENT — PAIN SCALES - GENERAL: PAINLEVEL_OUTOF10: 3

## 2024-09-10 ASSESSMENT — PAIN DESCRIPTION - LOCATION: LOCATION: FOOT

## 2024-09-10 ASSESSMENT — PAIN DESCRIPTION - ORIENTATION: ORIENTATION: RIGHT

## 2024-09-10 ASSESSMENT — PAIN DESCRIPTION - DESCRIPTORS: DESCRIPTORS: ACHING

## 2024-09-11 ENCOUNTER — HOSPITAL ENCOUNTER (OUTPATIENT)
Dept: INFUSION THERAPY | Age: 44
Setting detail: INFUSION SERIES
Discharge: HOME | End: 2024-09-11
Payer: COMMERCIAL

## 2024-09-11 VITALS
TEMPERATURE: 97.7 F | RESPIRATION RATE: 16 BRPM | HEART RATE: 87 BPM | OXYGEN SATURATION: 97 % | DIASTOLIC BLOOD PRESSURE: 82 MMHG | SYSTOLIC BLOOD PRESSURE: 127 MMHG

## 2024-09-11 DIAGNOSIS — L03.90 SEPSIS DUE TO CELLULITIS (HCC): Primary | ICD-10-CM

## 2024-09-11 DIAGNOSIS — A41.9 SEPSIS DUE TO CELLULITIS (HCC): Primary | ICD-10-CM

## 2024-09-11 LAB
BASOPHILS # BLD: 0 K/UL (ref 0–0.2)
BASOPHILS NFR BLD: 0.6 %
CRP SERPL HS-MCNC: 8.5 MG/L (ref 0–5)
EOSINOPHIL # BLD: 0.4 K/UL (ref 0–0.7)
EOSINOPHIL NFR BLD: 5.5 %
ERYTHROCYTE [DISTWIDTH] IN BLOOD BY AUTOMATED COUNT: 13.2 % (ref 11.5–14.5)
HCT VFR BLD AUTO: 38.2 % (ref 37–47)
HGB BLD-MCNC: 13.8 G/DL (ref 12–16)
LYMPHOCYTES # BLD: 1.8 K/UL (ref 1–4.8)
LYMPHOCYTES NFR BLD: 24.5 %
MCH RBC QN AUTO: 30.9 PG (ref 27–31.3)
MCHC RBC AUTO-ENTMCNC: 36.1 % (ref 33–37)
MCV RBC AUTO: 85.5 FL (ref 79.4–94.8)
MONOCYTES # BLD: 0.5 K/UL (ref 0.2–0.8)
MONOCYTES NFR BLD: 7.3 %
NEUTROPHILS # BLD: 4.5 K/UL (ref 1.4–6.5)
NEUTS SEG NFR BLD: 61.7 %
PLATELET # BLD AUTO: 275 K/UL (ref 130–400)
RBC # BLD AUTO: 4.47 M/UL (ref 4.2–5.4)
REASON FOR REJECTION: NORMAL
REJECTED TEST: NORMAL
WBC # BLD AUTO: 7.3 K/UL (ref 4.8–10.8)

## 2024-09-11 PROCEDURE — 96374 THER/PROPH/DIAG INJ IV PUSH: CPT

## 2024-09-11 PROCEDURE — 6360000002 HC RX W HCPCS: Performed by: INTERNAL MEDICINE

## 2024-09-11 PROCEDURE — 36592 COLLECT BLOOD FROM PICC: CPT

## 2024-09-11 PROCEDURE — 85025 COMPLETE CBC W/AUTO DIFF WBC: CPT

## 2024-09-11 PROCEDURE — 2580000003 HC RX 258: Performed by: INTERNAL MEDICINE

## 2024-09-11 PROCEDURE — 86140 C-REACTIVE PROTEIN: CPT

## 2024-09-11 PROCEDURE — 96365 THER/PROPH/DIAG IV INF INIT: CPT

## 2024-09-11 RX ORDER — HEPARIN 100 UNIT/ML
500 SYRINGE INTRAVENOUS PRN
Status: CANCELLED | OUTPATIENT
Start: 2024-09-12

## 2024-09-11 RX ORDER — SODIUM CHLORIDE 9 MG/ML
5-250 INJECTION, SOLUTION INTRAVENOUS PRN
Status: CANCELLED | OUTPATIENT
Start: 2024-09-12

## 2024-09-11 RX ORDER — SODIUM CHLORIDE 0.9 % (FLUSH) 0.9 %
5-40 SYRINGE (ML) INJECTION PRN
Status: CANCELLED | OUTPATIENT
Start: 2024-09-12

## 2024-09-11 RX ADMIN — ERTAPENEM SODIUM 1000 MG: 1 INJECTION INTRAMUSCULAR; INTRAVENOUS at 10:38

## 2024-09-11 ASSESSMENT — PAIN SCALES - GENERAL: PAINLEVEL_OUTOF10: 0

## 2024-09-12 ENCOUNTER — HOSPITAL ENCOUNTER (OUTPATIENT)
Dept: INFUSION THERAPY | Age: 44
Setting detail: INFUSION SERIES
Discharge: HOME | End: 2024-09-12
Payer: COMMERCIAL

## 2024-09-12 VITALS
OXYGEN SATURATION: 99 % | RESPIRATION RATE: 18 BRPM | SYSTOLIC BLOOD PRESSURE: 152 MMHG | DIASTOLIC BLOOD PRESSURE: 86 MMHG | TEMPERATURE: 97.9 F | HEART RATE: 85 BPM

## 2024-09-12 DIAGNOSIS — A41.9 SEPSIS DUE TO CELLULITIS (HCC): Primary | ICD-10-CM

## 2024-09-12 DIAGNOSIS — L03.90 SEPSIS DUE TO CELLULITIS (HCC): Primary | ICD-10-CM

## 2024-09-12 PROCEDURE — 2580000003 HC RX 258: Performed by: INTERNAL MEDICINE

## 2024-09-12 PROCEDURE — 6360000002 HC RX W HCPCS: Performed by: INTERNAL MEDICINE

## 2024-09-12 PROCEDURE — 96365 THER/PROPH/DIAG IV INF INIT: CPT

## 2024-09-12 RX ORDER — SODIUM CHLORIDE 0.9 % (FLUSH) 0.9 %
5-40 SYRINGE (ML) INJECTION PRN
Status: CANCELLED | OUTPATIENT
Start: 2024-09-13

## 2024-09-12 RX ORDER — SODIUM CHLORIDE 9 MG/ML
5-250 INJECTION, SOLUTION INTRAVENOUS PRN
Status: CANCELLED | OUTPATIENT
Start: 2024-09-13

## 2024-09-12 RX ORDER — HEPARIN 100 UNIT/ML
500 SYRINGE INTRAVENOUS PRN
Status: CANCELLED | OUTPATIENT
Start: 2024-09-13

## 2024-09-12 RX ADMIN — ERTAPENEM SODIUM 1000 MG: 1 INJECTION INTRAMUSCULAR; INTRAVENOUS at 10:20

## 2024-09-13 ENCOUNTER — HOSPITAL ENCOUNTER (OUTPATIENT)
Dept: INFUSION THERAPY | Age: 44
Setting detail: INFUSION SERIES
Discharge: HOME | End: 2024-09-13
Payer: COMMERCIAL

## 2024-09-13 VITALS
RESPIRATION RATE: 18 BRPM | SYSTOLIC BLOOD PRESSURE: 122 MMHG | TEMPERATURE: 97.7 F | HEART RATE: 86 BPM | OXYGEN SATURATION: 99 % | DIASTOLIC BLOOD PRESSURE: 79 MMHG

## 2024-09-13 DIAGNOSIS — L03.90 SEPSIS DUE TO CELLULITIS (HCC): Primary | ICD-10-CM

## 2024-09-13 DIAGNOSIS — A41.9 SEPSIS DUE TO CELLULITIS (HCC): Primary | ICD-10-CM

## 2024-09-13 PROCEDURE — 2580000003 HC RX 258: Performed by: INTERNAL MEDICINE

## 2024-09-13 PROCEDURE — 96365 THER/PROPH/DIAG IV INF INIT: CPT

## 2024-09-13 PROCEDURE — 6360000002 HC RX W HCPCS: Performed by: INTERNAL MEDICINE

## 2024-09-13 RX ORDER — SODIUM CHLORIDE 0.9 % (FLUSH) 0.9 %
5-40 SYRINGE (ML) INJECTION PRN
Status: CANCELLED | OUTPATIENT
Start: 2024-09-14

## 2024-09-13 RX ORDER — SODIUM CHLORIDE 9 MG/ML
5-250 INJECTION, SOLUTION INTRAVENOUS PRN
Status: CANCELLED | OUTPATIENT
Start: 2024-09-14

## 2024-09-13 RX ORDER — HEPARIN 100 UNIT/ML
500 SYRINGE INTRAVENOUS PRN
Status: CANCELLED | OUTPATIENT
Start: 2024-09-14

## 2024-09-13 RX ADMIN — ERTAPENEM SODIUM 1000 MG: 1 INJECTION INTRAMUSCULAR; INTRAVENOUS at 10:34

## 2024-09-13 ASSESSMENT — PAIN DESCRIPTION - DESCRIPTORS: DESCRIPTORS: ACHING

## 2024-09-13 ASSESSMENT — PAIN SCALES - GENERAL: PAINLEVEL_OUTOF10: 3

## 2024-09-13 ASSESSMENT — PAIN DESCRIPTION - ORIENTATION: ORIENTATION: RIGHT

## 2024-09-13 ASSESSMENT — PAIN DESCRIPTION - LOCATION: LOCATION: FOOT

## 2024-09-14 ENCOUNTER — HOSPITAL ENCOUNTER (OUTPATIENT)
Dept: INFUSION THERAPY | Age: 44
Setting detail: INFUSION SERIES
Discharge: HOME | End: 2024-09-14
Payer: COMMERCIAL

## 2024-09-14 VITALS
RESPIRATION RATE: 16 BRPM | SYSTOLIC BLOOD PRESSURE: 120 MMHG | HEART RATE: 89 BPM | TEMPERATURE: 97.5 F | DIASTOLIC BLOOD PRESSURE: 74 MMHG | OXYGEN SATURATION: 99 %

## 2024-09-14 DIAGNOSIS — L03.90 SEPSIS DUE TO CELLULITIS (HCC): Primary | ICD-10-CM

## 2024-09-14 DIAGNOSIS — A41.9 SEPSIS DUE TO CELLULITIS (HCC): Primary | ICD-10-CM

## 2024-09-14 PROCEDURE — 96365 THER/PROPH/DIAG IV INF INIT: CPT

## 2024-09-14 PROCEDURE — 2580000003 HC RX 258: Performed by: INTERNAL MEDICINE

## 2024-09-14 PROCEDURE — 6360000002 HC RX W HCPCS: Performed by: INTERNAL MEDICINE

## 2024-09-14 RX ORDER — SODIUM CHLORIDE 0.9 % (FLUSH) 0.9 %
5-40 SYRINGE (ML) INJECTION PRN
Status: CANCELLED | OUTPATIENT
Start: 2024-09-15

## 2024-09-14 RX ORDER — SODIUM CHLORIDE 9 MG/ML
5-250 INJECTION, SOLUTION INTRAVENOUS PRN
Status: CANCELLED | OUTPATIENT
Start: 2024-09-15

## 2024-09-14 RX ORDER — HEPARIN 100 UNIT/ML
500 SYRINGE INTRAVENOUS PRN
Status: CANCELLED | OUTPATIENT
Start: 2024-09-15

## 2024-09-14 RX ADMIN — ERTAPENEM SODIUM 1000 MG: 1 INJECTION INTRAMUSCULAR; INTRAVENOUS at 10:48

## 2024-09-15 ENCOUNTER — HOSPITAL ENCOUNTER (OUTPATIENT)
Dept: INFUSION THERAPY | Age: 44
Setting detail: INFUSION SERIES
Discharge: HOME | End: 2024-09-15
Payer: COMMERCIAL

## 2024-09-15 VITALS
RESPIRATION RATE: 16 BRPM | HEART RATE: 88 BPM | OXYGEN SATURATION: 100 % | DIASTOLIC BLOOD PRESSURE: 84 MMHG | TEMPERATURE: 97.7 F | SYSTOLIC BLOOD PRESSURE: 108 MMHG

## 2024-09-15 DIAGNOSIS — L03.90 SEPSIS DUE TO CELLULITIS (HCC): Primary | ICD-10-CM

## 2024-09-15 DIAGNOSIS — A41.9 SEPSIS DUE TO CELLULITIS (HCC): Primary | ICD-10-CM

## 2024-09-15 PROCEDURE — 2580000003 HC RX 258: Performed by: INTERNAL MEDICINE

## 2024-09-15 PROCEDURE — 96365 THER/PROPH/DIAG IV INF INIT: CPT

## 2024-09-15 PROCEDURE — 6360000002 HC RX W HCPCS: Performed by: INTERNAL MEDICINE

## 2024-09-15 RX ORDER — SODIUM CHLORIDE 0.9 % (FLUSH) 0.9 %
5-40 SYRINGE (ML) INJECTION PRN
Status: CANCELLED | OUTPATIENT
Start: 2024-09-16

## 2024-09-15 RX ORDER — SODIUM CHLORIDE 9 MG/ML
5-250 INJECTION, SOLUTION INTRAVENOUS PRN
Status: CANCELLED | OUTPATIENT
Start: 2024-09-16

## 2024-09-15 RX ORDER — HEPARIN 100 UNIT/ML
500 SYRINGE INTRAVENOUS PRN
Status: CANCELLED | OUTPATIENT
Start: 2024-09-16

## 2024-09-15 RX ADMIN — ERTAPENEM SODIUM 1000 MG: 1 INJECTION INTRAMUSCULAR; INTRAVENOUS at 10:55

## 2024-09-16 ENCOUNTER — HOSPITAL ENCOUNTER (OUTPATIENT)
Dept: INFUSION THERAPY | Age: 44
Setting detail: INFUSION SERIES
Discharge: HOME | End: 2024-09-16
Payer: COMMERCIAL

## 2024-09-16 VITALS
HEART RATE: 94 BPM | SYSTOLIC BLOOD PRESSURE: 133 MMHG | TEMPERATURE: 97.7 F | OXYGEN SATURATION: 98 % | DIASTOLIC BLOOD PRESSURE: 81 MMHG | RESPIRATION RATE: 18 BRPM

## 2024-09-16 DIAGNOSIS — L03.90 SEPSIS DUE TO CELLULITIS (HCC): Primary | ICD-10-CM

## 2024-09-16 DIAGNOSIS — A41.9 SEPSIS DUE TO CELLULITIS (HCC): Primary | ICD-10-CM

## 2024-09-16 PROCEDURE — 2580000003 HC RX 258: Performed by: INTERNAL MEDICINE

## 2024-09-16 PROCEDURE — 96365 THER/PROPH/DIAG IV INF INIT: CPT

## 2024-09-16 PROCEDURE — 6360000002 HC RX W HCPCS: Performed by: INTERNAL MEDICINE

## 2024-09-16 RX ORDER — SODIUM CHLORIDE 0.9 % (FLUSH) 0.9 %
5-40 SYRINGE (ML) INJECTION PRN
Status: CANCELLED | OUTPATIENT
Start: 2024-09-17

## 2024-09-16 RX ORDER — SODIUM CHLORIDE 9 MG/ML
5-250 INJECTION, SOLUTION INTRAVENOUS PRN
Status: CANCELLED | OUTPATIENT
Start: 2024-09-17

## 2024-09-16 RX ORDER — HEPARIN 100 UNIT/ML
500 SYRINGE INTRAVENOUS PRN
Status: CANCELLED | OUTPATIENT
Start: 2024-09-17

## 2024-09-16 RX ADMIN — ERTAPENEM SODIUM 1000 MG: 1 INJECTION INTRAMUSCULAR; INTRAVENOUS at 10:32

## 2024-09-16 ASSESSMENT — PAIN DESCRIPTION - DESCRIPTORS: DESCRIPTORS: ACHING

## 2024-09-16 ASSESSMENT — PAIN DESCRIPTION - LOCATION: LOCATION: FOOT

## 2024-09-16 ASSESSMENT — PAIN SCALES - GENERAL: PAINLEVEL_OUTOF10: 3

## 2024-09-16 ASSESSMENT — PAIN DESCRIPTION - ORIENTATION: ORIENTATION: RIGHT

## 2024-09-17 ENCOUNTER — HOSPITAL ENCOUNTER (OUTPATIENT)
Dept: INFUSION THERAPY | Age: 44
Setting detail: INFUSION SERIES
Discharge: HOME | End: 2024-09-17
Payer: COMMERCIAL

## 2024-09-17 ENCOUNTER — HOSPITAL ENCOUNTER (OUTPATIENT)
Dept: WOUND CARE | Age: 44
Discharge: HOME | End: 2024-09-17
Attending: STUDENT IN AN ORGANIZED HEALTH CARE EDUCATION/TRAINING PROGRAM
Payer: COMMERCIAL

## 2024-09-17 VITALS
TEMPERATURE: 96.5 F | OXYGEN SATURATION: 100 % | DIASTOLIC BLOOD PRESSURE: 82 MMHG | SYSTOLIC BLOOD PRESSURE: 124 MMHG | HEART RATE: 82 BPM

## 2024-09-17 VITALS
SYSTOLIC BLOOD PRESSURE: 122 MMHG | DIASTOLIC BLOOD PRESSURE: 79 MMHG | TEMPERATURE: 97 F | HEART RATE: 88 BPM | RESPIRATION RATE: 18 BRPM

## 2024-09-17 DIAGNOSIS — L03.90 SEPSIS DUE TO CELLULITIS (HCC): Primary | ICD-10-CM

## 2024-09-17 DIAGNOSIS — A41.9 SEPSIS DUE TO CELLULITIS (HCC): Primary | ICD-10-CM

## 2024-09-17 PROCEDURE — 6360000002 HC RX W HCPCS: Performed by: INTERNAL MEDICINE

## 2024-09-17 PROCEDURE — 96365 THER/PROPH/DIAG IV INF INIT: CPT

## 2024-09-17 PROCEDURE — 2580000003 HC RX 258: Performed by: INTERNAL MEDICINE

## 2024-09-17 PROCEDURE — 29580 STRAPPING UNNA BOOT: CPT

## 2024-09-17 PROCEDURE — 11042 DBRDMT SUBQ TIS 1ST 20SQCM/<: CPT

## 2024-09-17 RX ORDER — HEPARIN 100 UNIT/ML
500 SYRINGE INTRAVENOUS PRN
Status: CANCELLED | OUTPATIENT
Start: 2024-09-18

## 2024-09-17 RX ORDER — SODIUM CHLORIDE 0.9 % (FLUSH) 0.9 %
5-40 SYRINGE (ML) INJECTION PRN
Status: CANCELLED | OUTPATIENT
Start: 2024-09-18

## 2024-09-17 RX ORDER — SODIUM CHLORIDE 9 MG/ML
5-250 INJECTION, SOLUTION INTRAVENOUS PRN
Status: CANCELLED | OUTPATIENT
Start: 2024-09-18

## 2024-09-17 RX ADMIN — ERTAPENEM SODIUM 1000 MG: 1 INJECTION INTRAMUSCULAR; INTRAVENOUS at 10:34

## 2024-09-17 ASSESSMENT — PAIN DESCRIPTION - DESCRIPTORS
DESCRIPTORS: BURNING;SORE
DESCRIPTORS: ACHING

## 2024-09-17 ASSESSMENT — PAIN SCALES - GENERAL
PAINLEVEL_OUTOF10: 3
PAINLEVEL_OUTOF10: 4

## 2024-09-17 ASSESSMENT — PAIN DESCRIPTION - ORIENTATION
ORIENTATION: RIGHT
ORIENTATION: RIGHT

## 2024-09-17 ASSESSMENT — PAIN DESCRIPTION - LOCATION
LOCATION: FOOT
LOCATION: FOOT

## 2024-09-18 ENCOUNTER — OFFICE VISIT (OUTPATIENT)
Dept: INFECTIOUS DISEASES | Age: 44
End: 2024-09-18
Payer: COMMERCIAL

## 2024-09-18 ENCOUNTER — HOSPITAL ENCOUNTER (OUTPATIENT)
Dept: INFUSION THERAPY | Age: 44
Setting detail: INFUSION SERIES
Discharge: HOME | End: 2024-09-18
Payer: COMMERCIAL

## 2024-09-18 VITALS
WEIGHT: 182 LBS | TEMPERATURE: 97.3 F | HEIGHT: 63 IN | BODY MASS INDEX: 32.25 KG/M2 | RESPIRATION RATE: 16 BRPM | HEART RATE: 66 BPM | SYSTOLIC BLOOD PRESSURE: 127 MMHG | DIASTOLIC BLOOD PRESSURE: 84 MMHG

## 2024-09-18 VITALS
SYSTOLIC BLOOD PRESSURE: 117 MMHG | TEMPERATURE: 97.1 F | OXYGEN SATURATION: 100 % | DIASTOLIC BLOOD PRESSURE: 75 MMHG | RESPIRATION RATE: 18 BRPM | HEART RATE: 87 BPM

## 2024-09-18 DIAGNOSIS — L03.90 SEPSIS DUE TO CELLULITIS (HCC): Primary | ICD-10-CM

## 2024-09-18 DIAGNOSIS — A41.9 SEPSIS DUE TO CELLULITIS (HCC): Primary | ICD-10-CM

## 2024-09-18 DIAGNOSIS — E11.628 DIABETIC INFECTION OF RIGHT FOOT (HCC): Primary | ICD-10-CM

## 2024-09-18 DIAGNOSIS — L08.9 DIABETIC INFECTION OF RIGHT FOOT (HCC): Primary | ICD-10-CM

## 2024-09-18 LAB
BASOPHILS # BLD: 0 K/UL (ref 0–0.2)
BASOPHILS NFR BLD: 0.6 %
CRP SERPL HS-MCNC: 7.9 MG/L (ref 0–5)
EOSINOPHIL # BLD: 0.4 K/UL (ref 0–0.7)
EOSINOPHIL NFR BLD: 6.4 %
ERYTHROCYTE [DISTWIDTH] IN BLOOD BY AUTOMATED COUNT: 13.4 % (ref 11.5–14.5)
HCT VFR BLD AUTO: 37.8 % (ref 37–47)
HGB BLD-MCNC: 13.4 G/DL (ref 12–16)
LYMPHOCYTES # BLD: 1.6 K/UL (ref 1–4.8)
LYMPHOCYTES NFR BLD: 24.5 %
MCH RBC QN AUTO: 30.2 PG (ref 27–31.3)
MCHC RBC AUTO-ENTMCNC: 35.4 % (ref 33–37)
MCV RBC AUTO: 85.1 FL (ref 79.4–94.8)
MONOCYTES # BLD: 0.5 K/UL (ref 0.2–0.8)
MONOCYTES NFR BLD: 7.1 %
NEUTROPHILS # BLD: 3.9 K/UL (ref 1.4–6.5)
NEUTS SEG NFR BLD: 61.1 %
PLATELET # BLD AUTO: 249 K/UL (ref 130–400)
RBC # BLD AUTO: 4.44 M/UL (ref 4.2–5.4)
WBC # BLD AUTO: 6.5 K/UL (ref 4.8–10.8)

## 2024-09-18 PROCEDURE — 96365 THER/PROPH/DIAG IV INF INIT: CPT

## 2024-09-18 PROCEDURE — G8417 CALC BMI ABV UP PARAM F/U: HCPCS | Performed by: INTERNAL MEDICINE

## 2024-09-18 PROCEDURE — 99213 OFFICE O/P EST LOW 20 MIN: CPT | Performed by: INTERNAL MEDICINE

## 2024-09-18 PROCEDURE — 86140 C-REACTIVE PROTEIN: CPT

## 2024-09-18 PROCEDURE — 2580000003 HC RX 258: Performed by: INTERNAL MEDICINE

## 2024-09-18 PROCEDURE — 1111F DSCHRG MED/CURRENT MED MERGE: CPT | Performed by: INTERNAL MEDICINE

## 2024-09-18 PROCEDURE — 36592 COLLECT BLOOD FROM PICC: CPT

## 2024-09-18 PROCEDURE — 1036F TOBACCO NON-USER: CPT | Performed by: INTERNAL MEDICINE

## 2024-09-18 PROCEDURE — G8427 DOCREV CUR MEDS BY ELIG CLIN: HCPCS | Performed by: INTERNAL MEDICINE

## 2024-09-18 PROCEDURE — 2022F DILAT RTA XM EVC RTNOPTHY: CPT | Performed by: INTERNAL MEDICINE

## 2024-09-18 PROCEDURE — 85025 COMPLETE CBC W/AUTO DIFF WBC: CPT

## 2024-09-18 PROCEDURE — 6360000002 HC RX W HCPCS: Performed by: INTERNAL MEDICINE

## 2024-09-18 PROCEDURE — 3052F HG A1C>EQUAL 8.0%<EQUAL 9.0%: CPT | Performed by: INTERNAL MEDICINE

## 2024-09-18 RX ORDER — HEPARIN 100 UNIT/ML
500 SYRINGE INTRAVENOUS PRN
Status: CANCELLED | OUTPATIENT
Start: 2024-09-19

## 2024-09-18 RX ORDER — SODIUM CHLORIDE 9 MG/ML
5-250 INJECTION, SOLUTION INTRAVENOUS PRN
Status: CANCELLED | OUTPATIENT
Start: 2024-09-19

## 2024-09-18 RX ORDER — SODIUM CHLORIDE 0.9 % (FLUSH) 0.9 %
5-40 SYRINGE (ML) INJECTION PRN
Status: CANCELLED | OUTPATIENT
Start: 2024-09-19

## 2024-09-18 RX ADMIN — ERTAPENEM SODIUM 1000 MG: 1 INJECTION INTRAMUSCULAR; INTRAVENOUS at 10:27

## 2024-09-18 ASSESSMENT — PATIENT HEALTH QUESTIONNAIRE - PHQ9
2. FEELING DOWN, DEPRESSED OR HOPELESS: NOT AT ALL
SUM OF ALL RESPONSES TO PHQ QUESTIONS 1-9: 0
1. LITTLE INTEREST OR PLEASURE IN DOING THINGS: NOT AT ALL
SUM OF ALL RESPONSES TO PHQ QUESTIONS 1-9: 0
SUM OF ALL RESPONSES TO PHQ9 QUESTIONS 1 & 2: 0

## 2024-09-18 ASSESSMENT — ENCOUNTER SYMPTOMS
GASTROINTESTINAL NEGATIVE: 1
RESPIRATORY NEGATIVE: 1
COLOR CHANGE: 1

## 2024-09-18 ASSESSMENT — PAIN SCALES - GENERAL: PAINLEVEL_OUTOF10: 2

## 2024-09-18 ASSESSMENT — PAIN DESCRIPTION - LOCATION: LOCATION: FOOT

## 2024-09-19 ENCOUNTER — HOSPITAL ENCOUNTER (OUTPATIENT)
Dept: INFUSION THERAPY | Age: 44
Setting detail: INFUSION SERIES
Discharge: HOME | End: 2024-09-19
Payer: COMMERCIAL

## 2024-09-19 VITALS
OXYGEN SATURATION: 97 % | HEART RATE: 83 BPM | TEMPERATURE: 97.3 F | SYSTOLIC BLOOD PRESSURE: 125 MMHG | DIASTOLIC BLOOD PRESSURE: 82 MMHG

## 2024-09-19 DIAGNOSIS — L03.90 SEPSIS DUE TO CELLULITIS (HCC): Primary | ICD-10-CM

## 2024-09-19 DIAGNOSIS — A41.9 SEPSIS DUE TO CELLULITIS (HCC): Primary | ICD-10-CM

## 2024-09-19 PROCEDURE — 96365 THER/PROPH/DIAG IV INF INIT: CPT

## 2024-09-19 PROCEDURE — 6360000002 HC RX W HCPCS: Performed by: INTERNAL MEDICINE

## 2024-09-19 PROCEDURE — 2580000003 HC RX 258: Performed by: INTERNAL MEDICINE

## 2024-09-19 RX ORDER — SODIUM CHLORIDE 0.9 % (FLUSH) 0.9 %
5-40 SYRINGE (ML) INJECTION PRN
Status: CANCELLED | OUTPATIENT
Start: 2024-09-20

## 2024-09-19 RX ORDER — SODIUM CHLORIDE 9 MG/ML
5-250 INJECTION, SOLUTION INTRAVENOUS PRN
Status: CANCELLED | OUTPATIENT
Start: 2024-09-20

## 2024-09-19 RX ORDER — HEPARIN 100 UNIT/ML
500 SYRINGE INTRAVENOUS PRN
Status: CANCELLED | OUTPATIENT
Start: 2024-09-20

## 2024-09-19 RX ADMIN — ERTAPENEM SODIUM 1000 MG: 1 INJECTION INTRAMUSCULAR; INTRAVENOUS at 10:40

## 2024-09-19 ASSESSMENT — PAIN SCALES - GENERAL: PAINLEVEL_OUTOF10: 3

## 2024-09-20 ENCOUNTER — HOSPITAL ENCOUNTER (OUTPATIENT)
Dept: INFUSION THERAPY | Age: 44
Setting detail: INFUSION SERIES
Discharge: HOME | End: 2024-09-20
Payer: COMMERCIAL

## 2024-09-20 VITALS
HEART RATE: 97 BPM | OXYGEN SATURATION: 100 % | TEMPERATURE: 97.8 F | RESPIRATION RATE: 18 BRPM | SYSTOLIC BLOOD PRESSURE: 142 MMHG | DIASTOLIC BLOOD PRESSURE: 72 MMHG

## 2024-09-20 DIAGNOSIS — A41.9 SEPSIS DUE TO CELLULITIS (HCC): Primary | ICD-10-CM

## 2024-09-20 DIAGNOSIS — L03.90 SEPSIS DUE TO CELLULITIS (HCC): Primary | ICD-10-CM

## 2024-09-20 PROCEDURE — 99211 OFF/OP EST MAY X REQ PHY/QHP: CPT

## 2024-09-20 PROCEDURE — 2580000003 HC RX 258: Performed by: INTERNAL MEDICINE

## 2024-09-20 PROCEDURE — 6360000002 HC RX W HCPCS: Performed by: INTERNAL MEDICINE

## 2024-09-20 PROCEDURE — 96365 THER/PROPH/DIAG IV INF INIT: CPT

## 2024-09-20 RX ORDER — SODIUM CHLORIDE 0.9 % (FLUSH) 0.9 %
5-40 SYRINGE (ML) INJECTION PRN
OUTPATIENT
Start: 2024-09-25

## 2024-09-20 RX ORDER — HEPARIN 100 UNIT/ML
500 SYRINGE INTRAVENOUS PRN
OUTPATIENT
Start: 2024-09-25

## 2024-09-20 RX ORDER — SODIUM CHLORIDE 9 MG/ML
5-250 INJECTION, SOLUTION INTRAVENOUS PRN
OUTPATIENT
Start: 2024-09-25

## 2024-09-20 RX ADMIN — ERTAPENEM SODIUM 1000 MG: 1 INJECTION INTRAMUSCULAR; INTRAVENOUS at 10:36

## 2024-09-20 ASSESSMENT — PAIN SCALES - GENERAL: PAINLEVEL_OUTOF10: 3

## 2024-10-08 ENCOUNTER — HOSPITAL ENCOUNTER (OUTPATIENT)
Dept: WOUND CARE | Age: 44
Discharge: HOME | End: 2024-10-08
Payer: COMMERCIAL

## 2024-10-08 VITALS
TEMPERATURE: 96.9 F | DIASTOLIC BLOOD PRESSURE: 87 MMHG | HEART RATE: 90 BPM | RESPIRATION RATE: 18 BRPM | SYSTOLIC BLOOD PRESSURE: 133 MMHG

## 2024-10-08 PROCEDURE — 11042 DBRDMT SUBQ TIS 1ST 20SQCM/<: CPT

## 2024-10-08 PROCEDURE — 29445 APPL RIGID TOT CNTC LEG CAST: CPT

## 2024-10-08 ASSESSMENT — PAIN DESCRIPTION - LOCATION: LOCATION: FOOT

## 2024-10-08 ASSESSMENT — PAIN DESCRIPTION - ORIENTATION: ORIENTATION: RIGHT

## 2024-10-08 ASSESSMENT — PAIN SCALES - GENERAL: PAINLEVEL_OUTOF10: 7

## 2024-10-08 NOTE — FLOWSHEET NOTE
Call to patient, explained to her that it can take time for her body to adjust to a new OCP, as she just switched to the ortho tri cyclen. Told her Dr Eckert recommends she finish out the pill pack, like normal. Also recommend ibuprofen 600mg every 6 hours to decrease the flow. She verbalized understanding and intent to comply.    Total Contact Cast    NAME:  Carli Bustamante  YOB: 1980  MEDICAL RECORD NUMBER:  [id number]  DATE:  10/8/2024    Goal:  Patient will maintain integrity of cast, avoid mobility hazards, and report complications that may occur (foul odor, pain, numbness, cracked cast).    Removed old contact cast if indicated and wash extremity with soap and water  Applied ordered dressing over wound(s)   Applied cast padding  Applied foam padding  Applied per Dr. SHELBY Brand   Total Contact Cast was applied in the Wound Care Center to right lower leg  Applied cast material fiberglass  Allow cast to dry   Instructed patient to report to health care provider, including wound care center, any back pain, hip pain, or leg pain, numbness of toes, or any odor coming from the cast.   Instructed patient not to stick any foreign objects down into cast.  Instructed patient to utilize assistive devices(crutches, cane or walker) as ordered.  Instructed patient to continue offloading as directed.     Applied cast per  Guidelines    Electronically signed by Jeannette Mckeon RN on 10/8/2024 at 2:08 PM

## 2024-10-08 NOTE — DISCHARGE INSTRUCTIONS
[facility] and [facility]   Physician Orders and Discharge Instructions  [address] [place]  [address]  Telephone: [phone]      [phone]        NAME:  Carli Bustamante                                                                                         YOB: 1980  MEDICAL RECORD NUMBER:  [id number]     Your  is:  Jeannette     Home Care/Facility:     Wound Location: Right Plantar Foot      Dressing orders:   Bonita and a total contact cast was applied today.     Leave cast in place until next appointment. Call us or go to the emergency room if you are having pain or numbness in your leg.      Compression: none      Offloading Device: Forefoot offloading shoe with felt      Other Instructions:       Keep all dressings clean, dry and intact.  Keep pressure off the wound(s) at all times.      Follow up visit  2 days Thursday October 10, 2024 at 11:30am  and then 1 Week October 15, 2024 at 3:15pm     Please give 24 hour notice if unable to keep appointment. [phone]     If you experience any of the following, please call the Wound Care Service at  [phone] or go to the nearest emergency room.        *Increase in pain*Temperature over 101*Increase in drainage from your wound or a foul odor  *Uncontrolled swelling*Need for compression bandage changes due to slippage, breakthrough drainage       PLEASE NOTE: IF YOU ARE UNABLE TO OBTAIN WOUND SUPPLIES, CONTINUE TO USE THE SUPPLIES YOU HAVE AVAILABLE UNTIL YOU ARE ABLE TO REACH US. IT IS MOST IMPORTANT TO KEEP THE WOUND COVERED AT ALL TIMES

## 2024-10-10 ENCOUNTER — HOSPITAL ENCOUNTER (OUTPATIENT)
Dept: WOUND CARE | Age: 44
Discharge: HOME | End: 2024-10-10
Attending: PODIATRIST
Payer: COMMERCIAL

## 2024-10-10 VITALS
TEMPERATURE: 97.2 F | SYSTOLIC BLOOD PRESSURE: 159 MMHG | DIASTOLIC BLOOD PRESSURE: 80 MMHG | RESPIRATION RATE: 18 BRPM | HEART RATE: 89 BPM

## 2024-10-10 DIAGNOSIS — S90.511A ABRASION, RIGHT ANKLE, INITIAL ENCOUNTER: ICD-10-CM

## 2024-10-10 DIAGNOSIS — L97.512 ULCER OF RIGHT FOOT WITH FAT LAYER EXPOSED (HCC): Primary | ICD-10-CM

## 2024-10-10 PROCEDURE — 99213 OFFICE O/P EST LOW 20 MIN: CPT

## 2024-10-10 PROCEDURE — 99203 OFFICE O/P NEW LOW 30 MIN: CPT | Performed by: PODIATRIST

## 2024-10-10 NOTE — PROGRESS NOTES
10/10/24 1144   Wound Healing % 94 10/10/24 1144   Post-Procedure Length (cm) 0.5 cm 09/17/24 1631   Post-Procedure Width (cm) 0.5 cm 09/17/24 1631   Post-Procedure Depth (cm) 0.1 cm 09/17/24 1631   Post-Procedure Surface Area (cm^2) 0.25 cm^2 09/17/24 1631   Post-Procedure Volume (cm^3) 0.025 cm^3 09/17/24 1631   Undermining Starts ___ O'Clock 7 10/10/24 1144   Undermining Ends___ O'Clock 5 10/10/24 1144   Undermining Maxium Distance (cm) 0.15 10/10/24 1144   Wound Assessment Pink/red 10/10/24 1144   Drainage Amount Small (< 25%) 10/10/24 1144   Drainage Description Serosanguinous 10/10/24 1144   Odor None 10/10/24 1144   Edna-wound Assessment Hyperkeratosis (callous) 10/10/24 1144   Margins Undefined edges 10/10/24 1144   Wound Thickness Description not for Pressure Injury Full thickness 10/10/24 1144   Number of days: 51       Wound 10/10/24 Foot Distal;Right #2 (Active)   Wound Image   10/10/24 1151   Wound Cleansed Wound cleanser 10/10/24 1151   Dressing/Treatment Fistula pouch;Xeroform;Dry dressing 10/10/24 1151   Wound Length (cm) 1.3 cm 10/10/24 1151   Wound Width (cm) 0.9 cm 10/10/24 1151   Wound Depth (cm) 0.1 cm 10/10/24 1151   Wound Surface Area (cm^2) 1.17 cm^2 10/10/24 1151   Wound Volume (cm^3) 0.117 cm^3 10/10/24 1151   Wound Assessment Pink/red 10/10/24 1151   Drainage Amount None (dry) 10/10/24 1151   Odor None 10/10/24 1151   Edna-wound Assessment Fragile 10/10/24 1151   Margins Undefined edges 10/10/24 1151   Wound Thickness Description not for Pressure Injury Full thickness 10/10/24 1151   Number of days: 0                  Plan:     I have recommended the patient take a break from the total contact cast due to the abrasion of the anterior ankle.  I have dispensed a modified peg assist device to offload the wound.  Patient instructed to closely monitor for signs of ascending cellulitis or lymphangitis, she should report to the emergency department if these are observed.  Patient instructed to

## 2024-10-10 NOTE — DISCHARGE INSTRUCTIONS
[facility] Wound Center and Hyperbaric Medicine   Physician Orders and Discharge Instructions  [facility]  [address] [place]  [address]  Telephone: [phone]      [phone]        NAME:  Carli Bustamante                                                                                         YOB: 1980  MEDICAL RECORD NUMBER:  [id number]     Your  is:  Jeannette     Home Care/Facility:     Wound Location: Right Plantar Foot      Dressing orders: 1.Cleanse wound(s) with normal saline.  2. Apply dry QUE  Or equivalent to wound bed.  3. Moisten QUE with a few drops of normal saline.  4. Cover with gauze and a band aid or 4x4's and wrap with errol or kerlix  5. Change every other day (Tuesday, Thursday, Saturday)     Apply xeroform and a dry dressing to anterior ankle with dressing changes.      Compression: none      Offloading Device: Post op shoe with peg assist      Other Instructions:  Continue taking antibiotics per Infectious Disease.      Keep all dressings clean, dry and intact.  Keep pressure off the wound(s) at all times.      Follow up visit  Tuesday October 15, 2024 at 3:15pm      Please give 24 hour notice if unable to keep appointment. [phone]     If you experience any of the following, please call the Wound Care Service at  [phone] or go to the nearest emergency room.        *Increase in pain*Temperature over 101*Increase in drainage from your wound or a foul odor  *Uncontrolled swelling*Need for compression bandage changes due to slippage, breakthrough drainage       PLEASE NOTE: IF YOU ARE UNABLE TO OBTAIN WOUND SUPPLIES, CONTINUE TO USE THE SUPPLIES YOU HAVE AVAILABLE UNTIL YOU ARE ABLE TO REACH US. IT IS MOST IMPORTANT TO KEEP THE WOUND COVERED AT ALL TIMES

## 2024-10-11 NOTE — WOUND CARE
[facility] Physician Billing Sheet.     Carli Bustamante  AGE: 44 y.o.   GENDER: female  : 1980  TODAY'S DATE:  10/8/2024    ICD-10 CODES  L97.597  L03.115  E11.42      PHYSICIAN PROCEDURES    CPT CODE  80856      Electronically signed by Alistair Brand DPM on 10/8/2024 at 3:05 PM      
0.025 cm^3 09/17/24 1631   Undermining Starts ___ O'Clock 7 10/10/24 1144   Undermining Ends___ O'Clock 5 10/10/24 1144   Undermining Maxium Distance (cm) 0.15 10/10/24 1144   Wound Assessment Pink/red 10/10/24 1144   Drainage Amount Small (< 25%) 10/10/24 1144   Drainage Description Serosanguinous 10/10/24 1144   Odor None 10/10/24 1144   Edna-wound Assessment Hyperkeratosis (callous) 10/10/24 1144   Margins Undefined edges 10/10/24 1144   Wound Thickness Description not for Pressure Injury Full thickness 10/10/24 1144   Number of days: 52       Wound 10/10/24 Foot Distal;Right #2 (Active)   Wound Image   10/10/24 1151   Wound Cleansed Wound cleanser 10/10/24 1151   Dressing/Treatment Xeroform;Dry dressing 10/10/24 1151   Wound Length (cm) 1.3 cm 10/10/24 1151   Wound Width (cm) 0.9 cm 10/10/24 1151   Wound Depth (cm) 0.1 cm 10/10/24 1151   Wound Surface Area (cm^2) 1.17 cm^2 10/10/24 1151   Wound Volume (cm^3) 0.117 cm^3 10/10/24 1151   Wound Assessment Pink/red 10/10/24 1151   Drainage Amount None (dry) 10/10/24 1151   Odor None 10/10/24 1151   Edna-wound Assessment Fragile 10/10/24 1151   Margins Undefined edges 10/10/24 1151   Wound Thickness Description not for Pressure Injury Full thickness 10/10/24 1151   Number of days: 1             Percent of Wound/Ulcer Debrided: 100%    Total Surface Area Debrided:  1 sq cm     Diabetic/Pressure/Non Pressure Ulcers:  Ulcer: Diabetic ulcer, fat layer exposed      Bleeding:  Minimal    Hemostasis Achieved:  by pressure    Procedural Pain:  2  / 10     Post Procedural Pain:  2 / 10     Response to treatment:  Well tolerated by patient.       Plan:       TTC applied, consider offloading insert. Continue alginate. Back in 1 week    Treatment Note please see attached Discharge Instructions    In my professional opinion this patient would benefit from HBO Therapy: Undecided    Written patient dismissal instructions given to patient and signed by patient or POA.

## 2024-10-15 ENCOUNTER — OFFICE VISIT (OUTPATIENT)
Dept: INFECTIOUS DISEASES | Age: 44
End: 2024-10-15
Payer: COMMERCIAL

## 2024-10-15 ENCOUNTER — HOSPITAL ENCOUNTER (OUTPATIENT)
Dept: WOUND CARE | Age: 44
Discharge: HOME | End: 2024-10-15
Attending: STUDENT IN AN ORGANIZED HEALTH CARE EDUCATION/TRAINING PROGRAM
Payer: COMMERCIAL

## 2024-10-15 VITALS
TEMPERATURE: 97.7 F | DIASTOLIC BLOOD PRESSURE: 84 MMHG | HEART RATE: 118 BPM | SYSTOLIC BLOOD PRESSURE: 140 MMHG | RESPIRATION RATE: 18 BRPM

## 2024-10-15 VITALS
HEIGHT: 63 IN | RESPIRATION RATE: 18 BRPM | DIASTOLIC BLOOD PRESSURE: 89 MMHG | BODY MASS INDEX: 32.6 KG/M2 | TEMPERATURE: 97.3 F | SYSTOLIC BLOOD PRESSURE: 133 MMHG | WEIGHT: 184 LBS | HEART RATE: 96 BPM

## 2024-10-15 DIAGNOSIS — E11.628 DIABETIC INFECTION OF RIGHT FOOT (HCC): Primary | ICD-10-CM

## 2024-10-15 DIAGNOSIS — L08.9 DIABETIC INFECTION OF RIGHT FOOT (HCC): Primary | ICD-10-CM

## 2024-10-15 PROCEDURE — 2022F DILAT RTA XM EVC RTNOPTHY: CPT | Performed by: INTERNAL MEDICINE

## 2024-10-15 PROCEDURE — G8417 CALC BMI ABV UP PARAM F/U: HCPCS | Performed by: INTERNAL MEDICINE

## 2024-10-15 PROCEDURE — 1036F TOBACCO NON-USER: CPT | Performed by: INTERNAL MEDICINE

## 2024-10-15 PROCEDURE — 3052F HG A1C>EQUAL 8.0%<EQUAL 9.0%: CPT | Performed by: INTERNAL MEDICINE

## 2024-10-15 PROCEDURE — G8484 FLU IMMUNIZE NO ADMIN: HCPCS | Performed by: INTERNAL MEDICINE

## 2024-10-15 PROCEDURE — 99212 OFFICE O/P EST SF 10 MIN: CPT

## 2024-10-15 PROCEDURE — 99213 OFFICE O/P EST LOW 20 MIN: CPT | Performed by: INTERNAL MEDICINE

## 2024-10-15 PROCEDURE — G8427 DOCREV CUR MEDS BY ELIG CLIN: HCPCS | Performed by: INTERNAL MEDICINE

## 2024-10-15 ASSESSMENT — PAIN DESCRIPTION - LOCATION: LOCATION: FOOT

## 2024-10-15 ASSESSMENT — PATIENT HEALTH QUESTIONNAIRE - PHQ9
SUM OF ALL RESPONSES TO PHQ QUESTIONS 1-9: 0
1. LITTLE INTEREST OR PLEASURE IN DOING THINGS: NOT AT ALL
SUM OF ALL RESPONSES TO PHQ9 QUESTIONS 1 & 2: 0
2. FEELING DOWN, DEPRESSED OR HOPELESS: NOT AT ALL
SUM OF ALL RESPONSES TO PHQ QUESTIONS 1-9: 0

## 2024-10-15 ASSESSMENT — PAIN DESCRIPTION - DESCRIPTORS: DESCRIPTORS: BURNING;SORE

## 2024-10-15 ASSESSMENT — ENCOUNTER SYMPTOMS
GASTROINTESTINAL NEGATIVE: 1
COLOR CHANGE: 1
RESPIRATORY NEGATIVE: 1

## 2024-10-15 ASSESSMENT — PAIN DESCRIPTION - ORIENTATION: ORIENTATION: RIGHT;LEFT

## 2024-10-15 ASSESSMENT — PAIN SCALES - GENERAL: PAINLEVEL_OUTOF10: 5

## 2024-10-15 NOTE — PROGRESS NOTES
Infectious Disease     Patient Name: Carli Bustamante  Date: 10/15/2024  YOB: 1980  Medical Record Number: [id number]      Diabetic foot infection right foot  Right foot abscess        Patient has had ulceration lateral aspect right foot ongoing for more than a year sometimes completely closing then breaking open and draining periodically    Patient presented with recurrent foot ulcer plantar aspect right foot  Found to have drainage erythema edema foul-smelling drainage  Debrided prior to admission  No probe to bone  Underwent bedside debridement        RI FOOT RIGHT WO CONTRAST     Result Date: 8/19/2024  EXAMINATION: MRI OF THE RIGHT FOOT WITHOUT CONTRAST, 8/19/2024 12:47 pm TECHNIQUE: Multiplanar multisequence MRI of the right foot was performed without the administration of intravenous contrast. COMPARISON: Radiographs 08/18/2024 HISTORY: ORDERING SYSTEM PROVIDED HISTORY: R/o osteomyelitis at the base of 5th toe, distal 5th metatarsal, Distal rt 5th metatarsal TECHNOLOGIST PROVIDED HISTORY: Reason for exam:->R/o osteomyelitis at the base of 5th toe, distal 5th metatarsal Reason for exam:->Distal rt 5th metatarsal What is the area of interest?->Forefoot What reading provider will be dictating this exam?->CRC FINDINGS: LISFRANC JOINT: Visualized Lisfranc ligament is intact. No significant Lisfranc interval widening or significant periligamentous edema. BONE MARROW: No evidence of fracture.  Nonspecific bone marrow edema in the proximal and mid portions of the 2nd through 4th metatarsals.  This is favored to represent bone contusion or stress reaction.  Similar findings also present in the middle and lateral cuneiform is a. old healed fracture of the fist metatarsal diaphysis with angulation.  No evidence of osteomyelitis otherwise. GREATER AND LESSER MTP JOINTS: No significant joint effusion or osseous erosions. No significant degenerative changes. Normal alignment. SOFT TISSUES: Soft tissue

## 2024-10-15 NOTE — WOUND CARE
[facility] Physician Billing Sheet.     Carli Bustamante  AGE: 44 y.o.   GENDER: female  : 1980  TODAY'S DATE:  10/15/2024    ICD-10 CODES  L97.597  L03.115  E11.42      PHYSICIAN PROCEDURES    CPT CODE  15064      Electronically signed by Alistair Brand DPM on 10/15/2024 at 3:40 PM

## 2024-10-15 NOTE — DISCHARGE INSTRUCTIONS
[facility] Wound Center and Hyperbaric Medicine   Physician Orders and Discharge Instructions  [facility]  [address] [place]  [address]  Telephone: [phone]      [phone]        NAME:  Carli Bustamante                                                                                         YOB: 1980  MEDICAL RECORD NUMBER:  [id number]     Your  is:  Jeannette     Home Care/Facility:     Wound Location: Right Plantar Foot      Dressing orders:   Apply iodine, a piece of gauze and a band aid to the wound area for the next week.   Also apply Iodine to the red area on the top of your foot.          Compression: none      Offloading Device: Continue to use post op shoe with peg assist      Other Instructions:  It is ok to shower. No baths, hot tubs or swimming yet. Continue taking antibiotics per Infectious Disease. It is ok to shower     Keep all dressings clean, dry and intact.  Keep pressure off the wound(s) at all times.      Follow up visit  3 weeks Tuesday November 5, 2024 at      Please give 24 hour notice if unable to keep appointment. [phone]     If you experience any of the following, please call the Wound Care Service at  [phone] or go to the nearest emergency room.        *Increase in pain*Temperature over 101*Increase in drainage from your wound or a foul odor  *Uncontrolled swelling*Need for compression bandage changes due to slippage, breakthrough drainage       PLEASE NOTE: IF YOU ARE UNABLE TO OBTAIN WOUND SUPPLIES, CONTINUE TO USE THE SUPPLIES YOU HAVE AVAILABLE UNTIL YOU ARE ABLE TO REACH US. IT IS MOST IMPORTANT TO KEEP THE WOUND COVERED AT ALL TIMES

## 2024-10-15 NOTE — PLAN OF CARE
Problem: Pain  Goal: Verbalizes/displays adequate comfort level or baseline comfort level  Outcome: Progressing     Problem: Pain  Goal: Verbalizes/displays adequate comfort level or baseline comfort level  Outcome: Progressing     Problem: Wound:  Goal: Will show signs of wound healing; wound closure and no evidence of infection  Description: Will show signs of wound healing; wound closure and no evidence of infection  Outcome: Progressing

## 2024-10-31 DIAGNOSIS — E11.69 TYPE 2 DIABETES MELLITUS WITH OTHER SPECIFIED COMPLICATION, WITH LONG-TERM CURRENT USE OF INSULIN (HCC): ICD-10-CM

## 2024-10-31 DIAGNOSIS — Z79.4 TYPE 2 DIABETES MELLITUS WITH OTHER SPECIFIED COMPLICATION, WITH LONG-TERM CURRENT USE OF INSULIN (HCC): ICD-10-CM

## 2024-10-31 RX ORDER — INSULIN LISPRO 100 [IU]/ML
INJECTION, SOLUTION INTRAVENOUS; SUBCUTANEOUS
Qty: 60 ML | Refills: 0 | Status: SHIPPED | OUTPATIENT
Start: 2024-10-31

## 2024-11-05 ENCOUNTER — HOSPITAL ENCOUNTER (OUTPATIENT)
Dept: WOUND CARE | Age: 44
Discharge: HOME | End: 2024-11-05
Attending: STUDENT IN AN ORGANIZED HEALTH CARE EDUCATION/TRAINING PROGRAM
Payer: COMMERCIAL

## 2024-11-05 VITALS
HEART RATE: 93 BPM | SYSTOLIC BLOOD PRESSURE: 138 MMHG | DIASTOLIC BLOOD PRESSURE: 81 MMHG | RESPIRATION RATE: 18 BRPM | TEMPERATURE: 97.5 F

## 2024-11-05 DIAGNOSIS — L97.512 ULCER OF RIGHT FOOT WITH FAT LAYER EXPOSED (HCC): Primary | ICD-10-CM

## 2024-11-05 PROCEDURE — 11042 DBRDMT SUBQ TIS 1ST 20SQCM/<: CPT

## 2024-11-05 ASSESSMENT — PAIN DESCRIPTION - DESCRIPTORS: DESCRIPTORS: ACHING

## 2024-11-05 ASSESSMENT — PAIN SCALES - GENERAL: PAINLEVEL_OUTOF10: 4

## 2024-11-05 ASSESSMENT — PAIN DESCRIPTION - LOCATION: LOCATION: FOOT

## 2024-11-05 NOTE — DISCHARGE INSTRUCTIONS
[facility] Wound Center and Hyperbaric Medicine   Physician Orders and Discharge Instructions  [address] [place]  [address]  Telephone: [phone]      [phone]        NAME:  Carli Bustamante                                                                                         YOB: 1980  MEDICAL RECORD NUMBER:  [id number]     Your  is:  Jeannette     Home Care/Facility:     Wound Location: Right Plantar Foot      Dressing orders:   1.Cleanse wound(s) with normal saline.  2. Apply dry QUE  Or equivalent to wound bed.  3. Moisten QUE with a few drops of normal saline.  4. Cover with 4x4's and wrap with gauze (errol or kerlix)  5. Change  Every other day or Monday, Wednesday, and Friday          Compression: none      Offloading Device: Continue to use post op shoe with peg assist      Other Instructions:  It is ok to shower. No baths, hot tubs or swimming yet. Continue taking antibiotics per Infectious Disease. It is ok to shower     Keep all dressings clean, dry and intact.  Keep pressure off the wound(s) at all times.      Follow up visit  2 weeks Tuesday November 19, 2024 at 1:30pm     Please give 24 hour notice if unable to keep appointment. [phone]     If you experience any of the following, please call the Wound Care Service at  [phone] or go to the nearest emergency room.        *Increase in pain*Temperature over 101*Increase in drainage from your wound or a foul odor  *Uncontrolled swelling*Need for compression bandage changes due to slippage, breakthrough drainage       PLEASE NOTE: IF YOU ARE UNABLE TO OBTAIN WOUND SUPPLIES, CONTINUE TO USE THE SUPPLIES YOU HAVE AVAILABLE UNTIL YOU ARE ABLE TO REACH US. IT IS MOST IMPORTANT TO KEEP THE WOUND COVERED AT ALL TIMES               Electronically signed by TRISHA Rodgers CNP on 11/5/2024 at 1:52 PM

## 2024-11-05 NOTE — PROGRESS NOTES
11/05/24 1339   Wound Width (cm) 0.2 cm 11/05/24 1339   Wound Depth (cm) 0.3 cm 11/05/24 1339   Wound Surface Area (cm^2) 0.04 cm^2 11/05/24 1339   Change in Wound Size % (l*w) 97.33 11/05/24 1339   Wound Volume (cm^3) 0.012 cm^3 11/05/24 1339   Wound Healing % 96 11/05/24 1339   Post-Procedure Length (cm) 0.2 cm 11/05/24 1351   Post-Procedure Width (cm) 0.2 cm 11/05/24 1351   Post-Procedure Depth (cm) 0.3 cm 11/05/24 1351   Post-Procedure Surface Area (cm^2) 0.04 cm^2 11/05/24 1351   Post-Procedure Volume (cm^3) 0.012 cm^3 11/05/24 1351   Undermining Starts ___ O'Clock 12 11/05/24 1339   Undermining Ends___ O'Clock 1159 11/05/24 1339   Undermining Maxium Distance (cm) 0.2 11/05/24 1339   Wound Assessment Pink/red 11/05/24 1339   Drainage Amount Scant (moist but unmeasurable) 11/05/24 1339   Drainage Description Sanguinous 11/05/24 1339   Odor None 11/05/24 1339   Edna-wound Assessment Hyperkeratosis (callous) 11/05/24 1339   Margins Defined edges 11/05/24 1339   Wound Thickness Description not for Pressure Injury Full thickness 11/05/24 1339   Number of days: 77       Wound 10/10/24 Foot Distal;Right #2 (Active)   Wound Image   11/05/24 1339   Wound Etiology Diabetic 11/05/24 1339   Wound Cleansed Cleansed with saline 11/05/24 1339   Dressing/Treatment Betadine swabs/povidone iodine;Dry dressing 10/15/24 1539   Wound Length (cm) 0 cm 11/05/24 1339   Wound Width (cm) 0 cm 11/05/24 1339   Wound Depth (cm) 0 cm 11/05/24 1339   Wound Surface Area (cm^2) 0 cm^2 11/05/24 1339   Change in Wound Size % (l*w) 100 11/05/24 1339   Wound Volume (cm^3) 0 cm^3 11/05/24 1339   Wound Healing % 100 11/05/24 1339   Wound Assessment Pink/red 10/15/24 1452   Drainage Amount None (dry) 10/15/24 1452   Odor None 10/15/24 1452   Edna-wound Assessment Fragile 10/15/24 1452   Margins Undefined edges 10/15/24 1452   Wound Thickness Description not for Pressure Injury Full thickness 10/15/24 1452   Number of days: 26            Percent of

## 2024-11-14 DIAGNOSIS — Z79.4 TYPE 2 DIABETES MELLITUS WITH OTHER SPECIFIED COMPLICATION, WITH LONG-TERM CURRENT USE OF INSULIN (HCC): ICD-10-CM

## 2024-11-14 DIAGNOSIS — E11.69 TYPE 2 DIABETES MELLITUS WITH OTHER SPECIFIED COMPLICATION, WITH LONG-TERM CURRENT USE OF INSULIN (HCC): ICD-10-CM

## 2024-11-14 RX ORDER — INSULIN LISPRO 100 [IU]/ML
INJECTION, SOLUTION INTRAVENOUS; SUBCUTANEOUS
Qty: 60 ML | Refills: 0 | Status: SHIPPED | OUTPATIENT
Start: 2024-11-14

## 2024-11-18 ENCOUNTER — HOSPITAL ENCOUNTER (EMERGENCY)
Age: 44
Discharge: HOME | End: 2024-11-18
Attending: EMERGENCY MEDICINE
Payer: COMMERCIAL

## 2024-11-18 ENCOUNTER — APPOINTMENT (OUTPATIENT)
Dept: GENERAL RADIOLOGY | Age: 44
End: 2024-11-18
Payer: COMMERCIAL

## 2024-11-18 VITALS
BODY MASS INDEX: 31.54 KG/M2 | HEIGHT: 63 IN | TEMPERATURE: 98.8 F | RESPIRATION RATE: 18 BRPM | HEART RATE: 116 BPM | SYSTOLIC BLOOD PRESSURE: 192 MMHG | OXYGEN SATURATION: 98 % | WEIGHT: 178 LBS | DIASTOLIC BLOOD PRESSURE: 92 MMHG

## 2024-11-18 DIAGNOSIS — T14.8XXA CHRONIC WOUND: Primary | ICD-10-CM

## 2024-11-18 LAB
ALBUMIN SERPL-MCNC: 4.1 G/DL (ref 3.5–4.6)
ALP SERPL-CCNC: 89 U/L (ref 40–130)
ALT SERPL-CCNC: 34 U/L (ref 0–33)
ANION GAP SERPL CALCULATED.3IONS-SCNC: 14 MEQ/L (ref 9–15)
AST SERPL-CCNC: 32 U/L (ref 0–35)
BASOPHILS # BLD: 0 K/UL (ref 0–0.2)
BASOPHILS NFR BLD: 0.4 %
BILIRUB SERPL-MCNC: 0.5 MG/DL (ref 0.2–0.7)
BUN SERPL-MCNC: 6 MG/DL (ref 6–20)
CALCIUM SERPL-MCNC: 8.7 MG/DL (ref 8.5–9.9)
CHLORIDE SERPL-SCNC: 99 MEQ/L (ref 95–107)
CO2 SERPL-SCNC: 23 MEQ/L (ref 20–31)
CREAT SERPL-MCNC: 0.57 MG/DL (ref 0.5–0.9)
EOSINOPHIL # BLD: 0.2 K/UL (ref 0–0.7)
EOSINOPHIL NFR BLD: 3 %
ERYTHROCYTE [DISTWIDTH] IN BLOOD BY AUTOMATED COUNT: 13.5 % (ref 11.5–14.5)
GLOBULIN SER CALC-MCNC: 3.2 G/DL (ref 2.3–3.5)
GLUCOSE SERPL-MCNC: 350 MG/DL (ref 70–99)
HCT VFR BLD AUTO: 40.9 % (ref 37–47)
HGB BLD-MCNC: 15 G/DL (ref 12–16)
LACTATE BLDV-SCNC: 2.9 MMOL/L (ref 0.5–2.2)
LYMPHOCYTES # BLD: 1.5 K/UL (ref 1–4.8)
LYMPHOCYTES NFR BLD: 19.1 %
MCH RBC QN AUTO: 31.1 PG (ref 27–31.3)
MCHC RBC AUTO-ENTMCNC: 36.7 % (ref 33–37)
MCV RBC AUTO: 84.9 FL (ref 79.4–94.8)
MONOCYTES # BLD: 0.6 K/UL (ref 0.2–0.8)
MONOCYTES NFR BLD: 6.9 %
NEUTROPHILS # BLD: 5.6 K/UL (ref 1.4–6.5)
NEUTS SEG NFR BLD: 70.2 %
PLATELET # BLD AUTO: 319 K/UL (ref 130–400)
POTASSIUM SERPL-SCNC: 3.5 MEQ/L (ref 3.4–4.9)
PROCALCITONIN SERPL IA-MCNC: 0.07 NG/ML (ref 0–0.15)
PROT SERPL-MCNC: 7.3 G/DL (ref 6.3–8)
RBC # BLD AUTO: 4.82 M/UL (ref 4.2–5.4)
SODIUM SERPL-SCNC: 136 MEQ/L (ref 135–144)
WBC # BLD AUTO: 8 K/UL (ref 4.8–10.8)

## 2024-11-18 PROCEDURE — 73630 X-RAY EXAM OF FOOT: CPT

## 2024-11-18 PROCEDURE — 83605 ASSAY OF LACTIC ACID: CPT

## 2024-11-18 PROCEDURE — 87040 BLOOD CULTURE FOR BACTERIA: CPT

## 2024-11-18 PROCEDURE — 6360000002 HC RX W HCPCS: Performed by: EMERGENCY MEDICINE

## 2024-11-18 PROCEDURE — 2580000003 HC RX 258: Performed by: EMERGENCY MEDICINE

## 2024-11-18 PROCEDURE — 99284 EMERGENCY DEPT VISIT MOD MDM: CPT

## 2024-11-18 PROCEDURE — 36415 COLL VENOUS BLD VENIPUNCTURE: CPT

## 2024-11-18 PROCEDURE — 96365 THER/PROPH/DIAG IV INF INIT: CPT

## 2024-11-18 PROCEDURE — 84145 PROCALCITONIN (PCT): CPT

## 2024-11-18 PROCEDURE — 85025 COMPLETE CBC W/AUTO DIFF WBC: CPT

## 2024-11-18 PROCEDURE — 80053 COMPREHEN METABOLIC PANEL: CPT

## 2024-11-18 RX ORDER — 0.9 % SODIUM CHLORIDE 0.9 %
1000 INTRAVENOUS SOLUTION INTRAVENOUS ONCE
Status: COMPLETED | OUTPATIENT
Start: 2024-11-18 | End: 2024-11-18

## 2024-11-18 RX ADMIN — SODIUM CHLORIDE 1000 ML: 9 INJECTION, SOLUTION INTRAVENOUS at 17:19

## 2024-11-18 RX ADMIN — AMPICILLIN SODIUM AND SULBACTAM SODIUM 3000 MG: 2; 1 INJECTION, POWDER, FOR SOLUTION INTRAMUSCULAR; INTRAVENOUS at 17:14

## 2024-11-18 ASSESSMENT — ENCOUNTER SYMPTOMS
ABDOMINAL PAIN: 0
SHORTNESS OF BREATH: 0
CHEST TIGHTNESS: 0
NAUSEA: 0
EYE PAIN: 0
VOMITING: 0
SORE THROAT: 0

## 2024-11-18 ASSESSMENT — PAIN DESCRIPTION - ORIENTATION: ORIENTATION: RIGHT

## 2024-11-18 ASSESSMENT — PAIN SCALES - GENERAL: PAINLEVEL_OUTOF10: 7

## 2024-11-18 ASSESSMENT — PAIN DESCRIPTION - PAIN TYPE: TYPE: ACUTE PAIN

## 2024-11-18 ASSESSMENT — PAIN DESCRIPTION - LOCATION: LOCATION: FOOT

## 2024-11-18 ASSESSMENT — PAIN - FUNCTIONAL ASSESSMENT: PAIN_FUNCTIONAL_ASSESSMENT: 0-10

## 2024-11-18 NOTE — ED PROVIDER NOTES
Basic Information   Time Seen: 2:04 PM   Primary Care Provider: Jayashree Watson DO     Chief Complaint   Patient presents with    Wound Infection      HPI   Carli Bustamante is a 44 yrs female who presents for wound check.  She has a wound to her dorsal right foot adjacent to her fifth toe.  Patient states intermittent issues with this wound for the past several months.  She had been hospitalized at 1 point for this as she was septic.  Has been following with wound care and has been doing well since.  However patient states today she did not feel right, she felt that the area was more sore than usual.  She denies fever, chills, nausea vomiting lethargy, malaise, denies noting any active discharge or bleeding, denies acute trauma or injury.  No recent antibiotics for this.  She does have history of diabetes.  No history of osteomyelitis.  Patient also presenting hypertensive.  States no history of hypertension.  States that she is on hypertensive \"with something is going on.\"  She denies headache, nausea vomiting, dizziness, eye pain, vision change, chest pain or shortness breath, leg swelling, focal weakness or numbness.     Physical Exam     BP (!) 192/92 (11/18/24 1356)    Temp 98.8 °F (37.1 °C) (11/18/24 1401)    Pulse (!) 116 (11/18/24 1356)   Resp 18 (11/18/24 1356)    SpO2 98 % (11/18/24 1356)       General: Awake and Alert, no acute distress   CV: RRR, S1, S2   Resp: LCTAB, even and non labored   Other: Patient wiggles all toes on the right foot.  Less than 2-second capillary refill to toes on the right foot.  There is a shallow ulceration noted to the dorsal surface of her right foot adjacent to her fifth toe, there is no tunneling wound, no necrosis, no active discharge or bleeding.   Impression and Plan     Labs Reviewed   CULTURE, BLOOD 1   CULTURE, BLOOD 2   CBC WITH AUTO DIFFERENTIAL   COMPREHENSIVE METABOLIC PANEL   LACTIC ACID        XR FOOT RIGHT (MIN 3 VIEWS)    (Results Pending)      Final 
Response: Oriented  Best Motor Response: Obeys commands  Gibson Coma Scale Score: 15                     CIWA Assessment  BP: (!) 192/92  Pulse: (!) 116                 PHYSICAL EXAM    (up to 7 for level 4, 8 or more for level 5)     ED Triage Vitals   BP Systolic BP Percentile Diastolic BP Percentile Temp Temp Source Pulse Respirations SpO2   11/18/24 1356 -- -- 11/18/24 1401 11/18/24 1356 11/18/24 1356 11/18/24 1356 11/18/24 1356   (!) 192/92   98.8 °F (37.1 °C) Oral (!) 116 18 98 %      Height Weight - Scale         11/18/24 1356 11/18/24 1356         1.6 m (5' 3\") 80.7 kg (178 lb)             Physical Exam  Vitals and nursing note reviewed.   Constitutional:       General: She is not in acute distress.     Appearance: She is well-developed. She is not diaphoretic.   HENT:      Head: Normocephalic and atraumatic.      Right Ear: External ear normal.      Left Ear: External ear normal.      Nose: Nose normal.      Mouth/Throat:      Mouth: Mucous membranes are moist.      Pharynx: No oropharyngeal exudate.   Eyes:      Extraocular Movements: Extraocular movements intact.      Conjunctiva/sclera: Conjunctivae normal.      Pupils: Pupils are equal, round, and reactive to light.   Neck:      Thyroid: No thyromegaly.      Vascular: No JVD.      Trachea: No tracheal deviation.   Cardiovascular:      Rate and Rhythm: Normal rate.      Heart sounds: Normal heart sounds. No murmur heard.  Pulmonary:      Effort: Pulmonary effort is normal. No respiratory distress.      Breath sounds: Normal breath sounds. No wheezing.   Abdominal:      General: Bowel sounds are normal.      Palpations: Abdomen is soft.      Tenderness: There is no abdominal tenderness. There is no guarding.   Musculoskeletal:         General: Normal range of motion.      Cervical back: Normal range of motion and neck supple.      Right lower leg: No edema.      Left lower leg: No edema.      Comments: Right foot has a callus like wound on the plantar

## 2024-11-18 NOTE — ED TRIAGE NOTES
Patient reports having wound to right foot. States purulent drainage that started two days ago States was septic with this wound infection in August. States was on antibiotics for over a month, wound improved but went to wound doctor last week for it starting to open again.

## 2024-11-19 ENCOUNTER — HOSPITAL ENCOUNTER (OUTPATIENT)
Dept: WOUND CARE | Age: 44
Discharge: HOME | End: 2024-11-19
Attending: STUDENT IN AN ORGANIZED HEALTH CARE EDUCATION/TRAINING PROGRAM
Payer: COMMERCIAL

## 2024-11-19 VITALS
DIASTOLIC BLOOD PRESSURE: 80 MMHG | RESPIRATION RATE: 18 BRPM | TEMPERATURE: 97 F | HEART RATE: 89 BPM | SYSTOLIC BLOOD PRESSURE: 121 MMHG

## 2024-11-19 PROCEDURE — 11042 DBRDMT SUBQ TIS 1ST 20SQCM/<: CPT

## 2024-11-19 ASSESSMENT — PAIN DESCRIPTION - PAIN TYPE: TYPE: ACUTE PAIN

## 2024-11-19 ASSESSMENT — PAIN SCALES - GENERAL: PAINLEVEL_OUTOF10: 6

## 2024-11-19 ASSESSMENT — PAIN DESCRIPTION - LOCATION: LOCATION: FOOT

## 2024-11-19 ASSESSMENT — PAIN DESCRIPTION - ORIENTATION: ORIENTATION: RIGHT

## 2024-11-19 NOTE — DISCHARGE INSTRUCTIONS
[facility]   Physician Orders and Discharge Instructions  [address] [place]  [address]  Telephone: [phone]      [phone]        NAME:  Carli Bustamante                                                                                         YOB: 1980  MEDICAL RECORD NUMBER:  [id number]     Your  is:  Jeannette     Home Care/Facility:     Wound Location: Right Plantar Foot      Dressing orders:   1.Cleanse wound(s) with normal saline.  2. Apply dry QUE  Or equivalent to wound bed.  3. Moisten QUE with a few drops of normal saline.  4. Cover with a bordered gauze  5. Change daily           Compression: none      Offloading Device: Continue to wear the insert that Dr. Brand modified and added felt to      Other Instructions:  It is ok to shower. No baths, hot tubs or swimming yet. Continue taking antibiotics that you were prescribed in the ER. It is ok to shower     Keep all dressings clean, dry and intact.  Keep pressure off the wound(s) at all times.      Follow up visit  1 week Tuesday November 26, 2024 at 2:00pm     Please give 24 hour notice if unable to keep appointment. [phone]     If you experience any of the following, please call the Wound Care Service at  [phone] or go to the nearest emergency room.        *Increase in pain*Temperature over 101*Increase in drainage from your wound or a foul odor  *Uncontrolled swelling*Need for compression bandage changes due to slippage, breakthrough drainage       PLEASE NOTE: IF YOU ARE UNABLE TO OBTAIN WOUND SUPPLIES, CONTINUE TO USE THE SUPPLIES YOU HAVE AVAILABLE UNTIL YOU ARE ABLE TO REACH US. IT IS MOST IMPORTANT TO KEEP THE WOUND COVERED AT ALL TIMES

## 2024-11-19 NOTE — WOUND CARE
[facility] Physician Billing Sheet.     Carli Bustamante  AGE: 44 y.o.   GENDER: female  : 1980  TODAY'S DATE:  2024    ICD-10 CODES  L97.597  L03.115  E11.42    PHYSICIAN PROCEDURES    CPT CODE  62585        Electronically signed by Alistair Brand DPM on 2024 at 2:06 PM

## 2024-11-23 LAB
BACTERIA BLD CULT ORG #2: NORMAL
BACTERIA BLD CULT: NORMAL

## 2024-11-25 ENCOUNTER — HOSPITAL ENCOUNTER (EMERGENCY)
Age: 44
Discharge: HOME | End: 2024-11-25
Attending: EMERGENCY MEDICINE
Payer: COMMERCIAL

## 2024-11-25 VITALS
BODY MASS INDEX: 31.89 KG/M2 | WEIGHT: 180 LBS | OXYGEN SATURATION: 98 % | SYSTOLIC BLOOD PRESSURE: 115 MMHG | HEART RATE: 114 BPM | DIASTOLIC BLOOD PRESSURE: 89 MMHG | HEIGHT: 63 IN | RESPIRATION RATE: 18 BRPM | TEMPERATURE: 98.4 F

## 2024-11-25 DIAGNOSIS — E08.621 DIABETIC ULCER OF LEFT FOOT ASSOCIATED WITH DIABETES MELLITUS DUE TO UNDERLYING CONDITION, WITH FAT LAYER EXPOSED, UNSPECIFIED PART OF FOOT (HCC): Primary | ICD-10-CM

## 2024-11-25 DIAGNOSIS — L97.522 DIABETIC ULCER OF LEFT FOOT ASSOCIATED WITH DIABETES MELLITUS DUE TO UNDERLYING CONDITION, WITH FAT LAYER EXPOSED, UNSPECIFIED PART OF FOOT (HCC): Primary | ICD-10-CM

## 2024-11-25 LAB
ALBUMIN SERPL-MCNC: 4.4 G/DL (ref 3.5–4.6)
ALP SERPL-CCNC: 77 U/L (ref 40–130)
ALT SERPL-CCNC: 30 U/L (ref 0–33)
ANION GAP SERPL CALCULATED.3IONS-SCNC: 13 MEQ/L (ref 9–15)
AST SERPL-CCNC: 25 U/L (ref 0–35)
BASOPHILS # BLD: 0.1 K/UL (ref 0–0.2)
BASOPHILS NFR BLD: 0.4 %
BILIRUB SERPL-MCNC: 0.6 MG/DL (ref 0.2–0.7)
BUN SERPL-MCNC: 6 MG/DL (ref 6–20)
CALCIUM SERPL-MCNC: 9.5 MG/DL (ref 8.5–9.9)
CHLORIDE SERPL-SCNC: 99 MEQ/L (ref 95–107)
CO2 SERPL-SCNC: 24 MEQ/L (ref 20–31)
CREAT SERPL-MCNC: 0.5 MG/DL (ref 0.5–0.9)
CRP SERPL HS-MCNC: 19.5 MG/L (ref 0–5)
EOSINOPHIL # BLD: 0.2 K/UL (ref 0–0.7)
EOSINOPHIL NFR BLD: 2 %
ERYTHROCYTE [DISTWIDTH] IN BLOOD BY AUTOMATED COUNT: 13.5 % (ref 11.5–14.5)
ERYTHROCYTE [SEDIMENTATION RATE] IN BLOOD BY WESTERGREN METHOD: 14 MM (ref 0–20)
GLOBULIN SER CALC-MCNC: 3.4 G/DL (ref 2.3–3.5)
GLUCOSE SERPL-MCNC: 187 MG/DL (ref 70–99)
HCT VFR BLD AUTO: 42.8 % (ref 37–47)
HGB BLD-MCNC: 15.4 G/DL (ref 12–16)
LYMPHOCYTES # BLD: 1.5 K/UL (ref 1–4.8)
LYMPHOCYTES NFR BLD: 12.6 %
MCH RBC QN AUTO: 30.9 PG (ref 27–31.3)
MCHC RBC AUTO-ENTMCNC: 36 % (ref 33–37)
MCV RBC AUTO: 85.8 FL (ref 79.4–94.8)
MONOCYTES # BLD: 0.6 K/UL (ref 0.2–0.8)
MONOCYTES NFR BLD: 5.4 %
NEUTROPHILS # BLD: 9.3 K/UL (ref 1.4–6.5)
NEUTS SEG NFR BLD: 79.2 %
PLATELET # BLD AUTO: 344 K/UL (ref 130–400)
POTASSIUM SERPL-SCNC: 3.7 MEQ/L (ref 3.4–4.9)
PROT SERPL-MCNC: 7.8 G/DL (ref 6.3–8)
RBC # BLD AUTO: 4.99 M/UL (ref 4.2–5.4)
SODIUM SERPL-SCNC: 136 MEQ/L (ref 135–144)
WBC # BLD AUTO: 11.8 K/UL (ref 4.8–10.8)

## 2024-11-25 PROCEDURE — 36415 COLL VENOUS BLD VENIPUNCTURE: CPT

## 2024-11-25 PROCEDURE — 85025 COMPLETE CBC W/AUTO DIFF WBC: CPT

## 2024-11-25 PROCEDURE — 6370000000 HC RX 637 (ALT 250 FOR IP): Performed by: EMERGENCY MEDICINE

## 2024-11-25 PROCEDURE — 80053 COMPREHEN METABOLIC PANEL: CPT

## 2024-11-25 PROCEDURE — 86140 C-REACTIVE PROTEIN: CPT

## 2024-11-25 PROCEDURE — 99283 EMERGENCY DEPT VISIT LOW MDM: CPT

## 2024-11-25 PROCEDURE — 85652 RBC SED RATE AUTOMATED: CPT

## 2024-11-25 RX ORDER — FLUCONAZOLE 100 MG/1
200 TABLET ORAL ONCE
Status: COMPLETED | OUTPATIENT
Start: 2024-11-25 | End: 2024-11-25

## 2024-11-25 RX ORDER — FLUCONAZOLE 150 MG/1
150 TABLET ORAL
Qty: 2 TABLET | Refills: 0 | Status: SHIPPED | OUTPATIENT
Start: 2024-11-25 | End: 2024-12-01

## 2024-11-25 RX ADMIN — FLUCONAZOLE 200 MG: 100 TABLET ORAL at 15:19

## 2024-11-25 ASSESSMENT — ENCOUNTER SYMPTOMS
CHEST TIGHTNESS: 0
EYE PAIN: 0
VOMITING: 0
NAUSEA: 0
ABDOMINAL PAIN: 0
SORE THROAT: 0
SHORTNESS OF BREATH: 0

## 2024-11-25 ASSESSMENT — PAIN DESCRIPTION - LOCATION
LOCATION: FOOT
LOCATION: FOOT;BUTTOCKS

## 2024-11-25 ASSESSMENT — PAIN DESCRIPTION - PAIN TYPE
TYPE: ACUTE PAIN
TYPE: ACUTE PAIN

## 2024-11-25 ASSESSMENT — PAIN - FUNCTIONAL ASSESSMENT: PAIN_FUNCTIONAL_ASSESSMENT: 0-10

## 2024-11-25 ASSESSMENT — PAIN SCALES - GENERAL
PAINLEVEL_OUTOF10: 7
PAINLEVEL_OUTOF10: 7

## 2024-11-25 NOTE — ED PROVIDER NOTES
[facility] ED  EMERGENCY DEPARTMENT ENCOUNTER      Pt Name: Carli Bustamante  MRN: [id number]  Birthdate 1980  Date of evaluation: 11/25/2024  Provider: Yasmin Melendez DO    CHIEF COMPLAINT       Chief Complaint   Patient presents with    Wound Check     Pt states she is being seen by wound care for a diabetic ulcer on the bottom of her right foot. Pt states it is getting worse, now a new spot on great toe and buttocks.          HISTORY OF PRESENT ILLNESS   (Location/Symptom, Timing/Onset, Context/Setting, Quality, Duration, Modifying Factors, Severity)  Note limiting factors.   Carli Bustamante is a 44 y.o. female who presents to the emergency department .  Patient presents with concerns about diabetic foot ulcer.  She has had it for at least a year.  She was seen last week in the ER and by podiatry and is still concerned that it might be getting infected.  Patient has severe concerns about getting septic.  Patient is also complaining about a callus on her great toe that was draining.  Patient complains about a rash at the buttock creases that is red and very painful.    HPI    Nursing Notes were reviewed.    REVIEW OF SYSTEMS    (2-9 systems for level 4, 10 or more for level 5)     Review of Systems   Constitutional:  Negative for activity change, appetite change and fatigue.   HENT:  Negative for congestion and sore throat.    Eyes:  Negative for pain and visual disturbance.   Respiratory:  Negative for chest tightness and shortness of breath.    Cardiovascular:  Negative for chest pain.   Gastrointestinal:  Negative for abdominal pain, nausea and vomiting.   Endocrine: Negative for polydipsia.   Genitourinary:  Negative for flank pain and urgency.   Musculoskeletal:  Negative for gait problem and neck stiffness.   Skin:  Positive for wound. Negative for rash.   Neurological:  Negative for weakness, light-headedness and headaches.   Psychiatric/Behavioral:  Negative for confusion and sleep

## 2024-11-25 NOTE — ED TRIAGE NOTES
Pt states she is being seen by wound care for a diabetic ulcer on the bottom of her right foot. Pt states it is getting worse, now a new spot on great toe and buttocks. Pt a/o x 4 calm, skin p/w/d resp even and non-labored. No sob or acute distress noted.

## 2024-11-26 ENCOUNTER — HOSPITAL ENCOUNTER (OUTPATIENT)
Dept: WOUND CARE | Age: 44
Discharge: HOME | End: 2024-11-26
Attending: STUDENT IN AN ORGANIZED HEALTH CARE EDUCATION/TRAINING PROGRAM
Payer: COMMERCIAL

## 2024-11-26 VITALS
DIASTOLIC BLOOD PRESSURE: 94 MMHG | TEMPERATURE: 96.9 F | HEART RATE: 104 BPM | SYSTOLIC BLOOD PRESSURE: 156 MMHG | RESPIRATION RATE: 16 BRPM

## 2024-11-26 PROCEDURE — 11042 DBRDMT SUBQ TIS 1ST 20SQCM/<: CPT

## 2024-11-26 ASSESSMENT — PAIN DESCRIPTION - LOCATION: LOCATION: FOOT

## 2024-11-26 ASSESSMENT — PAIN SCALES - GENERAL: PAINLEVEL_OUTOF10: 7

## 2024-11-26 NOTE — WOUND CARE
[facility] Physician Billing Sheet.     Carli Bustamante  AGE: 44 y.o.   GENDER: female  : 1980  TODAY'S DATE:  2024    ICD-10 CODES  L97.597  L03.115  E11.42    PHYSICIAN PROCEDURES    CPT CODE  51947        Electronically signed by Alistair Brnad DPM on 2024 at 2:20 PM

## 2024-11-26 NOTE — DISCHARGE INSTRUCTIONS
[facility]   Physician Orders and Discharge Instructions  [facility]  [address] [place]  [address]  Telephone: [phone]      [phone]        NAME:  Carli Bustamante                                                                                         YOB: 1980  MEDICAL RECORD NUMBER:  [id number]     Your  is:  Jeannette     Home Care/Facility:     Wound Location: Right Plantar Foot, Right Great Toe      Dressing orders:   1.Cleanse wound(s) with normal saline.  2. Apply dry SILVERCEL OR CALCIUM ALGINATE WITH Ag or eqivalent to wound bed.  3. Cover with 4x4's and wrap with gauze (errol or kerlix)  4. Change  daily           Compression: none      Offloading Device:Post op shoe with peg assist      Other Instructions:  Keep the wound dry and covered when showering.  refill of antibiotic from the pharmacy and take as prescribed.      Keep all dressings clean, dry and intact.  Keep pressure off the wound(s) at all times.      Follow up visit  1 week December 3, 2024 at 1:15pm     Please give 24 hour notice if unable to keep appointment. [phone]     If you experience any of the following, please call the Wound Care Service at  [phone] or go to the nearest emergency room.        *Increase in pain*Temperature over 101*Increase in drainage from your wound or a foul odor  *Uncontrolled swelling*Need for compression bandage changes due to slippage, breakthrough drainage       PLEASE NOTE: IF YOU ARE UNABLE TO OBTAIN WOUND SUPPLIES, CONTINUE TO USE THE SUPPLIES YOU HAVE AVAILABLE UNTIL YOU ARE ABLE TO REACH US. IT IS MOST IMPORTANT TO KEEP THE WOUND COVERED AT ALL TIMES

## 2024-12-03 ENCOUNTER — HOSPITAL ENCOUNTER (OUTPATIENT)
Dept: WOUND CARE | Age: 44
Discharge: HOME | End: 2024-12-03
Attending: STUDENT IN AN ORGANIZED HEALTH CARE EDUCATION/TRAINING PROGRAM
Payer: COMMERCIAL

## 2024-12-03 VITALS
RESPIRATION RATE: 18 BRPM | DIASTOLIC BLOOD PRESSURE: 91 MMHG | HEART RATE: 95 BPM | SYSTOLIC BLOOD PRESSURE: 139 MMHG | TEMPERATURE: 96.5 F

## 2024-12-03 PROCEDURE — 11042 DBRDMT SUBQ TIS 1ST 20SQCM/<: CPT

## 2024-12-03 ASSESSMENT — PAIN DESCRIPTION - FREQUENCY: FREQUENCY: INTERMITTENT

## 2024-12-03 ASSESSMENT — PAIN DESCRIPTION - LOCATION: LOCATION: TOE (COMMENT WHICH ONE)

## 2024-12-03 ASSESSMENT — PAIN DESCRIPTION - ONSET: ONSET: SUDDEN

## 2024-12-03 ASSESSMENT — PAIN SCALES - GENERAL: PAINLEVEL_OUTOF10: 6

## 2024-12-03 ASSESSMENT — PAIN DESCRIPTION - ORIENTATION: ORIENTATION: RIGHT

## 2024-12-03 NOTE — DISCHARGE INSTRUCTIONS
[facility] and [facility] Medicine   Physician Orders and Discharge Instructions  [facility]  [address] [place]  [address]  Telephone: [phone]      [phone]        NAME:  Carli Bustamante                                                                                         YOB: 1980  MEDICAL RECORD NUMBER:  [id number]     Your  is:  Jeannette     Home Care/Facility:     Wound Location: Right Great Toe      Dressing orders:   1.Cleanse wound(s) with normal saline.  2. Apply dry SILVERCEL OR CALCIUM ALGINATE WITH Ag or eqivalent to wound bed.  3. Cover with 4x4's and wrap with gauze (errol or kerlix)  4. Change twice daily.           Compression: none      Offloading Device:Post op shoe with peg assist      Other Instructions:  Keep the wound dry and covered when showering. Continue taking the antibiotics you are currently taking.      Keep all dressings clean, dry and intact.  Keep pressure off the wound(s) at all times.      Follow up visit  2 weeks December 17, 2024 at 1:15pm     Please give 24 hour notice if unable to keep appointment. [phone]     If you experience any of the following, please call the Wound Care Service at  [phone] or go to the nearest emergency room.        *Increase in pain*Temperature over 101*Increase in drainage from your wound or a foul odor  *Uncontrolled swelling*Need for compression bandage changes due to slippage, breakthrough drainage       PLEASE NOTE: IF YOU ARE UNABLE TO OBTAIN WOUND SUPPLIES, CONTINUE TO USE THE SUPPLIES YOU HAVE AVAILABLE UNTIL YOU ARE ABLE TO REACH US. IT IS MOST IMPORTANT TO KEEP THE WOUND COVERED AT ALL TIMES

## 2024-12-06 ENCOUNTER — APPOINTMENT (OUTPATIENT)
Dept: GENERAL RADIOLOGY | Age: 44
End: 2024-12-06
Payer: COMMERCIAL

## 2024-12-06 ENCOUNTER — HOSPITAL ENCOUNTER (EMERGENCY)
Age: 44
Discharge: HOME | End: 2024-12-06
Attending: EMERGENCY MEDICINE
Payer: COMMERCIAL

## 2024-12-06 VITALS
TEMPERATURE: 98.9 F | RESPIRATION RATE: 16 BRPM | SYSTOLIC BLOOD PRESSURE: 142 MMHG | BODY MASS INDEX: 31.89 KG/M2 | HEART RATE: 98 BPM | DIASTOLIC BLOOD PRESSURE: 88 MMHG | OXYGEN SATURATION: 100 % | HEIGHT: 63 IN | WEIGHT: 180 LBS

## 2024-12-06 DIAGNOSIS — L97.515 DIABETIC ULCER OF TOE OF RIGHT FOOT ASSOCIATED WITH TYPE 2 DIABETES MELLITUS, WITH MUSCLE INVOLVEMENT WITHOUT EVIDENCE OF NECROSIS (HCC): Primary | ICD-10-CM

## 2024-12-06 DIAGNOSIS — E11.621 DIABETIC ULCER OF TOE OF RIGHT FOOT ASSOCIATED WITH TYPE 2 DIABETES MELLITUS, WITH MUSCLE INVOLVEMENT WITHOUT EVIDENCE OF NECROSIS (HCC): Primary | ICD-10-CM

## 2024-12-06 LAB
ALBUMIN SERPL-MCNC: 4 G/DL (ref 3.5–4.6)
ALP SERPL-CCNC: 95 U/L (ref 40–130)
ALT SERPL-CCNC: 28 U/L (ref 0–33)
ANION GAP SERPL CALCULATED.3IONS-SCNC: 11 MEQ/L (ref 9–15)
AST SERPL-CCNC: 23 U/L (ref 0–35)
BASOPHILS # BLD: 0 K/UL (ref 0–0.2)
BASOPHILS NFR BLD: 0.3 %
BILIRUB SERPL-MCNC: 0.4 MG/DL (ref 0.2–0.7)
BUN SERPL-MCNC: 10 MG/DL (ref 6–20)
CALCIUM SERPL-MCNC: 9 MG/DL (ref 8.5–9.9)
CHLORIDE SERPL-SCNC: 102 MEQ/L (ref 95–107)
CHP ED QC CHECK: NORMAL
CO2 SERPL-SCNC: 23 MEQ/L (ref 20–31)
CREAT SERPL-MCNC: 0.62 MG/DL (ref 0.5–0.9)
CRP SERPL HS-MCNC: 16.9 MG/L (ref 0–5)
EOSINOPHIL # BLD: 0.2 K/UL (ref 0–0.7)
EOSINOPHIL NFR BLD: 2.2 %
ERYTHROCYTE [DISTWIDTH] IN BLOOD BY AUTOMATED COUNT: 13.3 % (ref 11.5–14.5)
ERYTHROCYTE [SEDIMENTATION RATE] IN BLOOD BY WESTERGREN METHOD: 20 MM (ref 0–20)
GLOBULIN SER CALC-MCNC: 3.2 G/DL (ref 2.3–3.5)
GLUCOSE SERPL-MCNC: 337 MG/DL (ref 70–99)
HCT VFR BLD AUTO: 40.8 % (ref 37–47)
HGB BLD-MCNC: 14.7 G/DL (ref 12–16)
LACTATE BLDV-SCNC: 1.5 MMOL/L (ref 0.5–2.2)
LYMPHOCYTES # BLD: 1.8 K/UL (ref 1–4.8)
LYMPHOCYTES NFR BLD: 19.9 %
MCH RBC QN AUTO: 31.5 PG (ref 27–31.3)
MCHC RBC AUTO-ENTMCNC: 36 % (ref 33–37)
MCV RBC AUTO: 87.6 FL (ref 79.4–94.8)
MONOCYTES # BLD: 0.8 K/UL (ref 0.2–0.8)
MONOCYTES NFR BLD: 9.2 %
NEUTROPHILS # BLD: 6.1 K/UL (ref 1.4–6.5)
NEUTS SEG NFR BLD: 68.2 %
PLATELET # BLD AUTO: 307 K/UL (ref 130–400)
POTASSIUM SERPL-SCNC: 4 MEQ/L (ref 3.4–4.9)
PROT SERPL-MCNC: 7.2 G/DL (ref 6.3–8)
RBC # BLD AUTO: 4.66 M/UL (ref 4.2–5.4)
SODIUM SERPL-SCNC: 136 MEQ/L (ref 135–144)
WBC # BLD AUTO: 9 K/UL (ref 4.8–10.8)

## 2024-12-06 PROCEDURE — 86403 PARTICLE AGGLUT ANTBDY SCRN: CPT

## 2024-12-06 PROCEDURE — 96375 TX/PRO/DX INJ NEW DRUG ADDON: CPT

## 2024-12-06 PROCEDURE — 87186 SC STD MICRODIL/AGAR DIL: CPT

## 2024-12-06 PROCEDURE — 86140 C-REACTIVE PROTEIN: CPT

## 2024-12-06 PROCEDURE — 6360000002 HC RX W HCPCS: Performed by: EMERGENCY MEDICINE

## 2024-12-06 PROCEDURE — 2580000003 HC RX 258: Performed by: EMERGENCY MEDICINE

## 2024-12-06 PROCEDURE — 36415 COLL VENOUS BLD VENIPUNCTURE: CPT

## 2024-12-06 PROCEDURE — 87070 CULTURE OTHR SPECIMN AEROBIC: CPT

## 2024-12-06 PROCEDURE — 73630 X-RAY EXAM OF FOOT: CPT

## 2024-12-06 PROCEDURE — 80053 COMPREHEN METABOLIC PANEL: CPT

## 2024-12-06 PROCEDURE — 99284 EMERGENCY DEPT VISIT MOD MDM: CPT

## 2024-12-06 PROCEDURE — 85652 RBC SED RATE AUTOMATED: CPT

## 2024-12-06 PROCEDURE — 96365 THER/PROPH/DIAG IV INF INIT: CPT

## 2024-12-06 PROCEDURE — 85025 COMPLETE CBC W/AUTO DIFF WBC: CPT

## 2024-12-06 PROCEDURE — 87040 BLOOD CULTURE FOR BACTERIA: CPT

## 2024-12-06 PROCEDURE — 83605 ASSAY OF LACTIC ACID: CPT

## 2024-12-06 RX ADMIN — CEFTRIAXONE SODIUM 1000 MG: 1 INJECTION, POWDER, FOR SOLUTION INTRAMUSCULAR; INTRAVENOUS at 19:57

## 2024-12-06 RX ADMIN — SODIUM CHLORIDE 1500 MG: 9 INJECTION, SOLUTION INTRAVENOUS at 20:04

## 2024-12-06 ASSESSMENT — PAIN DESCRIPTION - DESCRIPTORS: DESCRIPTORS: BURNING

## 2024-12-06 ASSESSMENT — ENCOUNTER SYMPTOMS
NAUSEA: 0
SHORTNESS OF BREATH: 0
BACK PAIN: 0
VOMITING: 0
EYE PAIN: 0
ABDOMINAL PAIN: 0
SINUS PAIN: 0
COUGH: 0
COLOR CHANGE: 1
EYE REDNESS: 0

## 2024-12-06 ASSESSMENT — PAIN DESCRIPTION - FREQUENCY: FREQUENCY: INTERMITTENT

## 2024-12-06 ASSESSMENT — PAIN SCALES - GENERAL: PAINLEVEL_OUTOF10: 6

## 2024-12-06 ASSESSMENT — PAIN DESCRIPTION - LOCATION: LOCATION: TOE (COMMENT WHICH ONE)

## 2024-12-06 ASSESSMENT — PAIN DESCRIPTION - ORIENTATION: ORIENTATION: RIGHT

## 2024-12-06 ASSESSMENT — PAIN DESCRIPTION - PAIN TYPE: TYPE: ACUTE PAIN

## 2024-12-06 NOTE — ED PROVIDER NOTES
[facility] ED  EMERGENCY DEPARTMENT ENCOUNTER      Pt Name: Carli Bustamante  MRN: [id number]  Birthdate 1980  Date of evaluation: 12/6/2024  Provider: Ivan Tom DO  6:01 PM EST    CHIEF COMPLAINT       Chief Complaint   Patient presents with    Wound Check     Right first toe  Pt seen by wound care, last saw wound care Tuesday   Pt states increased drainage          HISTORY OF PRESENT ILLNESS   (Location/Symptom, Timing/Onset, Context/Setting, Quality, Duration, Modifying Factors, Severity)  Note limiting factors.   Carli Bustamante is a 44 y.o. female with significant past medical history type 2 diabetes with ulceration followed by Dr. Brand for vascular who presents to the emergency department for evaluation of her wound.  She has had a wound on the right first great toe which she reports has increased drainage and has developed increased redness and swelling around the foot.  He also reports the wound has an odor as well.  She states she had a similar wound on the medial part of the foot which became infected and she was admitted for in the past. She reports she was last seen by wound care on Tuesday.  She states she called the office today and they advised her to be seen in the emergency department for further evaluation      HPI    Nursing Notes were reviewed.    REVIEW OF SYSTEMS    (2-9 systems for level 4, 10 or more for level 5)     Review of Systems   Constitutional:  Negative for chills and fever.   HENT:  Negative for ear pain and sinus pain.    Eyes:  Negative for pain and redness.   Respiratory:  Negative for cough and shortness of breath.    Cardiovascular:  Negative for chest pain.   Gastrointestinal:  Negative for abdominal pain, nausea and vomiting.   Genitourinary:  Negative for dysuria and flank pain.   Musculoskeletal:  Negative for back pain.   Skin:  Positive for color change and wound. Negative for rash.   Neurological:  Negative for dizziness and headaches.    Psychiatric/Behavioral:  Negative for confusion. The patient is not nervous/anxious.        Except as noted above the remainder of the review of systems was reviewed and negative.       PAST MEDICAL HISTORY     Past Medical History:   Diagnosis Date    Anemia during pregnancy 2018    Eczema     Herpes simplex infection of skin     on neck    Hypercholesterolemia     Irregular heart beat     runs of v tach in the past    Obesity     Pregnancy induced hypertension     Type II or unspecified type diabetes mellitus without mention of complication, not stated as uncontrolled     type 2         SURGICAL HISTORY       Past Surgical History:   Procedure Laterality Date    ABDOMEN SURGERY       x 2     SECTION      TUBAL LIGATION           CURRENT MEDICATIONS       Previous Medications    ACETAMINOPHEN (TYLENOL) 500 MG TABLET    Take 2 tablets by mouth every 6 hours as needed for Pain or Fever    ALCOHOL SWABS PADS    Use qid    BLOOD GLUCOSE MONITOR KIT AND SUPPLIES    Dispense one meter that insurance will cover.    BLOOD GLUCOSE MONITOR STRIPS    Test 3x daily e11.65    BLOOD GLUCOSE TEST STRIPS (ACCU-CHEK GUIDE) STRIP    Test 4x daily, Patient is on insulin pump    INSULIN LISPRO (HUMALOG,ADMELOG) 100 UNIT/ML SOLN INJECTION VIAL    TAKE AS DIRECTED via insulin pump. max DAILY dose 200 UNITS.    INSULIN PEN NEEDLE (NOVOFINE) 32G X 6 MM MISC    qid    LANCETS MISC. (ACCU-CHEK FASTCLIX LANCET) KIT    1 each by Does not apply route 4 times daily Test 4x daily, works with patient insulin pump    MELATONIN 3 MG TABS TABLET    Take 1 tablet by mouth nightly       ALLERGIES     Tape [adhesive tape]    FAMILY HISTORY       Family History   Problem Relation Age of Onset    Arthritis Mother     Diabetes Mother     Early Death Father     Heart Attack Father     Heart Disease Father     High Blood Pressure Father     High Cholesterol Father     Diabetes Father     Arthritis Sister     Birth Defects

## 2024-12-07 LAB
BACTERIA BLD CULT ORG #2: NORMAL
BACTERIA BLD CULT: NORMAL

## 2024-12-07 NOTE — CARE COORDINATION
This nurse discussed with the patient about whether she has a primary care Dr to which she stated that she did not. With her permission, this nurse made her an appointment with Dr Murguia for 12/18/24 @ 1200. Information about the appointment was given to her and she stated that this is acceptable to her.

## 2024-12-08 LAB
BACTERIA SPEC ANAEROBE+AEROBE CULT: ABNORMAL
ORGANISM: ABNORMAL
ORGANISM: ABNORMAL

## 2024-12-10 ENCOUNTER — OFFICE VISIT (OUTPATIENT)
Dept: INFECTIOUS DISEASES | Age: 44
End: 2024-12-10
Payer: COMMERCIAL

## 2024-12-10 ENCOUNTER — HOSPITAL ENCOUNTER (OUTPATIENT)
Dept: WOUND CARE | Age: 44
Discharge: HOME | End: 2024-12-10
Attending: STUDENT IN AN ORGANIZED HEALTH CARE EDUCATION/TRAINING PROGRAM
Payer: COMMERCIAL

## 2024-12-10 VITALS
WEIGHT: 185 LBS | HEART RATE: 86 BPM | DIASTOLIC BLOOD PRESSURE: 88 MMHG | TEMPERATURE: 97.3 F | RESPIRATION RATE: 16 BRPM | BODY MASS INDEX: 32.78 KG/M2 | HEIGHT: 63 IN | SYSTOLIC BLOOD PRESSURE: 138 MMHG

## 2024-12-10 DIAGNOSIS — L08.9 DIABETIC FOOT INFECTION (HCC): Primary | ICD-10-CM

## 2024-12-10 DIAGNOSIS — E11.628 DIABETIC FOOT INFECTION (HCC): Primary | ICD-10-CM

## 2024-12-10 PROCEDURE — 2022F DILAT RTA XM EVC RTNOPTHY: CPT | Performed by: INTERNAL MEDICINE

## 2024-12-10 PROCEDURE — G8427 DOCREV CUR MEDS BY ELIG CLIN: HCPCS | Performed by: INTERNAL MEDICINE

## 2024-12-10 PROCEDURE — 11042 DBRDMT SUBQ TIS 1ST 20SQCM/<: CPT

## 2024-12-10 PROCEDURE — 99213 OFFICE O/P EST LOW 20 MIN: CPT | Performed by: INTERNAL MEDICINE

## 2024-12-10 PROCEDURE — G8484 FLU IMMUNIZE NO ADMIN: HCPCS | Performed by: INTERNAL MEDICINE

## 2024-12-10 PROCEDURE — 1036F TOBACCO NON-USER: CPT | Performed by: INTERNAL MEDICINE

## 2024-12-10 PROCEDURE — 3052F HG A1C>EQUAL 8.0%<EQUAL 9.0%: CPT | Performed by: INTERNAL MEDICINE

## 2024-12-10 PROCEDURE — G8417 CALC BMI ABV UP PARAM F/U: HCPCS | Performed by: INTERNAL MEDICINE

## 2024-12-10 ASSESSMENT — PATIENT HEALTH QUESTIONNAIRE - PHQ9
SUM OF ALL RESPONSES TO PHQ QUESTIONS 1-9: 0
1. LITTLE INTEREST OR PLEASURE IN DOING THINGS: NOT AT ALL
SUM OF ALL RESPONSES TO PHQ QUESTIONS 1-9: 0
2. FEELING DOWN, DEPRESSED OR HOPELESS: NOT AT ALL
SUM OF ALL RESPONSES TO PHQ9 QUESTIONS 1 & 2: 0
SUM OF ALL RESPONSES TO PHQ QUESTIONS 1-9: 0
SUM OF ALL RESPONSES TO PHQ QUESTIONS 1-9: 0

## 2024-12-10 ASSESSMENT — ENCOUNTER SYMPTOMS
COLOR CHANGE: 1
GASTROINTESTINAL NEGATIVE: 1
RESPIRATORY NEGATIVE: 1

## 2024-12-10 NOTE — PROGRESS NOTES
PM    GLUCOSE 246 04/06/2012 10:16 AM    CALCIUM 9.0 12/06/2024 06:17 PM    LABGLOM >90.0 12/06/2024 06:17 PM    LABGLOM >60.0 07/23/2023 02:45 PM          Culture, Wound  Order: [id number]  Status: Final result       Visible to patient: No (not released)       Next appt: None    Specimen Information: Foot   0 Result Notes      Component    WOUND/ABSCESS Direct Exam:  NO NEUTROPHILS SEEN  Direct Exam:  NO BACTERIA SEEN  Cult,Wound:  NO GROWTH  Performed [place] [address]  [phone]   Resulting Agency [facility]              Narrative  Performed by: [facility]  ORDER#: [id number]                          ORDERED BY: FISH VANESSA  SOURCE: Foot                               COLLECTED:  08/19/24 11:17  [facility].:                      RECEIVED :  08/19/24 11:17            Blood Culture 1  Order: [id number]  Status: Final result       Visible to patient: No (not released)       Next appt: None    Specimen Information: Blood   0 Result Notes      Component    Blood Culture, Routine  Abnormal   Gram stain anaerobic bottle  Gram positive cocci in clusters-resembling Staph  1 out of 2 blood cultures  Further results to follow  Further testing performed at [facility]    Resulting Agency [facility]              Narrative  Performed by: [facility]  ORDER#: [id number]                          ORDERED BY: RAVI COLMENARES  SOURCE: Blood                              COLLECTED:  08/18/24 21:13  [facility].:                      RECEIVED :  08/18/24 21:13            Culture, Wound  Order: [id number]  Status: Final result       Visible to patient: No (not released)       Next appt: Today at 03:30 PM in Wound Ostomy (Alistair Brand DPM)    Specimen Information: Wound   0 Result Notes      Component    WOUND/ABSCESS  Abnormal   Direct Exam:  NO NEUTROPHILS  Direct Exam:  FEW GRAM POSITIVE COCCI IN

## 2024-12-10 NOTE — PLAN OF CARE
Problem: Wound:  Goal: Will show signs of wound healing; wound closure and no evidence of infection  Outcome: Progressing     
Assessment Pink/red 12/10/24 1514   Drainage Amount Small (< 25%) 12/10/24 1514   Drainage Description Serous 12/10/24 1514   Odor None 12/10/24 1514   Edna-wound Assessment Hyperkeratosis (callous) 12/10/24 1514   Margins Undefined edges 12/10/24 1514   Wound Thickness Description not for Pressure Injury Full thickness 12/10/24 1514   Number of days: 14          Supplies Requested :      WOUND #: 2   PRIMARY DRESSING:  Collagen with silver  SECONDARY DRESSING:  None   Cover and secure with: 2x2 gauze and conforming roll gauze      FREQUENCY OF DRESSING CHANGES:  Every other day           ADDITIONAL ITEMS:  [] Gloves Small  [x] Gloves Medium [] Gloves Large [] Gloves XLarge  [] Tape 1\" [x] Tape 2\" [] Tape 3\"  [] Medipore Tape  [x] Saline  [] Skin Prep   [] Adhesive Remover   [] Cotton Tip Applicators   [] Other:    Patient Wound(s) Debrided: [x] Yes   [] No    Debribement Type: subcutaneous tissue    Debridement Date: 12/10/2024    Patient currently being seen by Home Health: [] Yes   [x] No    Duration for needed supplies:  []15  [x]30  []60  []90 Days    Provider Information:      PROVIDER'S NAME:  PRINCESS DIMAS DPM   NPI: [id number]

## 2024-12-10 NOTE — WOUND CARE
[facility] Physician Billing Sheet.     Carli Bustamante  AGE: 44 y.o.   GENDER: female  : 1980  TODAY'S DATE:  12/10/2024    ICD-10 CODES  L97.597  L03.115  E11.42    PHYSICIAN PROCEDURES    CPT CODE  21583        Electronically signed by Alistair Brand DPM on 12/10/2024 at 5:11 PM

## 2024-12-10 NOTE — DISCHARGE INSTRUCTIONS
[facility] Wound Center and Hyper[facility] Medicine   Physician Orders and Discharge Instructions  [facility]  [address] [place]  [address]  Telephone: [phone]      [phone]        NAME:  Carli Bustamante                                                                                         YOB: 1980  MEDICAL RECORD NUMBER:  [id number]     Your  is:  Jeannette     Home Care/Facility:     Wound Location: Right Great Toe      Dressing orders:   1.Cleanse wound(s) with normal saline.  2. Apply dry QUE  Or equivalent to wound bed.  3. Moisten QUE with a few drops of normal saline.  4. Cover with 4x4's and wrap with gauze (errol or kerlix)  5. Change  Every other day or Monday, Wednesday, and Friday          Compression: none      Offloading Device:Post op shoe with peg assist      Other Instructions:  Keep the wound dry and covered when showering. Continue taking the antibiotics per Infectious Disease.      Keep all dressings clean, dry and intact.  Keep pressure off the wound(s) at all times.      Follow up visit  2 weeks December 23, 2024  at 11:00am  with Dr. Johnson (due to holiday) and then back to Dr. SHELBY Brand      Please give 24 hour notice if unable to keep appointment. [phone]     If you experience any of the following, please call the Wound Care Service at  [phone] or go to the nearest emergency room.        *Increase in pain*Temperature over 101*Increase in drainage from your wound or a foul odor  *Uncontrolled swelling*Need for compression bandage changes due to slippage, breakthrough drainage       PLEASE NOTE: IF YOU ARE UNABLE TO OBTAIN WOUND SUPPLIES, CONTINUE TO USE THE SUPPLIES YOU HAVE AVAILABLE UNTIL YOU ARE ABLE TO REACH US. IT IS MOST IMPORTANT TO KEEP THE WOUND COVERED AT ALL TIMES

## 2024-12-11 LAB
BACTERIA BLD CULT ORG #2: NORMAL
BACTERIA BLD CULT: NORMAL
BACTERIA SPEC ANAEROBE+AEROBE CULT: ABNORMAL
ORGANISM: ABNORMAL
ORGANISM: ABNORMAL

## 2024-12-11 RX ORDER — SODIUM CHLORIDE 9 MG/ML
5-250 INJECTION, SOLUTION INTRAVENOUS PRN
Status: CANCELLED | OUTPATIENT
Start: 2024-12-12

## 2024-12-12 ENCOUNTER — HOSPITAL ENCOUNTER (OUTPATIENT)
Dept: INFUSION THERAPY | Age: 44
Setting detail: INFUSION SERIES
Discharge: HOME | End: 2024-12-12
Payer: COMMERCIAL

## 2024-12-12 ENCOUNTER — HOSPITAL ENCOUNTER (OUTPATIENT)
Dept: INTERVENTIONAL RADIOLOGY/VASCULAR | Age: 44
Discharge: HOME | End: 2024-12-14
Payer: COMMERCIAL

## 2024-12-12 VITALS
SYSTOLIC BLOOD PRESSURE: 142 MMHG | RESPIRATION RATE: 18 BRPM | HEART RATE: 88 BPM | TEMPERATURE: 96.7 F | OXYGEN SATURATION: 100 % | DIASTOLIC BLOOD PRESSURE: 92 MMHG

## 2024-12-12 DIAGNOSIS — L08.9 DIABETIC FOOT INFECTION (HCC): ICD-10-CM

## 2024-12-12 DIAGNOSIS — E11.628 DIABETIC FOOT INFECTION (HCC): ICD-10-CM

## 2024-12-12 DIAGNOSIS — L03.90 SEPSIS DUE TO CELLULITIS (HCC): Primary | ICD-10-CM

## 2024-12-12 DIAGNOSIS — A41.9 SEPSIS DUE TO CELLULITIS (HCC): Primary | ICD-10-CM

## 2024-12-12 PROCEDURE — 96365 THER/PROPH/DIAG IV INF INIT: CPT

## 2024-12-12 PROCEDURE — 6360000002 HC RX W HCPCS: Performed by: INTERNAL MEDICINE

## 2024-12-12 PROCEDURE — 36573 INSJ PICC RS&I 5 YR+: CPT

## 2024-12-12 PROCEDURE — 2580000003 HC RX 258: Performed by: INTERNAL MEDICINE

## 2024-12-12 PROCEDURE — 2709999900 IR PICC WO SQ PORT/PUMP > 5 YEARS

## 2024-12-12 RX ORDER — ERTAPENEM 1 G/1
1000 INJECTION, POWDER, LYOPHILIZED, FOR SOLUTION INTRAMUSCULAR; INTRAVENOUS DAILY
COMMUNITY

## 2024-12-12 RX ORDER — SODIUM CHLORIDE 9 MG/ML
5-250 INJECTION, SOLUTION INTRAVENOUS PRN
Status: CANCELLED | OUTPATIENT
Start: 2024-12-13

## 2024-12-12 RX ORDER — LIDOCAINE HYDROCHLORIDE 20 MG/ML
5 INJECTION, SOLUTION INFILTRATION; PERINEURAL ONCE
Status: COMPLETED | OUTPATIENT
Start: 2024-12-12 | End: 2024-12-12

## 2024-12-12 RX ADMIN — ERTAPENEM SODIUM 1000 MG: 1 INJECTION INTRAMUSCULAR; INTRAVENOUS at 11:31

## 2024-12-12 RX ADMIN — LIDOCAINE HYDROCHLORIDE 5 ML: 20 INJECTION, SOLUTION INFILTRATION; PERINEURAL at 11:02

## 2024-12-12 ASSESSMENT — PAIN DESCRIPTION - ORIENTATION: ORIENTATION: RIGHT

## 2024-12-12 ASSESSMENT — PAIN DESCRIPTION - LOCATION: LOCATION: TOE (COMMENT WHICH ONE)

## 2024-12-12 ASSESSMENT — PAIN SCALES - GENERAL: PAINLEVEL_OUTOF10: 4

## 2024-12-12 ASSESSMENT — PAIN DESCRIPTION - DESCRIPTORS: DESCRIPTORS: ACHING

## 2024-12-12 NOTE — FLOWSHEET NOTE
Patient to the floor ambulatory for her invanz infusion. Vital signs taken. Declined additional info on this medication. Verbal instruction given regarding side effects. Verbalized understanding. Call light  within reach.

## 2024-12-12 NOTE — DISCHARGE INSTRUCTIONS
You have signs of a blood clot, such as bulging veins near the catheter.     Your catheter is leaking, cracked, or clogged.     You feel resistance when you inject medicine or fluids into your catheter.     Your catheter is out of place. This may happen after severe coughing or vomiting, or if you pull on the catheter.     You have chest pain or shortness of breath.    Watch closely for changes in your health, and be sure to contact your doctor if:    You have any concerns about your catheter.   Where can you learn more?  Go to [email or web address] and sign in to your My [employer] account. Enter L935 in the Search Health Information box to learn more about \"Peripherally Inserted Central Catheter (PICC): Care Instructions.\"     Current as of: September 23, 2018  Content Version: 12.0  © 4659-9967 [employer]. Care instructions adapted under license by [employer]. If you have questions about a medical condition or this instruction, always ask your healthcare professional. [employer] disclaims any warranty or liability for your use of this information.

## 2024-12-13 ENCOUNTER — HOSPITAL ENCOUNTER (OUTPATIENT)
Dept: INFUSION THERAPY | Age: 44
Setting detail: INFUSION SERIES
Discharge: HOME | End: 2024-12-13
Payer: COMMERCIAL

## 2024-12-13 VITALS
RESPIRATION RATE: 16 BRPM | DIASTOLIC BLOOD PRESSURE: 72 MMHG | TEMPERATURE: 97.5 F | HEART RATE: 91 BPM | SYSTOLIC BLOOD PRESSURE: 122 MMHG

## 2024-12-13 DIAGNOSIS — L08.9 DIABETIC FOOT INFECTION (HCC): ICD-10-CM

## 2024-12-13 DIAGNOSIS — E11.628 DIABETIC FOOT INFECTION (HCC): ICD-10-CM

## 2024-12-13 DIAGNOSIS — L03.90 SEPSIS DUE TO CELLULITIS (HCC): Primary | ICD-10-CM

## 2024-12-13 DIAGNOSIS — A41.9 SEPSIS DUE TO CELLULITIS (HCC): Primary | ICD-10-CM

## 2024-12-13 PROCEDURE — 6360000002 HC RX W HCPCS: Performed by: INTERNAL MEDICINE

## 2024-12-13 PROCEDURE — 2580000003 HC RX 258: Performed by: INTERNAL MEDICINE

## 2024-12-13 PROCEDURE — 96365 THER/PROPH/DIAG IV INF INIT: CPT

## 2024-12-13 RX ORDER — SODIUM CHLORIDE 9 MG/ML
5-250 INJECTION, SOLUTION INTRAVENOUS PRN
Status: CANCELLED | OUTPATIENT
Start: 2024-12-14

## 2024-12-13 RX ADMIN — ERTAPENEM SODIUM 1000 MG: 1 INJECTION INTRAMUSCULAR; INTRAVENOUS at 12:05

## 2024-12-13 ASSESSMENT — PAIN DESCRIPTION - ORIENTATION: ORIENTATION: RIGHT

## 2024-12-13 ASSESSMENT — PAIN SCALES - GENERAL: PAINLEVEL_OUTOF10: 4

## 2024-12-13 ASSESSMENT — PAIN DESCRIPTION - LOCATION: LOCATION: TOE (COMMENT WHICH ONE)

## 2024-12-13 ASSESSMENT — PAIN DESCRIPTION - DESCRIPTORS: DESCRIPTORS: ACHING;BURNING

## 2024-12-13 NOTE — PROGRESS NOTES
[facility] OUTPATIENT INFUSION CENTER     PT arrived via:  [x] Ambulatory         [x] Wheelchair  []With transport                [] Other:     [x]PT oriented to room and call light in reach.   [x] Vital signs obtained and are stable.   [x]Medication education provided and reviewed with patient.

## 2024-12-13 NOTE — PROGRESS NOTES
Pt tolerated infusion. Picc line flushed. Pt left unit ambulatory. All equipment used in care has been cleaned.    Electronically signed by Keyla Dan RN on 12/13/2024 at 1:28 PM

## 2024-12-14 ENCOUNTER — HOSPITAL ENCOUNTER (OUTPATIENT)
Dept: INFUSION THERAPY | Age: 44
Setting detail: INFUSION SERIES
Discharge: HOME | End: 2024-12-14
Payer: COMMERCIAL

## 2024-12-14 DIAGNOSIS — L08.9 DIABETIC FOOT INFECTION (HCC): ICD-10-CM

## 2024-12-14 DIAGNOSIS — E11.628 DIABETIC FOOT INFECTION (HCC): ICD-10-CM

## 2024-12-14 DIAGNOSIS — L03.90 SEPSIS DUE TO CELLULITIS (HCC): Primary | ICD-10-CM

## 2024-12-14 DIAGNOSIS — A41.9 SEPSIS DUE TO CELLULITIS (HCC): Primary | ICD-10-CM

## 2024-12-14 PROCEDURE — 6360000002 HC RX W HCPCS: Performed by: INTERNAL MEDICINE

## 2024-12-14 PROCEDURE — 96365 THER/PROPH/DIAG IV INF INIT: CPT

## 2024-12-14 PROCEDURE — 96374 THER/PROPH/DIAG INJ IV PUSH: CPT

## 2024-12-14 PROCEDURE — 2580000003 HC RX 258: Performed by: INTERNAL MEDICINE

## 2024-12-14 RX ORDER — SODIUM CHLORIDE 9 MG/ML
5-250 INJECTION, SOLUTION INTRAVENOUS PRN
Status: CANCELLED | OUTPATIENT
Start: 2024-12-15

## 2024-12-14 RX ADMIN — ERTAPENEM SODIUM 1000 MG: 1 INJECTION INTRAMUSCULAR; INTRAVENOUS at 13:14

## 2024-12-15 ENCOUNTER — HOSPITAL ENCOUNTER (OUTPATIENT)
Dept: INFUSION THERAPY | Age: 44
Setting detail: INFUSION SERIES
Discharge: HOME | End: 2024-12-15
Payer: COMMERCIAL

## 2024-12-15 VITALS
HEART RATE: 87 BPM | RESPIRATION RATE: 16 BRPM | DIASTOLIC BLOOD PRESSURE: 91 MMHG | TEMPERATURE: 97.6 F | SYSTOLIC BLOOD PRESSURE: 132 MMHG

## 2024-12-15 DIAGNOSIS — L08.9 DIABETIC FOOT INFECTION (HCC): ICD-10-CM

## 2024-12-15 DIAGNOSIS — A41.9 SEPSIS DUE TO CELLULITIS (HCC): Primary | ICD-10-CM

## 2024-12-15 DIAGNOSIS — L03.90 SEPSIS DUE TO CELLULITIS (HCC): Primary | ICD-10-CM

## 2024-12-15 DIAGNOSIS — E11.628 DIABETIC FOOT INFECTION (HCC): ICD-10-CM

## 2024-12-15 PROCEDURE — 6360000002 HC RX W HCPCS: Performed by: INTERNAL MEDICINE

## 2024-12-15 PROCEDURE — 2580000003 HC RX 258: Performed by: INTERNAL MEDICINE

## 2024-12-15 PROCEDURE — 96365 THER/PROPH/DIAG IV INF INIT: CPT

## 2024-12-15 RX ORDER — SODIUM CHLORIDE 9 MG/ML
5-250 INJECTION, SOLUTION INTRAVENOUS PRN
Status: CANCELLED | OUTPATIENT
Start: 2024-12-16

## 2024-12-15 RX ADMIN — ERTAPENEM SODIUM 1000 MG: 1 INJECTION INTRAMUSCULAR; INTRAVENOUS at 12:34

## 2024-12-15 ASSESSMENT — PAIN DESCRIPTION - ORIENTATION
ORIENTATION: RIGHT
ORIENTATION: RIGHT

## 2024-12-15 ASSESSMENT — PAIN SCALES - GENERAL
PAINLEVEL_OUTOF10: 3
PAINLEVEL_OUTOF10: 3

## 2024-12-15 ASSESSMENT — PAIN DESCRIPTION - LOCATION
LOCATION: TOE (COMMENT WHICH ONE)
LOCATION: TOE (COMMENT WHICH ONE)

## 2024-12-15 ASSESSMENT — PAIN DESCRIPTION - DESCRIPTORS
DESCRIPTORS: ACHING;BURNING
DESCRIPTORS: ACHING;BURNING

## 2024-12-15 NOTE — PROGRESS NOTES
Vitals WNL pre and post infusion as documented in the flowsheet. PICC line cleaned with alcohol swab and flulshed  prior to and after use.  Alcohol cap applied after use.  No signs or symptoms of distress.  Patient tolerated infusion. See MAR and Flowsheet for supporting data.

## 2024-12-16 ENCOUNTER — APPOINTMENT (OUTPATIENT)
Dept: ULTRASOUND IMAGING | Age: 44
End: 2024-12-16
Payer: COMMERCIAL

## 2024-12-16 ENCOUNTER — HOSPITAL ENCOUNTER (EMERGENCY)
Age: 44
Discharge: HOME | End: 2024-12-16
Attending: STUDENT IN AN ORGANIZED HEALTH CARE EDUCATION/TRAINING PROGRAM
Payer: COMMERCIAL

## 2024-12-16 ENCOUNTER — TELEPHONE (OUTPATIENT)
Dept: INFECTIOUS DISEASES | Age: 44
End: 2024-12-16

## 2024-12-16 ENCOUNTER — HOSPITAL ENCOUNTER (OUTPATIENT)
Dept: INFUSION THERAPY | Age: 44
Setting detail: INFUSION SERIES
Discharge: HOME | End: 2024-12-16
Payer: COMMERCIAL

## 2024-12-16 VITALS
RESPIRATION RATE: 18 BRPM | OXYGEN SATURATION: 99 % | TEMPERATURE: 97.9 F | DIASTOLIC BLOOD PRESSURE: 90 MMHG | HEART RATE: 99 BPM | SYSTOLIC BLOOD PRESSURE: 154 MMHG

## 2024-12-16 VITALS
WEIGHT: 180 LBS | HEART RATE: 111 BPM | OXYGEN SATURATION: 99 % | SYSTOLIC BLOOD PRESSURE: 162 MMHG | BODY MASS INDEX: 31.89 KG/M2 | TEMPERATURE: 98.1 F | RESPIRATION RATE: 18 BRPM | HEIGHT: 63 IN | DIASTOLIC BLOOD PRESSURE: 99 MMHG

## 2024-12-16 DIAGNOSIS — L08.9 DIABETIC FOOT INFECTION (HCC): ICD-10-CM

## 2024-12-16 DIAGNOSIS — S40.021A CONTUSION OF RIGHT UPPER EXTREMITY, INITIAL ENCOUNTER: Primary | ICD-10-CM

## 2024-12-16 DIAGNOSIS — E11.628 DIABETIC FOOT INFECTION (HCC): ICD-10-CM

## 2024-12-16 DIAGNOSIS — I82.619 CEPHALIC VEIN THROMBOSIS: ICD-10-CM

## 2024-12-16 DIAGNOSIS — L03.90 SEPSIS DUE TO CELLULITIS (HCC): Primary | ICD-10-CM

## 2024-12-16 DIAGNOSIS — A41.9 SEPSIS DUE TO CELLULITIS (HCC): Primary | ICD-10-CM

## 2024-12-16 LAB
ANION GAP SERPL CALCULATED.3IONS-SCNC: 11 MEQ/L (ref 9–15)
BASOPHILS # BLD: 0 K/UL (ref 0–0.2)
BASOPHILS NFR BLD: 0.2 %
BUN SERPL-MCNC: 7 MG/DL (ref 6–20)
CALCIUM SERPL-MCNC: 9.1 MG/DL (ref 8.5–9.9)
CHLORIDE SERPL-SCNC: 103 MEQ/L (ref 95–107)
CO2 SERPL-SCNC: 25 MEQ/L (ref 20–31)
CREAT SERPL-MCNC: 0.44 MG/DL (ref 0.5–0.9)
EOSINOPHIL # BLD: 0.2 K/UL (ref 0–0.7)
EOSINOPHIL NFR BLD: 3.3 %
ERYTHROCYTE [DISTWIDTH] IN BLOOD BY AUTOMATED COUNT: 13.1 % (ref 11.5–14.5)
GLUCOSE SERPL-MCNC: 232 MG/DL (ref 70–99)
HCT VFR BLD AUTO: 37.7 % (ref 37–47)
HGB BLD-MCNC: 14.1 G/DL (ref 12–16)
LACTATE BLDV-SCNC: 1.9 MMOL/L (ref 0.5–2.2)
LYMPHOCYTES # BLD: 1.2 K/UL (ref 1–4.8)
LYMPHOCYTES NFR BLD: 22.1 %
MCH RBC QN AUTO: 31.9 PG (ref 27–31.3)
MCHC RBC AUTO-ENTMCNC: 37.4 % (ref 33–37)
MCV RBC AUTO: 85.3 FL (ref 79.4–94.8)
MONOCYTES # BLD: 0.5 K/UL (ref 0.2–0.8)
MONOCYTES NFR BLD: 8.6 %
NEUTROPHILS # BLD: 3.5 K/UL (ref 1.4–6.5)
NEUTS SEG NFR BLD: 65.4 %
PLATELET # BLD AUTO: 249 K/UL (ref 130–400)
POTASSIUM SERPL-SCNC: 3.7 MEQ/L (ref 3.4–4.9)
RBC # BLD AUTO: 4.42 M/UL (ref 4.2–5.4)
SODIUM SERPL-SCNC: 139 MEQ/L (ref 135–144)
WBC # BLD AUTO: 5.4 K/UL (ref 4.8–10.8)

## 2024-12-16 PROCEDURE — 2580000003 HC RX 258: Performed by: INTERNAL MEDICINE

## 2024-12-16 PROCEDURE — 6360000002 HC RX W HCPCS: Performed by: INTERNAL MEDICINE

## 2024-12-16 PROCEDURE — 87040 BLOOD CULTURE FOR BACTERIA: CPT

## 2024-12-16 PROCEDURE — 93971 EXTREMITY STUDY: CPT

## 2024-12-16 PROCEDURE — 96365 THER/PROPH/DIAG IV INF INIT: CPT

## 2024-12-16 PROCEDURE — 99284 EMERGENCY DEPT VISIT MOD MDM: CPT

## 2024-12-16 PROCEDURE — 83605 ASSAY OF LACTIC ACID: CPT

## 2024-12-16 PROCEDURE — 80048 BASIC METABOLIC PNL TOTAL CA: CPT

## 2024-12-16 PROCEDURE — 85025 COMPLETE CBC W/AUTO DIFF WBC: CPT

## 2024-12-16 PROCEDURE — 36415 COLL VENOUS BLD VENIPUNCTURE: CPT

## 2024-12-16 RX ORDER — ACETAMINOPHEN 500 MG
1000 TABLET ORAL ONCE
Status: DISCONTINUED | OUTPATIENT
Start: 2024-12-16 | End: 2024-12-16 | Stop reason: HOSPADM

## 2024-12-16 RX ORDER — SODIUM CHLORIDE 9 MG/ML
5-250 INJECTION, SOLUTION INTRAVENOUS PRN
Status: CANCELLED | OUTPATIENT
Start: 2024-12-17

## 2024-12-16 RX ADMIN — ERTAPENEM SODIUM 1000 MG: 1 INJECTION INTRAMUSCULAR; INTRAVENOUS at 12:43

## 2024-12-16 ASSESSMENT — PAIN DESCRIPTION - DESCRIPTORS
DESCRIPTORS: ACHING
DESCRIPTORS: TENDER;ACHING

## 2024-12-16 ASSESSMENT — PAIN DESCRIPTION - LOCATION
LOCATION: ARM
LOCATION: ARM

## 2024-12-16 ASSESSMENT — PAIN SCALES - GENERAL
PAINLEVEL_OUTOF10: 5
PAINLEVEL_OUTOF10: 7

## 2024-12-16 ASSESSMENT — PAIN DESCRIPTION - ORIENTATION
ORIENTATION: RIGHT
ORIENTATION: LEFT

## 2024-12-16 ASSESSMENT — PAIN - FUNCTIONAL ASSESSMENT: PAIN_FUNCTIONAL_ASSESSMENT: 0-10

## 2024-12-16 NOTE — DISCHARGE INSTRUCTIONS
You are seen in the ER today due to swelling to your right upper arm after PICC line was placed.  At this time, the swelling is likely related to a hematoma as seen on ultrasound imaging.  No blood clot was seen on ultrasound which is reassuring.  The hematoma can bleed and cause swelling to the arm.  If your arm starts to change color or he develop any severe numbness or complete weakness, come back to the ED for further evaluation.  Otherwise, you may continue with your IV antibiotic therapy for your foot infection.  We did collect blood cultures, this will take a few days for it to result, you will be contacted.  Your white blood cell count was not significantly elevated and you are not anemic.  Your lites on taking function was within normal limits.

## 2024-12-16 NOTE — ED TRIAGE NOTES
Pt presents to ED due to arm swelling/pain/bruising on the right upper and she just had a picc line placed 12/12. Pt is getting daily infusions of antibiotics and was given one today PTA.

## 2024-12-16 NOTE — FLOWSHEET NOTE
Patient to the floor ambulatory for her iv antibiotics. Vital signs taken. Left arm where her picc line sits is very swollen and bruised. She is complaining of increase in pain since Friday and increase in bruising. Patient has had pain all weekend and is having trouble moving the arm.  Encouraged her to go  to the ER to have this evaluated. Call light within reach.

## 2024-12-16 NOTE — FLOWSHEET NOTE
Infusion is complete. Tolerated well. Left the unit ambulatory with family to go to the ER to have her picc line evaluated. All equipment used in the care for this patient has been cleaned.

## 2024-12-16 NOTE — TELEPHONE ENCOUNTER
Nurse Akua reports the patient's arm is Swollen and Bruised at the Picc line site, patient is in Pain. IV Abx Infuse fine and has good blood return.  Nurse will send patient to the ER for evaluation. Will update ID physician.      Late entry> it was reported the patient was sent to the Er Dept and sign of DVT per US [facility] of RUE. To date, patient continues to attend daily IV Abx infusions.

## 2024-12-16 NOTE — FLOWSHEET NOTE
Spoke with Kacy bird ID to make her aware. She is in agreement that patient should go to the ER to be evaluated. April in ER is also aware.

## 2024-12-16 NOTE — ED PROVIDER NOTES
[facility] ED  EMERGENCY DEPARTMENT ENCOUNTER      Pt Name: Carli Bustamante  MRN: [id number]  Birthdate 1980  Date of evaluation: 12/16/2024  Provider: Deion Prado MD  4:44 PM    CHIEF COMPLAINT       Chief Complaint   Patient presents with    Vascular Access Problem     Check PICC in right arm         HISTORY OF PRESENT ILLNESS    Carli Bustamante is a 44 y.o. female who presents to the emergency department right arm swelling and pain      HPI  Patient is a 44-year-old female presenting to the ED due to right arm swelling and discomfort status post PICC line placement.  Patient has a past medical history of type 2 diabetes, hyperlipidemia.  Patient endorsed that the PICC line was placed on 12/12/2024 to the right upper arm.  She endorsed that she had a PICC line to the same location a few months ago that was removed.  The PICC line was placed because she has an infection to her right foot.  She endorsed that since then, she has had swelling to the right arm along with pain.  She endorses difficulty moving the right arm due to the discomfort.  She denies any numbness to the right arm.  She endorses that she still has some bruising around the PICC line site.  She denies being on blood thinning therapy.  She denies any centralized chest pain, lightheadedness, dizziness, coughing up of blood or shortness of breath.  She denies having history of blood clots.  She last received her transfusion of antibiotics today.     For infectious disease note obtained on 12/10, patient had a right foot abscess secondary to diabetic foot infection.  She underwent bedside debridement at that time.  The wound grew Staphylococcus aureus MSSA, she was prescribed Invanz through the PICC line for 6 weeks of therapy.    Nursing Notes were reviewed.    REVIEW OF SYSTEMS       Review of Systems   Constitutional:  Negative for activity change, appetite change, chills, fatigue and fever.   HENT:  Negative for congestion,  procedures.  There was a high probability of clinically significant/life threatening deterioration in the patient's condition which required my urgent intervention.     CONSULTS:  None    PROCEDURES:  Unless otherwise noted below, none     Procedures      FINAL IMPRESSION      1. Contusion of right upper extremity, initial encounter    2. Cephalic vein thrombosis          DISPOSITION/PLAN   DISPOSITION Decision To Discharge 12/16/2024 03:32:52 PM      PATIENT REFERRED TO:  Loi Murguia MD  [address]  [facility] [address]  [phone]    Schedule an appointment as soon as possible for a visit on 12/17/2024        DISCHARGE MEDICATIONS:  Discharge Medication List as of 12/16/2024  3:34 PM        Controlled Substances Monitoring:          No data to display                (Please note that portions of this note were completed with a voice recognition program.  Efforts were made to edit the dictations but occasionally words are mis-transcribed.)    Deion Prado MD (electronically signed)  Attending Emergency Physician            Deion Prado MD  12/19/24 1610       Deion Prado MD  12/19/24 1625       Deion Prado MD  12/19/24 7325

## 2024-12-17 ENCOUNTER — HOSPITAL ENCOUNTER (OUTPATIENT)
Dept: INFUSION THERAPY | Age: 44
Setting detail: INFUSION SERIES
Discharge: HOME | End: 2024-12-17
Payer: COMMERCIAL

## 2024-12-17 VITALS
TEMPERATURE: 99 F | DIASTOLIC BLOOD PRESSURE: 85 MMHG | OXYGEN SATURATION: 99 % | RESPIRATION RATE: 18 BRPM | HEART RATE: 99 BPM | SYSTOLIC BLOOD PRESSURE: 130 MMHG

## 2024-12-17 DIAGNOSIS — E11.628 DIABETIC FOOT INFECTION (HCC): ICD-10-CM

## 2024-12-17 DIAGNOSIS — L08.9 DIABETIC FOOT INFECTION (HCC): ICD-10-CM

## 2024-12-17 DIAGNOSIS — A41.9 SEPSIS DUE TO CELLULITIS (HCC): Primary | ICD-10-CM

## 2024-12-17 DIAGNOSIS — L03.90 SEPSIS DUE TO CELLULITIS (HCC): Primary | ICD-10-CM

## 2024-12-17 PROCEDURE — 6360000002 HC RX W HCPCS: Performed by: INTERNAL MEDICINE

## 2024-12-17 PROCEDURE — 2580000003 HC RX 258: Performed by: INTERNAL MEDICINE

## 2024-12-17 PROCEDURE — 96365 THER/PROPH/DIAG IV INF INIT: CPT

## 2024-12-17 RX ORDER — SODIUM CHLORIDE 9 MG/ML
5-250 INJECTION, SOLUTION INTRAVENOUS PRN
Status: CANCELLED | OUTPATIENT
Start: 2024-12-18

## 2024-12-17 RX ADMIN — ERTAPENEM SODIUM 1000 MG: 1 INJECTION INTRAMUSCULAR; INTRAVENOUS at 12:29

## 2024-12-17 ASSESSMENT — PAIN DESCRIPTION - DESCRIPTORS: DESCRIPTORS: ACHING

## 2024-12-17 ASSESSMENT — PAIN DESCRIPTION - LOCATION: LOCATION: FOOT

## 2024-12-17 ASSESSMENT — PAIN DESCRIPTION - ORIENTATION: ORIENTATION: RIGHT

## 2024-12-17 ASSESSMENT — PAIN SCALES - GENERAL: PAINLEVEL_OUTOF10: 3

## 2024-12-17 NOTE — FLOWSHEET NOTE
Infusion is complete. Tolerated well. Left the unit ambulatory  with family. All equipment used in the care for this patient has been cleaned.

## 2024-12-17 NOTE — FLOWSHEET NOTE
Patient arrived ambulatory to floor. Vital signs taken and stable. Patient oriented to room and call light. PICC line in place, dressing clean, dry, and intact. Purple single lumen. Flushed with brisk blood return. Call light within reach.    1229 - Invanz Infusion started. Call light within reach.

## 2024-12-18 ENCOUNTER — HOSPITAL ENCOUNTER (OUTPATIENT)
Dept: INFUSION THERAPY | Age: 44
Setting detail: INFUSION SERIES
Discharge: HOME | End: 2024-12-18
Payer: COMMERCIAL

## 2024-12-18 VITALS
RESPIRATION RATE: 18 BRPM | SYSTOLIC BLOOD PRESSURE: 129 MMHG | TEMPERATURE: 97.2 F | DIASTOLIC BLOOD PRESSURE: 73 MMHG | OXYGEN SATURATION: 99 % | HEART RATE: 98 BPM

## 2024-12-18 DIAGNOSIS — A41.9 SEPSIS DUE TO CELLULITIS (HCC): Primary | ICD-10-CM

## 2024-12-18 DIAGNOSIS — E11.628 DIABETIC FOOT INFECTION (HCC): ICD-10-CM

## 2024-12-18 DIAGNOSIS — L03.90 SEPSIS DUE TO CELLULITIS (HCC): Primary | ICD-10-CM

## 2024-12-18 DIAGNOSIS — L08.9 DIABETIC FOOT INFECTION (HCC): ICD-10-CM

## 2024-12-18 PROCEDURE — 6360000002 HC RX W HCPCS: Performed by: INTERNAL MEDICINE

## 2024-12-18 PROCEDURE — 2580000003 HC RX 258: Performed by: INTERNAL MEDICINE

## 2024-12-18 PROCEDURE — 96365 THER/PROPH/DIAG IV INF INIT: CPT

## 2024-12-18 RX ORDER — SODIUM CHLORIDE 9 MG/ML
5-250 INJECTION, SOLUTION INTRAVENOUS PRN
Status: CANCELLED | OUTPATIENT
Start: 2024-12-19

## 2024-12-18 RX ADMIN — ERTAPENEM SODIUM 1000 MG: 1 INJECTION INTRAMUSCULAR; INTRAVENOUS at 14:27

## 2024-12-18 ASSESSMENT — PAIN DESCRIPTION - ORIENTATION: ORIENTATION: RIGHT

## 2024-12-18 ASSESSMENT — PAIN DESCRIPTION - DESCRIPTORS: DESCRIPTORS: ACHING

## 2024-12-18 ASSESSMENT — PAIN SCALES - GENERAL: PAINLEVEL_OUTOF10: 3

## 2024-12-18 ASSESSMENT — PAIN DESCRIPTION - LOCATION: LOCATION: FOOT

## 2024-12-19 ENCOUNTER — HOSPITAL ENCOUNTER (OUTPATIENT)
Dept: INFUSION THERAPY | Age: 44
Setting detail: INFUSION SERIES
Discharge: HOME | End: 2024-12-19
Payer: COMMERCIAL

## 2024-12-19 VITALS
SYSTOLIC BLOOD PRESSURE: 127 MMHG | DIASTOLIC BLOOD PRESSURE: 71 MMHG | OXYGEN SATURATION: 100 % | HEART RATE: 100 BPM | TEMPERATURE: 97.5 F | RESPIRATION RATE: 18 BRPM

## 2024-12-19 DIAGNOSIS — E11.628 DIABETIC FOOT INFECTION (HCC): ICD-10-CM

## 2024-12-19 DIAGNOSIS — L03.90 SEPSIS DUE TO CELLULITIS (HCC): Primary | ICD-10-CM

## 2024-12-19 DIAGNOSIS — L08.9 DIABETIC FOOT INFECTION (HCC): ICD-10-CM

## 2024-12-19 DIAGNOSIS — A41.9 SEPSIS DUE TO CELLULITIS (HCC): Primary | ICD-10-CM

## 2024-12-19 LAB
ANION GAP SERPL CALCULATED.3IONS-SCNC: 10 MEQ/L (ref 9–15)
BUN SERPL-MCNC: 6 MG/DL (ref 6–20)
CALCIUM SERPL-MCNC: 8 MG/DL (ref 8.5–9.9)
CHLORIDE SERPL-SCNC: 103 MEQ/L (ref 95–107)
CO2 SERPL-SCNC: 24 MEQ/L (ref 20–31)
CREAT SERPL-MCNC: 0.46 MG/DL (ref 0.5–0.9)
CRP SERPL HS-MCNC: 9.1 MG/L (ref 0–5)
ERYTHROCYTE [DISTWIDTH] IN BLOOD BY AUTOMATED COUNT: 12.9 % (ref 11.5–14.5)
GLUCOSE SERPL-MCNC: 275 MG/DL (ref 70–99)
HCT VFR BLD AUTO: 34.9 % (ref 37–47)
HGB BLD-MCNC: 12.9 G/DL (ref 12–16)
MCH RBC QN AUTO: 31.9 PG (ref 27–31.3)
MCHC RBC AUTO-ENTMCNC: 37 % (ref 33–37)
MCV RBC AUTO: 86.4 FL (ref 79.4–94.8)
PLATELET # BLD AUTO: 270 K/UL (ref 130–400)
POTASSIUM SERPL-SCNC: 3.9 MEQ/L (ref 3.4–4.9)
RBC # BLD AUTO: 4.04 M/UL (ref 4.2–5.4)
SODIUM SERPL-SCNC: 137 MEQ/L (ref 135–144)
WBC # BLD AUTO: 5.8 K/UL (ref 4.8–10.8)

## 2024-12-19 PROCEDURE — 85027 COMPLETE CBC AUTOMATED: CPT

## 2024-12-19 PROCEDURE — 96365 THER/PROPH/DIAG IV INF INIT: CPT

## 2024-12-19 PROCEDURE — 86140 C-REACTIVE PROTEIN: CPT

## 2024-12-19 PROCEDURE — 36592 COLLECT BLOOD FROM PICC: CPT

## 2024-12-19 PROCEDURE — 6360000002 HC RX W HCPCS: Performed by: INTERNAL MEDICINE

## 2024-12-19 PROCEDURE — 2580000003 HC RX 258: Performed by: INTERNAL MEDICINE

## 2024-12-19 PROCEDURE — 80048 BASIC METABOLIC PNL TOTAL CA: CPT

## 2024-12-19 RX ORDER — SODIUM CHLORIDE 9 MG/ML
5-250 INJECTION, SOLUTION INTRAVENOUS PRN
Status: CANCELLED | OUTPATIENT
Start: 2024-12-20

## 2024-12-19 RX ADMIN — ERTAPENEM SODIUM 1000 MG: 1 INJECTION INTRAMUSCULAR; INTRAVENOUS at 12:53

## 2024-12-19 ASSESSMENT — PAIN DESCRIPTION - DESCRIPTORS: DESCRIPTORS: SORE

## 2024-12-19 ASSESSMENT — ENCOUNTER SYMPTOMS
PHOTOPHOBIA: 0
RHINORRHEA: 0
EYE ITCHING: 0
EYE DISCHARGE: 0
EYE REDNESS: 0
WHEEZING: 0
COLOR CHANGE: 1
VOMITING: 0
COUGH: 0
ABDOMINAL PAIN: 0
NAUSEA: 0
BACK PAIN: 0
EYE PAIN: 0
CHEST TIGHTNESS: 0

## 2024-12-19 ASSESSMENT — PAIN SCALES - GENERAL: PAINLEVEL_OUTOF10: 3

## 2024-12-19 ASSESSMENT — PAIN DESCRIPTION - LOCATION: LOCATION: TOE (COMMENT WHICH ONE)

## 2024-12-19 ASSESSMENT — PAIN DESCRIPTION - ORIENTATION: ORIENTATION: RIGHT

## 2024-12-19 NOTE — PROGRESS NOTES
[facility] OUTPATIENT INFUSION CENTER     PT arrived via:  [x] Ambulatory         [] Wheelchair  []With transport                [] Other:     [x]PT oriented to room and call light in reach.   [x] Vital signs obtained and are stable.   [x]Medication education provided and reviewed with patient. PICC line flushed and blood return noted.   Ordered labs drawn and sent to lab. PICC dressing changed and new caps applied. 1253- INVanz started, call light within reach, care continued. Electronically signed by Jessica Tariq RN on 12/19/2024 at 1:00 PM

## 2024-12-20 ENCOUNTER — HOSPITAL ENCOUNTER (OUTPATIENT)
Dept: INFUSION THERAPY | Age: 44
Setting detail: INFUSION SERIES
Discharge: HOME | End: 2024-12-20
Payer: COMMERCIAL

## 2024-12-20 VITALS
OXYGEN SATURATION: 99 % | HEART RATE: 91 BPM | TEMPERATURE: 96.7 F | SYSTOLIC BLOOD PRESSURE: 127 MMHG | RESPIRATION RATE: 18 BRPM | DIASTOLIC BLOOD PRESSURE: 80 MMHG

## 2024-12-20 DIAGNOSIS — A41.9 SEPSIS DUE TO CELLULITIS (HCC): Primary | ICD-10-CM

## 2024-12-20 DIAGNOSIS — L03.90 SEPSIS DUE TO CELLULITIS (HCC): Primary | ICD-10-CM

## 2024-12-20 DIAGNOSIS — E11.628 DIABETIC FOOT INFECTION (HCC): ICD-10-CM

## 2024-12-20 DIAGNOSIS — L08.9 DIABETIC FOOT INFECTION (HCC): ICD-10-CM

## 2024-12-20 PROCEDURE — 96365 THER/PROPH/DIAG IV INF INIT: CPT

## 2024-12-20 PROCEDURE — 6360000002 HC RX W HCPCS: Performed by: INTERNAL MEDICINE

## 2024-12-20 PROCEDURE — 2580000003 HC RX 258: Performed by: INTERNAL MEDICINE

## 2024-12-20 RX ORDER — SODIUM CHLORIDE 9 MG/ML
5-250 INJECTION, SOLUTION INTRAVENOUS PRN
Status: CANCELLED | OUTPATIENT
Start: 2024-12-21

## 2024-12-20 RX ADMIN — ERTAPENEM SODIUM 1000 MG: 1 INJECTION INTRAMUSCULAR; INTRAVENOUS at 12:19

## 2024-12-20 ASSESSMENT — PAIN DESCRIPTION - LOCATION: LOCATION: TOE (COMMENT WHICH ONE)

## 2024-12-20 ASSESSMENT — PAIN SCALES - GENERAL: PAINLEVEL_OUTOF10: 3

## 2024-12-20 ASSESSMENT — PAIN DESCRIPTION - ORIENTATION: ORIENTATION: RIGHT

## 2024-12-20 ASSESSMENT — PAIN DESCRIPTION - DESCRIPTORS: DESCRIPTORS: SORE

## 2024-12-21 ENCOUNTER — HOSPITAL ENCOUNTER (OUTPATIENT)
Dept: INFUSION THERAPY | Age: 44
Setting detail: INFUSION SERIES
Discharge: HOME | End: 2024-12-21
Payer: COMMERCIAL

## 2024-12-21 VITALS
RESPIRATION RATE: 18 BRPM | TEMPERATURE: 96.7 F | SYSTOLIC BLOOD PRESSURE: 123 MMHG | OXYGEN SATURATION: 100 % | HEART RATE: 89 BPM | DIASTOLIC BLOOD PRESSURE: 83 MMHG

## 2024-12-21 DIAGNOSIS — L03.90 SEPSIS DUE TO CELLULITIS (HCC): Primary | ICD-10-CM

## 2024-12-21 DIAGNOSIS — A41.9 SEPSIS DUE TO CELLULITIS (HCC): Primary | ICD-10-CM

## 2024-12-21 DIAGNOSIS — L08.9 DIABETIC FOOT INFECTION (HCC): ICD-10-CM

## 2024-12-21 DIAGNOSIS — E11.628 DIABETIC FOOT INFECTION (HCC): ICD-10-CM

## 2024-12-21 LAB
BACTERIA BLD CULT ORG #2: NORMAL
BACTERIA BLD CULT: NORMAL

## 2024-12-21 PROCEDURE — 96365 THER/PROPH/DIAG IV INF INIT: CPT

## 2024-12-21 PROCEDURE — 2580000003 HC RX 258: Performed by: INTERNAL MEDICINE

## 2024-12-21 PROCEDURE — 6360000002 HC RX W HCPCS: Performed by: INTERNAL MEDICINE

## 2024-12-21 RX ORDER — SODIUM CHLORIDE 9 MG/ML
5-250 INJECTION, SOLUTION INTRAVENOUS PRN
Status: CANCELLED | OUTPATIENT
Start: 2024-12-22

## 2024-12-21 RX ADMIN — ERTAPENEM SODIUM 1000 MG: 1 INJECTION INTRAMUSCULAR; INTRAVENOUS at 11:04

## 2024-12-22 ENCOUNTER — HOSPITAL ENCOUNTER (OUTPATIENT)
Dept: INFUSION THERAPY | Age: 44
Setting detail: INFUSION SERIES
Discharge: HOME | End: 2024-12-22
Payer: COMMERCIAL

## 2024-12-22 VITALS
TEMPERATURE: 97.3 F | RESPIRATION RATE: 18 BRPM | OXYGEN SATURATION: 99 % | SYSTOLIC BLOOD PRESSURE: 127 MMHG | DIASTOLIC BLOOD PRESSURE: 77 MMHG | HEART RATE: 91 BPM

## 2024-12-22 DIAGNOSIS — L03.90 SEPSIS DUE TO CELLULITIS (HCC): Primary | ICD-10-CM

## 2024-12-22 DIAGNOSIS — E11.628 DIABETIC FOOT INFECTION (HCC): ICD-10-CM

## 2024-12-22 DIAGNOSIS — L08.9 DIABETIC FOOT INFECTION (HCC): ICD-10-CM

## 2024-12-22 DIAGNOSIS — A41.9 SEPSIS DUE TO CELLULITIS (HCC): Primary | ICD-10-CM

## 2024-12-22 PROCEDURE — 2580000003 HC RX 258: Performed by: INTERNAL MEDICINE

## 2024-12-22 PROCEDURE — 96365 THER/PROPH/DIAG IV INF INIT: CPT

## 2024-12-22 PROCEDURE — 6360000002 HC RX W HCPCS: Performed by: INTERNAL MEDICINE

## 2024-12-22 RX ORDER — SODIUM CHLORIDE 9 MG/ML
5-250 INJECTION, SOLUTION INTRAVENOUS PRN
Status: CANCELLED | OUTPATIENT
Start: 2024-12-23

## 2024-12-22 RX ADMIN — ERTAPENEM SODIUM 1000 MG: 1 INJECTION INTRAMUSCULAR; INTRAVENOUS at 11:01

## 2024-12-22 ASSESSMENT — PAIN DESCRIPTION - DESCRIPTORS: DESCRIPTORS: SORE

## 2024-12-22 ASSESSMENT — PAIN SCALES - GENERAL: PAINLEVEL_OUTOF10: 3

## 2024-12-22 NOTE — FLOWSHEET NOTE
Patient to the floor ambulatory with family for her infusion. Vital signs taken. No distress noted. Call light within reach.

## 2024-12-23 ENCOUNTER — HOSPITAL ENCOUNTER (OUTPATIENT)
Dept: INFUSION THERAPY | Age: 44
Setting detail: INFUSION SERIES
Discharge: HOME | End: 2024-12-23
Payer: COMMERCIAL

## 2024-12-23 ENCOUNTER — HOSPITAL ENCOUNTER (OUTPATIENT)
Dept: WOUND CARE | Age: 44
Discharge: HOME | End: 2024-12-23
Attending: PODIATRIST
Payer: COMMERCIAL

## 2024-12-23 VITALS — SYSTOLIC BLOOD PRESSURE: 136 MMHG | DIASTOLIC BLOOD PRESSURE: 90 MMHG | TEMPERATURE: 96.8 F | HEART RATE: 91 BPM

## 2024-12-23 VITALS
HEART RATE: 91 BPM | DIASTOLIC BLOOD PRESSURE: 82 MMHG | OXYGEN SATURATION: 100 % | TEMPERATURE: 97.4 F | RESPIRATION RATE: 18 BRPM | SYSTOLIC BLOOD PRESSURE: 123 MMHG

## 2024-12-23 DIAGNOSIS — L03.90 SEPSIS DUE TO CELLULITIS (HCC): Primary | ICD-10-CM

## 2024-12-23 DIAGNOSIS — E11.628 DIABETIC FOOT INFECTION (HCC): ICD-10-CM

## 2024-12-23 DIAGNOSIS — A41.9 SEPSIS DUE TO CELLULITIS (HCC): Primary | ICD-10-CM

## 2024-12-23 DIAGNOSIS — L08.9 DIABETIC FOOT INFECTION (HCC): ICD-10-CM

## 2024-12-23 PROCEDURE — 11042 DBRDMT SUBQ TIS 1ST 20SQCM/<: CPT

## 2024-12-23 PROCEDURE — 6360000002 HC RX W HCPCS: Performed by: INTERNAL MEDICINE

## 2024-12-23 PROCEDURE — 96365 THER/PROPH/DIAG IV INF INIT: CPT

## 2024-12-23 PROCEDURE — 2580000003 HC RX 258: Performed by: INTERNAL MEDICINE

## 2024-12-23 RX ORDER — SODIUM CHLORIDE 9 MG/ML
5-250 INJECTION, SOLUTION INTRAVENOUS PRN
Status: CANCELLED | OUTPATIENT
Start: 2024-12-24

## 2024-12-23 RX ADMIN — ERTAPENEM SODIUM 1000 MG: 1 INJECTION INTRAMUSCULAR; INTRAVENOUS at 12:19

## 2024-12-23 ASSESSMENT — PAIN SCALES - GENERAL
PAINLEVEL_OUTOF10: 3
PAINLEVEL_OUTOF10: 3

## 2024-12-23 ASSESSMENT — PAIN DESCRIPTION - ORIENTATION: ORIENTATION: RIGHT

## 2024-12-23 ASSESSMENT — PAIN DESCRIPTION - LOCATION: LOCATION: TOE (COMMENT WHICH ONE)

## 2024-12-23 NOTE — DISCHARGE INSTRUCTIONS
[facility] Wound Center and Hyperbaric Medicine   Physician Orders and Discharge Instructions  [address] [place]  [address]  Telephone: [phone]      [phone]        NAME:  Carli Bustamante                                                                                         YOB: 1980  MEDICAL RECORD NUMBER:  [id number]     Your  is:  Jeannette     Home Care/Facility:     Wound Location: Right Great Toe      Dressing orders:   1.Cleanse wound(s) with normal saline.  2. Apply dry QUE  Or equivalent to wound bed.  3. Moisten QUE with a few drops of normal saline.  4. Cover with 4x4's and wrap with gauze (errol or kerlix)  5. Change  Every other day or Monday, Wednesday, and Friday           Compression: none      Offloading Device:Post op shoe with peg assist      Other Instructions:  Keep the wound dry and covered when showering. Continue taking the antibiotics per Infectious Disease.      Keep all dressings clean, dry and intact.  Keep pressure off the wound(s) at all times.      Follow up visit  2 weeks January 7th  at 3:15 pm  with Dr. SHELBY Brand      Please give 24 hour notice if unable to keep appointment. [phone]     If you experience any of the following, please call the Wound Care Service at  [phone] or go to the nearest emergency room.        *Increase in pain*Temperature over 101*Increase in drainage from your wound or a foul odor  *Uncontrolled swelling*Need for compression bandage changes due to slippage, breakthrough drainage       PLEASE NOTE: IF YOU ARE UNABLE TO OBTAIN WOUND SUPPLIES, CONTINUE TO USE THE SUPPLIES YOU HAVE AVAILABLE UNTIL YOU ARE ABLE TO REACH US. IT IS MOST IMPORTANT TO KEEP THE WOUND COVERED AT ALL TIMES        Electronically signed by Alber Johnson DPM on 12/23/2024 at 11:50 AM

## 2024-12-23 NOTE — PROGRESS NOTES
[facility]ound [facility]   Progress Note and Procedure Note      Carli Bustamante  MEDICAL RECORD NUMBER:  [id number]  AGE: 44 y.o.   GENDER: female  : 1980  EPISODE DATE:  2024    Subjective:     Chief Complaint   Patient presents with    Wound Check         HISTORY of PRESENT ILLNESS HPI     Carli Bustamante is a 44 y.o. female who presents today for wound/ulcer evaluation.   History of Wound Context: Right great toe diabetic ulcer.  She is improving. Denies nausea, vomiting, fevers, or chills.  Wound/Ulcer Pain Timing/Severity: none  Quality of pain: N/A  Severity:  0 / 10   Modifying Factors: None  Associated Signs/Symptoms: drainage and numbness    Ulcer Identification:  Ulcer Type: diabetic  Contributing Factors: diabetes, poor glucose control, shear force, and obesity    Wound: N/A        PAST MEDICAL HISTORY        Diagnosis Date    Anemia during pregnancy 2018    Eczema     Herpes simplex infection of skin     on neck    Hypercholesterolemia     Irregular heart beat     runs of v tach in the past    Obesity     Pregnancy induced hypertension     Type II or unspecified type diabetes mellitus without mention of complication, not stated as uncontrolled     type 2       PAST SURGICAL HISTORY    Past Surgical History:   Procedure Laterality Date    ABDOMEN SURGERY       x 2     SECTION      TUBAL LIGATION         FAMILY HISTORY    Family History   Problem Relation Age of Onset    Arthritis Mother     Diabetes Mother     Early Death Father     Heart Attack Father     Heart Disease Father     High Blood Pressure Father     High Cholesterol Father     Diabetes Father     Arthritis Sister     Birth Defects Son     Diabetes Paternal Grandfather     Heart Disease Paternal Grandfather     Breast Cancer Paternal Cousin     Cancer Neg Hx        SOCIAL HISTORY    Social History     Tobacco Use    Smoking status: Former     Current packs/day: 0.00     Average packs/day:

## 2024-12-23 NOTE — WOUND CARE
[facility] Wound Center Physician Billing Sheet.     Carli KALEN Bustamante  AGE: 44 y.o.   GENDER: female  : 1980  TODAY'S DATE:  2024    ICD-10 CODES  Active Hospital Problems    Diagnosis Date Noted    Ulcer of right foot with fat layer exposed (HCC) [L97.512] 10/10/2024    Type 2 diabetes mellitus with foot ulcer, with long-term current use of insulin (HCC) [E11.621, L97.509, Z79.4] 2012       PHYSICIAN PROCEDURES    CPT CODE  87503 - RT      Electronically signed by Alber Johnson DPM on 2024 at 11:52 AM

## 2024-12-23 NOTE — FLOWSHEET NOTE
Patient arrived ambulatory to floor. Vital signs taken and stable. PICC line remains in place, single purple lumen flushed, brisk blood return noted. Site clean, dry and intact. Patient oriented to room and call light. Call light within reach.    1219 - Invanz Infusion started. Call light within reach.     1250 - Infusion completed. Patient without signs/symptoms of reaction. PICC line remains in place for continuation of care. Pt left unit via ambulatory. All equipment used in the care for this patient has been cleaned.

## 2024-12-24 ENCOUNTER — HOSPITAL ENCOUNTER (OUTPATIENT)
Dept: INFUSION THERAPY | Age: 44
Setting detail: INFUSION SERIES
Discharge: HOME | End: 2024-12-24
Payer: COMMERCIAL

## 2024-12-24 VITALS
SYSTOLIC BLOOD PRESSURE: 124 MMHG | OXYGEN SATURATION: 99 % | TEMPERATURE: 98.6 F | RESPIRATION RATE: 18 BRPM | HEART RATE: 92 BPM | DIASTOLIC BLOOD PRESSURE: 82 MMHG

## 2024-12-24 DIAGNOSIS — L08.9 DIABETIC FOOT INFECTION (HCC): ICD-10-CM

## 2024-12-24 DIAGNOSIS — A41.9 SEPSIS DUE TO CELLULITIS (HCC): Primary | ICD-10-CM

## 2024-12-24 DIAGNOSIS — L03.90 SEPSIS DUE TO CELLULITIS (HCC): Primary | ICD-10-CM

## 2024-12-24 DIAGNOSIS — E11.628 DIABETIC FOOT INFECTION (HCC): ICD-10-CM

## 2024-12-24 PROCEDURE — 2580000003 HC RX 258: Performed by: INTERNAL MEDICINE

## 2024-12-24 PROCEDURE — 96365 THER/PROPH/DIAG IV INF INIT: CPT

## 2024-12-24 PROCEDURE — 6360000002 HC RX W HCPCS: Performed by: INTERNAL MEDICINE

## 2024-12-24 RX ORDER — SODIUM CHLORIDE 9 MG/ML
5-250 INJECTION, SOLUTION INTRAVENOUS PRN
Status: CANCELLED | OUTPATIENT
Start: 2024-12-25

## 2024-12-24 RX ADMIN — ERTAPENEM SODIUM 1000 MG: 1 INJECTION INTRAMUSCULAR; INTRAVENOUS at 12:31

## 2024-12-24 ASSESSMENT — PAIN SCALES - GENERAL: PAINLEVEL_OUTOF10: 2

## 2024-12-24 ASSESSMENT — PAIN DESCRIPTION - ORIENTATION: ORIENTATION: RIGHT

## 2024-12-24 ASSESSMENT — PAIN DESCRIPTION - LOCATION: LOCATION: TOE (COMMENT WHICH ONE)

## 2024-12-24 NOTE — FLOWSHEET NOTE
Infusion is complete. Tolerated well. Left the unit ambulatory with friend. All equipment used in the care for this patient has been cleaned.     What Is The Reason For Today's Visit?: Full Body Skin Examination What Is The Reason For Today's Visit? (Being Monitored For X): concerning skin lesions on an annual basis

## 2024-12-25 ENCOUNTER — HOSPITAL ENCOUNTER (OUTPATIENT)
Dept: INFUSION THERAPY | Age: 44
Setting detail: INFUSION SERIES
Discharge: HOME | End: 2024-12-25
Payer: COMMERCIAL

## 2024-12-25 VITALS
OXYGEN SATURATION: 100 % | TEMPERATURE: 97.7 F | HEART RATE: 99 BPM | SYSTOLIC BLOOD PRESSURE: 120 MMHG | DIASTOLIC BLOOD PRESSURE: 79 MMHG | RESPIRATION RATE: 18 BRPM

## 2024-12-25 DIAGNOSIS — E11.628 DIABETIC FOOT INFECTION (HCC): ICD-10-CM

## 2024-12-25 DIAGNOSIS — L03.90 SEPSIS DUE TO CELLULITIS (HCC): Primary | ICD-10-CM

## 2024-12-25 DIAGNOSIS — L08.9 DIABETIC FOOT INFECTION (HCC): ICD-10-CM

## 2024-12-25 DIAGNOSIS — A41.9 SEPSIS DUE TO CELLULITIS (HCC): Primary | ICD-10-CM

## 2024-12-25 PROCEDURE — 96365 THER/PROPH/DIAG IV INF INIT: CPT

## 2024-12-25 PROCEDURE — 2580000003 HC RX 258: Performed by: INTERNAL MEDICINE

## 2024-12-25 PROCEDURE — 6360000002 HC RX W HCPCS: Performed by: INTERNAL MEDICINE

## 2024-12-25 RX ORDER — SODIUM CHLORIDE 9 MG/ML
5-250 INJECTION, SOLUTION INTRAVENOUS PRN
Status: CANCELLED | OUTPATIENT
Start: 2024-12-26

## 2024-12-25 RX ADMIN — SODIUM CHLORIDE 1000 MG: 900 INJECTION INTRAVENOUS at 12:48

## 2024-12-25 ASSESSMENT — PAIN SCALES - GENERAL: PAINLEVEL_OUTOF10: 2

## 2024-12-25 ASSESSMENT — PAIN DESCRIPTION - ORIENTATION: ORIENTATION: RIGHT

## 2024-12-25 ASSESSMENT — PAIN DESCRIPTION - LOCATION: LOCATION: TOE (COMMENT WHICH ONE)

## 2024-12-25 NOTE — PROGRESS NOTES
Patient arrived to floor ambulatory with family at side to room #264. VS obtained and stable. PICC line intact, single lumen purple  was flushed. Patient oriented to room and call light. Infusion started at this time.  1319: Infusion complete. Patient without s/sx of adverse reactions.

## 2024-12-26 ENCOUNTER — OFFICE VISIT (OUTPATIENT)
Dept: FAMILY MEDICINE CLINIC | Age: 44
End: 2024-12-26
Payer: COMMERCIAL

## 2024-12-26 ENCOUNTER — HOSPITAL ENCOUNTER (OUTPATIENT)
Dept: INFUSION THERAPY | Age: 44
Setting detail: INFUSION SERIES
Discharge: HOME | End: 2024-12-26
Payer: COMMERCIAL

## 2024-12-26 VITALS
OXYGEN SATURATION: 98 % | SYSTOLIC BLOOD PRESSURE: 130 MMHG | WEIGHT: 185 LBS | HEART RATE: 97 BPM | HEIGHT: 63 IN | TEMPERATURE: 98.1 F | BODY MASS INDEX: 32.78 KG/M2 | DIASTOLIC BLOOD PRESSURE: 86 MMHG

## 2024-12-26 VITALS
DIASTOLIC BLOOD PRESSURE: 81 MMHG | HEART RATE: 101 BPM | SYSTOLIC BLOOD PRESSURE: 130 MMHG | TEMPERATURE: 97.4 F | RESPIRATION RATE: 18 BRPM | OXYGEN SATURATION: 99 %

## 2024-12-26 DIAGNOSIS — E11.628 DIABETIC FOOT INFECTION (HCC): ICD-10-CM

## 2024-12-26 DIAGNOSIS — K62.89 PERIRECTAL SKIN IRRITATION: Primary | ICD-10-CM

## 2024-12-26 DIAGNOSIS — L97.509 TYPE 2 DIABETES MELLITUS WITH FOOT ULCER, WITH LONG-TERM CURRENT USE OF INSULIN (HCC): ICD-10-CM

## 2024-12-26 DIAGNOSIS — L08.9 DIABETIC INFECTION OF RIGHT FOOT (HCC): ICD-10-CM

## 2024-12-26 DIAGNOSIS — Z79.4 TYPE 2 DIABETES MELLITUS WITH FOOT ULCER, WITH LONG-TERM CURRENT USE OF INSULIN (HCC): ICD-10-CM

## 2024-12-26 DIAGNOSIS — E11.621 TYPE 2 DIABETES MELLITUS WITH FOOT ULCER, WITH LONG-TERM CURRENT USE OF INSULIN (HCC): ICD-10-CM

## 2024-12-26 DIAGNOSIS — E11.628 DIABETIC INFECTION OF RIGHT FOOT (HCC): ICD-10-CM

## 2024-12-26 DIAGNOSIS — L08.9 DIABETIC FOOT INFECTION (HCC): ICD-10-CM

## 2024-12-26 DIAGNOSIS — A41.9 SEPSIS DUE TO CELLULITIS (HCC): Primary | ICD-10-CM

## 2024-12-26 DIAGNOSIS — L03.90 SEPSIS DUE TO CELLULITIS (HCC): Primary | ICD-10-CM

## 2024-12-26 LAB
ANION GAP SERPL CALCULATED.3IONS-SCNC: 13 MEQ/L (ref 9–15)
BUN SERPL-MCNC: 7 MG/DL (ref 6–20)
CALCIUM SERPL-MCNC: 8.6 MG/DL (ref 8.5–9.9)
CHLORIDE SERPL-SCNC: 102 MEQ/L (ref 95–107)
CO2 SERPL-SCNC: 23 MEQ/L (ref 20–31)
CREAT SERPL-MCNC: 0.44 MG/DL (ref 0.5–0.9)
CRP SERPL HS-MCNC: 10.5 MG/L (ref 0–5)
ERYTHROCYTE [DISTWIDTH] IN BLOOD BY AUTOMATED COUNT: 13.1 % (ref 11.5–14.5)
GLUCOSE SERPL-MCNC: 292 MG/DL (ref 70–99)
HCT VFR BLD AUTO: 36.7 % (ref 37–47)
HGB BLD-MCNC: 13.5 G/DL (ref 12–16)
MCH RBC QN AUTO: 31.5 PG (ref 27–31.3)
MCHC RBC AUTO-ENTMCNC: 36.8 % (ref 33–37)
MCV RBC AUTO: 85.7 FL (ref 79.4–94.8)
PLATELET # BLD AUTO: 291 K/UL (ref 130–400)
POTASSIUM SERPL-SCNC: 3.8 MEQ/L (ref 3.4–4.9)
RBC # BLD AUTO: 4.28 M/UL (ref 4.2–5.4)
SODIUM SERPL-SCNC: 138 MEQ/L (ref 135–144)
WBC # BLD AUTO: 5.7 K/UL (ref 4.8–10.8)

## 2024-12-26 PROCEDURE — G8427 DOCREV CUR MEDS BY ELIG CLIN: HCPCS | Performed by: FAMILY MEDICINE

## 2024-12-26 PROCEDURE — 2022F DILAT RTA XM EVC RTNOPTHY: CPT | Performed by: FAMILY MEDICINE

## 2024-12-26 PROCEDURE — 86140 C-REACTIVE PROTEIN: CPT

## 2024-12-26 PROCEDURE — 85027 COMPLETE CBC AUTOMATED: CPT

## 2024-12-26 PROCEDURE — 36592 COLLECT BLOOD FROM PICC: CPT

## 2024-12-26 PROCEDURE — 3052F HG A1C>EQUAL 8.0%<EQUAL 9.0%: CPT | Performed by: FAMILY MEDICINE

## 2024-12-26 PROCEDURE — 80048 BASIC METABOLIC PNL TOTAL CA: CPT

## 2024-12-26 PROCEDURE — 2580000003 HC RX 258: Performed by: INTERNAL MEDICINE

## 2024-12-26 PROCEDURE — 99214 OFFICE O/P EST MOD 30 MIN: CPT | Performed by: FAMILY MEDICINE

## 2024-12-26 PROCEDURE — 1036F TOBACCO NON-USER: CPT | Performed by: FAMILY MEDICINE

## 2024-12-26 PROCEDURE — 96365 THER/PROPH/DIAG IV INF INIT: CPT

## 2024-12-26 PROCEDURE — G8417 CALC BMI ABV UP PARAM F/U: HCPCS | Performed by: FAMILY MEDICINE

## 2024-12-26 PROCEDURE — 6360000002 HC RX W HCPCS: Performed by: INTERNAL MEDICINE

## 2024-12-26 PROCEDURE — G8484 FLU IMMUNIZE NO ADMIN: HCPCS | Performed by: FAMILY MEDICINE

## 2024-12-26 RX ORDER — SODIUM CHLORIDE 9 MG/ML
5-250 INJECTION, SOLUTION INTRAVENOUS PRN
Status: CANCELLED | OUTPATIENT
Start: 2024-12-27

## 2024-12-26 RX ORDER — CLOTRIMAZOLE AND BETAMETHASONE DIPROPIONATE 10; .64 MG/G; MG/G
CREAM TOPICAL
Qty: 45 G | Refills: 2 | Status: SHIPPED | OUTPATIENT
Start: 2024-12-26

## 2024-12-26 RX ADMIN — ERTAPENEM SODIUM 1000 MG: 1 INJECTION INTRAMUSCULAR; INTRAVENOUS at 12:22

## 2024-12-26 ASSESSMENT — PAIN DESCRIPTION - LOCATION: LOCATION: FOOT

## 2024-12-26 ASSESSMENT — PAIN SCALES - GENERAL: PAINLEVEL_OUTOF10: 3

## 2024-12-26 NOTE — PROGRESS NOTES
Chief Complaint   Patient presents with    New Patient     Has been seeing infectious disease, on pic line again, toe infected    Rash     On buttocks, never went away after being on antibiotics   Lump on upper back    Other     Needs paperwork filled out for not able to work        HPI:  Carli Bustamante is a 44 y.o. 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    History of PSVT (paroxysmal supraventricular tachycardia)    Hx of pre-eclampsia in prior pregnancy, currently pregnant    Hx of  section    Previous  section    Hx of  delivery, currently pregnant    History of fetal abnormality in previous pregnancy, currently pregnant    Late prenatal care affecting pregnancy in second trimester    Obesity (BMI 30-39.9)    Herpes simplex infection of skin    Lower abdominal pain    Type 2 diabetes mellitus affecting pregnancy in third trimester, antepartum    Diabetic foot infection (HCC)    Supervision of normal pregnancy    Noncompliance    High-risk pregnancy in second trimester    Anemia during pregnancy    Constipation    Acute cystitis without hematuria    Pregnancy with prenatal care elsewhere in third trimester    Rash    Eczema    Dysphagia    Sepsis (HCC)    Poorly controlled diabetes mellitus ([facility])    Insulin pump in place    Ulcer of right foot with fat layer exposed ([facility])    Abrasion, right ankle, initial encounter       Current Outpatient Medications   Medication Sig

## 2024-12-26 NOTE — PROGRESS NOTES
Pt tolerated infusion. Picc line dressing changed. Pt left unit ambulatory. All equipment used in care has been cleaned.    Electronically signed by Keyla Dan RN on 12/26/2024 at 1:05 PM

## 2024-12-26 NOTE — FLOWSHEET NOTE
Patient arrived ambulatory to floor. Vital signs taken and stable. Patient oriented to room and call light. Single lumen PICC line in place RICARDA, flushed and brisk blood return noted.  Call light within reach.    1211 - Labs obtained via PICC line, labelled, and sent to labs. Pt tolerated well.     1222 - Invanz Infusion started. Call light within reach.

## 2024-12-27 ENCOUNTER — HOSPITAL ENCOUNTER (OUTPATIENT)
Dept: INFUSION THERAPY | Age: 44
Setting detail: INFUSION SERIES
Discharge: HOME | End: 2024-12-27
Payer: COMMERCIAL

## 2024-12-27 ENCOUNTER — OFFICE VISIT (OUTPATIENT)
Dept: ENDOCRINOLOGY | Age: 44
End: 2024-12-27
Payer: COMMERCIAL

## 2024-12-27 VITALS
DIASTOLIC BLOOD PRESSURE: 72 MMHG | SYSTOLIC BLOOD PRESSURE: 111 MMHG | OXYGEN SATURATION: 98 % | HEIGHT: 63 IN | BODY MASS INDEX: 31.89 KG/M2 | WEIGHT: 180 LBS

## 2024-12-27 VITALS
OXYGEN SATURATION: 98 % | TEMPERATURE: 98 F | HEART RATE: 91 BPM | SYSTOLIC BLOOD PRESSURE: 127 MMHG | RESPIRATION RATE: 18 BRPM | DIASTOLIC BLOOD PRESSURE: 75 MMHG

## 2024-12-27 DIAGNOSIS — Z79.4 TYPE 2 DIABETES MELLITUS WITH OTHER SPECIFIED COMPLICATION, WITH LONG-TERM CURRENT USE OF INSULIN (HCC): Primary | ICD-10-CM

## 2024-12-27 DIAGNOSIS — L08.9 DIABETIC FOOT INFECTION (HCC): ICD-10-CM

## 2024-12-27 DIAGNOSIS — L03.90 SEPSIS DUE TO CELLULITIS (HCC): Primary | ICD-10-CM

## 2024-12-27 DIAGNOSIS — A41.9 SEPSIS DUE TO CELLULITIS (HCC): Primary | ICD-10-CM

## 2024-12-27 DIAGNOSIS — E11.628 DIABETIC FOOT INFECTION (HCC): ICD-10-CM

## 2024-12-27 DIAGNOSIS — E11.69 TYPE 2 DIABETES MELLITUS WITH OTHER SPECIFIED COMPLICATION, WITH LONG-TERM CURRENT USE OF INSULIN (HCC): Primary | ICD-10-CM

## 2024-12-27 DIAGNOSIS — Z46.81 INSULIN PUMP TITRATION: ICD-10-CM

## 2024-12-27 LAB
CHP ED QC CHECK: NORMAL
GLUCOSE BLD-MCNC: 258 MG/DL
HBA1C MFR BLD: 8.3 %

## 2024-12-27 PROCEDURE — 99214 OFFICE O/P EST MOD 30 MIN: CPT | Performed by: INTERNAL MEDICINE

## 2024-12-27 PROCEDURE — G8427 DOCREV CUR MEDS BY ELIG CLIN: HCPCS | Performed by: INTERNAL MEDICINE

## 2024-12-27 PROCEDURE — G8417 CALC BMI ABV UP PARAM F/U: HCPCS | Performed by: INTERNAL MEDICINE

## 2024-12-27 PROCEDURE — 82962 GLUCOSE BLOOD TEST: CPT | Performed by: INTERNAL MEDICINE

## 2024-12-27 PROCEDURE — 3052F HG A1C>EQUAL 8.0%<EQUAL 9.0%: CPT | Performed by: INTERNAL MEDICINE

## 2024-12-27 PROCEDURE — G8484 FLU IMMUNIZE NO ADMIN: HCPCS | Performed by: INTERNAL MEDICINE

## 2024-12-27 PROCEDURE — 96366 THER/PROPH/DIAG IV INF ADDON: CPT

## 2024-12-27 PROCEDURE — 1036F TOBACCO NON-USER: CPT | Performed by: INTERNAL MEDICINE

## 2024-12-27 PROCEDURE — 2022F DILAT RTA XM EVC RTNOPTHY: CPT | Performed by: INTERNAL MEDICINE

## 2024-12-27 PROCEDURE — 83036 HEMOGLOBIN GLYCOSYLATED A1C: CPT | Performed by: INTERNAL MEDICINE

## 2024-12-27 PROCEDURE — 2580000003 HC RX 258: Performed by: INTERNAL MEDICINE

## 2024-12-27 PROCEDURE — 96365 THER/PROPH/DIAG IV INF INIT: CPT

## 2024-12-27 PROCEDURE — 6360000002 HC RX W HCPCS: Performed by: INTERNAL MEDICINE

## 2024-12-27 RX ORDER — SODIUM CHLORIDE 9 MG/ML
5-250 INJECTION, SOLUTION INTRAVENOUS PRN
Status: CANCELLED | OUTPATIENT
Start: 2024-12-28

## 2024-12-27 RX ORDER — INSULIN LISPRO 100 [IU]/ML
INJECTION, SOLUTION INTRAVENOUS; SUBCUTANEOUS
Qty: 60 ML | Refills: 3 | Status: SHIPPED | OUTPATIENT
Start: 2024-12-27

## 2024-12-27 RX ORDER — LANCETS 30 GAUGE
1 EACH MISCELLANEOUS DAILY
Qty: 100 EACH | Refills: 3 | Status: SHIPPED | OUTPATIENT
Start: 2024-12-27

## 2024-12-27 RX ORDER — GLUCOSAMINE HCL/CHONDROITIN SU 500-400 MG
CAPSULE ORAL
Qty: 100 STRIP | Refills: 3 | Status: SHIPPED | OUTPATIENT
Start: 2024-12-27

## 2024-12-27 RX ORDER — BLOOD PRESSURE TEST KIT
KIT MISCELLANEOUS
Qty: 150 EACH | Refills: 6 | Status: SHIPPED | OUTPATIENT
Start: 2024-12-27

## 2024-12-27 RX ADMIN — ERTAPENEM SODIUM 1000 MG: 1 INJECTION INTRAMUSCULAR; INTRAVENOUS at 12:17

## 2024-12-27 ASSESSMENT — ENCOUNTER SYMPTOMS: EYES NEGATIVE: 1

## 2024-12-27 NOTE — PROGRESS NOTES
2024    Assessment:       Diagnosis Orders   1. Type 2 diabetes mellitus with other specified complication, with long-term current use of insulin ([facility])  POCT Glucose    POCT glycosylated hemoglobin (Hb A1C)    Hemoglobin A1C    Basic Metabolic Panel    Albumin/Creatinine Ratio, Urine    Lipid Panel    blood glucose monitor kit and supplies    blood glucose monitor strips    Lancets MISC    insulin lispro (HUMALOG,ADMELOG) 100 UNIT/ML SOLN injection vial    Alcohol Swabs PADS      2. Insulin pump titration              PLAN:     Increase basal rate to 4.5 units/h increase preset bolus to 15 units patient educated to get upgraded to [employer] 780 with sensor CGM A1c goal of 6.5 more than 50% of 30-minute spent patient education counseling    Orders Placed This Encounter   Procedures    Hemoglobin A1C     Standing Status:   Future     Standing Expiration Date:   2025    Basic Metabolic Panel     Standing Status:   Future     Standing Expiration Date:   2025    Albumin/Creatinine Ratio, Urine     Standing Status:   Future     Standing Expiration Date:   2025    Lipid Panel     Standing Status:   Future     Standing Expiration Date:   2025    POCT Glucose    POCT glycosylated hemoglobin (Hb A1C)     Orders Placed This Encounter   Medications    blood glucose monitor kit and supplies     Sig: Dispense one meter that insurance will cover.     Dispense:  1 kit     Refill:  0    blood glucose monitor strips     Sig: Test 3x daily e11.65     Dispense:  100 strip     Refill:  3    Lancets MISC     Si each by Does not apply route daily Test 3x daily e11.65     Dispense:  100 each     Refill:  3    insulin lispro (HUMALOG,ADMELOG) 100 UNIT/ML SOLN injection vial     Sig: TAKE AS DIRECTED via insulin pump. max DAILY dose 200 UNITS.     Dispense:  60 mL     Refill:  3     This prescription was filled on 10/11/2024. Any refills authorized will be placed on file.    Alcohol Swabs PADS     Sig: Use

## 2024-12-27 NOTE — PROGRESS NOTES
Patient arrived ambulatory to floor. Vital signs taken and stable. Single lumen PICC RICARDA in place. Dressing clean, dry, and intact. Insertion site pink, denies pain, drainage, and itching. Patient oriented to room and call light. Call light within reach.    1217 - Invanz Infusion started. Call light within reach.     1247 - Infusion completed. Patient without signs/symptoms of reaction. PICC flushed, normal saline locked. Pt left unit via ambulatory. All equipment used in the care for this patient has been cleaned.

## 2024-12-28 ENCOUNTER — HOSPITAL ENCOUNTER (OUTPATIENT)
Dept: INFUSION THERAPY | Age: 44
Setting detail: INFUSION SERIES
Discharge: HOME | End: 2024-12-28
Payer: COMMERCIAL

## 2024-12-28 VITALS
TEMPERATURE: 97.3 F | HEART RATE: 91 BPM | SYSTOLIC BLOOD PRESSURE: 136 MMHG | RESPIRATION RATE: 16 BRPM | OXYGEN SATURATION: 100 % | DIASTOLIC BLOOD PRESSURE: 80 MMHG

## 2024-12-28 DIAGNOSIS — L08.9 DIABETIC FOOT INFECTION (HCC): ICD-10-CM

## 2024-12-28 DIAGNOSIS — L03.90 SEPSIS DUE TO CELLULITIS (HCC): Primary | ICD-10-CM

## 2024-12-28 DIAGNOSIS — A41.9 SEPSIS DUE TO CELLULITIS (HCC): Primary | ICD-10-CM

## 2024-12-28 DIAGNOSIS — E11.628 DIABETIC FOOT INFECTION (HCC): ICD-10-CM

## 2024-12-28 PROCEDURE — 6360000002 HC RX W HCPCS: Performed by: INTERNAL MEDICINE

## 2024-12-28 PROCEDURE — 96365 THER/PROPH/DIAG IV INF INIT: CPT

## 2024-12-28 PROCEDURE — 2580000003 HC RX 258: Performed by: INTERNAL MEDICINE

## 2024-12-28 RX ORDER — SODIUM CHLORIDE 9 MG/ML
5-250 INJECTION, SOLUTION INTRAVENOUS PRN
Status: CANCELLED | OUTPATIENT
Start: 2024-12-29

## 2024-12-28 RX ADMIN — ERTAPENEM SODIUM 1000 MG: 1 INJECTION INTRAMUSCULAR; INTRAVENOUS at 10:52

## 2024-12-28 NOTE — PROGRESS NOTES
Patient tolerated infusion. Left unit ambulatory. All equipment used in care has been cleaned.    Electronically signed by Keyla Dan RN on 12/28/2024 at 11:24 AM

## 2024-12-28 NOTE — PROGRESS NOTES
[facility] OUTPATIENT INFUSION CENTER     PT arrived via:  [x] Ambulatory         [] Wheelchair  []With transport                [] Other:     [x]PT oriented to room and call light in reach.   [x] Vital signs obtained and are stable.   [x]Medication education provided and reviewed with patient.             .

## 2024-12-29 ENCOUNTER — HOSPITAL ENCOUNTER (OUTPATIENT)
Dept: INFUSION THERAPY | Age: 44
Setting detail: INFUSION SERIES
Discharge: HOME | End: 2024-12-29
Payer: COMMERCIAL

## 2024-12-29 VITALS
HEART RATE: 91 BPM | DIASTOLIC BLOOD PRESSURE: 73 MMHG | RESPIRATION RATE: 18 BRPM | TEMPERATURE: 97.5 F | SYSTOLIC BLOOD PRESSURE: 111 MMHG

## 2024-12-29 DIAGNOSIS — E11.628 DIABETIC FOOT INFECTION (HCC): ICD-10-CM

## 2024-12-29 DIAGNOSIS — A41.9 SEPSIS DUE TO CELLULITIS (HCC): Primary | ICD-10-CM

## 2024-12-29 DIAGNOSIS — L03.90 SEPSIS DUE TO CELLULITIS (HCC): Primary | ICD-10-CM

## 2024-12-29 DIAGNOSIS — L08.9 DIABETIC FOOT INFECTION (HCC): ICD-10-CM

## 2024-12-29 PROCEDURE — 2580000003 HC RX 258: Performed by: INTERNAL MEDICINE

## 2024-12-29 PROCEDURE — 6360000002 HC RX W HCPCS: Performed by: INTERNAL MEDICINE

## 2024-12-29 PROCEDURE — 96365 THER/PROPH/DIAG IV INF INIT: CPT

## 2024-12-29 RX ORDER — SODIUM CHLORIDE 9 MG/ML
5-250 INJECTION, SOLUTION INTRAVENOUS PRN
Status: CANCELLED | OUTPATIENT
Start: 2024-12-30

## 2024-12-29 RX ADMIN — ERTAPENEM SODIUM 1000 MG: 1 INJECTION INTRAMUSCULAR; INTRAVENOUS at 10:53

## 2024-12-29 ASSESSMENT — PAIN DESCRIPTION - ORIENTATION: ORIENTATION: RIGHT

## 2024-12-29 ASSESSMENT — PAIN DESCRIPTION - LOCATION: LOCATION: FOOT;TOE (COMMENT WHICH ONE)

## 2024-12-29 ASSESSMENT — PAIN SCALES - GENERAL: PAINLEVEL_OUTOF10: 2

## 2024-12-29 NOTE — PROGRESS NOTES
Pt tolerated infusion. Pt left unit ambulatory with family member. All equipment used in care has been cleaned.    Electronically signed by Keyla Dan RN on 12/29/2024 at 11:26 AM

## 2024-12-29 NOTE — PROGRESS NOTES
Infusion initiated.  Pt states no c/o. Call light in reach. Pt states no change in meds or info from

## 2024-12-29 NOTE — PROGRESS NOTES
Pt arrives to [facility] ambulatory for infusion. Pt is made comfortable in chair.     n/a

## 2024-12-30 ENCOUNTER — HOSPITAL ENCOUNTER (OUTPATIENT)
Dept: INFUSION THERAPY | Age: 44
Setting detail: INFUSION SERIES
Discharge: HOME | End: 2024-12-30
Payer: COMMERCIAL

## 2024-12-30 VITALS
OXYGEN SATURATION: 99 % | TEMPERATURE: 96.9 F | DIASTOLIC BLOOD PRESSURE: 68 MMHG | HEART RATE: 98 BPM | SYSTOLIC BLOOD PRESSURE: 117 MMHG | RESPIRATION RATE: 16 BRPM

## 2024-12-30 DIAGNOSIS — L03.90 SEPSIS DUE TO CELLULITIS (HCC): Primary | ICD-10-CM

## 2024-12-30 DIAGNOSIS — A41.9 SEPSIS DUE TO CELLULITIS (HCC): Primary | ICD-10-CM

## 2024-12-30 DIAGNOSIS — L08.9 DIABETIC FOOT INFECTION (HCC): ICD-10-CM

## 2024-12-30 DIAGNOSIS — E11.628 DIABETIC FOOT INFECTION (HCC): ICD-10-CM

## 2024-12-30 PROCEDURE — 6360000002 HC RX W HCPCS: Performed by: INTERNAL MEDICINE

## 2024-12-30 PROCEDURE — 96365 THER/PROPH/DIAG IV INF INIT: CPT

## 2024-12-30 PROCEDURE — 2580000003 HC RX 258: Performed by: INTERNAL MEDICINE

## 2024-12-30 RX ORDER — SODIUM CHLORIDE 9 MG/ML
5-250 INJECTION, SOLUTION INTRAVENOUS PRN
Status: CANCELLED | OUTPATIENT
Start: 2024-12-31

## 2024-12-30 RX ADMIN — ERTAPENEM SODIUM 1000 MG: 1 INJECTION INTRAMUSCULAR; INTRAVENOUS at 12:16

## 2024-12-30 NOTE — PROGRESS NOTES
Patient tolerated infusion. She left unit ambulatory. All equipment used in care has been cleaned.    Electronically signed by Keyla Dan RN on 12/30/2024 at 12:49 PM

## 2024-12-31 ENCOUNTER — HOSPITAL ENCOUNTER (OUTPATIENT)
Dept: INFUSION THERAPY | Age: 44
Setting detail: INFUSION SERIES
Discharge: HOME | End: 2024-12-31
Payer: COMMERCIAL

## 2024-12-31 VITALS
RESPIRATION RATE: 18 BRPM | HEART RATE: 86 BPM | TEMPERATURE: 96.3 F | SYSTOLIC BLOOD PRESSURE: 124 MMHG | OXYGEN SATURATION: 99 % | DIASTOLIC BLOOD PRESSURE: 82 MMHG

## 2024-12-31 DIAGNOSIS — L03.90 SEPSIS DUE TO CELLULITIS (HCC): Primary | ICD-10-CM

## 2024-12-31 DIAGNOSIS — A41.9 SEPSIS DUE TO CELLULITIS (HCC): Primary | ICD-10-CM

## 2024-12-31 DIAGNOSIS — L08.9 DIABETIC FOOT INFECTION (HCC): ICD-10-CM

## 2024-12-31 DIAGNOSIS — E11.628 DIABETIC FOOT INFECTION (HCC): ICD-10-CM

## 2024-12-31 PROCEDURE — 6360000002 HC RX W HCPCS: Performed by: INTERNAL MEDICINE

## 2024-12-31 PROCEDURE — 2580000003 HC RX 258: Performed by: INTERNAL MEDICINE

## 2024-12-31 PROCEDURE — 96365 THER/PROPH/DIAG IV INF INIT: CPT

## 2024-12-31 RX ORDER — SODIUM CHLORIDE 9 MG/ML
5-250 INJECTION, SOLUTION INTRAVENOUS PRN
Status: CANCELLED | OUTPATIENT
Start: 2025-01-01

## 2024-12-31 RX ADMIN — ERTAPENEM SODIUM 1000 MG: 1 INJECTION INTRAMUSCULAR; INTRAVENOUS at 12:18

## 2025-01-01 ENCOUNTER — HOSPITAL ENCOUNTER (OUTPATIENT)
Dept: INFUSION THERAPY | Age: 45
Setting detail: INFUSION SERIES
Discharge: HOME | End: 2025-01-01
Payer: COMMERCIAL

## 2025-01-01 VITALS
TEMPERATURE: 96 F | HEART RATE: 89 BPM | SYSTOLIC BLOOD PRESSURE: 130 MMHG | OXYGEN SATURATION: 100 % | RESPIRATION RATE: 18 BRPM | DIASTOLIC BLOOD PRESSURE: 81 MMHG

## 2025-01-01 DIAGNOSIS — L03.90 SEPSIS DUE TO CELLULITIS (HCC): Primary | ICD-10-CM

## 2025-01-01 DIAGNOSIS — A41.9 SEPSIS DUE TO CELLULITIS (HCC): Primary | ICD-10-CM

## 2025-01-01 DIAGNOSIS — E11.628 DIABETIC FOOT INFECTION (HCC): ICD-10-CM

## 2025-01-01 DIAGNOSIS — L08.9 DIABETIC FOOT INFECTION (HCC): ICD-10-CM

## 2025-01-01 PROCEDURE — 6360000002 HC RX W HCPCS: Performed by: INTERNAL MEDICINE

## 2025-01-01 PROCEDURE — 2580000003 HC RX 258: Performed by: INTERNAL MEDICINE

## 2025-01-01 PROCEDURE — 96365 THER/PROPH/DIAG IV INF INIT: CPT

## 2025-01-01 RX ORDER — SODIUM CHLORIDE 9 MG/ML
5-250 INJECTION, SOLUTION INTRAVENOUS PRN
Status: CANCELLED | OUTPATIENT
Start: 2025-01-02

## 2025-01-01 RX ADMIN — ERTAPENEM SODIUM 1000 MG: 1 INJECTION INTRAMUSCULAR; INTRAVENOUS at 10:58

## 2025-01-01 ASSESSMENT — PAIN SCALES - GENERAL: PAINLEVEL_OUTOF10: 2

## 2025-01-01 ASSESSMENT — PAIN DESCRIPTION - LOCATION: LOCATION: TOE (COMMENT WHICH ONE)

## 2025-01-01 ASSESSMENT — PAIN DESCRIPTION - ORIENTATION: ORIENTATION: RIGHT

## 2025-01-01 NOTE — PROGRESS NOTES
Pt arrives to [facility] ambulatory for infusion. Pt is made comfortable in chair.

## 2025-01-02 ENCOUNTER — HOSPITAL ENCOUNTER (OUTPATIENT)
Dept: INFUSION THERAPY | Age: 45
Setting detail: INFUSION SERIES
Discharge: HOME | End: 2025-01-02
Payer: COMMERCIAL

## 2025-01-02 VITALS
RESPIRATION RATE: 16 BRPM | OXYGEN SATURATION: 100 % | DIASTOLIC BLOOD PRESSURE: 73 MMHG | SYSTOLIC BLOOD PRESSURE: 122 MMHG | HEART RATE: 91 BPM | TEMPERATURE: 96.1 F

## 2025-01-02 DIAGNOSIS — A41.9 SEPSIS DUE TO CELLULITIS (HCC): Primary | ICD-10-CM

## 2025-01-02 DIAGNOSIS — L08.9 DIABETIC FOOT INFECTION (HCC): ICD-10-CM

## 2025-01-02 DIAGNOSIS — E11.628 DIABETIC FOOT INFECTION (HCC): ICD-10-CM

## 2025-01-02 DIAGNOSIS — L03.90 SEPSIS DUE TO CELLULITIS (HCC): Primary | ICD-10-CM

## 2025-01-02 LAB
ANION GAP SERPL CALCULATED.3IONS-SCNC: 9 MEQ/L (ref 9–15)
BUN SERPL-MCNC: 8 MG/DL (ref 6–20)
CALCIUM SERPL-MCNC: 8.5 MG/DL (ref 8.5–9.9)
CHLORIDE SERPL-SCNC: 102 MEQ/L (ref 95–107)
CO2 SERPL-SCNC: 20 MEQ/L (ref 20–31)
CREAT SERPL-MCNC: 0.37 MG/DL (ref 0.5–0.9)
CRP SERPL HS-MCNC: 11.2 MG/L (ref 0–5)
ERYTHROCYTE [DISTWIDTH] IN BLOOD BY AUTOMATED COUNT: 12.9 % (ref 11.5–14.5)
GLUCOSE SERPL-MCNC: 232 MG/DL (ref 70–99)
HCT VFR BLD AUTO: 37.2 % (ref 37–47)
HGB BLD-MCNC: 13.3 G/DL (ref 12–16)
MCH RBC QN AUTO: 30.2 PG (ref 27–31.3)
MCHC RBC AUTO-ENTMCNC: 35.8 % (ref 33–37)
MCV RBC AUTO: 84.5 FL (ref 79.4–94.8)
PLATELET # BLD AUTO: 268 K/UL (ref 130–400)
POTASSIUM SERPL-SCNC: 4 MEQ/L (ref 3.4–4.9)
RBC # BLD AUTO: 4.4 M/UL (ref 4.2–5.4)
SODIUM SERPL-SCNC: 131 MEQ/L (ref 135–144)
WBC # BLD AUTO: 7.4 K/UL (ref 4.8–10.8)

## 2025-01-02 PROCEDURE — 36592 COLLECT BLOOD FROM PICC: CPT

## 2025-01-02 PROCEDURE — 6360000002 HC RX W HCPCS: Performed by: INTERNAL MEDICINE

## 2025-01-02 PROCEDURE — 86140 C-REACTIVE PROTEIN: CPT

## 2025-01-02 PROCEDURE — 96365 THER/PROPH/DIAG IV INF INIT: CPT

## 2025-01-02 PROCEDURE — 80048 BASIC METABOLIC PNL TOTAL CA: CPT

## 2025-01-02 PROCEDURE — 85027 COMPLETE CBC AUTOMATED: CPT

## 2025-01-02 PROCEDURE — 2580000003 HC RX 258: Performed by: INTERNAL MEDICINE

## 2025-01-02 RX ORDER — SODIUM CHLORIDE 9 MG/ML
5-250 INJECTION, SOLUTION INTRAVENOUS PRN
Status: CANCELLED | OUTPATIENT
Start: 2025-01-03

## 2025-01-02 RX ADMIN — ERTAPENEM SODIUM 1000 MG: 1 INJECTION INTRAMUSCULAR; INTRAVENOUS at 12:36

## 2025-01-02 NOTE — PROGRESS NOTES
Pt tolerated infusion. Labs and picc line dressing done. Patient left unit ambulatory. All equipment used in care has been cleaned.    Electronically signed by Keyla Dan RN on 1/2/2025 at 1:18 PM

## 2025-01-02 NOTE — PROGRESS NOTES
[facility] OUTPATIENT INFUSION CENTER     PT arrived via:  [x] Ambulatory         [] Wheelchair  []With transport                [] Other:     [x]PT oriented to room and call light in reach.   [x] Vital signs obtained and are stable.   [x]Medication education provided and reviewed with patient.             .

## 2025-01-03 ENCOUNTER — HOSPITAL ENCOUNTER (OUTPATIENT)
Dept: INFUSION THERAPY | Age: 45
Setting detail: INFUSION SERIES
Discharge: HOME | End: 2025-01-03
Payer: COMMERCIAL

## 2025-01-03 VITALS
RESPIRATION RATE: 18 BRPM | OXYGEN SATURATION: 100 % | HEART RATE: 92 BPM | DIASTOLIC BLOOD PRESSURE: 76 MMHG | SYSTOLIC BLOOD PRESSURE: 128 MMHG | TEMPERATURE: 97.4 F

## 2025-01-03 DIAGNOSIS — A41.9 SEPSIS DUE TO CELLULITIS (HCC): Primary | ICD-10-CM

## 2025-01-03 DIAGNOSIS — L08.9 DIABETIC FOOT INFECTION (HCC): ICD-10-CM

## 2025-01-03 DIAGNOSIS — L03.90 SEPSIS DUE TO CELLULITIS (HCC): Primary | ICD-10-CM

## 2025-01-03 DIAGNOSIS — E11.628 DIABETIC FOOT INFECTION (HCC): ICD-10-CM

## 2025-01-03 PROCEDURE — 6360000002 HC RX W HCPCS: Performed by: INTERNAL MEDICINE

## 2025-01-03 PROCEDURE — 2580000003 HC RX 258: Performed by: INTERNAL MEDICINE

## 2025-01-03 PROCEDURE — 96365 THER/PROPH/DIAG IV INF INIT: CPT

## 2025-01-03 RX ORDER — SODIUM CHLORIDE 9 MG/ML
5-250 INJECTION, SOLUTION INTRAVENOUS PRN
Status: CANCELLED | OUTPATIENT
Start: 2025-01-04

## 2025-01-03 RX ADMIN — ERTAPENEM SODIUM 1000 MG: 1 INJECTION INTRAMUSCULAR; INTRAVENOUS at 12:33

## 2025-01-03 ASSESSMENT — PAIN DESCRIPTION - ORIENTATION: ORIENTATION: RIGHT

## 2025-01-03 ASSESSMENT — PAIN DESCRIPTION - LOCATION: LOCATION: TOE (COMMENT WHICH ONE)

## 2025-01-03 ASSESSMENT — PAIN SCALES - GENERAL: PAINLEVEL_OUTOF10: 2

## 2025-01-03 NOTE — PROGRESS NOTES
Pt tolerated infusion. Left unit ambulatory. All equipment used in care has been cleaned.    Electronically signed by Keyla Dan RN on 1/3/2025 at 1:04 PM

## 2025-01-04 ENCOUNTER — HOSPITAL ENCOUNTER (OUTPATIENT)
Dept: INFUSION THERAPY | Age: 45
Setting detail: INFUSION SERIES
Discharge: HOME | End: 2025-01-04
Payer: COMMERCIAL

## 2025-01-04 VITALS
SYSTOLIC BLOOD PRESSURE: 124 MMHG | HEART RATE: 96 BPM | RESPIRATION RATE: 18 BRPM | TEMPERATURE: 96.1 F | OXYGEN SATURATION: 99 % | DIASTOLIC BLOOD PRESSURE: 81 MMHG

## 2025-01-04 DIAGNOSIS — L08.9 DIABETIC FOOT INFECTION (HCC): ICD-10-CM

## 2025-01-04 DIAGNOSIS — A41.9 SEPSIS DUE TO CELLULITIS (HCC): Primary | ICD-10-CM

## 2025-01-04 DIAGNOSIS — E11.628 DIABETIC FOOT INFECTION (HCC): ICD-10-CM

## 2025-01-04 DIAGNOSIS — L03.90 SEPSIS DUE TO CELLULITIS (HCC): Primary | ICD-10-CM

## 2025-01-04 PROCEDURE — 2580000003 HC RX 258: Performed by: INTERNAL MEDICINE

## 2025-01-04 PROCEDURE — 6360000002 HC RX W HCPCS: Performed by: INTERNAL MEDICINE

## 2025-01-04 PROCEDURE — 96365 THER/PROPH/DIAG IV INF INIT: CPT

## 2025-01-04 RX ORDER — SODIUM CHLORIDE 9 MG/ML
5-250 INJECTION, SOLUTION INTRAVENOUS PRN
Status: CANCELLED | OUTPATIENT
Start: 2025-01-05

## 2025-01-04 RX ADMIN — ERTAPENEM SODIUM 1000 MG: 1 INJECTION INTRAMUSCULAR; INTRAVENOUS at 10:55

## 2025-01-04 ASSESSMENT — PAIN DESCRIPTION - LOCATION: LOCATION: TOE (COMMENT WHICH ONE)

## 2025-01-04 ASSESSMENT — PAIN SCALES - GENERAL: PAINLEVEL_OUTOF10: 2

## 2025-01-04 ASSESSMENT — PAIN DESCRIPTION - ORIENTATION: ORIENTATION: RIGHT

## 2025-01-04 NOTE — FLOWSHEET NOTE
Infusion is complete. Tolerated well. Left the unit ambulatory with friend. All equipment used in the care for this patient has been cleaned.

## 2025-01-05 ENCOUNTER — HOSPITAL ENCOUNTER (OUTPATIENT)
Dept: INFUSION THERAPY | Age: 45
Setting detail: INFUSION SERIES
Discharge: HOME | End: 2025-01-05
Payer: COMMERCIAL

## 2025-01-05 VITALS
SYSTOLIC BLOOD PRESSURE: 126 MMHG | HEART RATE: 86 BPM | RESPIRATION RATE: 18 BRPM | DIASTOLIC BLOOD PRESSURE: 78 MMHG | OXYGEN SATURATION: 98 % | TEMPERATURE: 97.2 F

## 2025-01-05 DIAGNOSIS — L03.90 SEPSIS DUE TO CELLULITIS (HCC): Primary | ICD-10-CM

## 2025-01-05 DIAGNOSIS — L08.9 DIABETIC FOOT INFECTION (HCC): ICD-10-CM

## 2025-01-05 DIAGNOSIS — A41.9 SEPSIS DUE TO CELLULITIS (HCC): Primary | ICD-10-CM

## 2025-01-05 DIAGNOSIS — E11.628 DIABETIC FOOT INFECTION (HCC): ICD-10-CM

## 2025-01-05 PROCEDURE — 96365 THER/PROPH/DIAG IV INF INIT: CPT

## 2025-01-05 PROCEDURE — 2580000003 HC RX 258: Performed by: INTERNAL MEDICINE

## 2025-01-05 PROCEDURE — 6360000002 HC RX W HCPCS: Performed by: INTERNAL MEDICINE

## 2025-01-05 RX ORDER — SODIUM CHLORIDE 9 MG/ML
5-250 INJECTION, SOLUTION INTRAVENOUS PRN
Status: CANCELLED | OUTPATIENT
Start: 2025-01-06

## 2025-01-05 RX ADMIN — ERTAPENEM SODIUM 1000 MG: 1 INJECTION INTRAMUSCULAR; INTRAVENOUS at 10:58

## 2025-01-05 ASSESSMENT — PAIN DESCRIPTION - LOCATION: LOCATION: TOE (COMMENT WHICH ONE)

## 2025-01-05 ASSESSMENT — PAIN SCALES - GENERAL: PAINLEVEL_OUTOF10: 2

## 2025-01-05 ASSESSMENT — PAIN DESCRIPTION - ORIENTATION: ORIENTATION: RIGHT

## 2025-01-06 ENCOUNTER — HOSPITAL ENCOUNTER (OUTPATIENT)
Dept: INFUSION THERAPY | Age: 45
Setting detail: INFUSION SERIES
Discharge: HOME | End: 2025-01-06
Payer: COMMERCIAL

## 2025-01-06 VITALS
RESPIRATION RATE: 18 BRPM | HEART RATE: 86 BPM | DIASTOLIC BLOOD PRESSURE: 78 MMHG | SYSTOLIC BLOOD PRESSURE: 118 MMHG | TEMPERATURE: 96.6 F

## 2025-01-06 DIAGNOSIS — L03.90 SEPSIS DUE TO CELLULITIS (HCC): Primary | ICD-10-CM

## 2025-01-06 DIAGNOSIS — E11.628 DIABETIC FOOT INFECTION (HCC): ICD-10-CM

## 2025-01-06 DIAGNOSIS — L08.9 DIABETIC FOOT INFECTION (HCC): ICD-10-CM

## 2025-01-06 DIAGNOSIS — A41.9 SEPSIS DUE TO CELLULITIS (HCC): Primary | ICD-10-CM

## 2025-01-06 PROCEDURE — 6360000002 HC RX W HCPCS: Performed by: INTERNAL MEDICINE

## 2025-01-06 PROCEDURE — 96365 THER/PROPH/DIAG IV INF INIT: CPT

## 2025-01-06 PROCEDURE — 2580000003 HC RX 258: Performed by: INTERNAL MEDICINE

## 2025-01-06 RX ORDER — SODIUM CHLORIDE 9 MG/ML
5-250 INJECTION, SOLUTION INTRAVENOUS PRN
Status: CANCELLED | OUTPATIENT
Start: 2025-01-07

## 2025-01-06 RX ADMIN — ERTAPENEM SODIUM 1000 MG: 1 INJECTION INTRAMUSCULAR; INTRAVENOUS at 12:32

## 2025-01-06 ASSESSMENT — PAIN DESCRIPTION - LOCATION: LOCATION: TOE (COMMENT WHICH ONE)

## 2025-01-06 ASSESSMENT — PAIN SCALES - GENERAL: PAINLEVEL_OUTOF10: 2

## 2025-01-06 ASSESSMENT — PAIN DESCRIPTION - ORIENTATION: ORIENTATION: RIGHT

## 2025-01-07 ENCOUNTER — HOSPITAL ENCOUNTER (OUTPATIENT)
Dept: INFUSION THERAPY | Age: 45
Setting detail: INFUSION SERIES
Discharge: HOME | End: 2025-01-07
Payer: COMMERCIAL

## 2025-01-07 ENCOUNTER — HOSPITAL ENCOUNTER (OUTPATIENT)
Dept: WOUND CARE | Age: 45
Discharge: HOME | End: 2025-01-07
Attending: STUDENT IN AN ORGANIZED HEALTH CARE EDUCATION/TRAINING PROGRAM
Payer: COMMERCIAL

## 2025-01-07 VITALS
OXYGEN SATURATION: 100 % | HEART RATE: 88 BPM | SYSTOLIC BLOOD PRESSURE: 126 MMHG | TEMPERATURE: 97.9 F | RESPIRATION RATE: 18 BRPM | DIASTOLIC BLOOD PRESSURE: 86 MMHG

## 2025-01-07 VITALS
TEMPERATURE: 97.6 F | HEART RATE: 98 BPM | SYSTOLIC BLOOD PRESSURE: 124 MMHG | DIASTOLIC BLOOD PRESSURE: 82 MMHG | RESPIRATION RATE: 18 BRPM

## 2025-01-07 DIAGNOSIS — A41.9 SEPSIS DUE TO CELLULITIS (HCC): Primary | ICD-10-CM

## 2025-01-07 DIAGNOSIS — E11.628 DIABETIC FOOT INFECTION (HCC): ICD-10-CM

## 2025-01-07 DIAGNOSIS — L08.9 DIABETIC FOOT INFECTION (HCC): ICD-10-CM

## 2025-01-07 DIAGNOSIS — L03.90 SEPSIS DUE TO CELLULITIS (HCC): Primary | ICD-10-CM

## 2025-01-07 PROCEDURE — 11042 DBRDMT SUBQ TIS 1ST 20SQCM/<: CPT

## 2025-01-07 PROCEDURE — 6360000002 HC RX W HCPCS: Performed by: INTERNAL MEDICINE

## 2025-01-07 PROCEDURE — 2580000003 HC RX 258: Performed by: INTERNAL MEDICINE

## 2025-01-07 PROCEDURE — 96365 THER/PROPH/DIAG IV INF INIT: CPT

## 2025-01-07 RX ORDER — SODIUM CHLORIDE 9 MG/ML
5-250 INJECTION, SOLUTION INTRAVENOUS PRN
Status: CANCELLED | OUTPATIENT
Start: 2025-01-08

## 2025-01-07 RX ADMIN — ERTAPENEM SODIUM 1000 MG: 1 INJECTION INTRAMUSCULAR; INTRAVENOUS at 12:19

## 2025-01-07 ASSESSMENT — PAIN DESCRIPTION - FREQUENCY: FREQUENCY: CONTINUOUS

## 2025-01-07 ASSESSMENT — PAIN SCALES - GENERAL
PAINLEVEL_OUTOF10: 2
PAINLEVEL_OUTOF10: 2

## 2025-01-07 ASSESSMENT — PAIN DESCRIPTION - ORIENTATION: ORIENTATION: RIGHT

## 2025-01-07 ASSESSMENT — PAIN DESCRIPTION - DESCRIPTORS: DESCRIPTORS: SORE

## 2025-01-07 ASSESSMENT — PAIN DESCRIPTION - LOCATION: LOCATION: TOE (COMMENT WHICH ONE)

## 2025-01-07 ASSESSMENT — PAIN - FUNCTIONAL ASSESSMENT: PAIN_FUNCTIONAL_ASSESSMENT: ACTIVITIES ARE NOT PREVENTED

## 2025-01-07 NOTE — PROGRESS NOTES
[facility] OUTPATIENT INFUSION CENTER     PT arrived via:  [x] Ambulatory         [] Wheelchair  []With transport                [] Other:     [x]PT oriented to room and call light in reach.   [x] Vital signs obtained and are stable.   [x]Medication education provided and reviewed with patient.

## 2025-01-07 NOTE — DISCHARGE INSTRUCTIONS
[facility] Wound Center and Hyperbaric Medicine   Physician Orders and Discharge Instructions  [facility]  [address] [place]  [address]  Telephone: [phone]      [phone]        NAME:  Carli Bustamante                                                                                         YOB: 1980  MEDICAL RECORD NUMBER:  [id number]     Your  is:  Jeannette     Home Care/Facility:     Wound Location: Right Great Toe      Dressing orders:   A skin substitute was applied today. Please leave the dressing in place until your next scheduled appointment. (IF NEEDED: you can change the outer dressing ONLY - do not remove the steri-strips and silicone layer).      Compression: none      Offloading Device:Post op shoe with peg assist donut felt pad to nicole wound.     Other Instructions:  Keep the wound dry and covered when showering. Continue taking the antibiotics per Infectious Disease.      Keep all dressings clean, dry and intact.  Keep pressure off the wound(s) at all times.      Follow up visit  1 week January 14, 2025 at 3:45pm     Please give 24 hour notice if unable to keep appointment. [phone]     If you experience any of the following, please call the Wound Care Service at  [phone] or go to the nearest emergency room.        *Increase in pain*Temperature over 101*Increase in drainage from your wound or a foul odor  *Uncontrolled swelling*Need for compression bandage changes due to slippage, breakthrough drainage       PLEASE NOTE: IF YOU ARE UNABLE TO OBTAIN WOUND SUPPLIES, CONTINUE TO USE THE SUPPLIES YOU HAVE AVAILABLE UNTIL YOU ARE ABLE TO REACH US. IT IS MOST IMPORTANT TO KEEP THE WOUND COVERED AT ALL TIMES

## 2025-01-07 NOTE — WOUND CARE
[facility]   Progress Note and Procedure Note      Carli Bustamante  MEDICAL RECORD NUMBER:  [id number]  AGE: 44 y.o.   GENDER: female  : 1980  EPISODE DATE:  2025    Subjective:     Chief Complaint   Patient presents with    Wound Check         HISTORY of PRESENT ILLNESS HPI     Carli Bustamante is a 44 y.o. female who presents today for wound/ulcer evaluation.   History of Wound Context:     Chronic DFU right foot     Wound/Ulcer Pain Timing/Severity: intermittent  Quality of pain: aching  Severity:  2 / 10   Modifying Factors: Pain worsens with walking  Associated Signs/Symptoms: none    Ulcer Identification:  Ulcer Type: diabetic  Contributing Factors: diabetes and poor glucose control    Wound: N/A        PAST MEDICAL HISTORY        Diagnosis Date    Anemia during pregnancy 2018    Eczema     Herpes simplex infection of skin     on neck    Hypercholesterolemia     Irregular heart beat     runs of v tach in the past    Obesity     Pregnancy induced hypertension     Type II or unspecified type diabetes mellitus without mention of complication, not stated as uncontrolled     type 2       PAST SURGICAL HISTORY    Past Surgical History:   Procedure Laterality Date    ABDOMEN SURGERY       x 2     SECTION      TUBAL LIGATION         FAMILY HISTORY    Family History   Problem Relation Age of Onset    Arthritis Mother     Diabetes Mother     Early Death Father     Heart Attack Father     Heart Disease Father     High Blood Pressure Father     High Cholesterol Father     Diabetes Father     Arthritis Sister     Birth Defects Son     Diabetes Paternal Grandfather     Heart Disease Paternal Grandfather     Breast Cancer Paternal Cousin     Cancer Neg Hx        SOCIAL HISTORY    Social History     Tobacco Use    Smoking status: Former     Current packs/day: 0.00     Average packs/day: 0.3 packs/day for 23.0 years (5.8 ttl pk-yrs)     Types: Cigarettes

## 2025-01-07 NOTE — PLAN OF CARE
Problem: Cognitive:  Goal: Knowledge of wound care  Description: Knowledge of wound care  Outcome: Progressing  Goal: Understands risk factors for wounds  Description: Understands risk factors for wounds  Outcome: Progressing     Problem: Pressure Ulcer:  Goal: Signs of wound healing will improve  Description: Signs of wound healing will improve  Outcome: Progressing  Goal: Absence of new pressure ulcer  Description: Absence of new pressure ulcer  Outcome: Progressing  Goal: Will show no infection signs and symptoms  Description: Will show no infection signs and symptoms  Outcome: Progressing

## 2025-01-08 ENCOUNTER — HOSPITAL ENCOUNTER (OUTPATIENT)
Dept: INFUSION THERAPY | Age: 45
Setting detail: INFUSION SERIES
Discharge: HOME | End: 2025-01-08
Payer: COMMERCIAL

## 2025-01-08 VITALS
RESPIRATION RATE: 18 BRPM | HEART RATE: 103 BPM | DIASTOLIC BLOOD PRESSURE: 70 MMHG | OXYGEN SATURATION: 98 % | TEMPERATURE: 97.6 F | SYSTOLIC BLOOD PRESSURE: 130 MMHG

## 2025-01-08 DIAGNOSIS — L08.9 DIABETIC FOOT INFECTION (HCC): ICD-10-CM

## 2025-01-08 DIAGNOSIS — E11.628 DIABETIC FOOT INFECTION (HCC): ICD-10-CM

## 2025-01-08 DIAGNOSIS — L03.90 SEPSIS DUE TO CELLULITIS (HCC): Primary | ICD-10-CM

## 2025-01-08 DIAGNOSIS — A41.9 SEPSIS DUE TO CELLULITIS (HCC): Primary | ICD-10-CM

## 2025-01-08 PROCEDURE — 2580000003 HC RX 258: Performed by: INTERNAL MEDICINE

## 2025-01-08 PROCEDURE — 96365 THER/PROPH/DIAG IV INF INIT: CPT

## 2025-01-08 PROCEDURE — 6360000002 HC RX W HCPCS: Performed by: INTERNAL MEDICINE

## 2025-01-08 RX ORDER — SODIUM CHLORIDE 9 MG/ML
5-250 INJECTION, SOLUTION INTRAVENOUS PRN
Status: CANCELLED | OUTPATIENT
Start: 2025-01-09

## 2025-01-08 RX ADMIN — ERTAPENEM SODIUM 1000 MG: 1 INJECTION INTRAMUSCULAR; INTRAVENOUS at 14:30

## 2025-01-08 ASSESSMENT — PAIN DESCRIPTION - ORIENTATION: ORIENTATION: RIGHT

## 2025-01-08 ASSESSMENT — PAIN SCALES - GENERAL: PAINLEVEL_OUTOF10: 2

## 2025-01-08 ASSESSMENT — PAIN DESCRIPTION - LOCATION: LOCATION: TOE (COMMENT WHICH ONE)

## 2025-01-08 NOTE — FLOWSHEET NOTE
Infusion completed, pt tolerated well.  Patient left unit ambulatory. All equipment used in the care for this patient has been cleaned.

## 2025-01-08 NOTE — PROGRESS NOTES
[facility]PATIENT INFUSION CENTER     PT arrived via:  [x] Ambulatory         [] Wheelchair  []With transport                [] Other:     [x]PT oriented to room and call light in reach.   [x] Vital signs obtained and are stable.   [x]Medication education provided and reviewed with patient.  1430- infusion started, call light is within reach, care continued. Electronically signed by Jessica Tariq RN on 1/8/2025 at 2:32 PM

## 2025-01-09 ENCOUNTER — HOSPITAL ENCOUNTER (OUTPATIENT)
Dept: INFUSION THERAPY | Age: 45
Setting detail: INFUSION SERIES
Discharge: HOME | End: 2025-01-09
Payer: COMMERCIAL

## 2025-01-09 VITALS
DIASTOLIC BLOOD PRESSURE: 79 MMHG | SYSTOLIC BLOOD PRESSURE: 124 MMHG | RESPIRATION RATE: 18 BRPM | TEMPERATURE: 98.4 F | HEART RATE: 83 BPM

## 2025-01-09 DIAGNOSIS — A41.9 SEPSIS DUE TO CELLULITIS (HCC): Primary | ICD-10-CM

## 2025-01-09 DIAGNOSIS — E11.628 DIABETIC FOOT INFECTION (HCC): ICD-10-CM

## 2025-01-09 DIAGNOSIS — L08.9 DIABETIC FOOT INFECTION (HCC): ICD-10-CM

## 2025-01-09 DIAGNOSIS — L03.90 SEPSIS DUE TO CELLULITIS (HCC): Primary | ICD-10-CM

## 2025-01-09 LAB
ANION GAP SERPL CALCULATED.3IONS-SCNC: 11 MEQ/L (ref 9–15)
BUN SERPL-MCNC: 7 MG/DL (ref 6–20)
CALCIUM SERPL-MCNC: 8.5 MG/DL (ref 8.5–9.9)
CHLORIDE SERPL-SCNC: 106 MEQ/L (ref 95–107)
CO2 SERPL-SCNC: 22 MEQ/L (ref 20–31)
CREAT SERPL-MCNC: 0.37 MG/DL (ref 0.5–0.9)
ERYTHROCYTE [DISTWIDTH] IN BLOOD BY AUTOMATED COUNT: 13.1 % (ref 11.5–14.5)
GLUCOSE SERPL-MCNC: 148 MG/DL (ref 70–99)
HCT VFR BLD AUTO: 36.5 % (ref 37–47)
HGB BLD-MCNC: 12.9 G/DL (ref 12–16)
MCH RBC QN AUTO: 30.2 PG (ref 27–31.3)
MCHC RBC AUTO-ENTMCNC: 35.3 % (ref 33–37)
MCV RBC AUTO: 85.5 FL (ref 79.4–94.8)
PLATELET # BLD AUTO: 255 K/UL (ref 130–400)
POTASSIUM SERPL-SCNC: 3.8 MEQ/L (ref 3.4–4.9)
RBC # BLD AUTO: 4.27 M/UL (ref 4.2–5.4)
SODIUM SERPL-SCNC: 139 MEQ/L (ref 135–144)
WBC # BLD AUTO: 7 K/UL (ref 4.8–10.8)

## 2025-01-09 PROCEDURE — 36592 COLLECT BLOOD FROM PICC: CPT

## 2025-01-09 PROCEDURE — 6360000002 HC RX W HCPCS: Performed by: INTERNAL MEDICINE

## 2025-01-09 PROCEDURE — 85027 COMPLETE CBC AUTOMATED: CPT

## 2025-01-09 PROCEDURE — 96365 THER/PROPH/DIAG IV INF INIT: CPT

## 2025-01-09 PROCEDURE — 2580000003 HC RX 258: Performed by: INTERNAL MEDICINE

## 2025-01-09 PROCEDURE — 80048 BASIC METABOLIC PNL TOTAL CA: CPT

## 2025-01-09 RX ORDER — SODIUM CHLORIDE 9 MG/ML
5-250 INJECTION, SOLUTION INTRAVENOUS PRN
Status: CANCELLED | OUTPATIENT
Start: 2025-01-10

## 2025-01-09 RX ADMIN — ERTAPENEM SODIUM 1000 MG: 1 INJECTION INTRAMUSCULAR; INTRAVENOUS at 12:24

## 2025-01-09 ASSESSMENT — PAIN DESCRIPTION - LOCATION: LOCATION: TOE (COMMENT WHICH ONE)

## 2025-01-09 ASSESSMENT — PAIN DESCRIPTION - ORIENTATION: ORIENTATION: RIGHT

## 2025-01-09 ASSESSMENT — PAIN SCALES - GENERAL: PAINLEVEL_OUTOF10: 2

## 2025-01-09 ASSESSMENT — PAIN DESCRIPTION - DESCRIPTORS: DESCRIPTORS: SORE

## 2025-01-09 NOTE — PROGRESS NOTES
Infusion initiated.  Pt states no c/o. Call light in reach. Pt states no change in meds or info since yesterday.

## 2025-01-09 NOTE — PROGRESS NOTES
Pt arrives to [facility] ambulatory for infusion.  Is made comfortable in chair.

## 2025-01-09 NOTE — PROGRESS NOTES
Picc dressing changed. Pt tolerated infusion. Left unit ambulatory. All equipment used in care has been cleaned.    Electronically signed by Keyla Dan RN on 1/9/2025 at 12:59 PM

## 2025-01-10 ENCOUNTER — HOSPITAL ENCOUNTER (OUTPATIENT)
Dept: INFUSION THERAPY | Age: 45
Setting detail: INFUSION SERIES
Discharge: HOME | End: 2025-01-10
Payer: COMMERCIAL

## 2025-01-10 VITALS
HEART RATE: 86 BPM | TEMPERATURE: 97.8 F | RESPIRATION RATE: 18 BRPM | OXYGEN SATURATION: 100 % | SYSTOLIC BLOOD PRESSURE: 129 MMHG | DIASTOLIC BLOOD PRESSURE: 75 MMHG

## 2025-01-10 DIAGNOSIS — A41.9 SEPSIS DUE TO CELLULITIS (HCC): Primary | ICD-10-CM

## 2025-01-10 DIAGNOSIS — L03.90 SEPSIS DUE TO CELLULITIS (HCC): Primary | ICD-10-CM

## 2025-01-10 DIAGNOSIS — L08.9 DIABETIC FOOT INFECTION (HCC): ICD-10-CM

## 2025-01-10 DIAGNOSIS — E11.628 DIABETIC FOOT INFECTION (HCC): ICD-10-CM

## 2025-01-10 PROCEDURE — 96365 THER/PROPH/DIAG IV INF INIT: CPT

## 2025-01-10 PROCEDURE — 2580000003 HC RX 258: Performed by: INTERNAL MEDICINE

## 2025-01-10 PROCEDURE — 6360000002 HC RX W HCPCS: Performed by: INTERNAL MEDICINE

## 2025-01-10 RX ORDER — SODIUM CHLORIDE 9 MG/ML
5-250 INJECTION, SOLUTION INTRAVENOUS PRN
Status: CANCELLED | OUTPATIENT
Start: 2025-01-11

## 2025-01-10 RX ADMIN — ERTAPENEM SODIUM 1000 MG: 1 INJECTION INTRAMUSCULAR; INTRAVENOUS at 11:57

## 2025-01-10 ASSESSMENT — PAIN SCALES - GENERAL: PAINLEVEL_OUTOF10: 3

## 2025-01-10 ASSESSMENT — PAIN DESCRIPTION - DESCRIPTORS: DESCRIPTORS: ACHING

## 2025-01-10 ASSESSMENT — PAIN DESCRIPTION - ORIENTATION: ORIENTATION: RIGHT

## 2025-01-10 ASSESSMENT — PAIN DESCRIPTION - LOCATION: LOCATION: TOE (COMMENT WHICH ONE)

## 2025-01-11 ENCOUNTER — HOSPITAL ENCOUNTER (OUTPATIENT)
Dept: INFUSION THERAPY | Age: 45
Setting detail: INFUSION SERIES
Discharge: HOME | End: 2025-01-11
Payer: COMMERCIAL

## 2025-01-11 DIAGNOSIS — L08.9 DIABETIC FOOT INFECTION (HCC): ICD-10-CM

## 2025-01-11 DIAGNOSIS — L03.90 SEPSIS DUE TO CELLULITIS (HCC): Primary | ICD-10-CM

## 2025-01-11 DIAGNOSIS — A41.9 SEPSIS DUE TO CELLULITIS (HCC): Primary | ICD-10-CM

## 2025-01-11 DIAGNOSIS — E11.628 DIABETIC FOOT INFECTION (HCC): ICD-10-CM

## 2025-01-11 PROCEDURE — 6360000002 HC RX W HCPCS: Performed by: INTERNAL MEDICINE

## 2025-01-11 PROCEDURE — 96365 THER/PROPH/DIAG IV INF INIT: CPT

## 2025-01-11 PROCEDURE — 2580000003 HC RX 258: Performed by: INTERNAL MEDICINE

## 2025-01-11 RX ORDER — SODIUM CHLORIDE 9 MG/ML
5-250 INJECTION, SOLUTION INTRAVENOUS PRN
Status: CANCELLED | OUTPATIENT
Start: 2025-01-12

## 2025-01-11 RX ADMIN — ERTAPENEM SODIUM 1000 MG: 1 INJECTION INTRAMUSCULAR; INTRAVENOUS at 11:30

## 2025-01-12 ENCOUNTER — HOSPITAL ENCOUNTER (OUTPATIENT)
Dept: INFUSION THERAPY | Age: 45
Setting detail: INFUSION SERIES
Discharge: HOME | End: 2025-01-12
Payer: COMMERCIAL

## 2025-01-12 VITALS
SYSTOLIC BLOOD PRESSURE: 136 MMHG | TEMPERATURE: 97 F | OXYGEN SATURATION: 100 % | HEART RATE: 84 BPM | RESPIRATION RATE: 16 BRPM | DIASTOLIC BLOOD PRESSURE: 81 MMHG

## 2025-01-12 DIAGNOSIS — A41.9 SEPSIS DUE TO CELLULITIS (HCC): Primary | ICD-10-CM

## 2025-01-12 DIAGNOSIS — L08.9 DIABETIC FOOT INFECTION (HCC): ICD-10-CM

## 2025-01-12 DIAGNOSIS — L03.90 SEPSIS DUE TO CELLULITIS (HCC): Primary | ICD-10-CM

## 2025-01-12 DIAGNOSIS — E11.628 DIABETIC FOOT INFECTION (HCC): ICD-10-CM

## 2025-01-12 PROCEDURE — 2580000003 HC RX 258: Performed by: INTERNAL MEDICINE

## 2025-01-12 PROCEDURE — 6360000002 HC RX W HCPCS: Performed by: INTERNAL MEDICINE

## 2025-01-12 PROCEDURE — 96365 THER/PROPH/DIAG IV INF INIT: CPT

## 2025-01-12 RX ORDER — SODIUM CHLORIDE 9 MG/ML
5-250 INJECTION, SOLUTION INTRAVENOUS PRN
Status: CANCELLED | OUTPATIENT
Start: 2025-01-13

## 2025-01-12 RX ADMIN — ERTAPENEM SODIUM 1000 MG: 1 INJECTION INTRAMUSCULAR; INTRAVENOUS at 11:00

## 2025-01-12 NOTE — PROGRESS NOTES
Pt tolerated infusion. She left unit ambulatory. All equipment used in care has been cleaned.    Electronically signed by Keyla Dan RN on 1/12/2025 at 11:32 AM

## 2025-01-12 NOTE — PROGRESS NOTES
[facility]     PT arrived via:  [x] Ambulatory         [] Wheelchair  []With transport                [] Other:     [x]PT oriented to room and call light in reach.   [x] Vital signs obtained and are stable.   [x]Medication education provided and reviewed with patient.

## 2025-01-13 ENCOUNTER — HOSPITAL ENCOUNTER (OUTPATIENT)
Dept: INFUSION THERAPY | Age: 45
Setting detail: INFUSION SERIES
Discharge: HOME | End: 2025-01-13
Payer: COMMERCIAL

## 2025-01-13 VITALS
RESPIRATION RATE: 18 BRPM | TEMPERATURE: 97.7 F | SYSTOLIC BLOOD PRESSURE: 116 MMHG | DIASTOLIC BLOOD PRESSURE: 78 MMHG | OXYGEN SATURATION: 100 % | HEART RATE: 90 BPM

## 2025-01-13 DIAGNOSIS — L03.90 SEPSIS DUE TO CELLULITIS (HCC): Primary | ICD-10-CM

## 2025-01-13 DIAGNOSIS — L08.9 DIABETIC FOOT INFECTION (HCC): ICD-10-CM

## 2025-01-13 DIAGNOSIS — E11.628 DIABETIC FOOT INFECTION (HCC): ICD-10-CM

## 2025-01-13 DIAGNOSIS — A41.9 SEPSIS DUE TO CELLULITIS (HCC): Primary | ICD-10-CM

## 2025-01-13 PROCEDURE — 6360000002 HC RX W HCPCS: Performed by: INTERNAL MEDICINE

## 2025-01-13 PROCEDURE — 2580000003 HC RX 258: Performed by: INTERNAL MEDICINE

## 2025-01-13 PROCEDURE — 96365 THER/PROPH/DIAG IV INF INIT: CPT

## 2025-01-13 RX ORDER — SODIUM CHLORIDE 9 MG/ML
5-250 INJECTION, SOLUTION INTRAVENOUS PRN
Status: CANCELLED | OUTPATIENT
Start: 2025-01-14

## 2025-01-13 RX ADMIN — ERTAPENEM SODIUM 1000 MG: 1 INJECTION INTRAMUSCULAR; INTRAVENOUS at 12:20

## 2025-01-13 ASSESSMENT — PAIN DESCRIPTION - LOCATION: LOCATION: TOE (COMMENT WHICH ONE)

## 2025-01-13 ASSESSMENT — PAIN SCALES - GENERAL: PAINLEVEL_OUTOF10: 2

## 2025-01-13 ASSESSMENT — PAIN DESCRIPTION - DESCRIPTORS: DESCRIPTORS: SORE

## 2025-01-13 ASSESSMENT — PAIN DESCRIPTION - ORIENTATION: ORIENTATION: RIGHT

## 2025-01-14 ENCOUNTER — HOSPITAL ENCOUNTER (OUTPATIENT)
Dept: WOUND CARE | Age: 45
Discharge: HOME | End: 2025-01-14
Attending: STUDENT IN AN ORGANIZED HEALTH CARE EDUCATION/TRAINING PROGRAM
Payer: COMMERCIAL

## 2025-01-14 ENCOUNTER — OFFICE VISIT (OUTPATIENT)
Dept: INFECTIOUS DISEASES | Age: 45
End: 2025-01-14
Payer: COMMERCIAL

## 2025-01-14 ENCOUNTER — HOSPITAL ENCOUNTER (OUTPATIENT)
Dept: INFUSION THERAPY | Age: 45
Setting detail: INFUSION SERIES
Discharge: HOME | End: 2025-01-14
Payer: COMMERCIAL

## 2025-01-14 VITALS
HEART RATE: 89 BPM | RESPIRATION RATE: 18 BRPM | DIASTOLIC BLOOD PRESSURE: 79 MMHG | SYSTOLIC BLOOD PRESSURE: 123 MMHG | BODY MASS INDEX: 33.2 KG/M2 | WEIGHT: 187.4 LBS | HEIGHT: 63 IN | TEMPERATURE: 97.5 F

## 2025-01-14 VITALS
SYSTOLIC BLOOD PRESSURE: 133 MMHG | RESPIRATION RATE: 18 BRPM | HEART RATE: 93 BPM | TEMPERATURE: 97.7 F | DIASTOLIC BLOOD PRESSURE: 72 MMHG | OXYGEN SATURATION: 99 %

## 2025-01-14 VITALS — TEMPERATURE: 96.8 F | RESPIRATION RATE: 18 BRPM | HEART RATE: 94 BPM

## 2025-01-14 DIAGNOSIS — L08.9 DIABETIC FOOT INFECTION (HCC): Primary | ICD-10-CM

## 2025-01-14 DIAGNOSIS — A41.9 SEPSIS DUE TO CELLULITIS (HCC): Primary | ICD-10-CM

## 2025-01-14 DIAGNOSIS — L03.90 SEPSIS DUE TO CELLULITIS (HCC): Primary | ICD-10-CM

## 2025-01-14 DIAGNOSIS — A49.9 POLYMICROBIAL BACTERIAL INFECTION: ICD-10-CM

## 2025-01-14 DIAGNOSIS — L08.9 DIABETIC FOOT INFECTION (HCC): ICD-10-CM

## 2025-01-14 DIAGNOSIS — E11.628 DIABETIC FOOT INFECTION (HCC): ICD-10-CM

## 2025-01-14 DIAGNOSIS — E11.628 DIABETIC FOOT INFECTION (HCC): Primary | ICD-10-CM

## 2025-01-14 PROCEDURE — 96365 THER/PROPH/DIAG IV INF INIT: CPT

## 2025-01-14 PROCEDURE — G8428 CUR MEDS NOT DOCUMENT: HCPCS | Performed by: INTERNAL MEDICINE

## 2025-01-14 PROCEDURE — 99213 OFFICE O/P EST LOW 20 MIN: CPT | Performed by: INTERNAL MEDICINE

## 2025-01-14 PROCEDURE — G8417 CALC BMI ABV UP PARAM F/U: HCPCS | Performed by: INTERNAL MEDICINE

## 2025-01-14 PROCEDURE — 6360000002 HC RX W HCPCS: Performed by: INTERNAL MEDICINE

## 2025-01-14 PROCEDURE — 15275 SKIN SUB GRAFT FACE/NK/HF/G: CPT

## 2025-01-14 PROCEDURE — 2580000003 HC RX 258: Performed by: INTERNAL MEDICINE

## 2025-01-14 PROCEDURE — 1036F TOBACCO NON-USER: CPT | Performed by: INTERNAL MEDICINE

## 2025-01-14 PROCEDURE — 2022F DILAT RTA XM EVC RTNOPTHY: CPT | Performed by: INTERNAL MEDICINE

## 2025-01-14 PROCEDURE — 3046F HEMOGLOBIN A1C LEVEL >9.0%: CPT | Performed by: INTERNAL MEDICINE

## 2025-01-14 RX ORDER — SODIUM CHLORIDE 9 MG/ML
5-250 INJECTION, SOLUTION INTRAVENOUS PRN
Status: CANCELLED | OUTPATIENT
Start: 2025-01-15

## 2025-01-14 RX ORDER — CEPHALEXIN 500 MG/1
500 CAPSULE ORAL 3 TIMES DAILY
Qty: 90 CAPSULE | Refills: 0 | Status: SHIPPED | OUTPATIENT
Start: 2025-01-14 | End: 2025-02-13

## 2025-01-14 RX ADMIN — ERTAPENEM SODIUM 1000 MG: 1 INJECTION INTRAMUSCULAR; INTRAVENOUS at 12:44

## 2025-01-14 ASSESSMENT — PATIENT HEALTH QUESTIONNAIRE - PHQ9
SUM OF ALL RESPONSES TO PHQ9 QUESTIONS 1 & 2: 0
2. FEELING DOWN, DEPRESSED OR HOPELESS: NOT AT ALL
SUM OF ALL RESPONSES TO PHQ QUESTIONS 1-9: 0
1. LITTLE INTEREST OR PLEASURE IN DOING THINGS: NOT AT ALL
SUM OF ALL RESPONSES TO PHQ QUESTIONS 1-9: 0

## 2025-01-14 ASSESSMENT — PAIN DESCRIPTION - DESCRIPTORS: DESCRIPTORS: SORE

## 2025-01-14 ASSESSMENT — PAIN SCALES - GENERAL
PAINLEVEL_OUTOF10: 2
PAINLEVEL_OUTOF10: 2

## 2025-01-14 ASSESSMENT — PAIN DESCRIPTION - LOCATION: LOCATION: TOE (COMMENT WHICH ONE)

## 2025-01-14 ASSESSMENT — ENCOUNTER SYMPTOMS: GASTROINTESTINAL NEGATIVE: 1

## 2025-01-14 NOTE — ASSESSMENT & PLAN NOTE
New, not at goal (unstable), continue current treatment plan for 1 more week then discontinue PICC line  Start 4 weeks course of Keflex 500 mg p.o. 3 times daily after IV antibiotics are done  Follow-up in 4 weeks  Follow-up with podiatry with local wound care

## 2025-01-14 NOTE — PROGRESS NOTES
Carli Bustamante (:  1980) is a 44 y.o. female,Established patient, here for evaluation of the following chief complaint(s):  Follow-up and Wound Check (4-5 week f/u for R foot infection)         Assessment & Plan  Diabetic foot infection (HCC)   New, not at goal (unstable), continue current treatment plan for 1 more week then discontinue PICC line  Start 4 weeks course of Keflex 500 mg p.o. 3 times daily after IV antibiotics are done  Follow-up in 4 weeks  Follow-up with podiatry with local wound care       Polymicrobial bacterial infection   New, not at goal (unstable),             No follow-ups on file.       Subjective   HPI  Follow-up right great toe diabetic foot infection with methicillin sensitive Staph aureus and group B streptococcus on IV Invanz started 5 weeks ago by Dr. Kauffman, well-tolerated.   Patient is diabetes mellitus 2 is better controlled.  Right foot ulcerations without any drainage.   Right great toe wound with progressive healing.  Denies any pain.   Has some symptoms related to peripheral neuropathy  No nausea vomiting or diarrhea  No fevers or chills  Review of Systems   Constitutional:  Negative for chills and fever.   HENT:  Negative for mouth sores.    Gastrointestinal: Negative.    Skin:  Positive for wound. Negative for rash.   All other systems reviewed and are negative.         Objective   Physical Exam  Vitals:    25 1337   BP: 123/79   Site: Left Upper Arm   Position: Sitting   Cuff Size: Medium Adult   Pulse: 89   Resp: 18   Temp: 97.5 °F (36.4 °C)   TempSrc: Temporal   Weight: 85 kg (187 lb 6.4 oz)   Height: 1.6 m (5' 3\")     General Appearance: alert and oriented to person, place and time, well-developed and well-nourished, in no acute distress  Skin: warm and dry, no rash.   Head: normocephalic and atraumatic  Eyes: anicteric sclerae  ENT:  normal mucous membranes.   Lungs: normal respiratory effort  Abdomen: soft, no tenderness  No leg edema  No erythema, no

## 2025-01-14 NOTE — DISCHARGE INSTRUCTIONS
[facility] Wound Center and Hyperbaric Medicine   Physician Orders and Discharge Instructions  [facility]  [address] [place]  [address]  Telephone: [phone]      [phone]        NAME:  Carli Bustamante                                                                                         YOB: 1980  MEDICAL RECORD NUMBER:  [id number]     Your  is:  Jeannette     Home Care/Facility:     Wound Location: Right Great Toe      Dressing orders:   A skin substitute was applied today. Please leave the dressing in place until your next scheduled appointment. (IF NEEDED: you can change the outer dressing ONLY - do not remove the steri-strips and silicone layer).       Compression: none      Offloading Device:Post op shoe with peg assist , donut felt pad to nicole wound on right great toe      Other Instructions:  Keep the wound dry and covered when showering. Continue taking the antibiotics per Infectious Disease.      Keep all dressings clean, dry and intact.  Keep pressure off the wound(s) at all times.      Follow up visit  1 week January 21, 2025 at 3:15pm     Please give 24 hour notice if unable to keep appointment. [phone]     If you experience any of the following, please call the Wound Care Service at  [phone] or go to the nearest emergency room.        *Increase in pain*Temperature over 101*Increase in drainage from your wound or a foul odor  *Uncontrolled swelling*Need for compression bandage changes due to slippage, breakthrough drainage       PLEASE NOTE: IF YOU ARE UNABLE TO OBTAIN WOUND SUPPLIES, CONTINUE TO USE THE SUPPLIES YOU HAVE AVAILABLE UNTIL YOU ARE ABLE TO REACH US. IT IS MOST IMPORTANT TO KEEP THE WOUND COVERED AT ALL TIMES

## 2025-01-15 ENCOUNTER — HOSPITAL ENCOUNTER (OUTPATIENT)
Dept: INFUSION THERAPY | Age: 45
Setting detail: INFUSION SERIES
Discharge: HOME | End: 2025-01-15
Payer: COMMERCIAL

## 2025-01-15 VITALS
TEMPERATURE: 96.9 F | OXYGEN SATURATION: 100 % | DIASTOLIC BLOOD PRESSURE: 75 MMHG | RESPIRATION RATE: 16 BRPM | SYSTOLIC BLOOD PRESSURE: 129 MMHG | HEART RATE: 97 BPM

## 2025-01-15 DIAGNOSIS — E11.628 DIABETIC FOOT INFECTION (HCC): ICD-10-CM

## 2025-01-15 DIAGNOSIS — L03.90 SEPSIS DUE TO CELLULITIS (HCC): Primary | ICD-10-CM

## 2025-01-15 DIAGNOSIS — A41.9 SEPSIS DUE TO CELLULITIS (HCC): Primary | ICD-10-CM

## 2025-01-15 DIAGNOSIS — L08.9 DIABETIC FOOT INFECTION (HCC): ICD-10-CM

## 2025-01-15 PROCEDURE — 6360000002 HC RX W HCPCS: Performed by: INTERNAL MEDICINE

## 2025-01-15 PROCEDURE — 96365 THER/PROPH/DIAG IV INF INIT: CPT

## 2025-01-15 PROCEDURE — 2580000003 HC RX 258: Performed by: INTERNAL MEDICINE

## 2025-01-15 RX ORDER — SODIUM CHLORIDE 9 MG/ML
5-250 INJECTION, SOLUTION INTRAVENOUS PRN
Status: CANCELLED | OUTPATIENT
Start: 2025-01-16

## 2025-01-15 RX ADMIN — ERTAPENEM SODIUM 1000 MG: 1 INJECTION INTRAMUSCULAR; INTRAVENOUS at 12:14

## 2025-01-15 ASSESSMENT — PAIN DESCRIPTION - DESCRIPTORS: DESCRIPTORS: SORE

## 2025-01-15 ASSESSMENT — PAIN SCALES - GENERAL: PAINLEVEL_OUTOF10: 2

## 2025-01-15 ASSESSMENT — PAIN DESCRIPTION - LOCATION: LOCATION: TOE (COMMENT WHICH ONE)

## 2025-01-16 ENCOUNTER — HOSPITAL ENCOUNTER (OUTPATIENT)
Dept: INFUSION THERAPY | Age: 45
Setting detail: INFUSION SERIES
Discharge: HOME | End: 2025-01-16
Payer: COMMERCIAL

## 2025-01-16 DIAGNOSIS — L03.90 SEPSIS DUE TO CELLULITIS (HCC): Primary | ICD-10-CM

## 2025-01-16 DIAGNOSIS — E11.628 DIABETIC FOOT INFECTION (HCC): ICD-10-CM

## 2025-01-16 DIAGNOSIS — L08.9 DIABETIC FOOT INFECTION (HCC): ICD-10-CM

## 2025-01-16 DIAGNOSIS — A41.9 SEPSIS DUE TO CELLULITIS (HCC): Primary | ICD-10-CM

## 2025-01-16 LAB
ANION GAP SERPL CALCULATED.3IONS-SCNC: 8 MEQ/L (ref 9–15)
BUN SERPL-MCNC: 6 MG/DL (ref 6–20)
CALCIUM SERPL-MCNC: 8.5 MG/DL (ref 8.5–9.9)
CHLORIDE SERPL-SCNC: 105 MEQ/L (ref 95–107)
CO2 SERPL-SCNC: 23 MEQ/L (ref 20–31)
CREAT SERPL-MCNC: 0.4 MG/DL (ref 0.5–0.9)
ERYTHROCYTE [DISTWIDTH] IN BLOOD BY AUTOMATED COUNT: 12.6 % (ref 11.5–14.5)
GLUCOSE SERPL-MCNC: 172 MG/DL (ref 70–99)
HCT VFR BLD AUTO: 36.6 % (ref 37–47)
HGB BLD-MCNC: 13.4 G/DL (ref 12–16)
MCH RBC QN AUTO: 31.1 PG (ref 27–31.3)
MCHC RBC AUTO-ENTMCNC: 36.6 % (ref 33–37)
MCV RBC AUTO: 84.9 FL (ref 79.4–94.8)
PLATELET # BLD AUTO: 306 K/UL (ref 130–400)
POTASSIUM SERPL-SCNC: 3.8 MEQ/L (ref 3.4–4.9)
RBC # BLD AUTO: 4.31 M/UL (ref 4.2–5.4)
SODIUM SERPL-SCNC: 136 MEQ/L (ref 135–144)
WBC # BLD AUTO: 7 K/UL (ref 4.8–10.8)

## 2025-01-16 PROCEDURE — 85027 COMPLETE CBC AUTOMATED: CPT

## 2025-01-16 PROCEDURE — 96365 THER/PROPH/DIAG IV INF INIT: CPT

## 2025-01-16 PROCEDURE — 80048 BASIC METABOLIC PNL TOTAL CA: CPT

## 2025-01-16 PROCEDURE — 2580000003 HC RX 258: Performed by: INTERNAL MEDICINE

## 2025-01-16 PROCEDURE — 36592 COLLECT BLOOD FROM PICC: CPT

## 2025-01-16 PROCEDURE — 6360000002 HC RX W HCPCS: Performed by: INTERNAL MEDICINE

## 2025-01-16 RX ORDER — SODIUM CHLORIDE 9 MG/ML
5-250 INJECTION, SOLUTION INTRAVENOUS PRN
Status: CANCELLED | OUTPATIENT
Start: 2025-01-17

## 2025-01-16 RX ADMIN — ERTAPENEM SODIUM 1000 MG: 1 INJECTION INTRAMUSCULAR; INTRAVENOUS at 12:36

## 2025-01-16 NOTE — PROGRESS NOTES
Infusion completed. Pt tolerated well.  PICC dressing changed. Pt tolerated well. No c/o or requests.    All equipment used in the care for this patient has been cleaned.

## 2025-01-16 NOTE — PROGRESS NOTES
Pt arrives to [facility] ambulatory for infusion.  Pt is made comfortable in chair.  Pt states no change in meds or info.  Infusion initiated.  Pt states no c/o. Call light in reach.

## 2025-01-17 ENCOUNTER — HOSPITAL ENCOUNTER (OUTPATIENT)
Dept: INFUSION THERAPY | Age: 45
Setting detail: INFUSION SERIES
Discharge: HOME | End: 2025-01-17
Payer: COMMERCIAL

## 2025-01-17 VITALS
RESPIRATION RATE: 18 BRPM | TEMPERATURE: 97.2 F | DIASTOLIC BLOOD PRESSURE: 78 MMHG | SYSTOLIC BLOOD PRESSURE: 130 MMHG | HEART RATE: 102 BPM | OXYGEN SATURATION: 99 %

## 2025-01-17 DIAGNOSIS — L03.90 SEPSIS DUE TO CELLULITIS (HCC): Primary | ICD-10-CM

## 2025-01-17 DIAGNOSIS — L08.9 DIABETIC FOOT INFECTION (HCC): ICD-10-CM

## 2025-01-17 DIAGNOSIS — A41.9 SEPSIS DUE TO CELLULITIS (HCC): Primary | ICD-10-CM

## 2025-01-17 DIAGNOSIS — E11.628 DIABETIC FOOT INFECTION (HCC): ICD-10-CM

## 2025-01-17 PROCEDURE — 2580000003 HC RX 258: Performed by: INTERNAL MEDICINE

## 2025-01-17 PROCEDURE — 96365 THER/PROPH/DIAG IV INF INIT: CPT

## 2025-01-17 PROCEDURE — 6360000002 HC RX W HCPCS: Performed by: INTERNAL MEDICINE

## 2025-01-17 RX ORDER — SODIUM CHLORIDE 9 MG/ML
5-250 INJECTION, SOLUTION INTRAVENOUS PRN
Status: CANCELLED | OUTPATIENT
Start: 2025-01-18

## 2025-01-17 RX ADMIN — ERTAPENEM SODIUM 1000 MG: 1 INJECTION INTRAMUSCULAR; INTRAVENOUS at 12:21

## 2025-01-17 ASSESSMENT — PAIN SCALES - GENERAL: PAINLEVEL_OUTOF10: 2

## 2025-01-17 ASSESSMENT — PAIN DESCRIPTION - LOCATION: LOCATION: TOE (COMMENT WHICH ONE)

## 2025-01-17 ASSESSMENT — PAIN DESCRIPTION - ORIENTATION: ORIENTATION: RIGHT

## 2025-01-17 ASSESSMENT — PAIN DESCRIPTION - DESCRIPTORS: DESCRIPTORS: ACHING

## 2025-01-17 ASSESSMENT — PAIN - FUNCTIONAL ASSESSMENT: PAIN_FUNCTIONAL_ASSESSMENT: ACTIVITIES ARE NOT PREVENTED

## 2025-01-17 NOTE — PROGRESS NOTES
[facility] OUTPATIENT INFUSION CENTER     PT arrived via:  [x] Ambulatory         [] Wheelchair  []With transport                [] Other:     [x]PT oriented to room and call light in reach.   [x] Vital signs obtained and are stable.   [x]Medication education provided and reviewed with patient.  1221- Infusion started, call light is within reach, care continued. Electronically signed by Jessica Tariq RN on 1/17/2025 at 12:23 PM

## 2025-01-18 ENCOUNTER — HOSPITAL ENCOUNTER (OUTPATIENT)
Dept: INFUSION THERAPY | Age: 45
Setting detail: INFUSION SERIES
Discharge: HOME | End: 2025-01-18
Payer: COMMERCIAL

## 2025-01-18 VITALS
TEMPERATURE: 97.9 F | DIASTOLIC BLOOD PRESSURE: 79 MMHG | HEART RATE: 83 BPM | SYSTOLIC BLOOD PRESSURE: 117 MMHG | RESPIRATION RATE: 18 BRPM | OXYGEN SATURATION: 100 %

## 2025-01-18 DIAGNOSIS — L08.9 DIABETIC FOOT INFECTION (HCC): ICD-10-CM

## 2025-01-18 DIAGNOSIS — L03.90 SEPSIS DUE TO CELLULITIS (HCC): Primary | ICD-10-CM

## 2025-01-18 DIAGNOSIS — A41.9 SEPSIS DUE TO CELLULITIS (HCC): Primary | ICD-10-CM

## 2025-01-18 DIAGNOSIS — E11.628 DIABETIC FOOT INFECTION (HCC): ICD-10-CM

## 2025-01-18 PROCEDURE — 2580000003 HC RX 258: Performed by: INTERNAL MEDICINE

## 2025-01-18 PROCEDURE — 6360000002 HC RX W HCPCS: Performed by: INTERNAL MEDICINE

## 2025-01-18 PROCEDURE — 96365 THER/PROPH/DIAG IV INF INIT: CPT

## 2025-01-18 RX ORDER — SODIUM CHLORIDE 9 MG/ML
5-250 INJECTION, SOLUTION INTRAVENOUS PRN
Status: CANCELLED | OUTPATIENT
Start: 2025-01-19

## 2025-01-18 RX ADMIN — ERTAPENEM SODIUM 1000 MG: 1 INJECTION INTRAMUSCULAR; INTRAVENOUS at 11:32

## 2025-01-18 ASSESSMENT — PAIN DESCRIPTION - DESCRIPTORS: DESCRIPTORS: ACHING

## 2025-01-18 ASSESSMENT — PAIN SCALES - GENERAL: PAINLEVEL_OUTOF10: 2

## 2025-01-18 ASSESSMENT — PAIN DESCRIPTION - LOCATION: LOCATION: TOE (COMMENT WHICH ONE)

## 2025-01-18 ASSESSMENT — PAIN DESCRIPTION - ORIENTATION: ORIENTATION: RIGHT

## 2025-01-19 ENCOUNTER — HOSPITAL ENCOUNTER (OUTPATIENT)
Dept: INFUSION THERAPY | Age: 45
Setting detail: INFUSION SERIES
Discharge: HOME | End: 2025-01-19
Payer: COMMERCIAL

## 2025-01-19 DIAGNOSIS — E11.628 DIABETIC FOOT INFECTION (HCC): ICD-10-CM

## 2025-01-19 DIAGNOSIS — A41.9 SEPSIS DUE TO CELLULITIS (HCC): Primary | ICD-10-CM

## 2025-01-19 DIAGNOSIS — L08.9 DIABETIC FOOT INFECTION (HCC): ICD-10-CM

## 2025-01-19 DIAGNOSIS — L03.90 SEPSIS DUE TO CELLULITIS (HCC): Primary | ICD-10-CM

## 2025-01-19 PROCEDURE — 2580000003 HC RX 258: Performed by: INTERNAL MEDICINE

## 2025-01-19 PROCEDURE — 96365 THER/PROPH/DIAG IV INF INIT: CPT

## 2025-01-19 PROCEDURE — 96374 THER/PROPH/DIAG INJ IV PUSH: CPT

## 2025-01-19 PROCEDURE — 6360000002 HC RX W HCPCS: Performed by: INTERNAL MEDICINE

## 2025-01-19 RX ORDER — SODIUM CHLORIDE 9 MG/ML
5-250 INJECTION, SOLUTION INTRAVENOUS PRN
Status: CANCELLED | OUTPATIENT
Start: 2025-01-20

## 2025-01-19 RX ADMIN — ERTAPENEM SODIUM 1000 MG: 1 INJECTION INTRAMUSCULAR; INTRAVENOUS at 11:24

## 2025-01-20 ENCOUNTER — HOSPITAL ENCOUNTER (OUTPATIENT)
Dept: INFUSION THERAPY | Age: 45
Setting detail: INFUSION SERIES
Discharge: HOME | End: 2025-01-20
Payer: COMMERCIAL

## 2025-01-20 VITALS
TEMPERATURE: 97.6 F | SYSTOLIC BLOOD PRESSURE: 128 MMHG | RESPIRATION RATE: 16 BRPM | OXYGEN SATURATION: 99 % | HEART RATE: 90 BPM | DIASTOLIC BLOOD PRESSURE: 83 MMHG

## 2025-01-20 DIAGNOSIS — L03.90 SEPSIS DUE TO CELLULITIS (HCC): Primary | ICD-10-CM

## 2025-01-20 DIAGNOSIS — A41.9 SEPSIS DUE TO CELLULITIS (HCC): Primary | ICD-10-CM

## 2025-01-20 DIAGNOSIS — L08.9 DIABETIC FOOT INFECTION (HCC): ICD-10-CM

## 2025-01-20 DIAGNOSIS — E11.628 DIABETIC FOOT INFECTION (HCC): ICD-10-CM

## 2025-01-20 PROCEDURE — 6360000002 HC RX W HCPCS: Performed by: INTERNAL MEDICINE

## 2025-01-20 PROCEDURE — 2580000003 HC RX 258: Performed by: INTERNAL MEDICINE

## 2025-01-20 PROCEDURE — 96365 THER/PROPH/DIAG IV INF INIT: CPT

## 2025-01-20 RX ORDER — SODIUM CHLORIDE 9 MG/ML
5-250 INJECTION, SOLUTION INTRAVENOUS PRN
Status: CANCELLED | OUTPATIENT
Start: 2025-01-21

## 2025-01-20 RX ADMIN — ERTAPENEM SODIUM 1000 MG: 1 INJECTION INTRAMUSCULAR; INTRAVENOUS at 12:03

## 2025-01-20 NOTE — PROGRESS NOTES
[facility] OUTPATIENT INFUSION CENTER     PT arrived via:  [x] Ambulatory         [] Wheelchair  []With transport                [] Other:     [x]PT oriented to room and call light in reach.   [x] Vital signs obtained and are stable.   [x]Medication education provided and reviewed with patient.             .

## 2025-01-20 NOTE — PROGRESS NOTES
Pt tolerated infusion. She left unit ambulatory. All equipment used in care has been cleaned.    Electronically signed by Keyla Dan RN on 1/20/2025 at 12:35 PM

## 2025-01-21 ENCOUNTER — HOSPITAL ENCOUNTER (OUTPATIENT)
Dept: INFUSION THERAPY | Age: 45
Setting detail: INFUSION SERIES
Discharge: HOME | End: 2025-01-21
Payer: COMMERCIAL

## 2025-01-21 ENCOUNTER — HOSPITAL ENCOUNTER (OUTPATIENT)
Dept: WOUND CARE | Age: 45
Discharge: HOME | End: 2025-01-21
Attending: STUDENT IN AN ORGANIZED HEALTH CARE EDUCATION/TRAINING PROGRAM
Payer: COMMERCIAL

## 2025-01-21 VITALS
SYSTOLIC BLOOD PRESSURE: 132 MMHG | TEMPERATURE: 97.6 F | HEART RATE: 102 BPM | RESPIRATION RATE: 18 BRPM | DIASTOLIC BLOOD PRESSURE: 85 MMHG

## 2025-01-21 VITALS
TEMPERATURE: 97.1 F | RESPIRATION RATE: 18 BRPM | DIASTOLIC BLOOD PRESSURE: 86 MMHG | SYSTOLIC BLOOD PRESSURE: 122 MMHG | OXYGEN SATURATION: 100 % | HEART RATE: 92 BPM

## 2025-01-21 DIAGNOSIS — L08.9 DIABETIC FOOT INFECTION (HCC): ICD-10-CM

## 2025-01-21 DIAGNOSIS — E11.628 DIABETIC FOOT INFECTION (HCC): ICD-10-CM

## 2025-01-21 DIAGNOSIS — A41.9 SEPSIS DUE TO CELLULITIS (HCC): Primary | ICD-10-CM

## 2025-01-21 DIAGNOSIS — L03.90 SEPSIS DUE TO CELLULITIS (HCC): Primary | ICD-10-CM

## 2025-01-21 PROCEDURE — 99211 OFF/OP EST MAY X REQ PHY/QHP: CPT

## 2025-01-21 PROCEDURE — 2580000003 HC RX 258: Performed by: INTERNAL MEDICINE

## 2025-01-21 PROCEDURE — 6360000002 HC RX W HCPCS: Performed by: INTERNAL MEDICINE

## 2025-01-21 PROCEDURE — 15275 SKIN SUB GRAFT FACE/NK/HF/G: CPT

## 2025-01-21 PROCEDURE — 96365 THER/PROPH/DIAG IV INF INIT: CPT

## 2025-01-21 RX ORDER — SODIUM CHLORIDE 9 MG/ML
5-250 INJECTION, SOLUTION INTRAVENOUS PRN
OUTPATIENT
Start: 2025-01-22

## 2025-01-21 RX ADMIN — ERTAPENEM SODIUM 1000 MG: 1 INJECTION INTRAMUSCULAR; INTRAVENOUS at 12:17

## 2025-01-21 ASSESSMENT — PAIN SCALES - GENERAL: PAINLEVEL_OUTOF10: 2

## 2025-01-21 ASSESSMENT — PAIN DESCRIPTION - LOCATION: LOCATION: TOE (COMMENT WHICH ONE)

## 2025-01-21 ASSESSMENT — PAIN DESCRIPTION - ORIENTATION: ORIENTATION: RIGHT

## 2025-01-21 NOTE — DISCHARGE INSTRUCTIONS
[facility] Wound Center and Hyperbaric Medicine   Physician Orders and Discharge Instructions  [facility]  [address] [place]  [address]  Telephone: [phone]      [phone]        NAME:  Carli Bustamante                                                                                         YOB: 1980  MEDICAL RECORD NUMBER:  [id number]     Your  is:  Jeannette     Home Care/Facility:     Wound Location: Right Great Toe      Dressing orders:   A skin substitute was applied today. Please leave the dressing in place until your next scheduled appointment. (IF NEEDED: you can change the outer dressing ONLY - do not remove the steri-strips and silicone layer).       Compression: none      Offloading Device:Post op shoe with peg assist , donut felt pad to nicole wound on right great toe      Other Instructions:  Keep the wound dry and covered when showering. Continue taking the antibiotics per Infectious Disease.      Keep all dressings clean, dry and intact.  Keep pressure off the wound(s) at all times.      Follow up visit  1 week January 28, 2025 at 3:00 pm      Please give 24 hour notice if unable to keep appointment. [phone]     If you experience any of the following, please call the Wound Care Service at  [phone] or go to the nearest emergency room.        *Increase in pain*Temperature over 101*Increase in drainage from your wound or a foul odor  *Uncontrolled swelling*Need for compression bandage changes due to slippage, breakthrough drainage       PLEASE NOTE: IF YOU ARE UNABLE TO OBTAIN WOUND SUPPLIES, CONTINUE TO USE THE SUPPLIES YOU HAVE AVAILABLE UNTIL YOU ARE ABLE TO REACH US. IT IS MOST IMPORTANT TO KEEP THE WOUND COVERED AT ALL TIMES

## 2025-01-21 NOTE — WOUND CARE
[facility] Physician Billing Sheet.     Carli Bustamante  AGE: 44 y.o.   GENDER: female  : 1980  TODAY'S DATE:  2025    ICD-10 CODES  L97.597  E11.42      PHYSICIAN PROCEDURES    CPT CODE  91803 RT      Electronically signed by Alistair Brand DPM on 2025 at 4:35 PM

## 2025-01-21 NOTE — FLOWSHEET NOTE
Patient arrived ambulatory to floor. Vital signs taken and stable. Single lumen PICC RICARDA in place. Flushed purple lumen. Patient oriented to room and call light. Call light within reach.    1217 - Invanz Infusion started. Call light within reach.     1247 - Infusion completed. Patient without signs/symptoms of reaction. PICC removed without complication. Pt tolerated well. Catheter appears intact, dressing applied. Pt left unit via ambulatory. All equipment used in the care for this patient has been cleaned.

## 2025-01-22 ENCOUNTER — HOSPITAL ENCOUNTER (OUTPATIENT)
Dept: INFUSION THERAPY | Age: 45
Setting detail: INFUSION SERIES
End: 2025-01-22

## 2025-01-28 ENCOUNTER — HOSPITAL ENCOUNTER (OUTPATIENT)
Dept: WOUND CARE | Age: 45
Discharge: HOME | End: 2025-01-28
Attending: STUDENT IN AN ORGANIZED HEALTH CARE EDUCATION/TRAINING PROGRAM
Payer: COMMERCIAL

## 2025-01-28 VITALS
SYSTOLIC BLOOD PRESSURE: 133 MMHG | TEMPERATURE: 97 F | RESPIRATION RATE: 16 BRPM | DIASTOLIC BLOOD PRESSURE: 80 MMHG | HEART RATE: 93 BPM

## 2025-01-28 DIAGNOSIS — E11.628 DIABETIC FOOT INFECTION (HCC): Primary | ICD-10-CM

## 2025-01-28 DIAGNOSIS — L08.9 DIABETIC FOOT INFECTION (HCC): Primary | ICD-10-CM

## 2025-01-28 PROCEDURE — 6370000000 HC RX 637 (ALT 250 FOR IP): Performed by: STUDENT IN AN ORGANIZED HEALTH CARE EDUCATION/TRAINING PROGRAM

## 2025-01-28 PROCEDURE — 15275 SKIN SUB GRAFT FACE/NK/HF/G: CPT

## 2025-01-28 RX ORDER — SODIUM CHLOR/HYPOCHLOROUS ACID 0.033 %
SOLUTION, IRRIGATION IRRIGATION PRN
OUTPATIENT
Start: 2025-01-28

## 2025-01-28 RX ORDER — GINSENG 100 MG
CAPSULE ORAL PRN
OUTPATIENT
Start: 2025-01-28

## 2025-01-28 RX ORDER — LIDOCAINE HYDROCHLORIDE 40 MG/ML
SOLUTION TOPICAL PRN
OUTPATIENT
Start: 2025-01-28

## 2025-01-28 RX ORDER — BETAMETHASONE DIPROPIONATE 0.5 MG/G
CREAM TOPICAL PRN
OUTPATIENT
Start: 2025-01-28

## 2025-01-28 RX ORDER — CLOBETASOL PROPIONATE 0.5 MG/G
OINTMENT TOPICAL PRN
OUTPATIENT
Start: 2025-01-28

## 2025-01-28 RX ORDER — TRIAMCINOLONE ACETONIDE 1 MG/G
OINTMENT TOPICAL PRN
OUTPATIENT
Start: 2025-01-28

## 2025-01-28 RX ORDER — LIDOCAINE HYDROCHLORIDE 20 MG/ML
JELLY TOPICAL PRN
OUTPATIENT
Start: 2025-01-28

## 2025-01-28 RX ORDER — NEOMYCIN/BACITRACIN/POLYMYXINB 3.5-400-5K
OINTMENT (GRAM) TOPICAL PRN
OUTPATIENT
Start: 2025-01-28

## 2025-01-28 RX ORDER — LIDOCAINE 50 MG/G
OINTMENT TOPICAL PRN
OUTPATIENT
Start: 2025-01-28

## 2025-01-28 RX ORDER — MUPIROCIN 20 MG/G
OINTMENT TOPICAL PRN
OUTPATIENT
Start: 2025-01-28

## 2025-01-28 RX ORDER — GENTAMICIN SULFATE 1 MG/G
OINTMENT TOPICAL PRN
OUTPATIENT
Start: 2025-01-28

## 2025-01-28 RX ORDER — LIDOCAINE 40 MG/G
CREAM TOPICAL PRN
Status: DISCONTINUED | OUTPATIENT
Start: 2025-01-28 | End: 2025-01-29 | Stop reason: HOSPADM

## 2025-01-28 RX ORDER — SILVER SULFADIAZINE 10 MG/G
CREAM TOPICAL PRN
OUTPATIENT
Start: 2025-01-28

## 2025-01-28 RX ORDER — LIDOCAINE 40 MG/G
CREAM TOPICAL PRN
OUTPATIENT
Start: 2025-01-28

## 2025-01-28 RX ORDER — BACITRACIN ZINC AND POLYMYXIN B SULFATE 500; 1000 [USP'U]/G; [USP'U]/G
OINTMENT TOPICAL PRN
OUTPATIENT
Start: 2025-01-28

## 2025-01-28 RX ADMIN — LIDOCAINE 4%: 4 CREAM TOPICAL at 15:35

## 2025-01-28 ASSESSMENT — PAIN SCALES - GENERAL: PAINLEVEL_OUTOF10: 2

## 2025-01-28 NOTE — WOUND CARE
[facility]                                                   Progress Note and Procedure Note      Carli Bustamante  MEDICAL RECORD NUMBER:  [id number]  AGE: 44 y.o.   GENDER: female  : 1980  EPISODE DATE:  2025    Subjective:     Chief Complaint   Patient presents with    Wound Check         HISTORY of PRESENT ILLNESS HPI     Carli Bustamante is a 44 y.o. female who presents today for wound/ulcer evaluation.   History of Wound Context:     Chronic DFU left foot     Wound/Ulcer Pain Timing/Severity: intermittent  Quality of pain: aching  Severity:  2 / 10   Modifying Factors: Pain worsens with walking  Associated Signs/Symptoms: edema    Ulcer Identification:  Ulcer Type: diabetic and pressure  Contributing Factors: edema, diabetes, and poor glucose control    Wound: N/A        PAST MEDICAL HISTORY        Diagnosis Date    Anemia during pregnancy 2018    Eczema     Herpes simplex infection of skin     on neck    Hypercholesterolemia     Irregular heart beat     runs of v tach in the past    Obesity     Pregnancy induced hypertension     Type II or unspecified type diabetes mellitus without mention of complication, not stated as uncontrolled     type 2       PAST SURGICAL HISTORY    Past Surgical History:   Procedure Laterality Date    ABDOMEN SURGERY       x 2     SECTION      TUBAL LIGATION         FAMILY HISTORY    Family History   Problem Relation Age of Onset    Arthritis Mother     Diabetes Mother     Early Death Father     Heart Attack Father     Heart Disease Father     High Blood Pressure Father     High Cholesterol Father     Diabetes Father     Arthritis Sister     Birth Defects Son     Diabetes Paternal Grandfather     Heart Disease Paternal Grandfather     Breast Cancer Paternal Cousin     Cancer Neg Hx        SOCIAL HISTORY    Social History     Tobacco Use    Smoking status: Former     Current packs/day: 0.00     Average packs/day: 0.3 packs/day

## 2025-01-28 NOTE — WOUND CARE
[facility] Physician Billing Sheet.     Carli Bustamante  AGE: 44 y.o.   GENDER: female  : 1980  TODAY'S DATE:  2025    ICD-10 CODES  L97.597  E11.42      PHYSICIAN PROCEDURES    CPT CODE  10615 RT      Electronically signed by Alistair Brand DPM on 2025 at 3:48 PM

## 2025-01-28 NOTE — DISCHARGE INSTRUCTIONS
[facility] Wound Center and Hyperbaric Medicine   Physician Orders and Discharge Instructions  [facility]  [address] [place]  [address]  Telephone: [phone]      [phone]        NAME:  Carli Bustamante                                                                                         YOB: 1980  MEDICAL RECORD NUMBER:  [id number]     Your  is:  Jeannette     Home Care/Facility:     Wound Location: Right Great Toe      Dressing orders:   A skin substitute was applied today. Please leave the dressing in place until your next scheduled appointment. (IF NEEDED: you can change the outer dressing ONLY - do not remove the steri-strips and silicone layer).       Compression: none      Offloading Device:Post op shoe with peg assist , donut felt pad to nicole wound on right great toe      Other Instructions:   HgA1C ordered today. Please complete as soon as possible. Keep the wound dry and covered when showering. Continue taking the antibiotics per Infectious Disease.      Keep all dressings clean, dry and intact.  Keep pressure off the wound(s) at all times.      Follow up visit  1 week February 4, 2025 at 3:45pm     Please give 24 hour notice if unable to keep appointment. [phone]     If you experience any of the following, please call the Wound Care Service at  [phone] or go to the nearest emergency room.        *Increase in pain*Temperature over 101*Increase in drainage from your wound or a foul odor  *Uncontrolled swelling*Need for compression bandage changes due to slippage, breakthrough drainage       PLEASE NOTE: IF YOU ARE UNABLE TO OBTAIN WOUND SUPPLIES, CONTINUE TO USE THE SUPPLIES YOU HAVE AVAILABLE UNTIL YOU ARE ABLE TO REACH US. IT IS MOST IMPORTANT TO KEEP THE WOUND COVERED AT ALL TIMES

## 2025-02-11 ENCOUNTER — HOSPITAL ENCOUNTER (OUTPATIENT)
Dept: WOUND CARE | Age: 45
Discharge: HOME | End: 2025-02-11
Attending: STUDENT IN AN ORGANIZED HEALTH CARE EDUCATION/TRAINING PROGRAM
Payer: COMMERCIAL

## 2025-02-11 VITALS
RESPIRATION RATE: 18 BRPM | TEMPERATURE: 97.3 F | DIASTOLIC BLOOD PRESSURE: 89 MMHG | SYSTOLIC BLOOD PRESSURE: 146 MMHG | HEART RATE: 105 BPM

## 2025-02-11 DIAGNOSIS — E11.628 DIABETIC FOOT INFECTION (HCC): Primary | ICD-10-CM

## 2025-02-11 DIAGNOSIS — L08.9 DIABETIC FOOT INFECTION (HCC): Primary | ICD-10-CM

## 2025-02-11 PROCEDURE — 15275 SKIN SUB GRAFT FACE/NK/HF/G: CPT

## 2025-02-11 PROCEDURE — 6370000000 HC RX 637 (ALT 250 FOR IP): Performed by: STUDENT IN AN ORGANIZED HEALTH CARE EDUCATION/TRAINING PROGRAM

## 2025-02-11 RX ORDER — BACITRACIN ZINC AND POLYMYXIN B SULFATE 500; 1000 [USP'U]/G; [USP'U]/G
OINTMENT TOPICAL PRN
OUTPATIENT
Start: 2025-02-11

## 2025-02-11 RX ORDER — NEOMYCIN/BACITRACIN/POLYMYXINB 3.5-400-5K
OINTMENT (GRAM) TOPICAL PRN
OUTPATIENT
Start: 2025-02-11

## 2025-02-11 RX ORDER — GENTAMICIN SULFATE 1 MG/G
OINTMENT TOPICAL PRN
OUTPATIENT
Start: 2025-02-11

## 2025-02-11 RX ORDER — LIDOCAINE HYDROCHLORIDE 20 MG/ML
JELLY TOPICAL PRN
OUTPATIENT
Start: 2025-02-11

## 2025-02-11 RX ORDER — BETAMETHASONE DIPROPIONATE 0.5 MG/G
CREAM TOPICAL PRN
OUTPATIENT
Start: 2025-02-11

## 2025-02-11 RX ORDER — LIDOCAINE HYDROCHLORIDE 20 MG/ML
JELLY TOPICAL PRN
Status: DISCONTINUED | OUTPATIENT
Start: 2025-02-11 | End: 2025-02-12 | Stop reason: HOSPADM

## 2025-02-11 RX ORDER — SILVER SULFADIAZINE 10 MG/G
CREAM TOPICAL PRN
OUTPATIENT
Start: 2025-02-11

## 2025-02-11 RX ORDER — MUPIROCIN 20 MG/G
OINTMENT TOPICAL PRN
OUTPATIENT
Start: 2025-02-11

## 2025-02-11 RX ORDER — LIDOCAINE 40 MG/G
CREAM TOPICAL PRN
OUTPATIENT
Start: 2025-02-11

## 2025-02-11 RX ORDER — GINSENG 100 MG
CAPSULE ORAL PRN
OUTPATIENT
Start: 2025-02-11

## 2025-02-11 RX ORDER — LIDOCAINE HYDROCHLORIDE 40 MG/ML
SOLUTION TOPICAL PRN
OUTPATIENT
Start: 2025-02-11

## 2025-02-11 RX ORDER — TRIAMCINOLONE ACETONIDE 1 MG/G
OINTMENT TOPICAL PRN
OUTPATIENT
Start: 2025-02-11

## 2025-02-11 RX ORDER — LIDOCAINE 50 MG/G
OINTMENT TOPICAL PRN
OUTPATIENT
Start: 2025-02-11

## 2025-02-11 RX ORDER — SODIUM CHLOR/HYPOCHLOROUS ACID 0.033 %
SOLUTION, IRRIGATION IRRIGATION PRN
OUTPATIENT
Start: 2025-02-11

## 2025-02-11 RX ORDER — CLOBETASOL PROPIONATE 0.5 MG/G
OINTMENT TOPICAL PRN
OUTPATIENT
Start: 2025-02-11

## 2025-02-11 RX ADMIN — LIDOCAINE HYDROCHLORIDE: 20 JELLY TOPICAL at 13:25

## 2025-02-11 ASSESSMENT — PAIN SCALES - GENERAL
PAINLEVEL_OUTOF10: 2
PAINLEVEL_OUTOF10: 2

## 2025-02-11 NOTE — DISCHARGE INSTRUCTIONS
[facility] Wound Center and Hyperbaric Medicine   Physician Orders and Discharge Instructions  [facility]  [address] [place]  [address]  Telephone: [phone]      [phone]        NAME:  Carli Bustamante                                                                                         YOB: 1980  MEDICAL RECORD NUMBER:  [id number]     Your  is:  Jeannette     Home Care/Facility:     Wound Location: Right Great Toe      Dressing orders:   A skin substitute was applied today. Please leave the dressing in place until your next scheduled appointment. (IF NEEDED: you can change the outer dressing ONLY - do not remove the steri-strips and silicone layer).       Compression: none      Offloading Device:Post op shoe with peg assist , donut felt pad to nicole wound on right great toe      Other Instructions:   HgA1C ordered today. Please complete as soon as possible. Keep the wound dry and covered when showering. Continue taking the antibiotics per Infectious Disease.      Keep all dressings clean, dry and intact.  Keep pressure off the wound(s) at all times.      Follow up visit  1 week February 18, 2025 at 1:15pm     Please give 24 hour notice if unable to keep appointment. [phone]     If you experience any of the following, please call the Wound Care Service at  [phone] or go to the nearest emergency room.        *Increase in pain*Temperature over 101*Increase in drainage from your wound or a foul odor  *Uncontrolled swelling*Need for compression bandage changes due to slippage, breakthrough drainage       PLEASE NOTE: IF YOU ARE UNABLE TO OBTAIN WOUND SUPPLIES, CONTINUE TO USE THE SUPPLIES YOU HAVE AVAILABLE UNTIL YOU ARE ABLE TO REACH US. IT IS MOST IMPORTANT TO KEEP THE WOUND COVERED AT ALL TIMES

## 2025-02-11 NOTE — WOUND CARE
[facility] Physician Billing Sheet.     Carli Bustamante  AGE: 44 y.o.   GENDER: female  : 1980  TODAY'S DATE:  2025    ICD-10 CODES  L97.597  E11.42      PHYSICIAN PROCEDURES    CPT CODE  85119 RT      Electronically signed by Alistair Brand DPM on 2025 at 2:01 PM

## 2025-02-11 NOTE — PLAN OF CARE
Problem: Wound:  Goal: Will show signs of wound healing; wound closure and no evidence of infection  Description: Will show signs of wound healing; wound closure and no evidence of infection  2/11/2025 1329 by Demetria Villalba, RN  Outcome: Progressing  2/11/2025 1308 by Jeannette Mckeon, RN  Outcome: Progressing     Problem: Pain  Goal: Verbalizes/displays adequate comfort level or baseline comfort level  Outcome: Progressing

## 2025-02-25 ENCOUNTER — HOSPITAL ENCOUNTER (OUTPATIENT)
Dept: WOUND CARE | Age: 45
Discharge: HOME | End: 2025-02-25
Attending: STUDENT IN AN ORGANIZED HEALTH CARE EDUCATION/TRAINING PROGRAM
Payer: COMMERCIAL

## 2025-02-25 VITALS
TEMPERATURE: 97.5 F | HEART RATE: 98 BPM | DIASTOLIC BLOOD PRESSURE: 86 MMHG | SYSTOLIC BLOOD PRESSURE: 140 MMHG | RESPIRATION RATE: 18 BRPM

## 2025-02-25 DIAGNOSIS — E11.69 TYPE 2 DIABETES MELLITUS WITH OTHER SPECIFIED COMPLICATION, WITH LONG-TERM CURRENT USE OF INSULIN (HCC): ICD-10-CM

## 2025-02-25 DIAGNOSIS — L08.9 DIABETIC FOOT INFECTION (HCC): Primary | ICD-10-CM

## 2025-02-25 DIAGNOSIS — Z79.4 TYPE 2 DIABETES MELLITUS WITH OTHER SPECIFIED COMPLICATION, WITH LONG-TERM CURRENT USE OF INSULIN (HCC): ICD-10-CM

## 2025-02-25 DIAGNOSIS — E11.628 DIABETIC FOOT INFECTION (HCC): Primary | ICD-10-CM

## 2025-02-25 LAB
ANION GAP SERPL CALCULATED.3IONS-SCNC: 14 MEQ/L (ref 9–15)
BUN SERPL-MCNC: 9 MG/DL (ref 6–20)
CALCIUM SERPL-MCNC: 8.9 MG/DL (ref 8.5–9.9)
CHLORIDE SERPL-SCNC: 101 MEQ/L (ref 95–107)
CO2 SERPL-SCNC: 21 MEQ/L (ref 20–31)
CREAT SERPL-MCNC: 0.58 MG/DL (ref 0.5–0.9)
GLUCOSE SERPL-MCNC: 264 MG/DL (ref 70–99)
POTASSIUM SERPL-SCNC: 3.8 MEQ/L (ref 3.4–4.9)
SODIUM SERPL-SCNC: 136 MEQ/L (ref 135–144)

## 2025-02-25 PROCEDURE — 15275 SKIN SUB GRAFT FACE/NK/HF/G: CPT

## 2025-02-25 PROCEDURE — 6370000000 HC RX 637 (ALT 250 FOR IP): Performed by: STUDENT IN AN ORGANIZED HEALTH CARE EDUCATION/TRAINING PROGRAM

## 2025-02-25 RX ORDER — BACITRACIN ZINC AND POLYMYXIN B SULFATE 500; 1000 [USP'U]/G; [USP'U]/G
OINTMENT TOPICAL PRN
OUTPATIENT
Start: 2025-02-25

## 2025-02-25 RX ORDER — SODIUM CHLOR/HYPOCHLOROUS ACID 0.033 %
SOLUTION, IRRIGATION IRRIGATION PRN
OUTPATIENT
Start: 2025-02-25

## 2025-02-25 RX ORDER — CLOBETASOL PROPIONATE 0.5 MG/G
OINTMENT TOPICAL PRN
OUTPATIENT
Start: 2025-02-25

## 2025-02-25 RX ORDER — GINSENG 100 MG
CAPSULE ORAL PRN
OUTPATIENT
Start: 2025-02-25

## 2025-02-25 RX ORDER — LIDOCAINE HYDROCHLORIDE 40 MG/ML
SOLUTION TOPICAL PRN
OUTPATIENT
Start: 2025-02-25

## 2025-02-25 RX ORDER — TRIAMCINOLONE ACETONIDE 1 MG/G
OINTMENT TOPICAL PRN
OUTPATIENT
Start: 2025-02-25

## 2025-02-25 RX ORDER — BETAMETHASONE DIPROPIONATE 0.5 MG/G
CREAM TOPICAL PRN
OUTPATIENT
Start: 2025-02-25

## 2025-02-25 RX ORDER — NEOMYCIN/BACITRACIN/POLYMYXINB 3.5-400-5K
OINTMENT (GRAM) TOPICAL PRN
OUTPATIENT
Start: 2025-02-25

## 2025-02-25 RX ORDER — MUPIROCIN 20 MG/G
OINTMENT TOPICAL PRN
OUTPATIENT
Start: 2025-02-25

## 2025-02-25 RX ORDER — GENTAMICIN SULFATE 1 MG/G
OINTMENT TOPICAL PRN
OUTPATIENT
Start: 2025-02-25

## 2025-02-25 RX ORDER — LIDOCAINE 50 MG/G
OINTMENT TOPICAL PRN
OUTPATIENT
Start: 2025-02-25

## 2025-02-25 RX ORDER — LIDOCAINE 40 MG/G
CREAM TOPICAL PRN
OUTPATIENT
Start: 2025-02-25

## 2025-02-25 RX ORDER — SILVER SULFADIAZINE 10 MG/G
CREAM TOPICAL PRN
OUTPATIENT
Start: 2025-02-25

## 2025-02-25 RX ORDER — LIDOCAINE HYDROCHLORIDE 20 MG/ML
JELLY TOPICAL PRN
Status: DISCONTINUED | OUTPATIENT
Start: 2025-02-25 | End: 2025-02-26 | Stop reason: HOSPADM

## 2025-02-25 RX ORDER — LIDOCAINE HYDROCHLORIDE 20 MG/ML
JELLY TOPICAL PRN
OUTPATIENT
Start: 2025-02-25

## 2025-02-25 RX ADMIN — LIDOCAINE HYDROCHLORIDE: 20 JELLY TOPICAL at 13:43

## 2025-02-25 ASSESSMENT — PAIN DESCRIPTION - ORIENTATION: ORIENTATION: RIGHT

## 2025-02-25 ASSESSMENT — PAIN DESCRIPTION - DESCRIPTORS: DESCRIPTORS: ACHING

## 2025-02-25 ASSESSMENT — PAIN SCALES - GENERAL: PAINLEVEL_OUTOF10: 3

## 2025-02-25 ASSESSMENT — PAIN DESCRIPTION - LOCATION: LOCATION: TOE (COMMENT WHICH ONE)

## 2025-02-25 NOTE — WOUND CARE
[facility] Physician Billing Sheet.     Carli Bustamante  AGE: 44 y.o.   GENDER: female  : 1980  TODAY'S DATE:  2025    ICD-10 CODES  L97.597  E11.42      PHYSICIAN PROCEDURES    CPT CODE  85953 RT      Electronically signed by Alistair Brand DPM on 2025 at 1:55 PM

## 2025-02-25 NOTE — DISCHARGE INSTRUCTIONS
[facility] Wound Center and Hyperbaric Medicine   Physician Orders and Discharge Instructions  [facility]  [address] [place]  [address]  Telephone: [phone]      [phone]        NAME:  Carli Bustamante                                                                                         YOB: 1980  MEDICAL RECORD NUMBER:  [id number]     Your  is:  Jeannette     Home Care/Facility:     Wound Location: Right Great Toe      Dressing orders:   A skin substitute was applied today. Please leave the dressing in place until your next scheduled appointment. (IF NEEDED: you can change the outer dressing ONLY - do not remove the steri-strips and silicone layer).       Compression: none      Offloading Device:Post op shoe with peg assist , donut felt pad to nicole wound on right great toe      Other Instructions:   HgA1C ordered today. Please complete as soon as possible. Keep the wound dry and covered when showering. Continue taking the antibiotics per Infectious Disease.      Keep all dressings clean, dry and intact.  Keep pressure off the wound(s) at all times.      Follow up visit  2 weeks March 11, 2025 at 1:15pm     Please give 24 hour notice if unable to keep appointment. [phone]     If you experience any of the following, please call the Wound Care Service at  [phone] or go to the nearest emergency room.        *Increase in pain*Temperature over 101*Increase in drainage from your wound or a foul odor  *Uncontrolled swelling*Need for compression bandage changes due to slippage, breakthrough drainage       PLEASE NOTE: IF YOU ARE UNABLE TO OBTAIN WOUND SUPPLIES, CONTINUE TO USE THE SUPPLIES YOU HAVE AVAILABLE UNTIL YOU ARE ABLE TO REACH US. IT IS MOST IMPORTANT TO KEEP THE WOUND COVERED AT ALL TIMES

## 2025-02-26 LAB
ESTIMATED AVERAGE GLUCOSE: 180 MG/DL
HBA1C MFR BLD: 7.9 % (ref 4–6)

## 2025-03-11 ENCOUNTER — HOSPITAL ENCOUNTER (OUTPATIENT)
Dept: WOUND CARE | Age: 45
Discharge: HOME | End: 2025-03-11
Attending: STUDENT IN AN ORGANIZED HEALTH CARE EDUCATION/TRAINING PROGRAM
Payer: COMMERCIAL

## 2025-03-11 VITALS
SYSTOLIC BLOOD PRESSURE: 150 MMHG | HEART RATE: 97 BPM | TEMPERATURE: 97.1 F | RESPIRATION RATE: 18 BRPM | DIASTOLIC BLOOD PRESSURE: 92 MMHG

## 2025-03-11 DIAGNOSIS — E11.628 DIABETIC FOOT INFECTION (HCC): Primary | ICD-10-CM

## 2025-03-11 DIAGNOSIS — L08.9 DIABETIC FOOT INFECTION (HCC): Primary | ICD-10-CM

## 2025-03-11 PROCEDURE — 11042 DBRDMT SUBQ TIS 1ST 20SQCM/<: CPT

## 2025-03-11 PROCEDURE — 87070 CULTURE OTHR SPECIMN AEROBIC: CPT

## 2025-03-11 PROCEDURE — 87075 CULTR BACTERIA EXCEPT BLOOD: CPT

## 2025-03-11 RX ORDER — LIDOCAINE HYDROCHLORIDE 40 MG/ML
SOLUTION TOPICAL PRN
OUTPATIENT
Start: 2025-03-11

## 2025-03-11 RX ORDER — GENTAMICIN SULFATE 1 MG/G
OINTMENT TOPICAL PRN
OUTPATIENT
Start: 2025-03-11

## 2025-03-11 RX ORDER — LIDOCAINE 40 MG/G
CREAM TOPICAL PRN
OUTPATIENT
Start: 2025-03-11

## 2025-03-11 RX ORDER — TRIAMCINOLONE ACETONIDE 1 MG/G
OINTMENT TOPICAL PRN
OUTPATIENT
Start: 2025-03-11

## 2025-03-11 RX ORDER — MUPIROCIN 20 MG/G
OINTMENT TOPICAL PRN
OUTPATIENT
Start: 2025-03-11

## 2025-03-11 RX ORDER — DOXYCYCLINE HYCLATE 100 MG
100 TABLET ORAL 2 TIMES DAILY
Qty: 14 TABLET | Refills: 0 | Status: SHIPPED | OUTPATIENT
Start: 2025-03-11 | End: 2025-03-18

## 2025-03-11 RX ORDER — CLOBETASOL PROPIONATE 0.5 MG/G
OINTMENT TOPICAL PRN
OUTPATIENT
Start: 2025-03-11

## 2025-03-11 RX ORDER — SODIUM CHLORIDE 9 MG/ML
5-250 INJECTION, SOLUTION INTRAVENOUS PRN
OUTPATIENT
Start: 2025-03-13

## 2025-03-11 RX ORDER — NEOMYCIN/BACITRACIN/POLYMYXINB 3.5-400-5K
OINTMENT (GRAM) TOPICAL PRN
OUTPATIENT
Start: 2025-03-11

## 2025-03-11 RX ORDER — SODIUM CHLOR/HYPOCHLOROUS ACID 0.033 %
SOLUTION, IRRIGATION IRRIGATION PRN
OUTPATIENT
Start: 2025-03-11

## 2025-03-11 RX ORDER — AMOXICILLIN AND CLAVULANATE POTASSIUM 500; 125 MG/1; MG/1
1 TABLET, FILM COATED ORAL 3 TIMES DAILY
Qty: 30 TABLET | Refills: 0 | Status: SHIPPED | OUTPATIENT
Start: 2025-03-11 | End: 2025-03-21

## 2025-03-11 RX ORDER — BACITRACIN ZINC AND POLYMYXIN B SULFATE 500; 1000 [USP'U]/G; [USP'U]/G
OINTMENT TOPICAL PRN
OUTPATIENT
Start: 2025-03-11

## 2025-03-11 RX ORDER — BETAMETHASONE DIPROPIONATE 0.5 MG/G
CREAM TOPICAL PRN
OUTPATIENT
Start: 2025-03-11

## 2025-03-11 RX ORDER — LIDOCAINE 50 MG/G
OINTMENT TOPICAL PRN
OUTPATIENT
Start: 2025-03-11

## 2025-03-11 RX ORDER — LIDOCAINE HYDROCHLORIDE 20 MG/ML
JELLY TOPICAL PRN
OUTPATIENT
Start: 2025-03-11

## 2025-03-11 RX ORDER — GINSENG 100 MG
CAPSULE ORAL PRN
OUTPATIENT
Start: 2025-03-11

## 2025-03-11 RX ORDER — SILVER SULFADIAZINE 10 MG/G
CREAM TOPICAL PRN
OUTPATIENT
Start: 2025-03-11

## 2025-03-11 NOTE — WOUND CARE
[facility] Physician Billing Sheet.     Carli Bustamante  AGE: 44 y.o.   GENDER: female  : 1980  TODAY'S DATE:  3/11/2025    ICD-10 CODES  L97.597  E11.42  L03.115    PHYSICIAN PROCEDURES    CPT CODE  93323  89302:25      Electronically signed by Alistair Brand DPM on 3/11/2025 at 1:57 PM

## 2025-03-11 NOTE — DISCHARGE INSTRUCTIONS
[facility] Wound Center and Hyperbaric Medicine   Physician Orders and Discharge Instructions  [facility]  [address] [place]  [address]  Telephone: [phone]      [phone]        NAME:  Carli Bustamante                                                                                         YOB: 1980  MEDICAL RECORD NUMBER:  [id number]     Your  is:  Jeannette     Home Care/Facility:     Wound Location: Right Great Toe, Right Lateral Foot     Dressing orders:   1.Cleanse wound(s) with normal saline.  2. Apply dry SILVERCEL OR CALCIUM ALGINATE WITH Ag or eqivalent to wound bed.  3. Cover with 4x4's and wrap with gauze (errol or kerlix)  4. Change Daily       Compression: none      Offloading Device:Post op shoe with peg assist      Other Instructions:  Wound culture taken today.  antibiotic from the pharmacy and start taking as prescribed. We will call you if you need a different antibiotic.  Keep the wound dry and covered when showering. Continue taking the antibiotics per Infectious Disease.      Keep all dressings clean, dry and intact.  Keep pressure off the wound(s) at all times.      Follow up visit  1 week March 18, 2025 at 2:15pm     Please give 24 hour notice if unable to keep appointment. [phone]     If you experience any of the following, please call the Wound Care Service at  [phone] or go to the nearest emergency room.        *Increase in pain*Temperature over 101*Increase in drainage from your wound or a foul odor  *Uncontrolled swelling*Need for compression bandage changes due to slippage, breakthrough drainage       PLEASE NOTE: IF YOU ARE UNABLE TO OBTAIN WOUND SUPPLIES, CONTINUE TO USE THE SUPPLIES YOU HAVE AVAILABLE UNTIL YOU ARE ABLE TO REACH US. IT IS MOST IMPORTANT TO KEEP THE WOUND COVERED AT ALL TIMES

## 2025-03-14 NOTE — WOUND CARE
[facility]   Progress Note and Procedure Note      Carli Bustamante  MEDICAL RECORD NUMBER:  [id number]  AGE: 44 y.o.   GENDER: female  : 1980  EPISODE DATE:  3/11/2025    Subjective:     Chief Complaint   Patient presents with    Wound Check         HISTORY of PRESENT ILLNESS HPI     Carli Bustamante is a 44 y.o. female who presents today for wound/ulcer evaluation.   History of Wound Context:     Chronic DFU right foot with new SOI to right great toe     Wound/Ulcer Pain Timing/Severity: intermittent  Quality of pain: aching  Severity:  2 / 10   Modifying Factors: Pain worsens with walking  Associated Signs/Symptoms: edema    Ulcer Identification:  Ulcer Type: diabetic and pressure  Contributing Factors: diabetes    Wound: N/A        PAST MEDICAL HISTORY        Diagnosis Date    Anemia during pregnancy 2018    Eczema     Herpes simplex infection of skin     on neck    Hypercholesterolemia     Irregular heart beat     runs of v tach in the past    Obesity     Pregnancy induced hypertension     Type II or unspecified type diabetes mellitus without mention of complication, not stated as uncontrolled     type 2       PAST SURGICAL HISTORY    Past Surgical History:   Procedure Laterality Date    ABDOMEN SURGERY       x 2     SECTION      TUBAL LIGATION         FAMILY HISTORY    Family History   Problem Relation Age of Onset    Arthritis Mother     Diabetes Mother     Early Death Father     Heart Attack Father     Heart Disease Father     High Blood Pressure Father     High Cholesterol Father     Diabetes Father     Arthritis Sister     Birth Defects Son     Diabetes Paternal Grandfather     Heart Disease Paternal Grandfather     Breast Cancer Paternal Cousin     Cancer Neg Hx        SOCIAL HISTORY    Social History     Tobacco Use    Smoking status: Former     Current packs/day: 0.00     Average packs/day: 0.3 packs/day for 23.0 years (5.8 ttl pk-yrs)

## 2025-03-16 LAB — CULTURE WOUND: NORMAL

## 2025-03-18 ENCOUNTER — HOSPITAL ENCOUNTER (OUTPATIENT)
Dept: WOUND CARE | Age: 45
Discharge: HOME | End: 2025-03-18
Attending: STUDENT IN AN ORGANIZED HEALTH CARE EDUCATION/TRAINING PROGRAM
Payer: COMMERCIAL

## 2025-03-18 VITALS
HEART RATE: 105 BPM | SYSTOLIC BLOOD PRESSURE: 131 MMHG | TEMPERATURE: 97.9 F | RESPIRATION RATE: 18 BRPM | DIASTOLIC BLOOD PRESSURE: 90 MMHG

## 2025-03-18 DIAGNOSIS — E11.628 DIABETIC FOOT INFECTION (HCC): Primary | ICD-10-CM

## 2025-03-18 DIAGNOSIS — L03.90 SEPSIS DUE TO CELLULITIS (HCC): ICD-10-CM

## 2025-03-18 DIAGNOSIS — L08.9 DIABETIC FOOT INFECTION (HCC): Primary | ICD-10-CM

## 2025-03-18 DIAGNOSIS — A41.9 SEPSIS DUE TO CELLULITIS (HCC): ICD-10-CM

## 2025-03-18 PROCEDURE — 11042 DBRDMT SUBQ TIS 1ST 20SQCM/<: CPT

## 2025-03-18 PROCEDURE — 6370000000 HC RX 637 (ALT 250 FOR IP): Performed by: STUDENT IN AN ORGANIZED HEALTH CARE EDUCATION/TRAINING PROGRAM

## 2025-03-18 RX ORDER — SODIUM CHLORIDE 9 MG/ML
5-250 INJECTION, SOLUTION INTRAVENOUS PRN
OUTPATIENT
Start: 2025-03-20

## 2025-03-18 RX ORDER — NEOMYCIN/BACITRACIN/POLYMYXINB 3.5-400-5K
OINTMENT (GRAM) TOPICAL PRN
OUTPATIENT
Start: 2025-03-18

## 2025-03-18 RX ORDER — GENTAMICIN SULFATE 1 MG/G
OINTMENT TOPICAL PRN
OUTPATIENT
Start: 2025-03-18

## 2025-03-18 RX ORDER — LIDOCAINE 50 MG/G
OINTMENT TOPICAL PRN
OUTPATIENT
Start: 2025-03-18

## 2025-03-18 RX ORDER — LIDOCAINE HYDROCHLORIDE 20 MG/ML
JELLY TOPICAL PRN
Status: DISCONTINUED | OUTPATIENT
Start: 2025-03-18 | End: 2025-03-19 | Stop reason: HOSPADM

## 2025-03-18 RX ORDER — LIDOCAINE HYDROCHLORIDE 20 MG/ML
JELLY TOPICAL PRN
OUTPATIENT
Start: 2025-03-18

## 2025-03-18 RX ORDER — CLOBETASOL PROPIONATE 0.5 MG/G
OINTMENT TOPICAL PRN
OUTPATIENT
Start: 2025-03-18

## 2025-03-18 RX ORDER — SILVER SULFADIAZINE 10 MG/G
CREAM TOPICAL PRN
OUTPATIENT
Start: 2025-03-18

## 2025-03-18 RX ORDER — GINSENG 100 MG
CAPSULE ORAL PRN
OUTPATIENT
Start: 2025-03-18

## 2025-03-18 RX ORDER — SODIUM CHLOR/HYPOCHLOROUS ACID 0.033 %
SOLUTION, IRRIGATION IRRIGATION PRN
OUTPATIENT
Start: 2025-03-18

## 2025-03-18 RX ORDER — TRIAMCINOLONE ACETONIDE 1 MG/G
OINTMENT TOPICAL PRN
OUTPATIENT
Start: 2025-03-18

## 2025-03-18 RX ORDER — MUPIROCIN 20 MG/G
OINTMENT TOPICAL PRN
OUTPATIENT
Start: 2025-03-18

## 2025-03-18 RX ORDER — LIDOCAINE 40 MG/G
CREAM TOPICAL PRN
OUTPATIENT
Start: 2025-03-18

## 2025-03-18 RX ORDER — BACITRACIN ZINC AND POLYMYXIN B SULFATE 500; 1000 [USP'U]/G; [USP'U]/G
OINTMENT TOPICAL PRN
OUTPATIENT
Start: 2025-03-18

## 2025-03-18 RX ORDER — LIDOCAINE HYDROCHLORIDE 40 MG/ML
SOLUTION TOPICAL PRN
OUTPATIENT
Start: 2025-03-18

## 2025-03-18 RX ORDER — BETAMETHASONE DIPROPIONATE 0.5 MG/G
CREAM TOPICAL PRN
OUTPATIENT
Start: 2025-03-18

## 2025-03-18 RX ADMIN — LIDOCAINE HYDROCHLORIDE: 20 JELLY TOPICAL at 14:54

## 2025-03-18 NOTE — DISCHARGE INSTRUCTIONS
[facility]   Physician Orders and Discharge Instructions  [facility]  [address] [place]  [address]  Telephone: [phone]      [phone]        NAME:  Carli Bustamante                                                                                         YOB: 1980  MEDICAL RECORD NUMBER:  [id number]     Your  is:  Jeannette     Home Care/Facility:     Wound Location: Right Great Toe, Right Lateral Foot     Dressing orders:   1.Cleanse wound(s) with normal saline.  2. Apply dry (COLLAGEN) PROMOGRAN or equivalent to wound bed.  3. Moisten PROMOGRAN with a few drops of normal saline.  4. Cover with 4x4's and wrap with gauze (errol or kerlix)  5. Change  Every other day or Monday, Wednesday, and Friday      Compression: none      Offloading Device:Post op shoe with peg assist      Other Instructions:   Keep the wound dry and covered when showering. Continue taking the antibiotics per Infectious Disease.      Keep all dressings clean, dry and intact.  Keep pressure off the wound(s) at all times.      Follow up visit  1 week March 25, 2025 at 1:00pm     Please give 24 hour notice if unable to keep appointment. [phone]     If you experience any of the following, please call the Wound Care Service at  [phone] or go to the nearest emergency room.        *Increase in pain*Temperature over 101*Increase in drainage from your wound or a foul odor  *Uncontrolled swelling*Need for compression bandage changes due to slippage, breakthrough drainage       PLEASE NOTE: IF YOU ARE UNABLE TO OBTAIN WOUND SUPPLIES, CONTINUE TO USE THE SUPPLIES YOU HAVE AVAILABLE UNTIL YOU ARE ABLE TO REACH US. IT IS MOST IMPORTANT TO KEEP THE WOUND COVERED AT ALL TIMES

## 2025-03-25 ENCOUNTER — HOSPITAL ENCOUNTER (OUTPATIENT)
Dept: WOUND CARE | Age: 45
Discharge: HOME | End: 2025-03-25
Payer: COMMERCIAL

## 2025-03-25 VITALS
HEART RATE: 92 BPM | SYSTOLIC BLOOD PRESSURE: 139 MMHG | DIASTOLIC BLOOD PRESSURE: 86 MMHG | TEMPERATURE: 48.7 F | RESPIRATION RATE: 18 BRPM

## 2025-03-25 DIAGNOSIS — E11.628 DIABETIC FOOT INFECTION (HCC): Primary | ICD-10-CM

## 2025-03-25 DIAGNOSIS — L08.9 DIABETIC FOOT INFECTION (HCC): Primary | ICD-10-CM

## 2025-03-25 PROCEDURE — 11042 DBRDMT SUBQ TIS 1ST 20SQCM/<: CPT

## 2025-03-25 ASSESSMENT — PAIN SCALES - GENERAL: PAINLEVEL_OUTOF10: 3

## 2025-03-25 ASSESSMENT — PAIN DESCRIPTION - ORIENTATION: ORIENTATION: RIGHT

## 2025-03-25 NOTE — PROGRESS NOTES
visit  2 weeks April 8 , 2025 at 1:00     Please give 24 hour notice if unable to keep appointment. [phone]     If you experience any of the following, please call the Wound Care Service at  [phone] or go to the nearest emergency room.        *Increase in pain*Temperature over 101*Increase in drainage from your wound or a foul odor  *Uncontrolled swelling*Need for compression bandage changes due to slippage, breakthrough drainage       PLEASE NOTE: IF YOU ARE UNABLE TO OBTAIN WOUND SUPPLIES, CONTINUE TO USE THE SUPPLIES YOU HAVE AVAILABLE UNTIL YOU ARE ABLE TO REACH US. IT IS MOST IMPORTANT TO KEEP THE WOUND COVERED AT ALL TIMES      Electronically signed by TRISHA Rodgers CNP on 3/25/2025 at 1:27 PM          Electronically signed by TRISHA Rodgers CNP on 3/25/2025 at 1:27 PM

## 2025-03-25 NOTE — DISCHARGE INSTRUCTIONS
[facility]   Physician Orders and Discharge Instructions  [facility]  [address] [place]  [address]  Telephone: [phone]      [phone]        NAME:  Carli Bustamante                                                                                         YOB: 1980  MEDICAL RECORD NUMBER:  [id number]     Your  is:  Jeannette     Home Care/Facility: none     Wound Location: Right Great Toe     Dressing orders:   1.Cleanse wound(s) with normal saline.  2. Apply dry (COLLAGEN) PROMOGRAN or equivalent to wound bed.  3. Moisten PROMOGRAN with a few drops of normal saline.  4. Cover with 4x4's and wrap with gauze (errol or kerlix)  5. Change  Every other day or Monday, Wednesday, and Friday      Wound Location: Right Lateral Foot  Pad and protect with a dry dressing  Change as needed   Compression: none      Offloading Device:Post op shoe with peg assist      Other Instructions:   Keep the wound dry and covered when showering.      Keep all dressings clean, dry and intact.  Keep pressure off the wound(s) at all times.      Follow up visit  2 weeks April 8 , 2025 at 1:00     Please give 24 hour notice if unable to keep appointment. [phone]     If you experience any of the following, please call the Wound Care Service at  [phone] or go to the nearest emergency room.        *Increase in pain*Temperature over 101*Increase in drainage from your wound or a foul odor  *Uncontrolled swelling*Need for compression bandage changes due to slippage, breakthrough drainage       PLEASE NOTE: IF YOU ARE UNABLE TO OBTAIN WOUND SUPPLIES, CONTINUE TO USE THE SUPPLIES YOU HAVE AVAILABLE UNTIL YOU ARE ABLE TO REACH US. IT IS MOST IMPORTANT TO KEEP THE WOUND COVERED AT ALL TIMES      Electronically signed by TRISHA Rodgers CNP on 3/25/2025 at 1:27 PM

## 2025-03-28 ENCOUNTER — OFFICE VISIT (OUTPATIENT)
Dept: ENDOCRINOLOGY | Age: 45
End: 2025-03-28

## 2025-03-28 VITALS
SYSTOLIC BLOOD PRESSURE: 107 MMHG | WEIGHT: 180 LBS | BODY MASS INDEX: 31.89 KG/M2 | DIASTOLIC BLOOD PRESSURE: 78 MMHG | HEART RATE: 97 BPM | HEIGHT: 63 IN

## 2025-03-28 DIAGNOSIS — E11.69 TYPE 2 DIABETES MELLITUS WITH OTHER SPECIFIED COMPLICATION, WITH LONG-TERM CURRENT USE OF INSULIN: ICD-10-CM

## 2025-03-28 DIAGNOSIS — Z79.4 TYPE 2 DIABETES MELLITUS WITH OTHER SPECIFIED COMPLICATION, WITH LONG-TERM CURRENT USE OF INSULIN: ICD-10-CM

## 2025-03-28 DIAGNOSIS — Z96.41 INSULIN PUMP IN PLACE: Primary | ICD-10-CM

## 2025-03-28 LAB
CHP ED QC CHECK: NORMAL
GLUCOSE BLD-MCNC: 135 MG/DL

## 2025-03-28 RX ORDER — INSULIN LISPRO 100 [IU]/ML
INJECTION, SOLUTION INTRAVENOUS; SUBCUTANEOUS
Qty: 60 ML | Refills: 3 | Status: SHIPPED | OUTPATIENT
Start: 2025-03-28

## 2025-03-28 NOTE — PROGRESS NOTES
3/28/2025    Assessment:       Diagnosis Orders   1. Insulin pump in place        2. Type 2 diabetes mellitus with other specified complication, with long-term current use of insulin (HCC)  insulin lispro (HUMALOG,ADMELOG) 100 UNIT/ML SOLN injection vial    Basic Metabolic Panel    Hemoglobin A1C    Lipid Panel    POCT Glucose            PLAN:     Orders Placed This Encounter   Procedures    Basic Metabolic Panel     Standing Status:   Future     Expected Date:   3/28/2025     Expiration Date:   3/28/2026    Hemoglobin A1C     Standing Status:   Future     Expected Date:   3/28/2025     Expiration Date:   3/28/2026    Lipid Panel     Standing Status:   Future     Expected Date:   3/28/2025     Expiration Date:   3/28/2026     Is Patient Fasting?/# of Hours:   8    POCT Glucose     Orders Placed This Encounter   Medications    insulin lispro (HUMALOG,ADMELOG) 100 UNIT/ML SOLN injection vial     Sig: TAKE AS DIRECTED via insulin pump. max DAILY dose 200 UNITS.     Dispense:  60 mL     Refill:  3     This prescription was filled on 10/11/2024. Any refills authorized will be placed on file.       Continue patient on insulin pump as per current pump setting follow-up in 3 to 6 months time A1c goal of less than 7 patient to call [employer] to get sensor linked and to get the pump upgraded to 780      Orders Placed This Encounter   Procedures    POCT Glucose     No orders of the defined types were placed in this encounter.    No follow-ups on file.  Subjective:     Chief Complaint   Patient presents with    Diabetes     Medtronic pump download in media      Vitals:    03/28/25 1342   BP: 107/78   BP Site: Right Upper Arm   Patient Position: Sitting   BP Cuff Size: Large Adult   Pulse: 97   Weight: 81.6 kg (180 lb)   Height: 1.6 m (5' 3\")     Wt Readings from Last 3 Encounters:   03/28/25 81.6 kg (180 lb)   01/14/25 85 kg (187 lb 6.4 oz)   12/27/24 81.6 kg (180 lb)     BP Readings from Last 3 Encounters:   03/28/25 107/78

## 2025-04-08 ENCOUNTER — HOSPITAL ENCOUNTER (OUTPATIENT)
Dept: WOUND CARE | Age: 45
Discharge: HOME | End: 2025-04-08
Attending: STUDENT IN AN ORGANIZED HEALTH CARE EDUCATION/TRAINING PROGRAM
Payer: COMMERCIAL

## 2025-04-08 VITALS
SYSTOLIC BLOOD PRESSURE: 132 MMHG | TEMPERATURE: 97.3 F | RESPIRATION RATE: 16 BRPM | DIASTOLIC BLOOD PRESSURE: 89 MMHG | HEART RATE: 106 BPM

## 2025-04-08 DIAGNOSIS — A41.9 SEPSIS DUE TO CELLULITIS (HCC): Primary | ICD-10-CM

## 2025-04-08 DIAGNOSIS — L03.90 SEPSIS DUE TO CELLULITIS (HCC): Primary | ICD-10-CM

## 2025-04-08 DIAGNOSIS — E11.628 DIABETIC FOOT INFECTION (HCC): ICD-10-CM

## 2025-04-08 DIAGNOSIS — L08.9 DIABETIC FOOT INFECTION (HCC): ICD-10-CM

## 2025-04-08 PROBLEM — E11.621 DIABETIC ULCER OF RIGHT GREAT TOE (HCC): Status: ACTIVE | Noted: 2024-10-10

## 2025-04-08 PROBLEM — L97.519 DIABETIC ULCER OF RIGHT GREAT TOE (HCC): Status: ACTIVE | Noted: 2024-10-10

## 2025-04-08 PROCEDURE — 6370000000 HC RX 637 (ALT 250 FOR IP): Performed by: STUDENT IN AN ORGANIZED HEALTH CARE EDUCATION/TRAINING PROGRAM

## 2025-04-08 PROCEDURE — 11042 DBRDMT SUBQ TIS 1ST 20SQCM/<: CPT

## 2025-04-08 RX ORDER — LIDOCAINE 50 MG/G
OINTMENT TOPICAL PRN
OUTPATIENT
Start: 2025-04-08

## 2025-04-08 RX ORDER — SODIUM CHLORIDE 9 MG/ML
5-250 INJECTION, SOLUTION INTRAVENOUS PRN
OUTPATIENT
Start: 2025-04-10

## 2025-04-08 RX ORDER — GINSENG 100 MG
CAPSULE ORAL PRN
OUTPATIENT
Start: 2025-04-08

## 2025-04-08 RX ORDER — LIDOCAINE HYDROCHLORIDE 20 MG/ML
JELLY TOPICAL PRN
OUTPATIENT
Start: 2025-04-08

## 2025-04-08 RX ORDER — SILVER SULFADIAZINE 10 MG/G
CREAM TOPICAL PRN
OUTPATIENT
Start: 2025-04-08

## 2025-04-08 RX ORDER — CLOBETASOL PROPIONATE 0.5 MG/G
OINTMENT TOPICAL PRN
OUTPATIENT
Start: 2025-04-08

## 2025-04-08 RX ORDER — SODIUM CHLOR/HYPOCHLOROUS ACID 0.033 %
SOLUTION, IRRIGATION IRRIGATION PRN
OUTPATIENT
Start: 2025-04-08

## 2025-04-08 RX ORDER — TRIAMCINOLONE ACETONIDE 1 MG/G
OINTMENT TOPICAL PRN
OUTPATIENT
Start: 2025-04-08

## 2025-04-08 RX ORDER — BACITRACIN ZINC AND POLYMYXIN B SULFATE 500; 1000 [USP'U]/G; [USP'U]/G
OINTMENT TOPICAL PRN
OUTPATIENT
Start: 2025-04-08

## 2025-04-08 RX ORDER — MUPIROCIN 20 MG/G
OINTMENT TOPICAL PRN
OUTPATIENT
Start: 2025-04-08

## 2025-04-08 RX ORDER — NEOMYCIN/BACITRACIN/POLYMYXINB 3.5-400-5K
OINTMENT (GRAM) TOPICAL PRN
OUTPATIENT
Start: 2025-04-08

## 2025-04-08 RX ORDER — GENTAMICIN SULFATE 1 MG/G
OINTMENT TOPICAL PRN
OUTPATIENT
Start: 2025-04-08

## 2025-04-08 RX ORDER — LIDOCAINE 40 MG/G
CREAM TOPICAL PRN
Status: DISCONTINUED | OUTPATIENT
Start: 2025-04-08 | End: 2025-04-09 | Stop reason: HOSPADM

## 2025-04-08 RX ORDER — LIDOCAINE HYDROCHLORIDE 40 MG/ML
SOLUTION TOPICAL PRN
OUTPATIENT
Start: 2025-04-08

## 2025-04-08 RX ORDER — LIDOCAINE 40 MG/G
CREAM TOPICAL PRN
OUTPATIENT
Start: 2025-04-08

## 2025-04-08 RX ORDER — BETAMETHASONE DIPROPIONATE 0.5 MG/G
CREAM TOPICAL PRN
OUTPATIENT
Start: 2025-04-08

## 2025-04-08 RX ADMIN — LIDOCAINE 4%: 4 CREAM TOPICAL at 13:31

## 2025-04-08 ASSESSMENT — PAIN SCALES - GENERAL: PAINLEVEL_OUTOF10: 5

## 2025-04-08 NOTE — DISCHARGE INSTR - COC
Test): {Done Not Done Date:}    Treatments at the Time of Hospital Discharge:   Respiratory Treatments: ***  Oxygen Therapy:  {Therapy; copd oxygen:92064}  Ventilator:    {[facility] Vent List:332328042}    Rehab Therapies: {THERAPEUTIC INTERVENTION:7554343337}  Weight Bearing Status/Restrictions: { CC Weight Bearin}  Other Medical Equipment (for information only, NOT a DME order):  {EQUIPMENT:093253478}  Other Treatments: ***    Patient's personal belongings (please select all that are sent with patient):  {CHP DME Belongings:482350671}    RN SIGNATURE:  {Esignature:425181906}    CASE MANAGEMENT/SOCIAL WORK SECTION    Inpatient Status Date: ***    Readmission Risk Assessment Score:  [facility] RISK OF UNPLANNED READMISSION 2.0             0 Total Score        Discharging to Facility/ Agency   Name:   Address:  Phone:  Fax:    Dialysis Facility (if applicable)   Name:  Address:  Dialysis Schedule:  Phone:  Fax:    / signature: {Esignature:011591239}    PHYSICIAN SECTION    Prognosis: {Prognosis:0862247448}    Condition at Discharge: { Patient Condition:174805781}    Rehab Potential (if transferring to Rehab): {Prognosis:5876653164}    Recommended Labs or Other Treatments After Discharge: ***    Physician Certification: I certify the above information and transfer of Carli Bustamante  is necessary for the continuing treatment of the diagnosis listed and that she requires {Admit to Appropriate Level of Care:52681} for {GREATER/LESS:586263174} 30 days.     Update Admission H&P: {CHP DME Changes in HandP:942305247}    PHYSICIAN SIGNATURE:  {Esignature:214520895}

## 2025-04-08 NOTE — PROGRESS NOTES
[place] [address]  Telephone: [phone]      [phone]        NAME:  Carli Bustamante                                                                                         YOB: 1980  MEDICAL RECORD NUMBER:  [id number]     Your  is:  Jeannette     Home Care/Facility: none     Wound Location: Right Great Toe     Dressing orders:   1.Cleanse wound(s) with normal saline.  2. Apply dry (COLLAGEN) PROMOGRAN or equivalent to wound bed.  3. Moisten PROMOGRAN with a few drops of normal saline.  4. Cover with 4x4's and wrap with gauze (errol or kerlix)  5. Change  Every other day or Monday, Wednesday, and Friday        Wound Location: Right Lateral Foot  Pad and protect with a dry dressing  Change as needed     Compression: none      Offloading Device:Post op shoe with peg assist, felt donut pad to great toe if unable to wear the post op shoe with peg assist      Other Instructions:   Keep the wound dry and covered when showering.      Keep all dressings clean, dry and intact.  Keep pressure off the wound(s) at all times.      Follow up visit  2 weeks April 22, 2025 at 1:00pm     Please give 24 hour notice if unable to keep appointment. [phone]     If you experience any of the following, please call the Wound Care Service at  [phone] or go to the nearest emergency room.        *Increase in pain*Temperature over 101*Increase in drainage from your wound or a foul odor  *Uncontrolled swelling*Need for compression bandage changes due to slippage, breakthrough drainage       PLEASE NOTE: IF YOU ARE UNABLE TO OBTAIN WOUND SUPPLIES, CONTINUE TO USE THE SUPPLIES YOU HAVE AVAILABLE UNTIL YOU ARE ABLE TO REACH US. IT IS MOST IMPORTANT TO KEEP THE WOUND COVERED AT ALL TIMES       Electronically signed by TRISHA Rodgers CNP on 4/8/2025 at 1:50 PM          Electronically signed by TRISHA Rodgers CNP on 4/8/2025 at 1:50 PM

## 2025-04-18 ENCOUNTER — APPOINTMENT (OUTPATIENT)
Dept: GENERAL RADIOLOGY | Age: 45
End: 2025-04-18
Payer: COMMERCIAL

## 2025-04-18 ENCOUNTER — HOSPITAL ENCOUNTER (EMERGENCY)
Age: 45
Discharge: HOME | End: 2025-04-18
Attending: INTERNAL MEDICINE
Payer: COMMERCIAL

## 2025-04-18 VITALS
SYSTOLIC BLOOD PRESSURE: 156 MMHG | RESPIRATION RATE: 16 BRPM | HEIGHT: 63 IN | DIASTOLIC BLOOD PRESSURE: 90 MMHG | TEMPERATURE: 98.4 F | HEART RATE: 94 BPM | WEIGHT: 181.8 LBS | BODY MASS INDEX: 32.21 KG/M2 | OXYGEN SATURATION: 100 %

## 2025-04-18 DIAGNOSIS — L03.031 ABSCESS OR CELLULITIS OF TOE, RIGHT: ICD-10-CM

## 2025-04-18 DIAGNOSIS — L97.519 SKIN ULCER OF RIGHT GREAT TOE, UNSPECIFIED ULCER STAGE (HCC): Primary | ICD-10-CM

## 2025-04-18 DIAGNOSIS — L02.611 ABSCESS OR CELLULITIS OF TOE, RIGHT: ICD-10-CM

## 2025-04-18 LAB
ALBUMIN SERPL-MCNC: 4.3 G/DL (ref 3.5–4.6)
ALP SERPL-CCNC: 73 U/L (ref 40–130)
ALT SERPL-CCNC: 25 U/L (ref 0–33)
ANION GAP SERPL CALCULATED.3IONS-SCNC: 11 MEQ/L (ref 9–15)
AST SERPL-CCNC: 24 U/L (ref 0–35)
BASOPHILS # BLD: 0 K/UL (ref 0–0.2)
BASOPHILS NFR BLD: 0.5 %
BILIRUB SERPL-MCNC: 0.4 MG/DL (ref 0.2–0.7)
BUN SERPL-MCNC: 7 MG/DL (ref 6–20)
CALCIUM SERPL-MCNC: 9.6 MG/DL (ref 8.5–9.9)
CHLORIDE SERPL-SCNC: 101 MEQ/L (ref 95–107)
CO2 SERPL-SCNC: 24 MEQ/L (ref 20–31)
CREAT SERPL-MCNC: 0.54 MG/DL (ref 0.5–0.9)
CRP SERPL HS-MCNC: 10.3 MG/L (ref 0–5)
EOSINOPHIL # BLD: 0.4 K/UL (ref 0–0.7)
EOSINOPHIL NFR BLD: 5 %
ERYTHROCYTE [DISTWIDTH] IN BLOOD BY AUTOMATED COUNT: 13.8 % (ref 11.5–14.5)
ERYTHROCYTE [SEDIMENTATION RATE] IN BLOOD BY WESTERGREN METHOD: 12 MM (ref 0–20)
GLOBULIN SER CALC-MCNC: 3.6 G/DL (ref 2.3–3.5)
GLUCOSE SERPL-MCNC: 263 MG/DL (ref 70–99)
HCG SERPL QL: NEGATIVE
HCT VFR BLD AUTO: 41.2 % (ref 37–47)
HGB BLD-MCNC: 15 G/DL (ref 12–16)
LACTATE BLDV-SCNC: 1.9 MMOL/L (ref 0.5–2.2)
LYMPHOCYTES # BLD: 1.7 K/UL (ref 1–4.8)
LYMPHOCYTES NFR BLD: 19.2 %
MAGNESIUM SERPL-MCNC: 2 MG/DL (ref 1.7–2.4)
MCH RBC QN AUTO: 30.9 PG (ref 27–31.3)
MCHC RBC AUTO-ENTMCNC: 36.4 % (ref 33–37)
MCV RBC AUTO: 84.9 FL (ref 79.4–94.8)
MONOCYTES # BLD: 0.6 K/UL (ref 0.2–0.8)
MONOCYTES NFR BLD: 6.3 %
NEUTROPHILS # BLD: 6 K/UL (ref 1.4–6.5)
NEUTS SEG NFR BLD: 68.7 %
PLATELET # BLD AUTO: 332 K/UL (ref 130–400)
POTASSIUM SERPL-SCNC: 4.1 MEQ/L (ref 3.4–4.9)
PROT SERPL-MCNC: 7.9 G/DL (ref 6.3–8)
RBC # BLD AUTO: 4.85 M/UL (ref 4.2–5.4)
SODIUM SERPL-SCNC: 136 MEQ/L (ref 135–144)
WBC # BLD AUTO: 8.8 K/UL (ref 4.8–10.8)

## 2025-04-18 PROCEDURE — 80053 COMPREHEN METABOLIC PANEL: CPT

## 2025-04-18 PROCEDURE — 83735 ASSAY OF MAGNESIUM: CPT

## 2025-04-18 PROCEDURE — 85025 COMPLETE CBC W/AUTO DIFF WBC: CPT

## 2025-04-18 PROCEDURE — 85652 RBC SED RATE AUTOMATED: CPT

## 2025-04-18 PROCEDURE — 99284 EMERGENCY DEPT VISIT MOD MDM: CPT

## 2025-04-18 PROCEDURE — 84703 CHORIONIC GONADOTROPIN ASSAY: CPT

## 2025-04-18 PROCEDURE — 83605 ASSAY OF LACTIC ACID: CPT

## 2025-04-18 PROCEDURE — 86140 C-REACTIVE PROTEIN: CPT

## 2025-04-18 PROCEDURE — 73660 X-RAY EXAM OF TOE(S): CPT

## 2025-04-18 PROCEDURE — 87040 BLOOD CULTURE FOR BACTERIA: CPT

## 2025-04-18 RX ORDER — SULFAMETHOXAZOLE AND TRIMETHOPRIM 800; 160 MG/1; MG/1
1 TABLET ORAL 2 TIMES DAILY
Qty: 14 TABLET | Refills: 0 | Status: SHIPPED | OUTPATIENT
Start: 2025-04-18 | End: 2025-04-25

## 2025-04-18 RX ORDER — CEPHALEXIN 500 MG/1
500 CAPSULE ORAL 3 TIMES DAILY
Qty: 21 CAPSULE | Refills: 0 | Status: SHIPPED | OUTPATIENT
Start: 2025-04-18 | End: 2025-04-25

## 2025-04-18 ASSESSMENT — ENCOUNTER SYMPTOMS
RHINORRHEA: 0
NAUSEA: 0
ABDOMINAL PAIN: 0
DIARRHEA: 0
BACK PAIN: 0
SHORTNESS OF BREATH: 0
SORE THROAT: 0
VOMITING: 0
EYE PAIN: 0
COLOR CHANGE: 1
COUGH: 0

## 2025-04-18 ASSESSMENT — PAIN DESCRIPTION - PAIN TYPE: TYPE: CHRONIC PAIN

## 2025-04-18 ASSESSMENT — PAIN DESCRIPTION - FREQUENCY: FREQUENCY: CONTINUOUS

## 2025-04-18 ASSESSMENT — PAIN DESCRIPTION - DESCRIPTORS: DESCRIPTORS: ACHING

## 2025-04-18 ASSESSMENT — PAIN - FUNCTIONAL ASSESSMENT: PAIN_FUNCTIONAL_ASSESSMENT: 0-10

## 2025-04-18 ASSESSMENT — PAIN DESCRIPTION - LOCATION: LOCATION: FOOT

## 2025-04-18 ASSESSMENT — PAIN SCALES - GENERAL: PAINLEVEL_OUTOF10: 7

## 2025-04-18 ASSESSMENT — PAIN DESCRIPTION - ORIENTATION: ORIENTATION: RIGHT

## 2025-04-18 NOTE — ED PROVIDER NOTES
MEDICAL HISTORY     Past Medical History:   Diagnosis Date    Anemia during pregnancy 2018    Eczema     Herpes simplex infection of skin     on neck    Hypercholesterolemia     Irregular heart beat     runs of v tach in the past    Obesity     Pregnancy induced hypertension     Type II or unspecified type diabetes mellitus without mention of complication, not stated as uncontrolled     type 2         SURGICAL HISTORY       Past Surgical History:   Procedure Laterality Date    ABDOMEN SURGERY       x 2     SECTION      TUBAL LIGATION           CURRENT MEDICATIONS       Discharge Medication List as of 2025  1:38 PM        CONTINUE these medications which have NOT CHANGED    Details   insulin lispro (HUMALOG,ADMELOG) 100 UNIT/ML SOLN injection vial TAKE AS DIRECTED via insulin pump. max DAILY dose 200 UNITS., Disp-60 mL, R-3This prescription was filled on 10/11/2024. Any refills authorized will be placed on file.Normal      Alcohol Swabs PADS Disp-150 each, R-06, NormalUse qid      clotrimazole-betamethasone (LOTRISONE) 1-0.05 % cream Apply topically 2 times daily., Disp-45 g, R-2, Normal      Lancets Misc. (ACCU-CHEK FASTCLIX LANCET) KIT 4 TIMES DAILY Starting Wed 2023, Disp-1 kit, R-3, NormalTest 4x daily, works with patient insulin pump      !! blood glucose test strips (ACCU-CHEK GUIDE) strip Test 4x daily, Patient is on insulin pump, Disp-150 each, R-3Normal      acetaminophen (TYLENOL) 500 MG tablet Take 2 tablets by mouth every 6 hours as needed for Pain or Fever, Disp-60 tablet, R-0Normal      blood glucose monitor kit and supplies Dispense one meter that insurance will cover., Disp-1 kit, R-0, Normal      !! blood glucose monitor strips Test 3x daily e11.65, Disp-100 strip, R-0, Normal      Insulin Pen Needle (NOVOFINE) 32G X 6 MM MISC Disp-300 each, R-5, Normalqid       !! - Potential duplicate medications found. Please discuss with provider.          ALLERGIES      Tape [adhesive tape]    FAMILY HISTORY       Family History   Problem Relation Age of Onset    Arthritis Mother     Diabetes Mother     Early Death Father     Heart Attack Father     Heart Disease Father     High Blood Pressure Father     High Cholesterol Father     Diabetes Father     Arthritis Sister     Birth Defects Son     Diabetes Paternal Grandfather     Heart Disease Paternal Grandfather     Breast Cancer Paternal Cousin     Cancer Neg Hx           SOCIAL HISTORY       Social History     Socioeconomic History    Marital status: Single   Tobacco Use    Smoking status: Former     Current packs/day: 0.00     Average packs/day: 0.3 packs/day for 23.0 years (5.8 ttl pk-yrs)     Types: Cigarettes     Start date: 1/1/1999     Quit date: 10/30/2021     Years since quitting: 3.4     Passive exposure: Past    Smokeless tobacco: Never    Tobacco comments:     started age 17   Vaping Use    Vaping status: Never Used   Substance and Sexual Activity    Alcohol use: No     Comment: socially before pregnancy    Drug use: No    Sexual activity: Not Currently     Partners: Male     Birth control/protection: Female Sterilization     Social Drivers of Health     Financial Resource Strain: Low Risk  (7/25/2024)    Overall Financial Resource Strain (CARDIA)     Difficulty of Paying Living Expenses: Not hard at all   Food Insecurity: No Food Insecurity (8/19/2024)    Hunger Vital Sign     Worried About Running Out of Food in the Last Year: Never true     Ran Out of Food in the Last Year: Never true   Transportation Needs: No Transportation Needs (8/19/2024)    PRAPARE - Transportation     Lack of Transportation (Medical): No     Lack of Transportation (Non-Medical): No   Housing Stability: Low Risk  (8/19/2024)    Housing Stability Vital Sign     Unable to Pay for Housing in the Last Year: No     Number of Times Moved in the Last Year: 1     Homeless in the Last Year: No       SCREENINGS                               CIWA

## 2025-04-18 NOTE — ED TRIAGE NOTES
Patient arrived via private car due to R great toe infected. Patient has been treated by wound center for this and it is now draining fluid. Patient is changing her dressing 3 x daily. Patient has been septic in the past due to wounds on her feet.   Not on any current antibiotics.   Ambulates by self, A/O x 4, cool and dry

## 2025-04-18 NOTE — ED NOTES
Pt states she is being treated by wound care, her last appointment was last week and her next appointment is next Tuesday.  Pt has not called wound care, she came in because the draining has increased.

## 2025-04-19 LAB
BACTERIA BLD CULT ORG #2: NORMAL
BACTERIA BLD CULT: NORMAL

## 2025-04-22 ENCOUNTER — HOSPITAL ENCOUNTER (OUTPATIENT)
Dept: WOUND CARE | Age: 45
Discharge: HOME | End: 2025-04-22
Attending: STUDENT IN AN ORGANIZED HEALTH CARE EDUCATION/TRAINING PROGRAM
Payer: COMMERCIAL

## 2025-04-22 VITALS
HEART RATE: 94 BPM | DIASTOLIC BLOOD PRESSURE: 85 MMHG | RESPIRATION RATE: 17 BRPM | SYSTOLIC BLOOD PRESSURE: 142 MMHG | TEMPERATURE: 97 F

## 2025-04-22 DIAGNOSIS — E11.628 DIABETIC FOOT INFECTION (HCC): Primary | ICD-10-CM

## 2025-04-22 DIAGNOSIS — L08.9 DIABETIC FOOT INFECTION (HCC): Primary | ICD-10-CM

## 2025-04-22 PROCEDURE — 11043 DBRDMT MUSC&/FSCA 1ST 20/<: CPT

## 2025-04-22 PROCEDURE — 6370000000 HC RX 637 (ALT 250 FOR IP): Performed by: STUDENT IN AN ORGANIZED HEALTH CARE EDUCATION/TRAINING PROGRAM

## 2025-04-22 RX ORDER — GENTAMICIN SULFATE 1 MG/G
OINTMENT TOPICAL PRN
OUTPATIENT
Start: 2025-04-22

## 2025-04-22 RX ORDER — NEOMYCIN/BACITRACIN/POLYMYXINB 3.5-400-5K
OINTMENT (GRAM) TOPICAL PRN
OUTPATIENT
Start: 2025-04-22

## 2025-04-22 RX ORDER — GINSENG 100 MG
CAPSULE ORAL PRN
OUTPATIENT
Start: 2025-04-22

## 2025-04-22 RX ORDER — LIDOCAINE HYDROCHLORIDE 20 MG/ML
JELLY TOPICAL PRN
OUTPATIENT
Start: 2025-04-22

## 2025-04-22 RX ORDER — BACITRACIN ZINC AND POLYMYXIN B SULFATE 500; 1000 [USP'U]/G; [USP'U]/G
OINTMENT TOPICAL PRN
OUTPATIENT
Start: 2025-04-22

## 2025-04-22 RX ORDER — LIDOCAINE HYDROCHLORIDE 40 MG/ML
SOLUTION TOPICAL PRN
OUTPATIENT
Start: 2025-04-22

## 2025-04-22 RX ORDER — SILVER SULFADIAZINE 10 MG/G
CREAM TOPICAL PRN
OUTPATIENT
Start: 2025-04-22

## 2025-04-22 RX ORDER — LIDOCAINE 40 MG/G
CREAM TOPICAL PRN
OUTPATIENT
Start: 2025-04-22

## 2025-04-22 RX ORDER — SODIUM CHLORIDE 9 MG/ML
5-250 INJECTION, SOLUTION INTRAVENOUS PRN
OUTPATIENT
Start: 2025-04-24

## 2025-04-22 RX ORDER — LIDOCAINE 50 MG/G
OINTMENT TOPICAL PRN
OUTPATIENT
Start: 2025-04-22

## 2025-04-22 RX ORDER — BETAMETHASONE DIPROPIONATE 0.5 MG/G
CREAM TOPICAL PRN
OUTPATIENT
Start: 2025-04-22

## 2025-04-22 RX ORDER — LIDOCAINE 40 MG/G
CREAM TOPICAL PRN
Status: DISCONTINUED | OUTPATIENT
Start: 2025-04-22 | End: 2025-04-23 | Stop reason: HOSPADM

## 2025-04-22 RX ORDER — CLOBETASOL PROPIONATE 0.5 MG/G
OINTMENT TOPICAL PRN
OUTPATIENT
Start: 2025-04-22

## 2025-04-22 RX ORDER — MUPIROCIN 20 MG/G
OINTMENT TOPICAL PRN
OUTPATIENT
Start: 2025-04-22

## 2025-04-22 RX ORDER — TRIAMCINOLONE ACETONIDE 1 MG/G
OINTMENT TOPICAL PRN
OUTPATIENT
Start: 2025-04-22

## 2025-04-22 RX ORDER — SODIUM CHLOR/HYPOCHLOROUS ACID 0.033 %
SOLUTION, IRRIGATION IRRIGATION PRN
OUTPATIENT
Start: 2025-04-22

## 2025-04-22 RX ADMIN — LIDOCAINE 4%: 4 CREAM TOPICAL at 15:47

## 2025-04-22 ASSESSMENT — PAIN SCALES - GENERAL: PAINLEVEL_OUTOF10: 5

## 2025-04-22 ASSESSMENT — PAIN DESCRIPTION - DESCRIPTORS: DESCRIPTORS: ACHING

## 2025-04-22 ASSESSMENT — PAIN DESCRIPTION - ORIENTATION: ORIENTATION: RIGHT

## 2025-04-22 ASSESSMENT — PAIN DESCRIPTION - LOCATION: LOCATION: FOOT

## 2025-04-22 NOTE — DISCHARGE INSTRUCTIONS
[facility] and [facility]   Physician Orders and Discharge Instructions  [facility]  [address] [place]  [address]  Telephone: [phone]      [phone]        NAME:  Carli Bustamante                                                                                         YOB: 1980  MEDICAL RECORD NUMBER:  [id number]     Your  is:  Jeannette     Home Care/Facility: none     Wound Location: Right Great Toe, Right Lateral Foot     Dressing orders:   1.Cleanse wound(s) with normal saline.  2. Apply dry SILVERCEL OR CALCIUM ALGINATE WITH Ag or eqivalent to wound bed.  3. Cover with 4x4's and wrap with gauze (errol or kerlix)  4. Change  Every other day or Monday, Wednesday, and Friday You can change daily if needed based on drainage.       Compression: none      Offloading Device:Post op shoe with peg assist     Other Instructions:   Keep the wound dry and covered when showering. Continue taking antibiotics that you were prescribed in the ER.      Keep all dressings clean, dry and intact.  Keep pressure off the wound(s) at all times.      Follow up visit  1 week April 29, 2025 at 1:45pm     Please give 24 hour notice if unable to keep appointment. [phone]     If you experience any of the following, please call the Wound Care Service at  [phone] or go to the nearest emergency room.        *Increase in pain*Temperature over 101*Increase in drainage from your wound or a foul odor  *Uncontrolled swelling*Need for compression bandage changes due to slippage, breakthrough drainage       PLEASE NOTE: IF YOU ARE UNABLE TO OBTAIN WOUND SUPPLIES, CONTINUE TO USE THE SUPPLIES YOU HAVE AVAILABLE UNTIL YOU ARE ABLE TO REACH US. IT IS MOST IMPORTANT TO KEEP THE WOUND COVERED AT ALL TIMES         [Cervical Pap Smear] : cervical Pap smear [Liquid Base] : liquid base

## 2025-04-23 LAB
BACTERIA BLD CULT ORG #2: NORMAL
BACTERIA BLD CULT: NORMAL

## 2025-04-29 ENCOUNTER — HOSPITAL ENCOUNTER (OUTPATIENT)
Dept: WOUND CARE | Age: 45
Discharge: HOME | End: 2025-04-29
Attending: STUDENT IN AN ORGANIZED HEALTH CARE EDUCATION/TRAINING PROGRAM
Payer: COMMERCIAL

## 2025-04-29 VITALS
HEART RATE: 100 BPM | SYSTOLIC BLOOD PRESSURE: 147 MMHG | RESPIRATION RATE: 16 BRPM | DIASTOLIC BLOOD PRESSURE: 90 MMHG | TEMPERATURE: 97.9 F

## 2025-04-29 DIAGNOSIS — E11.628 DIABETIC FOOT INFECTION (HCC): Primary | ICD-10-CM

## 2025-04-29 DIAGNOSIS — L08.9 DIABETIC FOOT INFECTION (HCC): Primary | ICD-10-CM

## 2025-04-29 PROCEDURE — 11042 DBRDMT SUBQ TIS 1ST 20SQCM/<: CPT

## 2025-04-29 RX ORDER — LIDOCAINE 40 MG/G
CREAM TOPICAL PRN
OUTPATIENT
Start: 2025-04-29

## 2025-04-29 RX ORDER — LIDOCAINE HYDROCHLORIDE 40 MG/ML
SOLUTION TOPICAL PRN
OUTPATIENT
Start: 2025-04-29

## 2025-04-29 RX ORDER — LIDOCAINE HYDROCHLORIDE 20 MG/ML
JELLY TOPICAL PRN
OUTPATIENT
Start: 2025-04-29

## 2025-04-29 RX ORDER — GENTAMICIN SULFATE 1 MG/G
OINTMENT TOPICAL PRN
OUTPATIENT
Start: 2025-04-29

## 2025-04-29 RX ORDER — MUPIROCIN 20 MG/G
OINTMENT TOPICAL PRN
OUTPATIENT
Start: 2025-04-29

## 2025-04-29 RX ORDER — GINSENG 100 MG
CAPSULE ORAL PRN
OUTPATIENT
Start: 2025-04-29

## 2025-04-29 RX ORDER — NEOMYCIN/BACITRACIN/POLYMYXINB 3.5-400-5K
OINTMENT (GRAM) TOPICAL PRN
OUTPATIENT
Start: 2025-04-29

## 2025-04-29 RX ORDER — BACITRACIN ZINC AND POLYMYXIN B SULFATE 500; 1000 [USP'U]/G; [USP'U]/G
OINTMENT TOPICAL PRN
OUTPATIENT
Start: 2025-04-29

## 2025-04-29 RX ORDER — SILVER SULFADIAZINE 10 MG/G
CREAM TOPICAL PRN
OUTPATIENT
Start: 2025-04-29

## 2025-04-29 RX ORDER — TRIAMCINOLONE ACETONIDE 1 MG/G
OINTMENT TOPICAL PRN
OUTPATIENT
Start: 2025-04-29

## 2025-04-29 RX ORDER — BETAMETHASONE DIPROPIONATE 0.5 MG/G
CREAM TOPICAL PRN
OUTPATIENT
Start: 2025-04-29

## 2025-04-29 RX ORDER — CLOBETASOL PROPIONATE 0.5 MG/G
OINTMENT TOPICAL PRN
OUTPATIENT
Start: 2025-04-29

## 2025-04-29 RX ORDER — LIDOCAINE 50 MG/G
OINTMENT TOPICAL PRN
OUTPATIENT
Start: 2025-04-29

## 2025-04-29 RX ORDER — SODIUM CHLOR/HYPOCHLOROUS ACID 0.033 %
SOLUTION, IRRIGATION IRRIGATION PRN
OUTPATIENT
Start: 2025-04-29

## 2025-04-29 ASSESSMENT — PAIN DESCRIPTION - LOCATION: LOCATION: FOOT

## 2025-04-29 ASSESSMENT — PAIN DESCRIPTION - ORIENTATION: ORIENTATION: RIGHT

## 2025-04-29 ASSESSMENT — PAIN SCALES - GENERAL: PAINLEVEL_OUTOF10: 4

## 2025-04-29 ASSESSMENT — PAIN DESCRIPTION - DESCRIPTORS: DESCRIPTORS: ACHING

## 2025-04-29 NOTE — DISCHARGE INSTRUCTIONS
[facility]   Physician Orders and Discharge Instructions  [facility]  [address] [place]  [address]  Telephone: [phone]      [phone]        NAME:  Carli Bustamante                                                                                         YOB: 1980  MEDICAL RECORD NUMBER:  [id number]     Your  is:  Jeannette     Home Care/Facility: none     Wound Location: Right Great Toe, Right Lateral Foot     Dressing orders:   1.Cleanse wound(s) with normal saline.  2. Apply dry COLLAGEN (PROMOGRAN) or equivalent to wound bed.  3. Moisten collagen with a few drops of normal saline.  4. Cover with 4x4's and wrap with gauze (errol or kerlix)  5. Change  Every other day or Monday, Wednesday, and Friday          Compression: none      Offloading Device:Post op shoe with peg assist     Other Instructions:   Keep the wound dry and covered when showering.      Keep all dressings clean, dry and intact.  Keep pressure off the wound(s) at all times.      Follow up visit  1 week May 6, 2025 at      Please give 24 hour notice if unable to keep appointment. [phone]     If you experience any of the following, please call the Wound Care Service at  [phone] or go to the nearest emergency room.        *Increase in pain*Temperature over 101*Increase in drainage from your wound or a foul odor  *Uncontrolled swelling*Need for compression bandage changes due to slippage, breakthrough drainage       PLEASE NOTE: IF YOU ARE UNABLE TO OBTAIN WOUND SUPPLIES, CONTINUE TO USE THE SUPPLIES YOU HAVE AVAILABLE UNTIL YOU ARE ABLE TO REACH US. IT IS MOST IMPORTANT TO KEEP THE WOUND COVERED AT ALL TIMES

## 2025-05-06 ENCOUNTER — HOSPITAL ENCOUNTER (OUTPATIENT)
Dept: WOUND CARE | Age: 45
Discharge: HOME | End: 2025-05-06
Payer: COMMERCIAL

## 2025-05-06 VITALS
SYSTOLIC BLOOD PRESSURE: 125 MMHG | RESPIRATION RATE: 16 BRPM | HEART RATE: 102 BPM | TEMPERATURE: 97.2 F | DIASTOLIC BLOOD PRESSURE: 76 MMHG

## 2025-05-06 DIAGNOSIS — L08.9 DIABETIC FOOT INFECTION (HCC): Primary | ICD-10-CM

## 2025-05-06 DIAGNOSIS — L03.90 SEPSIS DUE TO CELLULITIS (HCC): ICD-10-CM

## 2025-05-06 DIAGNOSIS — E11.628 DIABETIC FOOT INFECTION (HCC): Primary | ICD-10-CM

## 2025-05-06 DIAGNOSIS — A41.9 SEPSIS DUE TO CELLULITIS (HCC): ICD-10-CM

## 2025-05-06 PROCEDURE — 6370000000 HC RX 637 (ALT 250 FOR IP): Performed by: STUDENT IN AN ORGANIZED HEALTH CARE EDUCATION/TRAINING PROGRAM

## 2025-05-06 PROCEDURE — 11042 DBRDMT SUBQ TIS 1ST 20SQCM/<: CPT

## 2025-05-06 RX ORDER — GENTAMICIN SULFATE 1 MG/G
OINTMENT TOPICAL PRN
OUTPATIENT
Start: 2025-05-06

## 2025-05-06 RX ORDER — LIDOCAINE 50 MG/G
OINTMENT TOPICAL PRN
OUTPATIENT
Start: 2025-05-06

## 2025-05-06 RX ORDER — LIDOCAINE 40 MG/G
CREAM TOPICAL PRN
OUTPATIENT
Start: 2025-05-06

## 2025-05-06 RX ORDER — GINSENG 100 MG
CAPSULE ORAL PRN
OUTPATIENT
Start: 2025-05-06

## 2025-05-06 RX ORDER — BETAMETHASONE DIPROPIONATE 0.5 MG/G
CREAM TOPICAL PRN
OUTPATIENT
Start: 2025-05-06

## 2025-05-06 RX ORDER — NEOMYCIN/BACITRACIN/POLYMYXINB 3.5-400-5K
OINTMENT (GRAM) TOPICAL PRN
OUTPATIENT
Start: 2025-05-06

## 2025-05-06 RX ORDER — SODIUM CHLOR/HYPOCHLOROUS ACID 0.033 %
SOLUTION, IRRIGATION IRRIGATION PRN
OUTPATIENT
Start: 2025-05-06

## 2025-05-06 RX ORDER — LIDOCAINE HYDROCHLORIDE 20 MG/ML
JELLY TOPICAL PRN
Status: DISCONTINUED | OUTPATIENT
Start: 2025-05-06 | End: 2025-05-07 | Stop reason: HOSPADM

## 2025-05-06 RX ORDER — SODIUM CHLORIDE 9 MG/ML
5-250 INJECTION, SOLUTION INTRAVENOUS PRN
OUTPATIENT
Start: 2025-05-08

## 2025-05-06 RX ORDER — LIDOCAINE HYDROCHLORIDE 20 MG/ML
JELLY TOPICAL PRN
OUTPATIENT
Start: 2025-05-06

## 2025-05-06 RX ORDER — CLOBETASOL PROPIONATE 0.5 MG/G
OINTMENT TOPICAL PRN
OUTPATIENT
Start: 2025-05-06

## 2025-05-06 RX ORDER — LIDOCAINE HYDROCHLORIDE 40 MG/ML
SOLUTION TOPICAL PRN
OUTPATIENT
Start: 2025-05-06

## 2025-05-06 RX ORDER — SILVER SULFADIAZINE 10 MG/G
CREAM TOPICAL PRN
OUTPATIENT
Start: 2025-05-06

## 2025-05-06 RX ORDER — BACITRACIN ZINC AND POLYMYXIN B SULFATE 500; 1000 [USP'U]/G; [USP'U]/G
OINTMENT TOPICAL PRN
OUTPATIENT
Start: 2025-05-06

## 2025-05-06 RX ORDER — MUPIROCIN 20 MG/G
OINTMENT TOPICAL PRN
OUTPATIENT
Start: 2025-05-06

## 2025-05-06 RX ORDER — TRIAMCINOLONE ACETONIDE 1 MG/G
OINTMENT TOPICAL PRN
OUTPATIENT
Start: 2025-05-06

## 2025-05-06 RX ADMIN — LIDOCAINE HYDROCHLORIDE: 20 JELLY TOPICAL at 13:58

## 2025-05-06 ASSESSMENT — PAIN SCALES - GENERAL: PAINLEVEL_OUTOF10: 2

## 2025-05-06 ASSESSMENT — PAIN DESCRIPTION - LOCATION: LOCATION: FOOT

## 2025-05-06 ASSESSMENT — PAIN DESCRIPTION - ORIENTATION: ORIENTATION: RIGHT

## 2025-05-06 NOTE — DISCHARGE INSTRUCTIONS
[facility] and Hyperbar[facility] Medicine   Physician Orders and Discharge Instructions  [facility]  [address] [place]  [address]  Telephone: [phone]      [phone]        NAME:  Carli Bustamante                                                                                         YOB: 1980  MEDICAL RECORD NUMBER:  [id number]     Your  is:  Jeannette     Home Care/Facility: none     Wound Location: Right Great Toe, Right Lateral Foot     Dressing orders:   1.Cleanse wound(s) with normal saline.  2. Apply dry COLLAGEN (PROMOGRAN) or equivalent to wound bed.  3. Moisten collagen with a few drops of normal saline.  4. Cover with 4x4's and wrap with gauze (errol or kerlix)  5. Change  Every other day or Monday, Wednesday, and Friday           Compression: none      Offloading Device:Post op shoe with peg assist     Other Instructions:   Keep the wound dry and covered when showering.      Keep all dressings clean, dry and intact.  Keep pressure off the wound(s) at all times.      Follow up visit  2 weeks May 20, 2025 at 1:15pm     Please give 24 hour notice if unable to keep appointment. [phone]     If you experience any of the following, please call the Wound Care Service at  [phone] or go to the nearest emergency room.        *Increase in pain*Temperature over 101*Increase in drainage from your wound or a foul odor  *Uncontrolled swelling*Need for compression bandage changes due to slippage, breakthrough drainage       PLEASE NOTE: IF YOU ARE UNABLE TO OBTAIN WOUND SUPPLIES, CONTINUE TO USE THE SUPPLIES YOU HAVE AVAILABLE UNTIL YOU ARE ABLE TO REACH US. IT IS MOST IMPORTANT TO KEEP THE WOUND COVERED AT ALL TIMES

## 2025-05-20 ENCOUNTER — HOSPITAL ENCOUNTER (OUTPATIENT)
Dept: WOUND CARE | Age: 45
Discharge: HOME | End: 2025-05-20
Attending: STUDENT IN AN ORGANIZED HEALTH CARE EDUCATION/TRAINING PROGRAM
Payer: COMMERCIAL

## 2025-05-20 VITALS
HEART RATE: 116 BPM | RESPIRATION RATE: 16 BRPM | SYSTOLIC BLOOD PRESSURE: 136 MMHG | TEMPERATURE: 97.9 F | DIASTOLIC BLOOD PRESSURE: 84 MMHG

## 2025-05-20 DIAGNOSIS — E11.628 DIABETIC FOOT INFECTION (HCC): Primary | ICD-10-CM

## 2025-05-20 DIAGNOSIS — L08.9 DIABETIC FOOT INFECTION (HCC): Primary | ICD-10-CM

## 2025-05-20 PROCEDURE — 11042 DBRDMT SUBQ TIS 1ST 20SQCM/<: CPT

## 2025-05-20 RX ORDER — TRIAMCINOLONE ACETONIDE 1 MG/G
OINTMENT TOPICAL PRN
OUTPATIENT
Start: 2025-05-20

## 2025-05-20 RX ORDER — LIDOCAINE 40 MG/G
CREAM TOPICAL PRN
OUTPATIENT
Start: 2025-05-20

## 2025-05-20 RX ORDER — GENTAMICIN SULFATE 1 MG/G
OINTMENT TOPICAL PRN
OUTPATIENT
Start: 2025-05-20

## 2025-05-20 RX ORDER — NEOMYCIN/BACITRACIN/POLYMYXINB 3.5-400-5K
OINTMENT (GRAM) TOPICAL PRN
OUTPATIENT
Start: 2025-05-20

## 2025-05-20 RX ORDER — CLOBETASOL PROPIONATE 0.5 MG/G
OINTMENT TOPICAL PRN
OUTPATIENT
Start: 2025-05-20

## 2025-05-20 RX ORDER — BACITRACIN ZINC AND POLYMYXIN B SULFATE 500; 1000 [USP'U]/G; [USP'U]/G
OINTMENT TOPICAL PRN
OUTPATIENT
Start: 2025-05-20

## 2025-05-20 RX ORDER — LIDOCAINE HYDROCHLORIDE 20 MG/ML
JELLY TOPICAL PRN
OUTPATIENT
Start: 2025-05-20

## 2025-05-20 RX ORDER — SILVER SULFADIAZINE 10 MG/G
CREAM TOPICAL PRN
OUTPATIENT
Start: 2025-05-20

## 2025-05-20 RX ORDER — LIDOCAINE HYDROCHLORIDE 40 MG/ML
SOLUTION TOPICAL PRN
OUTPATIENT
Start: 2025-05-20

## 2025-05-20 RX ORDER — MUPIROCIN 20 MG/G
OINTMENT TOPICAL PRN
OUTPATIENT
Start: 2025-05-20

## 2025-05-20 RX ORDER — SODIUM CHLOR/HYPOCHLOROUS ACID 0.033 %
SOLUTION, IRRIGATION IRRIGATION PRN
OUTPATIENT
Start: 2025-05-20

## 2025-05-20 RX ORDER — BETAMETHASONE DIPROPIONATE 0.5 MG/G
CREAM TOPICAL PRN
OUTPATIENT
Start: 2025-05-20

## 2025-05-20 RX ORDER — LIDOCAINE HYDROCHLORIDE 20 MG/ML
JELLY TOPICAL PRN
Status: DISCONTINUED | OUTPATIENT
Start: 2025-05-20 | End: 2025-05-21 | Stop reason: HOSPADM

## 2025-05-20 RX ORDER — GINSENG 100 MG
CAPSULE ORAL PRN
OUTPATIENT
Start: 2025-05-20

## 2025-05-20 RX ORDER — LIDOCAINE 50 MG/G
OINTMENT TOPICAL PRN
OUTPATIENT
Start: 2025-05-20

## 2025-05-20 ASSESSMENT — PAIN DESCRIPTION - ORIENTATION: ORIENTATION: RIGHT

## 2025-05-20 ASSESSMENT — PAIN DESCRIPTION - LOCATION: LOCATION: FOOT

## 2025-05-20 ASSESSMENT — PAIN SCALES - GENERAL: PAINLEVEL_OUTOF10: 5

## 2025-05-20 NOTE — WOUND CARE
[facility] Physician Billing Sheet.     Carli Bustamante  AGE: 44 y.o.   GENDER: female  : 1980  TODAY'S DATE:  2025    ICD-10 CODES  L97.597  E11.42    PHYSICIAN PROCEDURES    CPT CODE  95306        Electronically signed by Alistair Brand DPM on 2025 at 2:01 PM

## 2025-05-20 NOTE — DISCHARGE INSTRUCTIONS
[facility] and Hyperbaric Medicine   Physician Orders and Discharge Instructions  [facility]  [address] [place]  [address]  Telephone: [phone]      [phone]        NAME:  Carli Bustamante                                                                                         YOB: 1980  MEDICAL RECORD NUMBER:  [id number]     Your  is:  Jeannette     Home Care/Facility: none     Wound Location: Right Great Toe, Right Lateral Foot     Dressing orders:   1.Cleanse wound(s) with normal saline.  2. Apply dry COLLAGEN (PROMOGRAN) or equivalent to wound bed.  3. Moisten collagen with a few drops of normal saline.  4. Cover with 4x4's and wrap with gauze (errol or kerlix)  5. Change  Every other day or Monday, Wednesday, and Friday           Compression: none      Offloading Device:Post op shoe with peg assist     Other Instructions:   Keep the wound dry and covered when showering.      Keep all dressings clean, dry and intact.  Keep pressure off the wound(s) at all times.      Follow up visit  3 weeks Kaila 10, 2025 at 1:00     Please give 24 hour notice if unable to keep appointment. [phone]     If you experience any of the following, please call the Wound Care Service at  [phone] or go to the nearest emergency room.        *Increase in pain*Temperature over 101*Increase in drainage from your wound or a foul odor  *Uncontrolled swelling*Need for compression bandage changes due to slippage, breakthrough drainage       PLEASE NOTE: IF YOU ARE UNABLE TO OBTAIN WOUND SUPPLIES, CONTINUE TO USE THE SUPPLIES YOU HAVE AVAILABLE UNTIL YOU ARE ABLE TO REACH US. IT IS MOST IMPORTANT TO KEEP THE WOUND COVERED AT ALL TIMES

## 2025-06-17 ENCOUNTER — HOSPITAL ENCOUNTER (OUTPATIENT)
Dept: WOUND CARE | Age: 45
Discharge: HOME | End: 2025-06-17
Attending: STUDENT IN AN ORGANIZED HEALTH CARE EDUCATION/TRAINING PROGRAM
Payer: COMMERCIAL

## 2025-06-17 VITALS
HEART RATE: 103 BPM | DIASTOLIC BLOOD PRESSURE: 90 MMHG | SYSTOLIC BLOOD PRESSURE: 138 MMHG | TEMPERATURE: 97.9 F | RESPIRATION RATE: 19 BRPM

## 2025-06-17 DIAGNOSIS — L08.9 DIABETIC FOOT INFECTION (HCC): Primary | ICD-10-CM

## 2025-06-17 DIAGNOSIS — E11.628 DIABETIC FOOT INFECTION (HCC): Primary | ICD-10-CM

## 2025-06-17 PROCEDURE — 6370000000 HC RX 637 (ALT 250 FOR IP): Performed by: STUDENT IN AN ORGANIZED HEALTH CARE EDUCATION/TRAINING PROGRAM

## 2025-06-17 PROCEDURE — 11042 DBRDMT SUBQ TIS 1ST 20SQCM/<: CPT

## 2025-06-17 RX ORDER — MUPIROCIN 2 %
OINTMENT (GRAM) TOPICAL PRN
OUTPATIENT
Start: 2025-06-17

## 2025-06-17 RX ORDER — BACITRACIN ZINC AND POLYMYXIN B SULFATE 500; 1000 [USP'U]/G; [USP'U]/G
OINTMENT TOPICAL PRN
OUTPATIENT
Start: 2025-06-17

## 2025-06-17 RX ORDER — CLOBETASOL PROPIONATE 0.5 MG/G
OINTMENT TOPICAL PRN
OUTPATIENT
Start: 2025-06-17

## 2025-06-17 RX ORDER — BETAMETHASONE DIPROPIONATE 0.5 MG/G
CREAM TOPICAL PRN
OUTPATIENT
Start: 2025-06-17

## 2025-06-17 RX ORDER — LIDOCAINE HYDROCHLORIDE 20 MG/ML
JELLY TOPICAL PRN
Status: DISCONTINUED | OUTPATIENT
Start: 2025-06-17 | End: 2025-06-18 | Stop reason: HOSPADM

## 2025-06-17 RX ORDER — GENTAMICIN SULFATE 1 MG/G
OINTMENT TOPICAL PRN
OUTPATIENT
Start: 2025-06-17

## 2025-06-17 RX ORDER — GINSENG 100 MG
CAPSULE ORAL PRN
OUTPATIENT
Start: 2025-06-17

## 2025-06-17 RX ORDER — LIDOCAINE 40 MG/G
CREAM TOPICAL PRN
OUTPATIENT
Start: 2025-06-17

## 2025-06-17 RX ORDER — LIDOCAINE HYDROCHLORIDE 40 MG/ML
SOLUTION TOPICAL PRN
OUTPATIENT
Start: 2025-06-17

## 2025-06-17 RX ORDER — LIDOCAINE HYDROCHLORIDE 20 MG/ML
JELLY TOPICAL PRN
OUTPATIENT
Start: 2025-06-17

## 2025-06-17 RX ORDER — TRIAMCINOLONE ACETONIDE 1 MG/G
OINTMENT TOPICAL PRN
OUTPATIENT
Start: 2025-06-17

## 2025-06-17 RX ORDER — SILVER SULFADIAZINE 10 MG/G
CREAM TOPICAL PRN
OUTPATIENT
Start: 2025-06-17

## 2025-06-17 RX ORDER — SODIUM CHLOR/HYPOCHLOROUS ACID 0.033 %
SOLUTION, IRRIGATION IRRIGATION PRN
OUTPATIENT
Start: 2025-06-17

## 2025-06-17 RX ORDER — LIDOCAINE 50 MG/G
OINTMENT TOPICAL PRN
OUTPATIENT
Start: 2025-06-17

## 2025-06-17 RX ORDER — NEOMYCIN/BACITRACIN/POLYMYXINB 3.5-400-5K
OINTMENT (GRAM) TOPICAL PRN
OUTPATIENT
Start: 2025-06-17

## 2025-06-17 RX ADMIN — LIDOCAINE HYDROCHLORIDE: 20 JELLY TOPICAL at 13:22

## 2025-06-17 ASSESSMENT — PAIN DESCRIPTION - DESCRIPTORS: DESCRIPTORS: ACHING;SHARP

## 2025-06-17 ASSESSMENT — PAIN DESCRIPTION - LOCATION: LOCATION: FOOT

## 2025-06-17 ASSESSMENT — PAIN DESCRIPTION - ORIENTATION: ORIENTATION: RIGHT

## 2025-06-17 ASSESSMENT — PAIN SCALES - GENERAL: PAINLEVEL_OUTOF10: 6

## 2025-06-17 NOTE — DISCHARGE INSTRUCTIONS
[facility]   Physician Orders and Discharge Instructions  [facility]  [address] [place]  [address]  Telephone: [phone]      [phone]        NAME:  Carli Bustamante                                                                                         YOB: 1980  MEDICAL RECORD NUMBER:  [id number]     Your  is:  Jeannette     Home Care/Facility: none     Wound Location: Right Great Toe, Right Lateral Foot     Dressing orders:   1.Cleanse wound(s) with normal saline.  2. Apply dry COLLAGEN (PROMOGRAN) or equivalent to wound bed.  3. Moisten collagen with a few drops of normal saline.  4. Cover with 4x4's and wrap with gauze (errol or kerlix)  5. Change  Every other day or Monday, Wednesday, and Friday           Compression: none      Offloading Device:Post op shoe with peg assist     Other Instructions:   Keep the wound dry and covered when showering.      Keep all dressings clean, dry and intact.  Keep pressure off the wound(s) at all times.      Follow up visit  1 week June 24, 2025 at 1:45pm     Please give 24 hour notice if unable to keep appointment. [phone]     If you experience any of the following, please call the Wound Care Service at  [phone] or go to the nearest emergency room.        *Increase in pain*Temperature over 101*Increase in drainage from your wound or a foul odor  *Uncontrolled swelling*Need for compression bandage changes due to slippage, breakthrough drainage       PLEASE NOTE: IF YOU ARE UNABLE TO OBTAIN WOUND SUPPLIES, CONTINUE TO USE THE SUPPLIES YOU HAVE AVAILABLE UNTIL YOU ARE ABLE TO REACH US. IT IS MOST IMPORTANT TO KEEP THE WOUND COVERED AT ALL TIMES

## 2025-06-17 NOTE — WOUND CARE
[facility] Physician Billing Sheet.     Caril Bustamanet  AGE: 44 y.o.   GENDER: female  : 1980  TODAY'S DATE:  2025    ICD-10 CODES  L97.597  E11.42    PHYSICIAN PROCEDURES    CPT CODE  47911        Electronically signed by Alistair Brand DPM on 2025 at 2:09 PM

## 2025-06-24 ENCOUNTER — HOSPITAL ENCOUNTER (OUTPATIENT)
Dept: WOUND CARE | Age: 45
Discharge: HOME | End: 2025-06-24
Attending: STUDENT IN AN ORGANIZED HEALTH CARE EDUCATION/TRAINING PROGRAM
Payer: COMMERCIAL

## 2025-06-24 VITALS
RESPIRATION RATE: 18 BRPM | HEART RATE: 92 BPM | SYSTOLIC BLOOD PRESSURE: 136 MMHG | DIASTOLIC BLOOD PRESSURE: 81 MMHG | TEMPERATURE: 97.2 F

## 2025-06-24 DIAGNOSIS — L08.9 DIABETIC FOOT INFECTION (HCC): Primary | ICD-10-CM

## 2025-06-24 DIAGNOSIS — E11.628 DIABETIC FOOT INFECTION (HCC): Primary | ICD-10-CM

## 2025-06-24 PROCEDURE — 6370000000 HC RX 637 (ALT 250 FOR IP): Performed by: STUDENT IN AN ORGANIZED HEALTH CARE EDUCATION/TRAINING PROGRAM

## 2025-06-24 PROCEDURE — 11042 DBRDMT SUBQ TIS 1ST 20SQCM/<: CPT

## 2025-06-24 RX ORDER — SODIUM CHLOR/HYPOCHLOROUS ACID 0.033 %
SOLUTION, IRRIGATION IRRIGATION PRN
OUTPATIENT
Start: 2025-06-24

## 2025-06-24 RX ORDER — LIDOCAINE HYDROCHLORIDE 20 MG/ML
JELLY TOPICAL PRN
OUTPATIENT
Start: 2025-06-24

## 2025-06-24 RX ORDER — LIDOCAINE 40 MG/G
CREAM TOPICAL PRN
OUTPATIENT
Start: 2025-06-24

## 2025-06-24 RX ORDER — LIDOCAINE 40 MG/G
CREAM TOPICAL PRN
Status: DISCONTINUED | OUTPATIENT
Start: 2025-06-24 | End: 2025-06-25 | Stop reason: HOSPADM

## 2025-06-24 RX ORDER — LIDOCAINE HYDROCHLORIDE 40 MG/ML
SOLUTION TOPICAL PRN
OUTPATIENT
Start: 2025-06-24

## 2025-06-24 RX ORDER — GENTAMICIN SULFATE 1 MG/G
OINTMENT TOPICAL PRN
OUTPATIENT
Start: 2025-06-24

## 2025-06-24 RX ORDER — CLOBETASOL PROPIONATE 0.5 MG/G
OINTMENT TOPICAL PRN
OUTPATIENT
Start: 2025-06-24

## 2025-06-24 RX ORDER — GINSENG 100 MG
CAPSULE ORAL PRN
OUTPATIENT
Start: 2025-06-24

## 2025-06-24 RX ORDER — LIDOCAINE 50 MG/G
OINTMENT TOPICAL PRN
OUTPATIENT
Start: 2025-06-24

## 2025-06-24 RX ORDER — TRIAMCINOLONE ACETONIDE 1 MG/G
OINTMENT TOPICAL PRN
OUTPATIENT
Start: 2025-06-24

## 2025-06-24 RX ORDER — BETAMETHASONE DIPROPIONATE 0.5 MG/G
CREAM TOPICAL PRN
OUTPATIENT
Start: 2025-06-24

## 2025-06-24 RX ORDER — SILVER SULFADIAZINE 10 MG/G
CREAM TOPICAL PRN
OUTPATIENT
Start: 2025-06-24

## 2025-06-24 RX ORDER — MUPIROCIN 2 %
OINTMENT (GRAM) TOPICAL PRN
OUTPATIENT
Start: 2025-06-24

## 2025-06-24 RX ORDER — BACITRACIN ZINC AND POLYMYXIN B SULFATE 500; 1000 [USP'U]/G; [USP'U]/G
OINTMENT TOPICAL PRN
OUTPATIENT
Start: 2025-06-24

## 2025-06-24 RX ORDER — NEOMYCIN/BACITRACIN/POLYMYXINB 3.5-400-5K
OINTMENT (GRAM) TOPICAL PRN
OUTPATIENT
Start: 2025-06-24

## 2025-06-24 RX ADMIN — LIDOCAINE 4%: 4 CREAM TOPICAL at 14:08

## 2025-06-24 ASSESSMENT — PAIN DESCRIPTION - LOCATION: LOCATION: FOOT

## 2025-06-24 ASSESSMENT — PAIN DESCRIPTION - DESCRIPTORS: DESCRIPTORS: ACHING

## 2025-06-24 ASSESSMENT — PAIN SCALES - GENERAL: PAINLEVEL_OUTOF10: 7

## 2025-06-24 ASSESSMENT — PAIN DESCRIPTION - ORIENTATION: ORIENTATION: RIGHT

## 2025-06-24 NOTE — DISCHARGE INSTRUCTIONS
[facility]   Physician Orders and Discharge Instructions  [facility]  [address] [place]  [address]  Telephone: [phone]      [phone]        NAME:  Carli Bustamante                                                                                         YOB: 1980  MEDICAL RECORD NUMBER:  [id number]     Your  is:  Jeannette     Home Care/Facility: none     Wound Location: Right Great Toe, Right Lateral Foot     Dressing orders:   1.Cleanse wound(s) with normal saline.  2. Apply dry COLLAGEN (PROMOGRAN) or equivalent to wound bed.  3. Moisten collagen with a few drops of normal saline.  4. Cover with 4x4's and wrap with gauze (errol or kerlix)  5. Change  Every other day or Monday, Wednesday, and Friday           Compression: none      Offloading Device:Post op shoe with peg assist     Other Instructions:   Keep the wound dry and covered when showering.      Keep all dressings clean, dry and intact.  Keep pressure off the wound(s) at all times.      Follow up visit  2 weeks July 8, 2025 at 1:15 pm     Please give 24 hour notice if unable to keep appointment. [phone]     If you experience any of the following, please call the Wound Care Service at  [phone] or go to the nearest emergency room.        *Increase in pain*Temperature over 101*Increase in drainage from your wound or a foul odor  *Uncontrolled swelling*Need for compression bandage changes due to slippage, breakthrough drainage       PLEASE NOTE: IF YOU ARE UNABLE TO OBTAIN WOUND SUPPLIES, CONTINUE TO USE THE SUPPLIES YOU HAVE AVAILABLE UNTIL YOU ARE ABLE TO REACH US. IT IS MOST IMPORTANT TO KEEP THE WOUND COVERED AT ALL TIMES

## 2025-06-26 ENCOUNTER — HOSPITAL ENCOUNTER (EMERGENCY)
Age: 45
Discharge: HOME | End: 2025-06-26
Payer: COMMERCIAL

## 2025-06-26 ENCOUNTER — APPOINTMENT (OUTPATIENT)
Dept: GENERAL RADIOLOGY | Age: 45
End: 2025-06-26
Payer: COMMERCIAL

## 2025-06-26 VITALS
RESPIRATION RATE: 17 BRPM | WEIGHT: 182.4 LBS | HEART RATE: 94 BPM | DIASTOLIC BLOOD PRESSURE: 94 MMHG | TEMPERATURE: 97.8 F | BODY MASS INDEX: 32.32 KG/M2 | SYSTOLIC BLOOD PRESSURE: 160 MMHG | HEIGHT: 63 IN | OXYGEN SATURATION: 98 %

## 2025-06-26 DIAGNOSIS — E08.621 DIABETIC ULCER OF TOE OF RIGHT FOOT ASSOCIATED WITH DIABETES MELLITUS DUE TO UNDERLYING CONDITION, WITH FAT LAYER EXPOSED (HCC): Primary | ICD-10-CM

## 2025-06-26 DIAGNOSIS — L03.031 CELLULITIS OF TOE OF RIGHT FOOT: ICD-10-CM

## 2025-06-26 DIAGNOSIS — L97.512 DIABETIC ULCER OF TOE OF RIGHT FOOT ASSOCIATED WITH DIABETES MELLITUS DUE TO UNDERLYING CONDITION, WITH FAT LAYER EXPOSED (HCC): Primary | ICD-10-CM

## 2025-06-26 LAB
ALBUMIN SERPL-MCNC: 4 G/DL (ref 3.5–4.6)
ALP SERPL-CCNC: 75 U/L (ref 40–130)
ALT SERPL-CCNC: 30 U/L (ref 0–33)
ANION GAP SERPL CALCULATED.3IONS-SCNC: 10 MEQ/L (ref 9–15)
AST SERPL-CCNC: 32 U/L (ref 0–35)
BASOPHILS # BLD: 0 K/UL (ref 0–0.2)
BASOPHILS NFR BLD: 0.5 %
BILIRUB SERPL-MCNC: 0.3 MG/DL (ref 0.2–0.7)
BUN SERPL-MCNC: 7 MG/DL (ref 6–20)
CALCIUM SERPL-MCNC: 9.2 MG/DL (ref 8.5–9.9)
CHLORIDE SERPL-SCNC: 102 MEQ/L (ref 95–107)
CO2 SERPL-SCNC: 24 MEQ/L (ref 20–31)
CREAT SERPL-MCNC: 0.53 MG/DL (ref 0.5–0.9)
CRP SERPL HS-MCNC: 10.5 MG/L (ref 0–5)
EOSINOPHIL # BLD: 0.4 K/UL (ref 0–0.7)
EOSINOPHIL NFR BLD: 5.7 %
ERYTHROCYTE [DISTWIDTH] IN BLOOD BY AUTOMATED COUNT: 13.1 % (ref 11.5–14.5)
ERYTHROCYTE [SEDIMENTATION RATE] IN BLOOD BY WESTERGREN METHOD: 18 MM (ref 0–20)
GLOBULIN SER CALC-MCNC: 3.3 G/DL (ref 2.3–3.5)
GLUCOSE SERPL-MCNC: 202 MG/DL (ref 70–99)
HCT VFR BLD AUTO: 39.2 % (ref 37–47)
HGB BLD-MCNC: 14.1 G/DL (ref 12–16)
LACTATE BLDV-SCNC: 1.2 MMOL/L (ref 0.5–2.2)
LYMPHOCYTES # BLD: 1.8 K/UL (ref 1–4.8)
LYMPHOCYTES NFR BLD: 23.8 %
MCH RBC QN AUTO: 31.3 PG (ref 27–31.3)
MCHC RBC AUTO-ENTMCNC: 36 % (ref 33–37)
MCV RBC AUTO: 87.1 FL (ref 79.4–94.8)
MONOCYTES # BLD: 0.5 K/UL (ref 0.2–0.8)
MONOCYTES NFR BLD: 6.8 %
NEUTROPHILS # BLD: 4.7 K/UL (ref 1.4–6.5)
NEUTS SEG NFR BLD: 62.9 %
PLATELET # BLD AUTO: 317 K/UL (ref 130–400)
POTASSIUM SERPL-SCNC: 4.2 MEQ/L (ref 3.4–4.9)
PROT SERPL-MCNC: 7.3 G/DL (ref 6.3–8)
RBC # BLD AUTO: 4.5 M/UL (ref 4.2–5.4)
SODIUM SERPL-SCNC: 136 MEQ/L (ref 135–144)
WBC # BLD AUTO: 7.5 K/UL (ref 4.8–10.8)

## 2025-06-26 PROCEDURE — 99284 EMERGENCY DEPT VISIT MOD MDM: CPT

## 2025-06-26 PROCEDURE — 83605 ASSAY OF LACTIC ACID: CPT

## 2025-06-26 PROCEDURE — 85652 RBC SED RATE AUTOMATED: CPT

## 2025-06-26 PROCEDURE — 80053 COMPREHEN METABOLIC PANEL: CPT

## 2025-06-26 PROCEDURE — 36415 COLL VENOUS BLD VENIPUNCTURE: CPT

## 2025-06-26 PROCEDURE — 73630 X-RAY EXAM OF FOOT: CPT

## 2025-06-26 PROCEDURE — 85025 COMPLETE CBC W/AUTO DIFF WBC: CPT

## 2025-06-26 PROCEDURE — 86140 C-REACTIVE PROTEIN: CPT

## 2025-06-26 RX ORDER — SULFAMETHOXAZOLE AND TRIMETHOPRIM 800; 160 MG/1; MG/1
1 TABLET ORAL 2 TIMES DAILY
Qty: 20 TABLET | Refills: 0 | Status: SHIPPED | OUTPATIENT
Start: 2025-06-26 | End: 2025-07-06

## 2025-06-26 RX ORDER — CEPHALEXIN 500 MG/1
500 CAPSULE ORAL 3 TIMES DAILY
Qty: 30 CAPSULE | Refills: 0 | Status: SHIPPED | OUTPATIENT
Start: 2025-06-26 | End: 2025-07-06

## 2025-06-26 ASSESSMENT — PAIN SCALES - GENERAL: PAINLEVEL_OUTOF10: 8

## 2025-06-26 ASSESSMENT — PAIN DESCRIPTION - LOCATION: LOCATION: FOOT

## 2025-06-26 ASSESSMENT — PAIN DESCRIPTION - ORIENTATION: ORIENTATION: RIGHT

## 2025-06-26 ASSESSMENT — PAIN - FUNCTIONAL ASSESSMENT
PAIN_FUNCTIONAL_ASSESSMENT: NONE - DENIES PAIN
PAIN_FUNCTIONAL_ASSESSMENT: 0-10

## 2025-06-26 NOTE — ED TRIAGE NOTES
2 wounds on right foot   Seeing wound care for it   Pt states her foot is more swollen and the pain is worse   Pt is diabetic   8/10 pain   Pt is afebrile

## 2025-06-26 NOTE — ED PROVIDER NOTES
Basic Information   Time Seen: 2:20 PM   Primary Care Provider: Loi Murguia MD     Chief Complaint   Patient presents with    Wound Check    Foot Pain    Leg Swelling     Right foot is swollen         HPI   Carli Bustamante is a 44 yrs female who presents with right foot pain and swelling.  Patient states that she sees wound clinic for her wounds on her feet however she states that her pain has gotten worse and her swelling has gotten worse as well.  She states that she is not on many medications for this at home.  She is a diabetic.   Physical Exam     BP (!) 160/94 (06/26/25 1353)    Temp 97.8 °F (36.6 °C) (06/26/25 1353)    Pulse 94 (06/26/25 1353)   Resp 17 (06/26/25 1353)    SpO2 98 % (06/26/25 1353)       General: Awake and Alert, no acute distress   CV: RRR, S1, S2   Resp: LCTAB, even and non labored   Other:   Impression and Plan     Labs Reviewed   CBC WITH AUTO DIFFERENTIAL   COMPREHENSIVE METABOLIC PANEL   LACTIC ACID   SEDIMENTATION RATE   C-REACTIVE PROTEIN        No orders to display      Final Impression   I have performed a medical screening exam on Carli Bustamante. Based on this patient's chief complaint/symptoms of   Chief Complaint   Patient presents with    Wound Check    Foot Pain    Leg Swelling     Right foot is swollen       and my focused exam, their care will be started and transitioned to provider when room is available       Torey Guerra PA-C  06/26/25 [id number]

## 2025-06-26 NOTE — CARE COORDINATION
This nurse spoke with the patient. She was not able to give me any days or times that would work for her to follow up either with the wound care center or her pcp due to prior obligations. She states that she will check her schedule and call the offices or go to the walk in clinic. I did encourage her to make follow up appointments as soon as possible. I did call the [facility] wound care clinic who also stated to have the patient contact them for follow up and the patient stated her understanding.

## 2025-06-26 NOTE — ED PROVIDER NOTES
[facility] EMERGENCY DEPARTMENT  eMERGENCYdEPARTMENT eNCOUnter        Pt Name: Carli Bustamante  MRN: [id number]  Birthdate 1980of evaluation: 6/26/2025  Provider:Palomo Bae PA-C  3:20 PM EDT    CHIEF COMPLAINT       Chief Complaint   Patient presents with    Wound Check    Foot Pain    Leg Swelling     Right foot is swollen            HISTORY OF PRESENT ILLNESS  (Location/Symptom, Timing/Onset, Context/Setting, Quality, Duration, Modifying Factors, Severity.)   Carli Bustamante is a 44 y.o. female who presents to the emergency department with a complaint of right foot pain for the past 2 days. Patient states she saw the wound care team on Tuesday of this week as she does regularly. The wound care team debrided the wound. She states since then she has experienced increased pain around the wound and has noticed additional swelling.  Denies any fevers or chills.  No drainage from the wounds.  No pain proximal to the ankle    HPI    Nursing Notes were reviewed and I agree.    REVIEW OF SYSTEMS    (2-9 systems for level 4, 10 or more for level 5)     Review of Systems   Constitutional:  Negative for fever.   Musculoskeletal:  Negative for gait problem and joint swelling.   Skin:  Positive for wound.   Neurological:  Negative for weakness and numbness.   All other systems reviewed and are negative.       as noted above the remainder of the review of systems was reviewed and negative.       PAST MEDICAL HISTORY     Past Medical History:   Diagnosis Date    Anemia during pregnancy 02/02/2018    Eczema     Herpes simplex infection of skin     on neck    Hypercholesterolemia     Irregular heart beat     runs of v tach in the past    Obesity     Pregnancy induced hypertension     Type II or unspecified type diabetes mellitus without mention of complication, not stated as uncontrolled     type 2         SURGICAL HISTORY       Past Surgical History:   Procedure Laterality Date    ABDOMEN SURGERY  2006/2016

## 2025-06-27 ENCOUNTER — OFFICE VISIT (OUTPATIENT)
Age: 45
End: 2025-06-27
Payer: COMMERCIAL

## 2025-06-27 VITALS
BODY MASS INDEX: 31.89 KG/M2 | SYSTOLIC BLOOD PRESSURE: 111 MMHG | DIASTOLIC BLOOD PRESSURE: 71 MMHG | HEIGHT: 63 IN | HEART RATE: 96 BPM | WEIGHT: 180 LBS

## 2025-06-27 DIAGNOSIS — E11.69 TYPE 2 DIABETES MELLITUS WITH OTHER SPECIFIED COMPLICATION, WITH LONG-TERM CURRENT USE OF INSULIN (HCC): Primary | ICD-10-CM

## 2025-06-27 DIAGNOSIS — Z46.81 INSULIN PUMP TITRATION: ICD-10-CM

## 2025-06-27 DIAGNOSIS — Z96.41 INSULIN PUMP IN PLACE: ICD-10-CM

## 2025-06-27 DIAGNOSIS — Z79.4 TYPE 2 DIABETES MELLITUS WITH OTHER SPECIFIED COMPLICATION, WITH LONG-TERM CURRENT USE OF INSULIN (HCC): Primary | ICD-10-CM

## 2025-06-27 LAB
CHP ED QC CHECK: ABNORMAL
GLUCOSE BLD-MCNC: 269 MG/DL
HBA1C MFR BLD: 8.2 %

## 2025-06-27 PROCEDURE — G8417 CALC BMI ABV UP PARAM F/U: HCPCS | Performed by: INTERNAL MEDICINE

## 2025-06-27 PROCEDURE — 99213 OFFICE O/P EST LOW 20 MIN: CPT | Performed by: INTERNAL MEDICINE

## 2025-06-27 PROCEDURE — PBSHW POCT GLYCOSYLATED HEMOGLOBIN (HGB A1C): Performed by: INTERNAL MEDICINE

## 2025-06-27 PROCEDURE — 83036 HEMOGLOBIN GLYCOSYLATED A1C: CPT | Performed by: INTERNAL MEDICINE

## 2025-06-27 PROCEDURE — 2022F DILAT RTA XM EVC RTNOPTHY: CPT | Performed by: INTERNAL MEDICINE

## 2025-06-27 PROCEDURE — 82962 GLUCOSE BLOOD TEST: CPT | Performed by: INTERNAL MEDICINE

## 2025-06-27 PROCEDURE — 3052F HG A1C>EQUAL 8.0%<EQUAL 9.0%: CPT | Performed by: INTERNAL MEDICINE

## 2025-06-27 PROCEDURE — 1036F TOBACCO NON-USER: CPT | Performed by: INTERNAL MEDICINE

## 2025-06-27 PROCEDURE — PBSHW POCT GLUCOSE: Performed by: INTERNAL MEDICINE

## 2025-06-27 PROCEDURE — G8427 DOCREV CUR MEDS BY ELIG CLIN: HCPCS | Performed by: INTERNAL MEDICINE

## 2025-06-27 PROCEDURE — 99214 OFFICE O/P EST MOD 30 MIN: CPT | Performed by: INTERNAL MEDICINE

## 2025-06-27 RX ORDER — INSULIN LISPRO 100 [IU]/ML
INJECTION, SOLUTION INTRAVENOUS; SUBCUTANEOUS
Qty: 60 ML | Refills: 3 | Status: SHIPPED | OUTPATIENT
Start: 2025-06-27

## 2025-06-27 ASSESSMENT — ENCOUNTER SYMPTOMS: EYES NEGATIVE: 1

## 2025-06-27 NOTE — PROGRESS NOTES
6/27/2025    Assessment:       Diagnosis Orders   1. Type 2 diabetes mellitus with other specified complication, with long-term current use of insulin ([facility])  POCT Glucose    POCT glycosylated hemoglobin (Hb A1C)    Basic Metabolic Panel    Hemoglobin A1C    Lipid Panel    Albumin/Creatinine Ratio, Urine    insulin lispro (HUMALOG,ADMELOG) 100 UNIT/ML SOLN injection vial      2. Insulin pump in place        3. Insulin pump titration              PLAN:     Increase basal rate to 5 units/hr   Advised patient to take her preset bolus of 15 units patient to upgrade her pump to Fontself 780 with sensor next month A1c goal of less than 7 advised that blood sugars will help in the morning  More than 50% of 30 minutes the time spent in  Patient education counseling  Diabetes education provided today:    Nutrition as a mainstream of diabetes therapy. [place] about label reading.  Insulin pumps, how they work and how they affect blood sugar levels.  Continuous Glucose monitor. How it works and checks blood sugars every 5 min. for 4 days during our tests.    Orders Placed This Encounter   Procedures    Basic Metabolic Panel     Standing Status:   Future     Expected Date:   6/27/2025     Expiration Date:   6/27/2026    Hemoglobin A1C     Standing Status:   Future     Expected Date:   6/27/2025     Expiration Date:   6/27/2026    Lipid Panel     Standing Status:   Future     Expected Date:   6/27/2025     Expiration Date:   6/27/2026    Albumin/Creatinine Ratio, Urine     Standing Status:   Future     Expected Date:   6/27/2025     Expiration Date:   6/27/2026    POCT Glucose    POCT glycosylated hemoglobin (Hb A1C)     Orders Placed This Encounter   Medications    insulin lispro (HUMALOG,ADMELOG) 100 UNIT/ML SOLN injection vial     Sig: TAKE AS DIRECTED via insulin pump. max DAILY dose 200 UNITS.     Dispense:  60 mL     Refill:  3     This prescription was filled on 10/11/2024. Any refills authorized will be placed on file.

## 2025-07-22 LAB
ALBUMIN SERPL BCP-MCNC: 4.1 G/DL (ref 3.4–5)
ALP SERPL-CCNC: 58 U/L (ref 33–110)
ALT SERPL W P-5'-P-CCNC: 36 U/L (ref 7–45)
ANION GAP SERPL CALC-SCNC: 14 MMOL/L (ref 10–20)
AST SERPL W P-5'-P-CCNC: 38 U/L (ref 9–39)
BASOPHILS # BLD AUTO: 0.03 X10*3/UL (ref 0–0.1)
BASOPHILS NFR BLD AUTO: 0.4 %
BILIRUB SERPL-MCNC: 0.5 MG/DL (ref 0–1.2)
BUN SERPL-MCNC: 10 MG/DL (ref 6–23)
CALCIUM SERPL-MCNC: 9.3 MG/DL (ref 8.6–10.6)
CHLORIDE SERPL-SCNC: 102 MMOL/L (ref 98–107)
CO2 SERPL-SCNC: 25 MMOL/L (ref 21–32)
CREAT SERPL-MCNC: 0.61 MG/DL (ref 0.5–1.05)
EGFRCR SERPLBLD CKD-EPI 2021: >90 ML/MIN/1.73M*2
EOSINOPHIL # BLD AUTO: 0.4 X10*3/UL (ref 0–0.7)
EOSINOPHIL NFR BLD AUTO: 5.1 %
ERYTHROCYTE [DISTWIDTH] IN BLOOD BY AUTOMATED COUNT: 13 % (ref 11.5–14.5)
GLUCOSE SERPL-MCNC: 309 MG/DL (ref 74–99)
HCT VFR BLD AUTO: 40 % (ref 36–46)
HGB BLD-MCNC: 14.3 G/DL (ref 12–16)
IMM GRANULOCYTES # BLD AUTO: 0.02 X10*3/UL (ref 0–0.7)
IMM GRANULOCYTES NFR BLD AUTO: 0.3 % (ref 0–0.9)
LYMPHOCYTES # BLD AUTO: 1.87 X10*3/UL (ref 1.2–4.8)
LYMPHOCYTES NFR BLD AUTO: 23.7 %
MCH RBC QN AUTO: 30.6 PG (ref 26–34)
MCHC RBC AUTO-ENTMCNC: 35.8 G/DL (ref 32–36)
MCV RBC AUTO: 86 FL (ref 80–100)
MONOCYTES # BLD AUTO: 0.64 X10*3/UL (ref 0.1–1)
MONOCYTES NFR BLD AUTO: 8.1 %
NEUTROPHILS # BLD AUTO: 4.93 X10*3/UL (ref 1.2–7.7)
NEUTROPHILS NFR BLD AUTO: 62.4 %
NRBC BLD-RTO: 0 /100 WBCS (ref 0–0)
PLATELET # BLD AUTO: 320 X10*3/UL (ref 150–450)
POTASSIUM SERPL-SCNC: 3.7 MMOL/L (ref 3.5–5.3)
PROT SERPL-MCNC: 7.7 G/DL (ref 6.4–8.2)
RBC # BLD AUTO: 4.67 X10*6/UL (ref 4–5.2)
SODIUM SERPL-SCNC: 137 MMOL/L (ref 136–145)
WBC # BLD AUTO: 7.9 X10*3/UL (ref 4.4–11.3)

## 2025-07-22 PROCEDURE — 85025 COMPLETE CBC W/AUTO DIFF WBC: CPT | Performed by: EMERGENCY MEDICINE

## 2025-07-22 PROCEDURE — 36415 COLL VENOUS BLD VENIPUNCTURE: CPT | Performed by: EMERGENCY MEDICINE

## 2025-07-22 PROCEDURE — 99284 EMERGENCY DEPT VISIT MOD MDM: CPT | Performed by: EMERGENCY MEDICINE

## 2025-07-22 PROCEDURE — 80053 COMPREHEN METABOLIC PANEL: CPT | Performed by: EMERGENCY MEDICINE

## 2025-07-22 PROCEDURE — 99283 EMERGENCY DEPT VISIT LOW MDM: CPT | Performed by: EMERGENCY MEDICINE

## 2025-07-23 ENCOUNTER — HOSPITAL ENCOUNTER (EMERGENCY)
Facility: HOSPITAL | Age: 45
Discharge: HOME | End: 2025-07-23
Attending: EMERGENCY MEDICINE
Payer: COMMERCIAL

## 2025-07-23 VITALS
SYSTOLIC BLOOD PRESSURE: 147 MMHG | RESPIRATION RATE: 16 BRPM | TEMPERATURE: 97.7 F | OXYGEN SATURATION: 95 % | DIASTOLIC BLOOD PRESSURE: 93 MMHG | HEART RATE: 103 BPM

## 2025-07-23 DIAGNOSIS — L03.90 WOUND CELLULITIS: Primary | ICD-10-CM

## 2025-07-23 LAB — GLUCOSE BLD MANUAL STRIP-MCNC: 172 MG/DL (ref 74–99)

## 2025-07-23 PROCEDURE — 82947 ASSAY GLUCOSE BLOOD QUANT: CPT

## 2025-07-23 PROCEDURE — 2500000001 HC RX 250 WO HCPCS SELF ADMINISTERED DRUGS (ALT 637 FOR MEDICARE OP): Mod: SE

## 2025-07-23 PROCEDURE — 2500000002 HC RX 250 W HCPCS SELF ADMINISTERED DRUGS (ALT 637 FOR MEDICARE OP, ALT 636 FOR OP/ED): Mod: SE

## 2025-07-23 RX ORDER — CIPROFLOXACIN 500 MG/1
500 TABLET, FILM COATED ORAL 2 TIMES DAILY
Qty: 14 TABLET | Refills: 0 | Status: SHIPPED | OUTPATIENT
Start: 2025-07-23 | End: 2025-07-30

## 2025-07-23 RX ORDER — SULFAMETHOXAZOLE AND TRIMETHOPRIM 400; 80 MG/1; MG/1
1 TABLET ORAL 2 TIMES DAILY
Qty: 14 TABLET | Refills: 0 | Status: SHIPPED | OUTPATIENT
Start: 2025-07-23 | End: 2025-07-30

## 2025-07-23 RX ORDER — SULFAMETHOXAZOLE AND TRIMETHOPRIM 800; 160 MG/1; MG/1
1 TABLET ORAL EVERY 12 HOURS SCHEDULED
Status: DISCONTINUED | OUTPATIENT
Start: 2025-07-23 | End: 2025-07-23 | Stop reason: HOSPADM

## 2025-07-23 RX ORDER — CIPROFLOXACIN 500 MG/1
500 TABLET, FILM COATED ORAL EVERY 12 HOURS SCHEDULED
Status: DISCONTINUED | OUTPATIENT
Start: 2025-07-23 | End: 2025-07-23 | Stop reason: HOSPADM

## 2025-07-23 RX ADMIN — SULFAMETHOXAZOLE AND TRIMETHOPRIM 1 TABLET: 800; 160 TABLET ORAL at 01:24

## 2025-07-23 RX ADMIN — CIPROFOLXACIN 500 MG: 500 TABLET ORAL at 01:24

## 2025-07-23 ASSESSMENT — LIFESTYLE VARIABLES
EVER FELT BAD OR GUILTY ABOUT YOUR DRINKING: NO
HAVE PEOPLE ANNOYED YOU BY CRITICIZING YOUR DRINKING: NO
TOTAL SCORE: 0
HAVE YOU EVER FELT YOU SHOULD CUT DOWN ON YOUR DRINKING: NO
EVER HAD A DRINK FIRST THING IN THE MORNING TO STEADY YOUR NERVES TO GET RID OF A HANGOVER: NO

## 2025-07-23 NOTE — DISCHARGE INSTRUCTIONS
Wound Care:  Keep the wound clean and dry.  Change dressings as instructed, usually once daily or if it becomes soiled or wet.  Wash your hands before and after wound care.  Do not walk barefoot. Protect your foot with a clean, dry bandage and appropriate footwear.  Elevate your foot when sitting or lying down to help reduce swelling.    Medications:  Take all prescribed antibiotics exactly as directed. Do not skip doses.  Complete the full course, even if the wound looks or feels better.  Take pain medications as needed and as prescribed.  Monitor blood sugar levels more frequently, as infections can cause blood sugar to rise.    Monitoring at Home:  Watch for signs that the infection is getting worse. These include:  Increased redness, warmth, or swelling around the wound  Pus or foul-smelling drainage  Red streaks spreading from the wound  Fever (temperature over 100.4°F / 38°C)  Increased pain or tenderness  Wound not healing or getting larger  Numbness or discoloration of toes or foot

## 2025-07-23 NOTE — ED PROVIDER NOTES
Emergency Department Provider Note       History of Present Illness     History provided by: Patient  Limitations to History: None  External Records Reviewed with Brief Summary: No recent outpatient visits.    HPI:  Jie Hartmann is a 45 y.o. female with history of diabetes who presents to the emergency room for evaluation of right foot wound.  She states over the last 5 days she has noted increased odor and yellow-colored drainage from the circular wound on the lateral plantar aspect of the foot.  Patient notes that she has had diabetic wounds in the past that have led the septic.  Last time she had a PICC line.  A diabetic wound infection was in December 2024.  Patient states that she was unable to attend her wound care appointment for the last 4 weeks as she has been in the hospital caring for her son.  Patient has been self dressing the wounds.  Patient denies any abnormal swelling of the right lower extremity.  Patient denies any chest pain, shortness of breath, abdominal pain, nausea, vomiting, diarrhea.  Patient reports that her sugars are labile and she has an insulin pump.    Physical Exam   Triage vitals:  T 36.5 °C (97.7 °F)  HR (!) 105  /84  RR 16  O2 98 % None (Room air)    General: Patient in no acute distress, resting comfortably in bed  Skin: Wounds on the plantar aspect of the right foot.  Images in patient chart.  Healing wound to the plantar aspect of the right great toe. 1 cm diameter wound to the plantar aspect of the right fifth toe, some wound to the lateral aspect of the right foot.  No active purulent discharge, yellow discolored discharge on dressings noted.  Mild redness circumferentially around the wound under fifth toe and lateral aspect of foot.  No streaking noted.  No pain out of proportion.  Mild tenderness just adjacent to the wounds.  The wounds to the left foot noted.  Cardiac: Tachycardia, regular rhythm, no murmurs  Pulm: Clear to auscultation in all lung  fields  Abd: Soft, nontender, nondistended  MSK: Nontender right knee, nontender right calf      Medical Decision Making & ED Course   Medical Decision Makin y.o. female with history of diabetes on insulin pump presents to the emergency room for evaluation of right diabetic foot wounds.  Patient states that for the last 4-week she has not been able to attend wound care and has been self dressing her wounds.  Patient has a history of sepsis secondary to diabetic wound infections.  Patient with 3 wounds to the plantar aspect of the right foot, with a 1 cm circumferential wound under the fifth digit that has slightly expanded in the last week with foul odor and yellow-colored discharge.  ----    Differential diagnoses considered include but are not limited to: Osteomyelitis, cellulitis, diabetic ulcer, venous stasis ulcer    CBC and CMP obtained.  CBC shows no signs of leukocytosis however given patient's clinical evaluation concern for possible diabetic wound infection.  CMP demonstrates hyperglycemia.  Will obtain point-of-care glucose and patient is able to self dose using insulin pump.    Will discharge patient with referral for wound care and supplies to continue with wound dressings.  Will start patient on antibiotics for wound infection.    Social Determinants of Health which Significantly Impact Care: Social Determinants of Health which Significantly Impact Care: Transportation difficulties The following actions were taken to address these social determinants : Will provide patient with wound care referral to obtain while here with her son in the hospital.    EKG Independent Interpretation: EKG not obtained    Independent Result Review and Interpretation: None obtained x-ray unlikely to .  CT not indicated at this time    Chronic conditions affecting the patient's care: As documented above in MDM    The patient was discussed with the following consultants/services: None    Care  Considerations: As documented above in MDM    ED Course:  Diagnoses as of 07/23/25 0120   Wound cellulitis       Disposition   As a result of the work-up, the patient was discharged home.  she was informed of her diagnosis and instructed to come back with any concerns or worsening of condition.  she and was agreeable to the plan as discussed above.  she was given the opportunity to ask questions.  All of the patient's questions were answered.      This was a shared visit with an ED attending.  The patient was seen and discussed with the ED attending    Blaire Hodges DO  Emergency Medicine                                                       Blaire Hodges DO  Resident  07/23/25 0121

## 2025-08-12 ENCOUNTER — HOSPITAL ENCOUNTER (OUTPATIENT)
Dept: WOUND CARE | Age: 45
Discharge: HOME | End: 2025-08-12
Attending: STUDENT IN AN ORGANIZED HEALTH CARE EDUCATION/TRAINING PROGRAM
Payer: COMMERCIAL

## 2025-08-12 VITALS
DIASTOLIC BLOOD PRESSURE: 84 MMHG | TEMPERATURE: 97.1 F | SYSTOLIC BLOOD PRESSURE: 136 MMHG | HEART RATE: 100 BPM | RESPIRATION RATE: 18 BRPM

## 2025-08-12 DIAGNOSIS — L08.9 DIABETIC FOOT INFECTION (HCC): Primary | ICD-10-CM

## 2025-08-12 DIAGNOSIS — E11.628 DIABETIC FOOT INFECTION (HCC): Primary | ICD-10-CM

## 2025-08-12 PROCEDURE — 11042 DBRDMT SUBQ TIS 1ST 20SQCM/<: CPT

## 2025-08-12 PROCEDURE — 6370000000 HC RX 637 (ALT 250 FOR IP): Performed by: STUDENT IN AN ORGANIZED HEALTH CARE EDUCATION/TRAINING PROGRAM

## 2025-08-12 RX ORDER — MUPIROCIN 2 %
OINTMENT (GRAM) TOPICAL PRN
OUTPATIENT
Start: 2025-08-12

## 2025-08-12 RX ORDER — BACITRACIN ZINC AND POLYMYXIN B SULFATE 500; 1000 [USP'U]/G; [USP'U]/G
OINTMENT TOPICAL PRN
OUTPATIENT
Start: 2025-08-12

## 2025-08-12 RX ORDER — LIDOCAINE HYDROCHLORIDE 40 MG/ML
SOLUTION TOPICAL PRN
OUTPATIENT
Start: 2025-08-12

## 2025-08-12 RX ORDER — LIDOCAINE 40 MG/G
CREAM TOPICAL PRN
OUTPATIENT
Start: 2025-08-12

## 2025-08-12 RX ORDER — BETAMETHASONE DIPROPIONATE 0.5 MG/G
CREAM TOPICAL PRN
OUTPATIENT
Start: 2025-08-12

## 2025-08-12 RX ORDER — TRIAMCINOLONE ACETONIDE 1 MG/G
OINTMENT TOPICAL PRN
OUTPATIENT
Start: 2025-08-12

## 2025-08-12 RX ORDER — CLOBETASOL PROPIONATE 0.5 MG/G
OINTMENT TOPICAL PRN
OUTPATIENT
Start: 2025-08-12

## 2025-08-12 RX ORDER — GINSENG 100 MG
CAPSULE ORAL PRN
OUTPATIENT
Start: 2025-08-12

## 2025-08-12 RX ORDER — LIDOCAINE HYDROCHLORIDE 20 MG/ML
JELLY TOPICAL PRN
Status: DISCONTINUED | OUTPATIENT
Start: 2025-08-12 | End: 2025-08-13 | Stop reason: HOSPADM

## 2025-08-12 RX ORDER — LIDOCAINE 50 MG/G
OINTMENT TOPICAL PRN
OUTPATIENT
Start: 2025-08-12

## 2025-08-12 RX ORDER — SODIUM CHLOR/HYPOCHLOROUS ACID 0.033 %
SOLUTION, IRRIGATION IRRIGATION PRN
OUTPATIENT
Start: 2025-08-12

## 2025-08-12 RX ORDER — SILVER SULFADIAZINE 10 MG/G
CREAM TOPICAL PRN
OUTPATIENT
Start: 2025-08-12

## 2025-08-12 RX ORDER — GENTAMICIN SULFATE 1 MG/G
OINTMENT TOPICAL PRN
OUTPATIENT
Start: 2025-08-12

## 2025-08-12 RX ORDER — LIDOCAINE HYDROCHLORIDE 20 MG/ML
JELLY TOPICAL PRN
OUTPATIENT
Start: 2025-08-12

## 2025-08-12 RX ORDER — NEOMYCIN/BACITRACIN/POLYMYXINB 3.5-400-5K
OINTMENT (GRAM) TOPICAL PRN
OUTPATIENT
Start: 2025-08-12

## 2025-08-12 RX ADMIN — LIDOCAINE HYDROCHLORIDE: 20 JELLY TOPICAL at 14:03

## 2025-08-12 ASSESSMENT — PAIN DESCRIPTION - ORIENTATION: ORIENTATION: RIGHT

## 2025-08-12 ASSESSMENT — PAIN SCALES - GENERAL: PAINLEVEL_OUTOF10: 3

## 2025-08-12 ASSESSMENT — PAIN DESCRIPTION - LOCATION: LOCATION: FOOT

## 2025-08-12 ASSESSMENT — PAIN DESCRIPTION - DESCRIPTORS: DESCRIPTORS: ACHING

## 2025-08-19 ENCOUNTER — HOSPITAL ENCOUNTER (OUTPATIENT)
Dept: WOUND CARE | Age: 45
Discharge: HOME | End: 2025-08-19
Attending: STUDENT IN AN ORGANIZED HEALTH CARE EDUCATION/TRAINING PROGRAM
Payer: COMMERCIAL

## 2025-08-19 VITALS
SYSTOLIC BLOOD PRESSURE: 140 MMHG | RESPIRATION RATE: 16 BRPM | DIASTOLIC BLOOD PRESSURE: 84 MMHG | TEMPERATURE: 96.8 F | HEART RATE: 93 BPM

## 2025-08-19 DIAGNOSIS — E11.628 DIABETIC FOOT INFECTION (HCC): Primary | ICD-10-CM

## 2025-08-19 DIAGNOSIS — L08.9 DIABETIC FOOT INFECTION (HCC): Primary | ICD-10-CM

## 2025-08-19 PROCEDURE — 11042 DBRDMT SUBQ TIS 1ST 20SQCM/<: CPT

## 2025-08-19 PROCEDURE — 6370000000 HC RX 637 (ALT 250 FOR IP): Performed by: STUDENT IN AN ORGANIZED HEALTH CARE EDUCATION/TRAINING PROGRAM

## 2025-08-19 RX ORDER — BETAMETHASONE DIPROPIONATE 0.5 MG/G
CREAM TOPICAL PRN
OUTPATIENT
Start: 2025-08-19

## 2025-08-19 RX ORDER — LIDOCAINE HYDROCHLORIDE 20 MG/ML
JELLY TOPICAL PRN
Status: DISCONTINUED | OUTPATIENT
Start: 2025-08-19 | End: 2025-08-20 | Stop reason: HOSPADM

## 2025-08-19 RX ORDER — MUPIROCIN 2 %
OINTMENT (GRAM) TOPICAL PRN
OUTPATIENT
Start: 2025-08-19

## 2025-08-19 RX ORDER — LIDOCAINE 40 MG/G
CREAM TOPICAL PRN
OUTPATIENT
Start: 2025-08-19

## 2025-08-19 RX ORDER — GINSENG 100 MG
CAPSULE ORAL PRN
OUTPATIENT
Start: 2025-08-19

## 2025-08-19 RX ORDER — LIDOCAINE HYDROCHLORIDE 20 MG/ML
JELLY TOPICAL PRN
OUTPATIENT
Start: 2025-08-19

## 2025-08-19 RX ORDER — LIDOCAINE HYDROCHLORIDE 40 MG/ML
SOLUTION TOPICAL PRN
OUTPATIENT
Start: 2025-08-19

## 2025-08-19 RX ORDER — CLOBETASOL PROPIONATE 0.5 MG/G
OINTMENT TOPICAL PRN
OUTPATIENT
Start: 2025-08-19

## 2025-08-19 RX ORDER — BACITRACIN ZINC AND POLYMYXIN B SULFATE 500; 1000 [USP'U]/G; [USP'U]/G
OINTMENT TOPICAL PRN
OUTPATIENT
Start: 2025-08-19

## 2025-08-19 RX ORDER — GENTAMICIN SULFATE 1 MG/G
OINTMENT TOPICAL PRN
OUTPATIENT
Start: 2025-08-19

## 2025-08-19 RX ORDER — NEOMYCIN/BACITRACIN/POLYMYXINB 3.5-400-5K
OINTMENT (GRAM) TOPICAL PRN
OUTPATIENT
Start: 2025-08-19

## 2025-08-19 RX ORDER — SILVER SULFADIAZINE 10 MG/G
CREAM TOPICAL PRN
OUTPATIENT
Start: 2025-08-19

## 2025-08-19 RX ORDER — LIDOCAINE 50 MG/G
OINTMENT TOPICAL PRN
OUTPATIENT
Start: 2025-08-19

## 2025-08-19 RX ORDER — TRIAMCINOLONE ACETONIDE 1 MG/G
OINTMENT TOPICAL PRN
OUTPATIENT
Start: 2025-08-19

## 2025-08-19 RX ORDER — SODIUM CHLOR/HYPOCHLOROUS ACID 0.033 %
SOLUTION, IRRIGATION IRRIGATION PRN
OUTPATIENT
Start: 2025-08-19

## 2025-08-19 RX ADMIN — LIDOCAINE HYDROCHLORIDE: 20 JELLY TOPICAL at 14:34

## 2025-08-19 ASSESSMENT — PAIN DESCRIPTION - DESCRIPTORS: DESCRIPTORS: ACHING

## 2025-08-19 ASSESSMENT — PAIN DESCRIPTION - ORIENTATION: ORIENTATION: RIGHT

## 2025-08-19 ASSESSMENT — PAIN - FUNCTIONAL ASSESSMENT: PAIN_FUNCTIONAL_ASSESSMENT: ACTIVITIES ARE NOT PREVENTED

## 2025-08-19 ASSESSMENT — PAIN DESCRIPTION - LOCATION: LOCATION: FOOT

## 2025-08-19 ASSESSMENT — PAIN SCALES - GENERAL: PAINLEVEL_OUTOF10: 3

## 2025-08-19 ASSESSMENT — PAIN DESCRIPTION - PAIN TYPE: TYPE: ACUTE PAIN
